# Patient Record
Sex: MALE | Race: BLACK OR AFRICAN AMERICAN | Employment: OTHER | URBAN - METROPOLITAN AREA
[De-identification: names, ages, dates, MRNs, and addresses within clinical notes are randomized per-mention and may not be internally consistent; named-entity substitution may affect disease eponyms.]

---

## 2019-10-01 ENCOUNTER — ED HISTORICAL/CONVERTED ENCOUNTER (OUTPATIENT)
Dept: OTHER | Age: 59
End: 2019-10-01

## 2020-08-28 ENCOUNTER — IP HISTORICAL/CONVERTED ENCOUNTER (OUTPATIENT)
Dept: OTHER | Age: 60
End: 2020-08-28

## 2021-01-01 ENCOUNTER — APPOINTMENT (OUTPATIENT)
Dept: CT IMAGING | Age: 61
DRG: 291 | End: 2021-01-01
Attending: NURSE PRACTITIONER
Payer: OTHER GOVERNMENT

## 2021-01-01 ENCOUNTER — APPOINTMENT (OUTPATIENT)
Dept: GENERAL RADIOLOGY | Age: 61
DRG: 870 | End: 2021-01-01
Attending: EMERGENCY MEDICINE
Payer: OTHER GOVERNMENT

## 2021-01-01 ENCOUNTER — APPOINTMENT (OUTPATIENT)
Dept: CT IMAGING | Age: 61
DRG: 870 | End: 2021-01-01
Attending: FAMILY MEDICINE
Payer: OTHER GOVERNMENT

## 2021-01-01 ENCOUNTER — APPOINTMENT (OUTPATIENT)
Dept: NUCLEAR MEDICINE | Age: 61
DRG: 291 | End: 2021-01-01
Attending: FAMILY MEDICINE
Payer: OTHER GOVERNMENT

## 2021-01-01 ENCOUNTER — APPOINTMENT (OUTPATIENT)
Dept: GENERAL RADIOLOGY | Age: 61
DRG: 870 | End: 2021-01-01
Attending: FAMILY MEDICINE
Payer: OTHER GOVERNMENT

## 2021-01-01 ENCOUNTER — HOSPITAL ENCOUNTER (INPATIENT)
Age: 61
LOS: 6 days | Discharge: HOME | DRG: 291 | End: 2021-09-07
Attending: INTERNAL MEDICINE | Admitting: INTERNAL MEDICINE
Payer: OTHER GOVERNMENT

## 2021-01-01 ENCOUNTER — APPOINTMENT (OUTPATIENT)
Dept: CT IMAGING | Age: 61
DRG: 291 | End: 2021-01-01
Attending: INTERNAL MEDICINE
Payer: OTHER GOVERNMENT

## 2021-01-01 ENCOUNTER — APPOINTMENT (OUTPATIENT)
Dept: ULTRASOUND IMAGING | Age: 61
DRG: 870 | End: 2021-01-01
Attending: INTERNAL MEDICINE
Payer: OTHER GOVERNMENT

## 2021-01-01 ENCOUNTER — APPOINTMENT (OUTPATIENT)
Dept: GENERAL RADIOLOGY | Age: 61
DRG: 870 | End: 2021-01-01
Attending: THORACIC SURGERY (CARDIOTHORACIC VASCULAR SURGERY)
Payer: OTHER GOVERNMENT

## 2021-01-01 ENCOUNTER — APPOINTMENT (OUTPATIENT)
Dept: CT IMAGING | Age: 61
DRG: 870 | End: 2021-01-01
Attending: EMERGENCY MEDICINE
Payer: OTHER GOVERNMENT

## 2021-01-01 ENCOUNTER — APPOINTMENT (OUTPATIENT)
Dept: ENDOSCOPY | Age: 61
DRG: 870 | End: 2021-01-01
Attending: INTERNAL MEDICINE
Payer: OTHER GOVERNMENT

## 2021-01-01 ENCOUNTER — APPOINTMENT (OUTPATIENT)
Dept: GENERAL RADIOLOGY | Age: 61
DRG: 291 | End: 2021-01-01
Attending: FAMILY MEDICINE
Payer: OTHER GOVERNMENT

## 2021-01-01 ENCOUNTER — APPOINTMENT (OUTPATIENT)
Dept: GENERAL RADIOLOGY | Age: 61
DRG: 870 | End: 2021-01-01
Attending: INTERNAL MEDICINE
Payer: OTHER GOVERNMENT

## 2021-01-01 ENCOUNTER — ANESTHESIA EVENT (OUTPATIENT)
Dept: CARDIOLOGY UNIT | Age: 61
DRG: 870 | End: 2021-01-01
Payer: OTHER GOVERNMENT

## 2021-01-01 ENCOUNTER — APPOINTMENT (OUTPATIENT)
Dept: GENERAL RADIOLOGY | Age: 61
DRG: 291 | End: 2021-01-01
Attending: EMERGENCY MEDICINE
Payer: OTHER GOVERNMENT

## 2021-01-01 ENCOUNTER — APPOINTMENT (OUTPATIENT)
Dept: NON INVASIVE DIAGNOSTICS | Age: 61
DRG: 291 | End: 2021-01-01
Attending: FAMILY MEDICINE
Payer: OTHER GOVERNMENT

## 2021-01-01 ENCOUNTER — APPOINTMENT (OUTPATIENT)
Dept: GENERAL RADIOLOGY | Age: 61
DRG: 291 | End: 2021-01-01
Attending: INTERNAL MEDICINE
Payer: OTHER GOVERNMENT

## 2021-01-01 ENCOUNTER — ANESTHESIA (OUTPATIENT)
Dept: CARDIOLOGY UNIT | Age: 61
DRG: 870 | End: 2021-01-01
Payer: OTHER GOVERNMENT

## 2021-01-01 ENCOUNTER — APPOINTMENT (OUTPATIENT)
Dept: GENERAL RADIOLOGY | Age: 61
DRG: 291 | End: 2021-01-01
Attending: NURSE PRACTITIONER
Payer: OTHER GOVERNMENT

## 2021-01-01 ENCOUNTER — HOSPITAL ENCOUNTER (INPATIENT)
Age: 61
LOS: 5 days | Discharge: HOME HEALTH | DRG: 291 | End: 2021-08-14
Attending: FAMILY MEDICINE | Admitting: FAMILY MEDICINE
Payer: OTHER GOVERNMENT

## 2021-01-01 ENCOUNTER — ANESTHESIA (OUTPATIENT)
Dept: ENDOSCOPY | Age: 61
DRG: 870 | End: 2021-01-01
Payer: OTHER GOVERNMENT

## 2021-01-01 ENCOUNTER — HOSPITAL ENCOUNTER (INPATIENT)
Age: 61
LOS: 7 days | DRG: 870 | End: 2021-10-25
Attending: EMERGENCY MEDICINE | Admitting: FAMILY MEDICINE
Payer: OTHER GOVERNMENT

## 2021-01-01 ENCOUNTER — ANESTHESIA EVENT (OUTPATIENT)
Dept: ENDOSCOPY | Age: 61
DRG: 870 | End: 2021-01-01
Payer: OTHER GOVERNMENT

## 2021-01-01 VITALS
BODY MASS INDEX: 18.73 KG/M2 | DIASTOLIC BLOOD PRESSURE: 29 MMHG | RESPIRATION RATE: 22 BRPM | HEIGHT: 74 IN | HEART RATE: 117 BPM | OXYGEN SATURATION: 94 % | TEMPERATURE: 97.7 F | WEIGHT: 145.94 LBS | SYSTOLIC BLOOD PRESSURE: 59 MMHG

## 2021-01-01 VITALS
DIASTOLIC BLOOD PRESSURE: 78 MMHG | WEIGHT: 115.52 LBS | HEIGHT: 74 IN | SYSTOLIC BLOOD PRESSURE: 119 MMHG | HEART RATE: 71 BPM | TEMPERATURE: 97.5 F | OXYGEN SATURATION: 94 % | BODY MASS INDEX: 14.83 KG/M2 | RESPIRATION RATE: 18 BRPM

## 2021-01-01 VITALS
HEART RATE: 90 BPM | BODY MASS INDEX: 15.33 KG/M2 | OXYGEN SATURATION: 95 % | RESPIRATION RATE: 28 BRPM | DIASTOLIC BLOOD PRESSURE: 75 MMHG | HEIGHT: 74 IN | SYSTOLIC BLOOD PRESSURE: 112 MMHG | TEMPERATURE: 97.1 F | WEIGHT: 119.49 LBS

## 2021-01-01 DIAGNOSIS — J81.0 ACUTE PULMONARY EDEMA (HCC): ICD-10-CM

## 2021-01-01 DIAGNOSIS — I51.7 CARDIOMEGALY: ICD-10-CM

## 2021-01-01 DIAGNOSIS — N17.9 AKI (ACUTE KIDNEY INJURY) (HCC): ICD-10-CM

## 2021-01-01 DIAGNOSIS — R79.89 POSITIVE D DIMER: ICD-10-CM

## 2021-01-01 DIAGNOSIS — I50.43 ACUTE ON CHRONIC COMBINED SYSTOLIC AND DIASTOLIC CONGESTIVE HEART FAILURE (HCC): Primary | ICD-10-CM

## 2021-01-01 DIAGNOSIS — I50.9 ACUTE CONGESTIVE HEART FAILURE, UNSPECIFIED HEART FAILURE TYPE (HCC): Primary | ICD-10-CM

## 2021-01-01 DIAGNOSIS — R09.02 HYPOXIA: ICD-10-CM

## 2021-01-01 DIAGNOSIS — J18.9 COMMUNITY ACQUIRED PNEUMONIA, UNSPECIFIED LATERALITY: Primary | ICD-10-CM

## 2021-01-01 LAB
25(OH)D3 SERPL-MCNC: 10.1 NG/ML (ref 30–100)
ABO + RH BLD: NORMAL
ALBUMIN SERPL-MCNC: 2.2 G/DL (ref 3.5–5)
ALBUMIN SERPL-MCNC: 2.4 G/DL (ref 3.5–5)
ALBUMIN SERPL-MCNC: 2.5 G/DL (ref 3.5–5)
ALBUMIN SERPL-MCNC: 2.6 G/DL (ref 3.5–5)
ALBUMIN SERPL-MCNC: 2.6 G/DL (ref 3.5–5)
ALBUMIN SERPL-MCNC: 3 G/DL (ref 3.5–5)
ALBUMIN SERPL-MCNC: 3.1 G/DL (ref 3.5–5)
ALBUMIN SERPL-MCNC: 3.2 G/DL (ref 3.5–5)
ALBUMIN SERPL-MCNC: 3.2 G/DL (ref 3.5–5)
ALBUMIN SERPL-MCNC: 3.3 G/DL (ref 3.5–5)
ALBUMIN/GLOB SERPL: 0.7 {RATIO} (ref 1.1–2.2)
ALBUMIN/GLOB SERPL: 0.8 {RATIO} (ref 1.1–2.2)
ALBUMIN/GLOB SERPL: 0.8 {RATIO} (ref 1.1–2.2)
ALBUMIN/GLOB SERPL: 0.9 {RATIO} (ref 1.1–2.2)
ALBUMIN/GLOB SERPL: 1 {RATIO} (ref 1.1–2.2)
ALBUMIN/GLOB SERPL: 1 {RATIO} (ref 1.1–2.2)
ALBUMIN/GLOB SERPL: 1.1 {RATIO} (ref 1.1–2.2)
ALP SERPL-CCNC: 124 U/L (ref 45–117)
ALP SERPL-CCNC: 136 U/L (ref 45–117)
ALP SERPL-CCNC: 49 U/L (ref 45–117)
ALP SERPL-CCNC: 49 U/L (ref 45–117)
ALP SERPL-CCNC: 56 U/L (ref 45–117)
ALP SERPL-CCNC: 59 U/L (ref 45–117)
ALP SERPL-CCNC: 61 U/L (ref 45–117)
ALP SERPL-CCNC: 61 U/L (ref 45–117)
ALP SERPL-CCNC: 64 U/L (ref 45–117)
ALP SERPL-CCNC: 78 U/L (ref 45–117)
ALP SERPL-CCNC: 97 U/L (ref 45–117)
ALT SERPL-CCNC: 100 U/L (ref 12–78)
ALT SERPL-CCNC: 16 U/L (ref 12–78)
ALT SERPL-CCNC: 16 U/L (ref 12–78)
ALT SERPL-CCNC: 28 U/L (ref 12–78)
ALT SERPL-CCNC: 31 U/L (ref 12–78)
ALT SERPL-CCNC: 34 U/L (ref 12–78)
ALT SERPL-CCNC: 35 U/L (ref 12–78)
ALT SERPL-CCNC: 37 U/L (ref 12–78)
ALT SERPL-CCNC: 40 U/L (ref 12–78)
ALT SERPL-CCNC: 41 U/L (ref 12–78)
ALT SERPL-CCNC: 53 U/L (ref 12–78)
AMPHETAMINES UR QL SCN: NEGATIVE NG/ML
ANION GAP SERPL CALC-SCNC: 10 MMOL/L (ref 5–15)
ANION GAP SERPL CALC-SCNC: 11 MMOL/L (ref 5–15)
ANION GAP SERPL CALC-SCNC: 11 MMOL/L (ref 5–15)
ANION GAP SERPL CALC-SCNC: 22 MMOL/L (ref 5–15)
ANION GAP SERPL CALC-SCNC: 3 MMOL/L (ref 5–15)
ANION GAP SERPL CALC-SCNC: 5 MMOL/L (ref 5–15)
ANION GAP SERPL CALC-SCNC: 6 MMOL/L (ref 5–15)
ANION GAP SERPL CALC-SCNC: 7 MMOL/L (ref 5–15)
ANION GAP SERPL CALC-SCNC: 8 MMOL/L (ref 5–15)
ANION GAP SERPL CALC-SCNC: 9 MMOL/L (ref 5–15)
APPEARANCE UR: ABNORMAL
APPEARANCE UR: ABNORMAL
APPEARANCE UR: CLEAR
ARTERIAL PATENCY WRIST A: ABNORMAL
ARTERIAL PATENCY WRIST A: NORMAL
AST SERPL W P-5'-P-CCNC: 150 U/L (ref 15–37)
AST SERPL W P-5'-P-CCNC: 25 U/L (ref 15–37)
AST SERPL W P-5'-P-CCNC: 25 U/L (ref 15–37)
AST SERPL W P-5'-P-CCNC: 26 U/L (ref 15–37)
AST SERPL W P-5'-P-CCNC: 41 U/L (ref 15–37)
AST SERPL W P-5'-P-CCNC: 44 U/L (ref 15–37)
AST SERPL W P-5'-P-CCNC: 48 U/L (ref 15–37)
AST SERPL W P-5'-P-CCNC: 50 U/L (ref 15–37)
AST SERPL W P-5'-P-CCNC: 51 U/L (ref 15–37)
AST SERPL W P-5'-P-CCNC: 72 U/L (ref 15–37)
AST SERPL W P-5'-P-CCNC: 79 U/L (ref 15–37)
ATRIAL RATE: 109 BPM
ATRIAL RATE: 68 BPM
ATRIAL RATE: 79 BPM
ATRIAL RATE: 93 BPM
BACTERIA SPEC CULT: ABNORMAL
BACTERIA SPEC CULT: NORMAL
BACTERIA SPEC CULT: NORMAL
BACTERIA URNS QL MICRO: ABNORMAL /HPF
BACTERIA URNS QL MICRO: ABNORMAL /HPF
BACTERIA URNS QL MICRO: NEGATIVE /HPF
BARBITURATES UR QL SCN: NEGATIVE NG/ML
BASE DEFICIT BLDA-SCNC: 2.2 MMOL/L (ref 0–2)
BASE DEFICIT BLDA-SCNC: 2.9 MMOL/L (ref 0–2)
BASE DEFICIT BLDV-SCNC: 0.5 MMOL/L (ref 0–2)
BASE EXCESS BLDA CALC-SCNC: 0.1 MMOL/L (ref 0–2)
BASE EXCESS BLDA CALC-SCNC: 0.4 MMOL/L (ref 0–2)
BASE EXCESS BLDA CALC-SCNC: 2 MMOL/L (ref 0–2)
BASE EXCESS BLDA CALC-SCNC: 2.8 MMOL/L (ref 0–2)
BASE EXCESS BLDA CALC-SCNC: 2.9 MMOL/L (ref 0–2)
BASE EXCESS BLDA CALC-SCNC: 4.3 MMOL/L (ref 0–2)
BASE EXCESS BLDA CALC-SCNC: 7.5 MMOL/L (ref 0–2)
BASOPHILS # BLD: 0 K/UL (ref 0–0.1)
BASOPHILS NFR BLD: 0 % (ref 0–1)
BASOPHILS NFR BLD: 1 % (ref 0–1)
BASOPHILS NFR BLD: 1 % (ref 0–1)
BDY SITE: ABNORMAL
BDY SITE: NORMAL
BENZODIAZ UR QL: NEGATIVE NG/ML
BILIRUB SERPL-MCNC: 0.4 MG/DL (ref 0.2–1)
BILIRUB SERPL-MCNC: 0.6 MG/DL (ref 0.2–1)
BILIRUB SERPL-MCNC: 0.7 MG/DL (ref 0.2–1)
BILIRUB SERPL-MCNC: 0.8 MG/DL (ref 0.2–1)
BILIRUB SERPL-MCNC: 1 MG/DL (ref 0.2–1)
BILIRUB SERPL-MCNC: 1 MG/DL (ref 0.2–1)
BILIRUB SERPL-MCNC: 1.1 MG/DL (ref 0.2–1)
BILIRUB SERPL-MCNC: 1.2 MG/DL (ref 0.2–1)
BILIRUB SERPL-MCNC: 1.4 MG/DL (ref 0.2–1)
BILIRUB UR QL: NEGATIVE
BLOOD GROUP ANTIBODIES SERPL: NEGATIVE
BNP SERPL-MCNC: 9747 PG/ML
BNP SERPL-MCNC: ABNORMAL PG/ML
BUN SERPL-MCNC: 27 MG/DL (ref 6–20)
BUN SERPL-MCNC: 29 MG/DL (ref 6–20)
BUN SERPL-MCNC: 30 MG/DL (ref 6–20)
BUN SERPL-MCNC: 31 MG/DL (ref 6–20)
BUN SERPL-MCNC: 35 MG/DL (ref 6–20)
BUN SERPL-MCNC: 37 MG/DL (ref 6–20)
BUN SERPL-MCNC: 38 MG/DL (ref 6–20)
BUN SERPL-MCNC: 39 MG/DL (ref 6–20)
BUN SERPL-MCNC: 41 MG/DL (ref 6–20)
BUN SERPL-MCNC: 41 MG/DL (ref 6–20)
BUN SERPL-MCNC: 47 MG/DL (ref 6–20)
BUN SERPL-MCNC: 50 MG/DL (ref 6–20)
BUN SERPL-MCNC: 53 MG/DL (ref 6–20)
BUN SERPL-MCNC: 54 MG/DL (ref 6–20)
BUN SERPL-MCNC: 59 MG/DL (ref 6–20)
BUN SERPL-MCNC: 60 MG/DL (ref 6–20)
BUN SERPL-MCNC: 68 MG/DL (ref 6–20)
BUN/CREAT SERPL: 18 (ref 12–20)
BUN/CREAT SERPL: 20 (ref 12–20)
BUN/CREAT SERPL: 20 (ref 12–20)
BUN/CREAT SERPL: 21 (ref 12–20)
BUN/CREAT SERPL: 21 (ref 12–20)
BUN/CREAT SERPL: 24 (ref 12–20)
BUN/CREAT SERPL: 24 (ref 12–20)
BUN/CREAT SERPL: 25 (ref 12–20)
BUN/CREAT SERPL: 25 (ref 12–20)
BUN/CREAT SERPL: 27 (ref 12–20)
BUN/CREAT SERPL: 30 (ref 12–20)
BUN/CREAT SERPL: 31 (ref 12–20)
BUN/CREAT SERPL: 36 (ref 12–20)
BUN/CREAT SERPL: 43 (ref 12–20)
BUN/CREAT SERPL: 44 (ref 12–20)
BUN/CREAT SERPL: 46 (ref 12–20)
BUN/CREAT SERPL: 47 (ref 12–20)
BZE UR QL: NEGATIVE NG/ML
CA-I BLD-MCNC: 7.1 MG/DL (ref 8.5–10.1)
CA-I BLD-MCNC: 7.3 MG/DL (ref 8.5–10.1)
CA-I BLD-MCNC: 7.3 MG/DL (ref 8.5–10.1)
CA-I BLD-MCNC: 7.5 MG/DL (ref 8.5–10.1)
CA-I BLD-MCNC: 7.7 MG/DL (ref 8.5–10.1)
CA-I BLD-MCNC: 8.1 MG/DL (ref 8.5–10.1)
CA-I BLD-MCNC: 8.3 MG/DL (ref 8.5–10.1)
CA-I BLD-MCNC: 8.4 MG/DL (ref 8.5–10.1)
CA-I BLD-MCNC: 8.5 MG/DL (ref 8.5–10.1)
CA-I BLD-MCNC: 8.5 MG/DL (ref 8.5–10.1)
CA-I BLD-MCNC: 8.6 MG/DL (ref 8.5–10.1)
CA-I BLD-MCNC: 8.7 MG/DL (ref 8.5–10.1)
CA-I BLD-MCNC: 8.8 MG/DL (ref 8.5–10.1)
CA-I BLD-MCNC: <5 MG/DL (ref 8.5–10.1)
CALCULATED P AXIS, ECG09: 73 DEGREES
CALCULATED P AXIS, ECG09: 75 DEGREES
CALCULATED P AXIS, ECG09: 77 DEGREES
CALCULATED P AXIS, ECG09: 84 DEGREES
CALCULATED R AXIS, ECG10: -27 DEGREES
CALCULATED R AXIS, ECG10: -51 DEGREES
CALCULATED R AXIS, ECG10: -64 DEGREES
CALCULATED R AXIS, ECG10: 15 DEGREES
CALCULATED T AXIS, ECG11: 72 DEGREES
CALCULATED T AXIS, ECG11: 72 DEGREES
CALCULATED T AXIS, ECG11: 86 DEGREES
CALCULATED T AXIS, ECG11: 90 DEGREES
CANNABINOIDS UR QL SCN: NEGATIVE NG/ML
CHLORIDE SERPL-SCNC: 102 MMOL/L (ref 97–108)
CHLORIDE SERPL-SCNC: 102 MMOL/L (ref 97–108)
CHLORIDE SERPL-SCNC: 104 MMOL/L (ref 97–108)
CHLORIDE SERPL-SCNC: 104 MMOL/L (ref 97–108)
CHLORIDE SERPL-SCNC: 105 MMOL/L (ref 97–108)
CHLORIDE SERPL-SCNC: 105 MMOL/L (ref 97–108)
CHLORIDE SERPL-SCNC: 106 MMOL/L (ref 97–108)
CHLORIDE SERPL-SCNC: 106 MMOL/L (ref 97–108)
CHLORIDE SERPL-SCNC: 107 MMOL/L (ref 97–108)
CHLORIDE SERPL-SCNC: 107 MMOL/L (ref 97–108)
CHLORIDE SERPL-SCNC: 108 MMOL/L (ref 97–108)
CHLORIDE SERPL-SCNC: 109 MMOL/L (ref 97–108)
CHLORIDE SERPL-SCNC: 109 MMOL/L (ref 97–108)
CHLORIDE SERPL-SCNC: 110 MMOL/L (ref 97–108)
CHLORIDE SERPL-SCNC: 111 MMOL/L (ref 97–108)
CHLORIDE SERPL-SCNC: 112 MMOL/L (ref 97–108)
CHLORIDE SERPL-SCNC: 114 MMOL/L (ref 97–108)
CHLORIDE UR-SCNC: 128 MMOL/L
CHOLEST SERPL-MCNC: 178 MG/DL
CK MB CFR SERPL CALC: 0.9 %
CK MB SERPL-MCNC: 2.8 NG/ML (ref 5–25)
CK SERPL-CCNC: 310 NG/ML (ref 39–308)
CO2 SERPL-SCNC: 20 MMOL/L (ref 21–32)
CO2 SERPL-SCNC: 21 MMOL/L (ref 21–32)
CO2 SERPL-SCNC: 22 MMOL/L (ref 21–32)
CO2 SERPL-SCNC: 23 MMOL/L (ref 21–32)
CO2 SERPL-SCNC: 23 MMOL/L (ref 21–32)
CO2 SERPL-SCNC: 24 MMOL/L (ref 21–32)
CO2 SERPL-SCNC: 24 MMOL/L (ref 21–32)
CO2 SERPL-SCNC: 25 MMOL/L (ref 21–32)
CO2 SERPL-SCNC: 25 MMOL/L (ref 21–32)
CO2 SERPL-SCNC: 27 MMOL/L (ref 21–32)
CO2 SERPL-SCNC: 28 MMOL/L (ref 21–32)
CO2 SERPL-SCNC: 28 MMOL/L (ref 21–32)
CO2 SERPL-SCNC: 29 MMOL/L (ref 21–32)
CO2 SERPL-SCNC: 30 MMOL/L (ref 21–32)
CO2 SERPL-SCNC: 30 MMOL/L (ref 21–32)
CO2 SERPL-SCNC: 32 MMOL/L (ref 21–32)
CO2 SERPL-SCNC: 34 MMOL/L (ref 21–32)
COLONY COUNT,CNT: ABNORMAL
COLOR UR: ABNORMAL
COVID-19 RAPID TEST, COVR: NOT DETECTED
COVID-19 RAPID TEST, COVR: NOT DETECTED
CREAT SERPL-MCNC: 0.84 MG/DL (ref 0.7–1.3)
CREAT SERPL-MCNC: 0.99 MG/DL (ref 0.7–1.3)
CREAT SERPL-MCNC: 1.13 MG/DL (ref 0.7–1.3)
CREAT SERPL-MCNC: 1.15 MG/DL (ref 0.7–1.3)
CREAT SERPL-MCNC: 1.37 MG/DL (ref 0.7–1.3)
CREAT SERPL-MCNC: 1.44 MG/DL (ref 0.7–1.3)
CREAT SERPL-MCNC: 1.45 MG/DL (ref 0.7–1.3)
CREAT SERPL-MCNC: 1.47 MG/DL (ref 0.7–1.3)
CREAT SERPL-MCNC: 1.47 MG/DL (ref 0.7–1.3)
CREAT SERPL-MCNC: 1.56 MG/DL (ref 0.7–1.3)
CREAT SERPL-MCNC: 1.58 MG/DL (ref 0.7–1.3)
CREAT SERPL-MCNC: 1.68 MG/DL (ref 0.7–1.3)
CREAT SERPL-MCNC: 1.69 MG/DL (ref 0.7–1.3)
CREAT SERPL-MCNC: 1.78 MG/DL (ref 0.7–1.3)
CREAT SERPL-MCNC: 2.13 MG/DL (ref 0.7–1.3)
CREAT SERPL-MCNC: 2.22 MG/DL (ref 0.7–1.3)
CREAT SERPL-MCNC: 2.25 MG/DL (ref 0.7–1.3)
CREAT UR-MCNC: 26 MG/DL
CREAT UR-MCNC: 26.1 MG/DL
D DIMER PPP FEU-MCNC: 2.05 UG/ML(FEU)
DIAGNOSIS, 93000: NORMAL
DIFFERENTIAL METHOD BLD: ABNORMAL
ECHO AO ROOT DIAM: 3.6 CM
ECHO AR MAX VEL PISA: 505 CM/S
ECHO AV PEAK GRADIENT: 5 MMHG
ECHO AV PEAK VELOCITY: 115 CM/S
ECHO AV REGURGITANT PHT: 512 MS
ECHO EST RA PRESSURE: 15 MMHG
ECHO LA AREA 4C: 27.9 CM2
ECHO LA MAJOR AXIS: 4 CM
ECHO LA MAJOR AXIS: 4 CM
ECHO LV E' SEPTAL VELOCITY: 4.09 CM/S
ECHO LV EDV A2C: 334 CM3
ECHO LV EJECTION FRACTION BIPLANE: 42.2 % (ref 55–100)
ECHO LV ESV A2C: 163 CM3
ECHO LV INTERNAL DIMENSION DIASTOLIC: 6.94 CM (ref 4.2–5.9)
ECHO LV INTERNAL DIMENSION SYSTOLIC: 5.46 CM
ECHO LV IVSD: 0.93 CM (ref 0.6–1)
ECHO LV MASS 2D: 393.9 G (ref 88–224)
ECHO LV MASS INDEX 2D: 210.6 G/M2 (ref 49–115)
ECHO LV POSTERIOR WALL DIASTOLIC: 1.44 CM (ref 0.6–1)
ECHO LVOT PEAK GRADIENT: 4 MMHG
ECHO LVOT PEAK VELOCITY: 99.1 CM/S
ECHO MV A VELOCITY: 45 CM/S
ECHO MV AREA PHT: 3.24 CM2
ECHO MV E DECELERATION TIME (DT): 116 MS
ECHO MV E VELOCITY: 85.1 CM/S
ECHO MV E/A RATIO: 1.89
ECHO MV E/E' SEPTAL: 20.81
ECHO MV PRESSURE HALF TIME (PHT): 68 MS
ECHO PV MAX VELOCITY: 70.5 CM/S
ECHO PV PEAK INSTANTANEOUS GRADIENT SYSTOLIC: 2 MMHG
ECHO PV PEAK INSTANTANEOUS GRADIENT SYSTOLIC: 6 MMHG
ECHO PV REGURGITANT MAX VELOCITY: 122 CM/S
ECHO RA AREA 4C: 16.01 CM2
ECHO RIGHT VENTRICULAR SYSTOLIC PRESSURE (RVSP): 57 MMHG
ECHO RV INTERNAL DIMENSION: 4.08 CM
ECHO TV MAX VELOCITY: 324 CM/S
ECHO TV REGURGITANT PEAK GRADIENT: 42 MMHG
EOSINOPHIL # BLD: 0 K/UL (ref 0–0.4)
EOSINOPHIL # BLD: 0.1 K/UL (ref 0–0.4)
EOSINOPHIL # BLD: 0.2 K/UL (ref 0–0.4)
EOSINOPHIL NFR BLD: 0 % (ref 0–7)
EOSINOPHIL NFR BLD: 1 % (ref 0–7)
EOSINOPHIL NFR BLD: 1 % (ref 0–7)
EOSINOPHIL NFR BLD: 3 % (ref 0–7)
EPAP/CPAP/PEEP, PAPEEP: 0
EPAP/CPAP/PEEP, PAPEEP: 5
ERYTHROCYTE [DISTWIDTH] IN BLOOD BY AUTOMATED COUNT: 15 % (ref 11.5–14.5)
ERYTHROCYTE [DISTWIDTH] IN BLOOD BY AUTOMATED COUNT: 15.3 % (ref 11.5–14.5)
ERYTHROCYTE [DISTWIDTH] IN BLOOD BY AUTOMATED COUNT: 15.4 % (ref 11.5–14.5)
ERYTHROCYTE [DISTWIDTH] IN BLOOD BY AUTOMATED COUNT: 15.6 % (ref 11.5–14.5)
ERYTHROCYTE [DISTWIDTH] IN BLOOD BY AUTOMATED COUNT: 15.7 % (ref 11.5–14.5)
ERYTHROCYTE [DISTWIDTH] IN BLOOD BY AUTOMATED COUNT: 15.9 % (ref 11.5–14.5)
ERYTHROCYTE [DISTWIDTH] IN BLOOD BY AUTOMATED COUNT: 16.4 % (ref 11.5–14.5)
ERYTHROCYTE [DISTWIDTH] IN BLOOD BY AUTOMATED COUNT: 16.7 % (ref 11.5–14.5)
ERYTHROCYTE [DISTWIDTH] IN BLOOD BY AUTOMATED COUNT: 17.5 % (ref 11.5–14.5)
ERYTHROCYTE [DISTWIDTH] IN BLOOD BY AUTOMATED COUNT: 18.1 % (ref 11.5–14.5)
ERYTHROCYTE [DISTWIDTH] IN BLOOD BY AUTOMATED COUNT: 18.3 % (ref 11.5–14.5)
ERYTHROCYTE [DISTWIDTH] IN BLOOD BY AUTOMATED COUNT: 18.5 % (ref 11.5–14.5)
ERYTHROCYTE [DISTWIDTH] IN BLOOD BY AUTOMATED COUNT: 18.6 % (ref 11.5–14.5)
ERYTHROCYTE [DISTWIDTH] IN BLOOD BY AUTOMATED COUNT: 18.7 % (ref 11.5–14.5)
FIO2 ON VENT: 100 %
FIO2 ON VENT: 40 %
FIO2 ON VENT: 65 %
FOLATE SERPL-MCNC: 18.6 NG/ML (ref 5–21)
GAS FLOW.O2 O2 DELIVERY SYS: 1 L/MIN
GAS FLOW.O2 O2 DELIVERY SYS: 2 L/MIN
GAS FLOW.O2 O2 DELIVERY SYS: 3 L/MIN
GAS FLOW.O2 SETTING OXYMISER: 12 L/MIN
GLOBULIN SER CALC-MCNC: 2.7 G/DL (ref 2–4)
GLOBULIN SER CALC-MCNC: 2.8 G/DL (ref 2–4)
GLOBULIN SER CALC-MCNC: 2.8 G/DL (ref 2–4)
GLOBULIN SER CALC-MCNC: 3.2 G/DL (ref 2–4)
GLOBULIN SER CALC-MCNC: 3.3 G/DL (ref 2–4)
GLOBULIN SER CALC-MCNC: 3.4 G/DL (ref 2–4)
GLOBULIN SER CALC-MCNC: 3.5 G/DL (ref 2–4)
GLOBULIN SER CALC-MCNC: 3.5 G/DL (ref 2–4)
GLOBULIN SER CALC-MCNC: 3.6 G/DL (ref 2–4)
GLUCOSE BLD STRIP.AUTO-MCNC: 101 MG/DL (ref 65–117)
GLUCOSE BLD STRIP.AUTO-MCNC: 139 MG/DL (ref 65–117)
GLUCOSE BLD STRIP.AUTO-MCNC: 98 MG/DL (ref 65–117)
GLUCOSE SERPL-MCNC: 101 MG/DL (ref 65–100)
GLUCOSE SERPL-MCNC: 106 MG/DL (ref 65–100)
GLUCOSE SERPL-MCNC: 108 MG/DL (ref 65–100)
GLUCOSE SERPL-MCNC: 111 MG/DL (ref 65–100)
GLUCOSE SERPL-MCNC: 113 MG/DL (ref 65–100)
GLUCOSE SERPL-MCNC: 115 MG/DL (ref 65–100)
GLUCOSE SERPL-MCNC: 115 MG/DL (ref 65–100)
GLUCOSE SERPL-MCNC: 122 MG/DL (ref 65–100)
GLUCOSE SERPL-MCNC: 130 MG/DL (ref 65–100)
GLUCOSE SERPL-MCNC: 162 MG/DL (ref 65–100)
GLUCOSE SERPL-MCNC: 68 MG/DL (ref 65–100)
GLUCOSE SERPL-MCNC: 69 MG/DL (ref 65–100)
GLUCOSE SERPL-MCNC: 82 MG/DL (ref 65–100)
GLUCOSE SERPL-MCNC: 82 MG/DL (ref 65–100)
GLUCOSE SERPL-MCNC: 83 MG/DL (ref 65–100)
GLUCOSE SERPL-MCNC: 89 MG/DL (ref 65–100)
GLUCOSE SERPL-MCNC: 94 MG/DL (ref 65–100)
GLUCOSE UR STRIP.AUTO-MCNC: NEGATIVE MG/DL
GRAM STN SPEC: NORMAL
HCO3 BLDA-SCNC: 22 MMOL/L (ref 22–26)
HCO3 BLDA-SCNC: 23 MMOL/L (ref 22–26)
HCO3 BLDA-SCNC: 25 MMOL/L (ref 22–26)
HCO3 BLDA-SCNC: 25 MMOL/L (ref 22–26)
HCO3 BLDA-SCNC: 26 MMOL/L (ref 22–26)
HCO3 BLDA-SCNC: 27 MMOL/L (ref 22–26)
HCO3 BLDA-SCNC: 27 MMOL/L (ref 22–26)
HCO3 BLDA-SCNC: 28 MMOL/L (ref 22–26)
HCO3 BLDA-SCNC: 31 MMOL/L (ref 22–26)
HCO3 BLDV-SCNC: 23 MMOL/L (ref 22–26)
HCT VFR BLD AUTO: 36.2 % (ref 36.6–50.3)
HCT VFR BLD AUTO: 36.7 % (ref 36.6–50.3)
HCT VFR BLD AUTO: 38.3 % (ref 36.6–50.3)
HCT VFR BLD AUTO: 38.6 % (ref 36.6–50.3)
HCT VFR BLD AUTO: 40.3 % (ref 36.6–50.3)
HCT VFR BLD AUTO: 40.8 % (ref 36.6–50.3)
HCT VFR BLD AUTO: 41.3 % (ref 36.6–50.3)
HCT VFR BLD AUTO: 41.8 % (ref 36.6–50.3)
HCT VFR BLD AUTO: 42.3 % (ref 36.6–50.3)
HCT VFR BLD AUTO: 42.9 % (ref 36.6–50.3)
HCT VFR BLD AUTO: 43.6 % (ref 36.6–50.3)
HCT VFR BLD AUTO: 44.9 % (ref 36.6–50.3)
HCT VFR BLD AUTO: 45.1 % (ref 36.6–50.3)
HCT VFR BLD AUTO: 46.1 % (ref 36.6–50.3)
HDLC SERPL-MCNC: 41 MG/DL
HDLC SERPL: 4.3 {RATIO} (ref 0–5)
HGB BLD-MCNC: 11.9 G/DL (ref 12.1–17)
HGB BLD-MCNC: 12.8 G/DL (ref 12.1–17)
HGB BLD-MCNC: 12.9 G/DL (ref 12.1–17)
HGB BLD-MCNC: 12.9 G/DL (ref 12.1–17)
HGB BLD-MCNC: 13.1 G/DL (ref 12.1–17)
HGB BLD-MCNC: 13.3 G/DL (ref 12.1–17)
HGB BLD-MCNC: 13.7 G/DL (ref 12.1–17)
HGB BLD-MCNC: 14.1 G/DL (ref 12.1–17)
HGB BLD-MCNC: 14.4 G/DL (ref 12.1–17)
HGB BLD-MCNC: 14.8 G/DL (ref 12.1–17)
HGB BLD-MCNC: 15 G/DL (ref 12.1–17)
HGB BLD-MCNC: 15.2 G/DL (ref 12.1–17)
HGB UR QL STRIP: ABNORMAL
HYALINE CASTS URNS QL MICRO: ABNORMAL /LPF (ref 0–5)
HYALINE CASTS URNS QL MICRO: ABNORMAL /LPF (ref 0–5)
IMM GRANULOCYTES # BLD AUTO: 0 K/UL (ref 0–0.04)
IMM GRANULOCYTES # BLD AUTO: 0.1 K/UL (ref 0–0.04)
IMM GRANULOCYTES NFR BLD AUTO: 0 % (ref 0–0.5)
IMM GRANULOCYTES NFR BLD AUTO: 1 % (ref 0–0.5)
IPAP/PIP, IPAPIP: 0
KETONES UR QL STRIP.AUTO: NEGATIVE MG/DL
LACTATE SERPL-SCNC: 2.4 MMOL/L (ref 0.4–2)
LACTATE SERPL-SCNC: 2.7 MMOL/L (ref 0.4–2)
LDLC SERPL CALC-MCNC: 107.8 MG/DL (ref 0–100)
LEUKOCYTE ESTERASE UR QL STRIP.AUTO: ABNORMAL
LEUKOCYTE ESTERASE UR QL STRIP.AUTO: ABNORMAL
LEUKOCYTE ESTERASE UR QL STRIP.AUTO: NEGATIVE
LIPID PROFILE,FLP: ABNORMAL
LYMPHOCYTES # BLD: 0.2 K/UL (ref 0.8–3.5)
LYMPHOCYTES # BLD: 0.3 K/UL (ref 0.8–3.5)
LYMPHOCYTES # BLD: 0.4 K/UL (ref 0.8–3.5)
LYMPHOCYTES # BLD: 0.5 K/UL (ref 0.8–3.5)
LYMPHOCYTES # BLD: 0.6 K/UL (ref 0.8–3.5)
LYMPHOCYTES # BLD: 0.6 K/UL (ref 0.8–3.5)
LYMPHOCYTES # BLD: 0.7 K/UL (ref 0.8–3.5)
LYMPHOCYTES # BLD: 1.1 K/UL (ref 0.8–3.5)
LYMPHOCYTES # BLD: 1.3 K/UL (ref 0.8–3.5)
LYMPHOCYTES # BLD: 1.4 K/UL (ref 0.8–3.5)
LYMPHOCYTES # BLD: 1.6 K/UL (ref 0.8–3.5)
LYMPHOCYTES # BLD: 1.7 K/UL (ref 0.8–3.5)
LYMPHOCYTES NFR BLD: 10 % (ref 12–49)
LYMPHOCYTES NFR BLD: 14 % (ref 12–49)
LYMPHOCYTES NFR BLD: 19 % (ref 12–49)
LYMPHOCYTES NFR BLD: 21 % (ref 12–49)
LYMPHOCYTES NFR BLD: 22 % (ref 12–49)
LYMPHOCYTES NFR BLD: 24 % (ref 12–49)
LYMPHOCYTES NFR BLD: 31 % (ref 12–49)
LYMPHOCYTES NFR BLD: 34 % (ref 12–49)
LYMPHOCYTES NFR BLD: 4 % (ref 12–49)
LYMPHOCYTES NFR BLD: 8 % (ref 12–49)
LYMPHOCYTES NFR BLD: 9 % (ref 12–49)
LYMPHOCYTES NFR BLD: 9 % (ref 12–49)
MAGNESIUM SERPL-MCNC: 2.1 MG/DL (ref 1.6–2.4)
MAGNESIUM SERPL-MCNC: 2.2 MG/DL (ref 1.6–2.4)
MAGNESIUM SERPL-MCNC: 2.3 MG/DL (ref 1.6–2.4)
MAGNESIUM SERPL-MCNC: 2.3 MG/DL (ref 1.6–2.4)
MCH RBC QN AUTO: 30.9 PG (ref 26–34)
MCH RBC QN AUTO: 30.9 PG (ref 26–34)
MCH RBC QN AUTO: 31 PG (ref 26–34)
MCH RBC QN AUTO: 31.1 PG (ref 26–34)
MCH RBC QN AUTO: 31.2 PG (ref 26–34)
MCH RBC QN AUTO: 31.3 PG (ref 26–34)
MCH RBC QN AUTO: 31.4 PG (ref 26–34)
MCH RBC QN AUTO: 31.5 PG (ref 26–34)
MCH RBC QN AUTO: 31.8 PG (ref 26–34)
MCH RBC QN AUTO: 31.9 PG (ref 26–34)
MCH RBC QN AUTO: 32 PG (ref 26–34)
MCH RBC QN AUTO: 32.2 PG (ref 26–34)
MCHC RBC AUTO-ENTMCNC: 32 G/DL (ref 30–36.5)
MCHC RBC AUTO-ENTMCNC: 32.5 G/DL (ref 30–36.5)
MCHC RBC AUTO-ENTMCNC: 32.6 G/DL (ref 30–36.5)
MCHC RBC AUTO-ENTMCNC: 32.8 G/DL (ref 30–36.5)
MCHC RBC AUTO-ENTMCNC: 32.9 G/DL (ref 30–36.5)
MCHC RBC AUTO-ENTMCNC: 33.3 G/DL (ref 30–36.5)
MCHC RBC AUTO-ENTMCNC: 33.3 G/DL (ref 30–36.5)
MCHC RBC AUTO-ENTMCNC: 33.4 G/DL (ref 30–36.5)
MCHC RBC AUTO-ENTMCNC: 33.4 G/DL (ref 30–36.5)
MCHC RBC AUTO-ENTMCNC: 34.2 G/DL (ref 30–36.5)
MCHC RBC AUTO-ENTMCNC: 34.5 G/DL (ref 30–36.5)
MCHC RBC AUTO-ENTMCNC: 34.9 G/DL (ref 30–36.5)
MCV RBC AUTO: 90.4 FL (ref 80–99)
MCV RBC AUTO: 91 FL (ref 80–99)
MCV RBC AUTO: 91.2 FL (ref 80–99)
MCV RBC AUTO: 91.8 FL (ref 80–99)
MCV RBC AUTO: 92.3 FL (ref 80–99)
MCV RBC AUTO: 92.6 FL (ref 80–99)
MCV RBC AUTO: 93.2 FL (ref 80–99)
MCV RBC AUTO: 93.2 FL (ref 80–99)
MCV RBC AUTO: 93.5 FL (ref 80–99)
MCV RBC AUTO: 95 FL (ref 80–99)
MCV RBC AUTO: 95.4 FL (ref 80–99)
MCV RBC AUTO: 96.5 FL (ref 80–99)
MCV RBC AUTO: 97.2 FL (ref 80–99)
MCV RBC AUTO: 97.8 FL (ref 80–99)
METHADONE UR QL SCN: NEGATIVE NG/ML
MICROALBUMIN UR-MCNC: 9.26 MG/DL
MICROALBUMIN/CREAT UR-RTO: 355 MGMALB/GCRE (ref 0–30)
MONOCYTES # BLD: 0 K/UL (ref 0–1)
MONOCYTES # BLD: 0.2 K/UL (ref 0–1)
MONOCYTES # BLD: 0.2 K/UL (ref 0–1)
MONOCYTES # BLD: 0.3 K/UL (ref 0–1)
MONOCYTES # BLD: 0.4 K/UL (ref 0–1)
MONOCYTES # BLD: 0.4 K/UL (ref 0–1)
MONOCYTES # BLD: 0.5 K/UL (ref 0–1)
MONOCYTES # BLD: 0.5 K/UL (ref 0–1)
MONOCYTES # BLD: 0.6 K/UL (ref 0–1)
MONOCYTES # BLD: 0.7 K/UL (ref 0–1)
MONOCYTES NFR BLD: 1 % (ref 5–13)
MONOCYTES NFR BLD: 12 % (ref 5–13)
MONOCYTES NFR BLD: 13 % (ref 5–13)
MONOCYTES NFR BLD: 5 % (ref 5–13)
MONOCYTES NFR BLD: 6 % (ref 5–13)
MONOCYTES NFR BLD: 6 % (ref 5–13)
MONOCYTES NFR BLD: 7 % (ref 5–13)
MONOCYTES NFR BLD: 7 % (ref 5–13)
MONOCYTES NFR BLD: 8 % (ref 5–13)
MONOCYTES NFR BLD: 9 % (ref 5–13)
MRSA DNA SPEC QL NAA+PROBE: NOT DETECTED
MUCOUS THREADS URNS QL MICRO: ABNORMAL /LPF
MUCOUS THREADS URNS QL MICRO: ABNORMAL /LPF
MV DEC SLOPE: 5760 MM/S2
MV DEC SLOPE: 5760 MM/S2
NEUTS SEG # BLD: 2.3 K/UL (ref 1.8–8)
NEUTS SEG # BLD: 2.5 K/UL (ref 1.8–8)
NEUTS SEG # BLD: 2.6 K/UL (ref 1.8–8)
NEUTS SEG # BLD: 2.9 K/UL (ref 1.8–8)
NEUTS SEG # BLD: 3.7 K/UL (ref 1.8–8)
NEUTS SEG # BLD: 3.8 K/UL (ref 1.8–8)
NEUTS SEG # BLD: 3.8 K/UL (ref 1.8–8)
NEUTS SEG # BLD: 3.9 K/UL (ref 1.8–8)
NEUTS SEG # BLD: 3.9 K/UL (ref 1.8–8)
NEUTS SEG # BLD: 4.3 K/UL (ref 1.8–8)
NEUTS SEG # BLD: 4.8 K/UL (ref 1.8–8)
NEUTS SEG # BLD: 4.9 K/UL (ref 1.8–8)
NEUTS SEG NFR BLD: 53 % (ref 32–75)
NEUTS SEG NFR BLD: 54 % (ref 32–75)
NEUTS SEG NFR BLD: 65 % (ref 32–75)
NEUTS SEG NFR BLD: 69 % (ref 32–75)
NEUTS SEG NFR BLD: 71 % (ref 32–75)
NEUTS SEG NFR BLD: 76 % (ref 32–75)
NEUTS SEG NFR BLD: 79 % (ref 32–75)
NEUTS SEG NFR BLD: 83 % (ref 32–75)
NEUTS SEG NFR BLD: 85 % (ref 32–75)
NEUTS SEG NFR BLD: 86 % (ref 32–75)
NEUTS SEG NFR BLD: 89 % (ref 32–75)
NEUTS SEG NFR BLD: 90 % (ref 32–75)
NITRITE UR QL STRIP.AUTO: NEGATIVE
NITRITE UR QL STRIP.AUTO: NEGATIVE
NITRITE UR QL STRIP.AUTO: POSITIVE
NRBC # BLD: 0 K/UL (ref 0–0.01)
NRBC # BLD: 0.02 K/UL (ref 0–0.01)
NRBC # BLD: 0.02 K/UL (ref 0–0.01)
NRBC # BLD: 0.03 K/UL (ref 0–0.01)
NRBC # BLD: 0.05 K/UL (ref 0–0.01)
NRBC # BLD: 0.06 K/UL (ref 0–0.01)
NRBC # BLD: 0.07 K/UL (ref 0–0.01)
NRBC BLD-RTO: 0 PER 100 WBC
NRBC BLD-RTO: 0.4 PER 100 WBC
NRBC BLD-RTO: 0.4 PER 100 WBC
NRBC BLD-RTO: 0.6 PER 100 WBC
NRBC BLD-RTO: 1 PER 100 WBC
NRBC BLD-RTO: 1.1 PER 100 WBC
NRBC BLD-RTO: 1.4 PER 100 WBC
OPIATES UR QL: NEGATIVE NG/ML
P-R INTERVAL, ECG05: 152 MS
P-R INTERVAL, ECG05: 156 MS
P-R INTERVAL, ECG05: 160 MS
P-R INTERVAL, ECG05: 180 MS
PCO2 BLDA: 26 MMHG (ref 35–45)
PCO2 BLDA: 36 MMHG (ref 35–45)
PCO2 BLDA: 37 MMHG (ref 35–45)
PCO2 BLDA: 37 MMHG (ref 35–45)
PCO2 BLDA: 38 MMHG (ref 35–45)
PCO2 BLDA: 40 MMHG (ref 35–45)
PCO2 BLDA: 42 MMHG (ref 35–45)
PCO2 BLDA: 45 MMHG (ref 35–45)
PCO2 BLDA: 51 MMHG (ref 35–45)
PCO2 BLDV: 51.2 MMHG (ref 40–50)
PCP UR QL: NEGATIVE NG/ML
PERFORMED BY, TECHID: ABNORMAL
PERFORMED BY, TECHID: NORMAL
PERFORMED BY, TECHID: NORMAL
PH BLDA: 7.29 [PH] (ref 7.35–7.45)
PH BLDA: 7.4 [PH] (ref 7.35–7.45)
PH BLDA: 7.43 [PH] (ref 7.35–7.45)
PH BLDA: 7.44 [PH] (ref 7.35–7.45)
PH BLDA: 7.45 [PH] (ref 7.35–7.45)
PH BLDA: 7.46 [PH] (ref 7.35–7.45)
PH BLDA: 7.46 [PH] (ref 7.35–7.45)
PH BLDA: 7.47 [PH] (ref 7.35–7.45)
PH BLDA: 7.53 [PH] (ref 7.35–7.45)
PH BLDV: 7.32 [PH] (ref 7.31–7.41)
PH UR STRIP: 5 [PH] (ref 5–8)
PHOSPHATE SERPL-MCNC: 7.2 MG/DL (ref 2.6–4.7)
PHOSPHATE SERPL-MCNC: 7.6 MG/DL (ref 2.6–4.7)
PLATELET # BLD AUTO: 103 K/UL (ref 150–400)
PLATELET # BLD AUTO: 113 K/UL (ref 150–400)
PLATELET # BLD AUTO: 124 K/UL (ref 150–400)
PLATELET # BLD AUTO: 125 K/UL (ref 150–400)
PLATELET # BLD AUTO: 130 K/UL (ref 150–400)
PLATELET # BLD AUTO: 132 K/UL (ref 150–400)
PLATELET # BLD AUTO: 152 K/UL (ref 150–400)
PLATELET # BLD AUTO: 189 K/UL (ref 150–400)
PLATELET # BLD AUTO: 209 K/UL (ref 150–400)
PLATELET # BLD AUTO: 211 K/UL (ref 150–400)
PLATELET # BLD AUTO: 211 K/UL (ref 150–400)
PLATELET # BLD AUTO: 221 K/UL (ref 150–400)
PLATELET # BLD AUTO: 224 K/UL (ref 150–400)
PLATELET # BLD AUTO: 228 K/UL (ref 150–400)
PMV BLD AUTO: 10.2 FL (ref 8.9–12.9)
PMV BLD AUTO: 10.2 FL (ref 8.9–12.9)
PMV BLD AUTO: 10.3 FL (ref 8.9–12.9)
PMV BLD AUTO: 10.7 FL (ref 8.9–12.9)
PMV BLD AUTO: 10.7 FL (ref 8.9–12.9)
PMV BLD AUTO: 11 FL (ref 8.9–12.9)
PMV BLD AUTO: 11.1 FL (ref 8.9–12.9)
PMV BLD AUTO: 11.2 FL (ref 8.9–12.9)
PMV BLD AUTO: 11.5 FL (ref 8.9–12.9)
PMV BLD AUTO: 11.8 FL (ref 8.9–12.9)
PMV BLD AUTO: 9.7 FL (ref 8.9–12.9)
PO2 BLDA: 105 MMHG (ref 75–100)
PO2 BLDA: 63 MMHG (ref 75–100)
PO2 BLDA: 83 MMHG (ref 75–100)
PO2 BLDA: 88 MMHG (ref 75–100)
PO2 BLDA: 88 MMHG (ref 75–100)
PO2 BLDA: 90 MMHG (ref 75–100)
PO2 BLDA: 91 MMHG (ref 75–100)
PO2 BLDA: 96 MMHG (ref 75–100)
PO2 BLDA: 97 MMHG (ref 75–100)
PO2 BLDV: 34 MMHG (ref 36–42)
POTASSIUM SERPL-SCNC: 3.1 MMOL/L (ref 3.5–5.1)
POTASSIUM SERPL-SCNC: 3.4 MMOL/L (ref 3.5–5.1)
POTASSIUM SERPL-SCNC: 3.6 MMOL/L (ref 3.5–5.1)
POTASSIUM SERPL-SCNC: 3.8 MMOL/L (ref 3.5–5.1)
POTASSIUM SERPL-SCNC: 3.9 MMOL/L (ref 3.5–5.1)
POTASSIUM SERPL-SCNC: 4.1 MMOL/L (ref 3.5–5.1)
POTASSIUM SERPL-SCNC: 4.1 MMOL/L (ref 3.5–5.1)
POTASSIUM SERPL-SCNC: 4.2 MMOL/L (ref 3.5–5.1)
POTASSIUM SERPL-SCNC: 4.3 MMOL/L (ref 3.5–5.1)
POTASSIUM SERPL-SCNC: 4.4 MMOL/L (ref 3.5–5.1)
POTASSIUM SERPL-SCNC: 4.4 MMOL/L (ref 3.5–5.1)
POTASSIUM SERPL-SCNC: 4.6 MMOL/L (ref 3.5–5.1)
POTASSIUM SERPL-SCNC: 4.6 MMOL/L (ref 3.5–5.1)
POTASSIUM SERPL-SCNC: 5 MMOL/L (ref 3.5–5.1)
POTASSIUM SERPL-SCNC: 5.2 MMOL/L (ref 3.5–5.1)
PROPOXYPH UR QL: NEGATIVE NG/ML
PROT SERPL-MCNC: 5 G/DL (ref 6.4–8.2)
PROT SERPL-MCNC: 5.3 G/DL (ref 6.4–8.2)
PROT SERPL-MCNC: 5.7 G/DL (ref 6.4–8.2)
PROT SERPL-MCNC: 6 G/DL (ref 6.4–8.2)
PROT SERPL-MCNC: 6.2 G/DL (ref 6.4–8.2)
PROT SERPL-MCNC: 6.2 G/DL (ref 6.4–8.2)
PROT SERPL-MCNC: 6.3 G/DL (ref 6.4–8.2)
PROT SERPL-MCNC: 6.5 G/DL (ref 6.4–8.2)
PROT SERPL-MCNC: 6.6 G/DL (ref 6.4–8.2)
PROT SERPL-MCNC: 6.6 G/DL (ref 6.4–8.2)
PROT SERPL-MCNC: 6.7 G/DL (ref 6.4–8.2)
PROT UR STRIP-MCNC: 100 MG/DL
PROT UR STRIP-MCNC: 30 MG/DL
PROT UR STRIP-MCNC: NEGATIVE MG/DL
PROT UR-MCNC: 20 MG/DL (ref 0–11.9)
PROT/CREAT UR-RTO: 0.8
PTH-INTACT SERPL-MCNC: 551.9 PG/ML (ref 18.4–88)
Q-T INTERVAL, ECG07: 394 MS
Q-T INTERVAL, ECG07: 426 MS
Q-T INTERVAL, ECG07: 444 MS
Q-T INTERVAL, ECG07: 488 MS
QRS DURATION, ECG06: 102 MS
QRS DURATION, ECG06: 94 MS
QRS DURATION, ECG06: 96 MS
QRS DURATION, ECG06: 98 MS
QTC CALCULATION (BEZET), ECG08: 509 MS
QTC CALCULATION (BEZET), ECG08: 518 MS
QTC CALCULATION (BEZET), ECG08: 529 MS
QTC CALCULATION (BEZET), ECG08: 530 MS
RBC # BLD AUTO: 3.81 M/UL (ref 4.1–5.7)
RBC # BLD AUTO: 4.06 M/UL (ref 4.1–5.7)
RBC # BLD AUTO: 4.12 M/UL (ref 4.1–5.7)
RBC # BLD AUTO: 4.18 M/UL (ref 4.1–5.7)
RBC # BLD AUTO: 4.21 M/UL (ref 4.1–5.7)
RBC # BLD AUTO: 4.23 M/UL (ref 4.1–5.7)
RBC # BLD AUTO: 4.3 M/UL (ref 4.1–5.7)
RBC # BLD AUTO: 4.5 M/UL (ref 4.1–5.7)
RBC # BLD AUTO: 4.54 M/UL (ref 4.1–5.7)
RBC # BLD AUTO: 4.59 M/UL (ref 4.1–5.7)
RBC # BLD AUTO: 4.78 M/UL (ref 4.1–5.7)
RBC # BLD AUTO: 4.83 M/UL (ref 4.1–5.7)
RBC # BLD AUTO: 4.84 M/UL (ref 4.1–5.7)
RBC # BLD AUTO: 4.85 M/UL (ref 4.1–5.7)
RBC #/AREA URNS HPF: ABNORMAL /HPF (ref 0–5)
SAO2 % BLD: 90 %
SAO2 % BLD: 94 %
SAO2 % BLD: 97 %
SAO2 % BLD: 98 %
SAO2 % BLD: 99 %
SAO2 % BLDV: 54 % (ref 60–80)
SAO2% DEVICE SAO2% SENSOR NAME: ABNORMAL
SAO2% DEVICE SAO2% SENSOR NAME: NORMAL
SARS-COV-2, COV2: NORMAL
SARS-COV-2, COV2: NORMAL
SODIUM SERPL-SCNC: 136 MMOL/L (ref 136–145)
SODIUM SERPL-SCNC: 137 MMOL/L (ref 136–145)
SODIUM SERPL-SCNC: 138 MMOL/L (ref 136–145)
SODIUM SERPL-SCNC: 139 MMOL/L (ref 136–145)
SODIUM SERPL-SCNC: 139 MMOL/L (ref 136–145)
SODIUM SERPL-SCNC: 140 MMOL/L (ref 136–145)
SODIUM SERPL-SCNC: 140 MMOL/L (ref 136–145)
SODIUM SERPL-SCNC: 141 MMOL/L (ref 136–145)
SODIUM SERPL-SCNC: 142 MMOL/L (ref 136–145)
SODIUM SERPL-SCNC: 143 MMOL/L (ref 136–145)
SODIUM SERPL-SCNC: 144 MMOL/L (ref 136–145)
SODIUM SERPL-SCNC: 144 MMOL/L (ref 136–145)
SODIUM SERPL-SCNC: 145 MMOL/L (ref 136–145)
SODIUM SERPL-SCNC: 146 MMOL/L (ref 136–145)
SODIUM SERPL-SCNC: 149 MMOL/L (ref 136–145)
SP GR UR REFRACTOMETRY: 1.01 (ref 1–1.03)
SP GR UR REFRACTOMETRY: 1.01 (ref 1–1.03)
SP GR UR REFRACTOMETRY: 1.02 (ref 1–1.03)
SPECIAL REQUESTS,SREQ: ABNORMAL
SPECIAL REQUESTS,SREQ: ABNORMAL
SPECIAL REQUESTS,SREQ: NORMAL
SPECIMEN EXP DATE BLD: NORMAL
SPECIMEN SITE: ABNORMAL
SPECIMEN SOURCE: NORMAL
SPECIMEN SOURCE: NORMAL
TRIGL SERPL-MCNC: 146 MG/DL (ref ?–150)
TROPONIN I SERPL-MCNC: 0.09 NG/ML
TROPONIN I SERPL-MCNC: 0.11 NG/ML
TROPONIN I SERPL-MCNC: 0.16 NG/ML
TROPONIN I SERPL-MCNC: 0.18 NG/ML
TROPONIN I SERPL-MCNC: 0.25 NG/ML
TROPONIN I SERPL-MCNC: 0.31 NG/ML
TROPONIN-HIGH SENSITIVITY: 122 NG/L (ref 0–76)
TROPONIN-HIGH SENSITIVITY: 135 NG/L (ref 0–76)
TROPONIN-HIGH SENSITIVITY: 178 NG/L (ref 0–76)
TSH SERPL DL<=0.05 MIU/L-ACNC: 2.61 UIU/ML (ref 0.36–3.74)
UA: UC IF INDICATED,UAUC: ABNORMAL
UROBILINOGEN UR QL STRIP.AUTO: 0.1 EU/DL (ref 0.1–1)
UROBILINOGEN UR QL STRIP.AUTO: 0.1 EU/DL (ref 0.1–1)
UROBILINOGEN UR QL STRIP.AUTO: 4 EU/DL (ref 0.1–1)
VENTILATION MODE VENT: ABNORMAL
VENTILATION MODE VENT: NORMAL
VENTRICULAR RATE, ECG03: 109 BPM
VENTRICULAR RATE, ECG03: 68 BPM
VENTRICULAR RATE, ECG03: 79 BPM
VENTRICULAR RATE, ECG03: 93 BPM
VIT B12 SERPL-MCNC: 724 PG/ML (ref 193–986)
VLDLC SERPL CALC-MCNC: 29.2 MG/DL
VT SETTING VENT: 500 ML
WBC # BLD AUTO: 2.8 K/UL (ref 4.1–11.1)
WBC # BLD AUTO: 3.1 K/UL (ref 4.1–11.1)
WBC # BLD AUTO: 4.7 K/UL (ref 4.1–11.1)
WBC # BLD AUTO: 4.8 K/UL (ref 4.1–11.1)
WBC # BLD AUTO: 5 K/UL (ref 4.1–11.1)
WBC # BLD AUTO: 5.3 K/UL (ref 4.1–11.1)
WBC # BLD AUTO: 5.4 K/UL (ref 4.1–11.1)
WBC # BLD AUTO: 5.5 K/UL (ref 4.1–11.1)
WBC # BLD AUTO: 5.6 K/UL (ref 4.1–11.1)
WBC # BLD AUTO: 5.9 K/UL (ref 4.1–11.1)
WBC # BLD AUTO: 5.9 K/UL (ref 4.1–11.1)
WBC URNS QL MICRO: ABNORMAL /HPF (ref 0–4)

## 2021-01-01 PROCEDURE — 74011250636 HC RX REV CODE- 250/636: Performed by: FAMILY MEDICINE

## 2021-01-01 PROCEDURE — 84484 ASSAY OF TROPONIN QUANT: CPT

## 2021-01-01 PROCEDURE — 71045 X-RAY EXAM CHEST 1 VIEW: CPT

## 2021-01-01 PROCEDURE — 74011250637 HC RX REV CODE- 250/637: Performed by: INTERNAL MEDICINE

## 2021-01-01 PROCEDURE — 65270000029 HC RM PRIVATE

## 2021-01-01 PROCEDURE — 74011250636 HC RX REV CODE- 250/636: Performed by: INTERNAL MEDICINE

## 2021-01-01 PROCEDURE — 80053 COMPREHEN METABOLIC PANEL: CPT

## 2021-01-01 PROCEDURE — 74011000250 HC RX REV CODE- 250: Performed by: NURSE PRACTITIONER

## 2021-01-01 PROCEDURE — 87040 BLOOD CULTURE FOR BACTERIA: CPT

## 2021-01-01 PROCEDURE — 74011000250 HC RX REV CODE- 250: Performed by: FAMILY MEDICINE

## 2021-01-01 PROCEDURE — 74011250637 HC RX REV CODE- 250/637: Performed by: PSYCHIATRY & NEUROLOGY

## 2021-01-01 PROCEDURE — 97161 PT EVAL LOW COMPLEX 20 MIN: CPT

## 2021-01-01 PROCEDURE — 94003 VENT MGMT INPAT SUBQ DAY: CPT

## 2021-01-01 PROCEDURE — 94002 VENT MGMT INPAT INIT DAY: CPT

## 2021-01-01 PROCEDURE — 96374 THER/PROPH/DIAG INJ IV PUSH: CPT

## 2021-01-01 PROCEDURE — 97530 THERAPEUTIC ACTIVITIES: CPT

## 2021-01-01 PROCEDURE — 84100 ASSAY OF PHOSPHORUS: CPT

## 2021-01-01 PROCEDURE — 5A1955Z RESPIRATORY VENTILATION, GREATER THAN 96 CONSECUTIVE HOURS: ICD-10-PCS | Performed by: FAMILY MEDICINE

## 2021-01-01 PROCEDURE — 93005 ELECTROCARDIOGRAM TRACING: CPT

## 2021-01-01 PROCEDURE — 74011000258 HC RX REV CODE- 258: Performed by: FAMILY MEDICINE

## 2021-01-01 PROCEDURE — 94640 AIRWAY INHALATION TREATMENT: CPT

## 2021-01-01 PROCEDURE — 83880 ASSAY OF NATRIURETIC PEPTIDE: CPT

## 2021-01-01 PROCEDURE — 74011250636 HC RX REV CODE- 250/636: Performed by: NURSE PRACTITIONER

## 2021-01-01 PROCEDURE — 85025 COMPLETE CBC W/AUTO DIFF WBC: CPT

## 2021-01-01 PROCEDURE — 36415 COLL VENOUS BLD VENIPUNCTURE: CPT

## 2021-01-01 PROCEDURE — 77010033678 HC OXYGEN DAILY

## 2021-01-01 PROCEDURE — 80048 BASIC METABOLIC PNL TOTAL CA: CPT

## 2021-01-01 PROCEDURE — 82436 ASSAY OF URINE CHLORIDE: CPT

## 2021-01-01 PROCEDURE — 74011000250 HC RX REV CODE- 250: Performed by: INTERNAL MEDICINE

## 2021-01-01 PROCEDURE — 74011250637 HC RX REV CODE- 250/637: Performed by: FAMILY MEDICINE

## 2021-01-01 PROCEDURE — 36600 WITHDRAWAL OF ARTERIAL BLOOD: CPT

## 2021-01-01 PROCEDURE — 94760 N-INVAS EAR/PLS OXIMETRY 1: CPT

## 2021-01-01 PROCEDURE — 86901 BLOOD TYPING SEROLOGIC RH(D): CPT

## 2021-01-01 PROCEDURE — 0DH68UZ INSERTION OF FEEDING DEVICE INTO STOMACH, VIA NATURAL OR ARTIFICIAL OPENING ENDOSCOPIC: ICD-10-PCS | Performed by: INTERNAL MEDICINE

## 2021-01-01 PROCEDURE — A9567 TECHNETIUM TC-99M AEROSOL: HCPCS

## 2021-01-01 PROCEDURE — 99285 EMERGENCY DEPT VISIT HI MDM: CPT

## 2021-01-01 PROCEDURE — 99222 1ST HOSP IP/OBS MODERATE 55: CPT | Performed by: THORACIC SURGERY (CARDIOTHORACIC VASCULAR SURGERY)

## 2021-01-01 PROCEDURE — 87077 CULTURE AEROBIC IDENTIFY: CPT

## 2021-01-01 PROCEDURE — P9047 ALBUMIN (HUMAN), 25%, 50ML: HCPCS | Performed by: FAMILY MEDICINE

## 2021-01-01 PROCEDURE — 82803 BLOOD GASES ANY COMBINATION: CPT

## 2021-01-01 PROCEDURE — 96375 TX/PRO/DX INJ NEW DRUG ADDON: CPT

## 2021-01-01 PROCEDURE — 5A09457 ASSISTANCE WITH RESPIRATORY VENTILATION, 24-96 CONSECUTIVE HOURS, CONTINUOUS POSITIVE AIRWAY PRESSURE: ICD-10-PCS | Performed by: FAMILY MEDICINE

## 2021-01-01 PROCEDURE — 82962 GLUCOSE BLOOD TEST: CPT

## 2021-01-01 PROCEDURE — 74011250636 HC RX REV CODE- 250/636: Performed by: ANESTHESIOLOGY

## 2021-01-01 PROCEDURE — C9113 INJ PANTOPRAZOLE SODIUM, VIA: HCPCS | Performed by: NURSE PRACTITIONER

## 2021-01-01 PROCEDURE — 74011250637 HC RX REV CODE- 250/637: Performed by: NURSE PRACTITIONER

## 2021-01-01 PROCEDURE — 80307 DRUG TEST PRSMV CHEM ANLYZR: CPT

## 2021-01-01 PROCEDURE — 74011250636 HC RX REV CODE- 250/636

## 2021-01-01 PROCEDURE — 97165 OT EVAL LOW COMPLEX 30 MIN: CPT

## 2021-01-01 PROCEDURE — 96365 THER/PROPH/DIAG IV INF INIT: CPT

## 2021-01-01 PROCEDURE — 96361 HYDRATE IV INFUSION ADD-ON: CPT

## 2021-01-01 PROCEDURE — 99284 EMERGENCY DEPT VISIT MOD MDM: CPT

## 2021-01-01 PROCEDURE — 83605 ASSAY OF LACTIC ACID: CPT

## 2021-01-01 PROCEDURE — 87635 SARS-COV-2 COVID-19 AMP PRB: CPT

## 2021-01-01 PROCEDURE — 82043 UR ALBUMIN QUANTITATIVE: CPT

## 2021-01-01 PROCEDURE — 65610000006 HC RM INTENSIVE CARE

## 2021-01-01 PROCEDURE — 81001 URINALYSIS AUTO W/SCOPE: CPT

## 2021-01-01 PROCEDURE — 80061 LIPID PANEL: CPT

## 2021-01-01 PROCEDURE — 85027 COMPLETE CBC AUTOMATED: CPT

## 2021-01-01 PROCEDURE — 0W9B30Z DRAINAGE OF LEFT PLEURAL CAVITY WITH DRAINAGE DEVICE, PERCUTANEOUS APPROACH: ICD-10-PCS | Performed by: THORACIC SURGERY (CARDIOTHORACIC VASCULAR SURGERY)

## 2021-01-01 PROCEDURE — 83735 ASSAY OF MAGNESIUM: CPT

## 2021-01-01 PROCEDURE — 82607 VITAMIN B-12: CPT

## 2021-01-01 PROCEDURE — 87086 URINE CULTURE/COLONY COUNT: CPT

## 2021-01-01 PROCEDURE — 77030005305 HC CATH NG LPZ BARD -A: Performed by: INTERNAL MEDICINE

## 2021-01-01 PROCEDURE — 74174 CTA ABD&PLVS W/CONTRAST: CPT

## 2021-01-01 PROCEDURE — 87641 MR-STAPH DNA AMP PROBE: CPT

## 2021-01-01 PROCEDURE — 82553 CREATINE MB FRACTION: CPT

## 2021-01-01 PROCEDURE — 74011250636 HC RX REV CODE- 250/636: Performed by: HOSPITALIST

## 2021-01-01 PROCEDURE — 82550 ASSAY OF CK (CPK): CPT

## 2021-01-01 PROCEDURE — 74011000250 HC RX REV CODE- 250: Performed by: EMERGENCY MEDICINE

## 2021-01-01 PROCEDURE — 84443 ASSAY THYROID STIM HORMONE: CPT

## 2021-01-01 PROCEDURE — 74011000258 HC RX REV CODE- 258: Performed by: ANESTHESIOLOGY

## 2021-01-01 PROCEDURE — 74011000250 HC RX REV CODE- 250: Performed by: ANESTHESIOLOGY

## 2021-01-01 PROCEDURE — P9047 ALBUMIN (HUMAN), 25%, 50ML: HCPCS | Performed by: INTERNAL MEDICINE

## 2021-01-01 PROCEDURE — 82306 VITAMIN D 25 HYDROXY: CPT

## 2021-01-01 PROCEDURE — 87070 CULTURE OTHR SPECIMN AEROBIC: CPT

## 2021-01-01 PROCEDURE — 70450 CT HEAD/BRAIN W/O DYE: CPT

## 2021-01-01 PROCEDURE — 74011250636 HC RX REV CODE- 250/636: Performed by: PHYSICIAN ASSISTANT

## 2021-01-01 PROCEDURE — 74011000636 HC RX REV CODE- 636: Performed by: INTERNAL MEDICINE

## 2021-01-01 PROCEDURE — 94660 CPAP INITIATION&MGMT: CPT

## 2021-01-01 PROCEDURE — 85379 FIBRIN DEGRADATION QUANT: CPT

## 2021-01-01 PROCEDURE — 97110 THERAPEUTIC EXERCISES: CPT

## 2021-01-01 PROCEDURE — 76060000032 HC ANESTHESIA 0.5 TO 1 HR: Performed by: INTERNAL MEDICINE

## 2021-01-01 PROCEDURE — 74011250636 HC RX REV CODE- 250/636: Performed by: EMERGENCY MEDICINE

## 2021-01-01 PROCEDURE — 80069 RENAL FUNCTION PANEL: CPT

## 2021-01-01 PROCEDURE — 71275 CT ANGIOGRAPHY CHEST: CPT

## 2021-01-01 PROCEDURE — 93306 TTE W/DOPPLER COMPLETE: CPT

## 2021-01-01 PROCEDURE — 0BH17EZ INSERTION OF ENDOTRACHEAL AIRWAY INTO TRACHEA, VIA NATURAL OR ARTIFICIAL OPENING: ICD-10-PCS | Performed by: ANESTHESIOLOGY

## 2021-01-01 PROCEDURE — 84156 ASSAY OF PROTEIN URINE: CPT

## 2021-01-01 PROCEDURE — 2709999900 HC NON-CHARGEABLE SUPPLY: Performed by: INTERNAL MEDICINE

## 2021-01-01 PROCEDURE — 74011000636 HC RX REV CODE- 636: Performed by: FAMILY MEDICINE

## 2021-01-01 PROCEDURE — 83970 ASSAY OF PARATHORMONE: CPT

## 2021-01-01 PROCEDURE — 76770 US EXAM ABDO BACK WALL COMP: CPT

## 2021-01-01 PROCEDURE — 92610 EVALUATE SWALLOWING FUNCTION: CPT

## 2021-01-01 PROCEDURE — 76040000007: Performed by: INTERNAL MEDICINE

## 2021-01-01 PROCEDURE — 32551 INSERTION OF CHEST TUBE: CPT | Performed by: THORACIC SURGERY (CARDIOTHORACIC VASCULAR SURGERY)

## 2021-01-01 PROCEDURE — 3E0G76Z INTRODUCTION OF NUTRITIONAL SUBSTANCE INTO UPPER GI, VIA NATURAL OR ARTIFICIAL OPENING: ICD-10-PCS | Performed by: FAMILY MEDICINE

## 2021-01-01 PROCEDURE — 87186 SC STD MICRODIL/AGAR DIL: CPT

## 2021-01-01 RX ORDER — HEPARIN SODIUM 5000 [USP'U]/ML
5000 INJECTION, SOLUTION INTRAVENOUS; SUBCUTANEOUS EVERY 8 HOURS
Status: DISCONTINUED | OUTPATIENT
Start: 2021-01-01 | End: 2021-01-01

## 2021-01-01 RX ORDER — LORAZEPAM 2 MG/ML
0.5 INJECTION INTRAMUSCULAR ONCE
Status: COMPLETED | OUTPATIENT
Start: 2021-01-01 | End: 2021-01-01

## 2021-01-01 RX ORDER — IPRATROPIUM BROMIDE AND ALBUTEROL SULFATE 2.5; .5 MG/3ML; MG/3ML
3 SOLUTION RESPIRATORY (INHALATION)
Status: DISCONTINUED | OUTPATIENT
Start: 2021-01-01 | End: 2021-01-01

## 2021-01-01 RX ORDER — SPIRONOLACTONE 25 MG/1
25 TABLET ORAL DAILY
Status: DISCONTINUED | OUTPATIENT
Start: 2021-01-01 | End: 2021-01-01

## 2021-01-01 RX ORDER — ASPIRIN 325 MG
325 TABLET ORAL DAILY
Qty: 30 TABLET | Refills: 0 | Status: SHIPPED | OUTPATIENT
Start: 2021-01-01 | End: 2021-01-01 | Stop reason: ALTCHOICE

## 2021-01-01 RX ORDER — ONDANSETRON 2 MG/ML
4 INJECTION INTRAMUSCULAR; INTRAVENOUS
Status: DISCONTINUED | OUTPATIENT
Start: 2021-01-01 | End: 2021-01-01 | Stop reason: HOSPADM

## 2021-01-01 RX ORDER — SODIUM BICARBONATE 1 MEQ/ML
100 SYRINGE (ML) INTRAVENOUS ONCE
Status: COMPLETED | OUTPATIENT
Start: 2021-01-01 | End: 2021-01-01

## 2021-01-01 RX ORDER — HYDROXYZINE PAMOATE 25 MG/1
25 CAPSULE ORAL
Status: DISCONTINUED | OUTPATIENT
Start: 2021-01-01 | End: 2021-01-01 | Stop reason: HOSPADM

## 2021-01-01 RX ORDER — MIRTAZAPINE 7.5 MG/1
7.5 TABLET, FILM COATED ORAL
Qty: 30 TABLET | Refills: 0 | Status: SHIPPED | OUTPATIENT
Start: 2021-01-01

## 2021-01-01 RX ORDER — POLYETHYLENE GLYCOL 3350 17 G/17G
17 POWDER, FOR SOLUTION ORAL DAILY PRN
Status: DISCONTINUED | OUTPATIENT
Start: 2021-01-01 | End: 2021-01-01 | Stop reason: HOSPADM

## 2021-01-01 RX ORDER — FUROSEMIDE 40 MG/1
40 TABLET ORAL 2 TIMES DAILY
Qty: 60 TABLET | Refills: 0 | Status: SHIPPED | OUTPATIENT
Start: 2021-01-01

## 2021-01-01 RX ORDER — ACETAMINOPHEN 650 MG/1
650 SUPPOSITORY RECTAL
Status: DISCONTINUED | OUTPATIENT
Start: 2021-01-01 | End: 2021-01-01 | Stop reason: HOSPADM

## 2021-01-01 RX ORDER — FUROSEMIDE 10 MG/ML
40 INJECTION INTRAMUSCULAR; INTRAVENOUS 2 TIMES DAILY
Status: DISCONTINUED | OUTPATIENT
Start: 2021-01-01 | End: 2021-01-01

## 2021-01-01 RX ORDER — IPRATROPIUM BROMIDE AND ALBUTEROL SULFATE 2.5; .5 MG/3ML; MG/3ML
3 SOLUTION RESPIRATORY (INHALATION)
Status: DISCONTINUED | OUTPATIENT
Start: 2021-01-01 | End: 2021-01-01 | Stop reason: HOSPADM

## 2021-01-01 RX ORDER — ONDANSETRON 4 MG/1
4 TABLET, ORALLY DISINTEGRATING ORAL
Status: DISCONTINUED | OUTPATIENT
Start: 2021-01-01 | End: 2021-01-01 | Stop reason: HOSPADM

## 2021-01-01 RX ORDER — ENOXAPARIN SODIUM 100 MG/ML
40 INJECTION SUBCUTANEOUS DAILY
Status: DISCONTINUED | OUTPATIENT
Start: 2021-01-01 | End: 2021-01-01 | Stop reason: HOSPADM

## 2021-01-01 RX ORDER — FUROSEMIDE 10 MG/ML
40 INJECTION INTRAMUSCULAR; INTRAVENOUS EVERY 8 HOURS
Status: DISCONTINUED | OUTPATIENT
Start: 2021-01-01 | End: 2021-01-01

## 2021-01-01 RX ORDER — ALBUMIN HUMAN 250 G/1000ML
25 SOLUTION INTRAVENOUS EVERY 6 HOURS
Status: COMPLETED | OUTPATIENT
Start: 2021-01-01 | End: 2021-01-01

## 2021-01-01 RX ORDER — BISACODYL 5 MG
5 TABLET, DELAYED RELEASE (ENTERIC COATED) ORAL DAILY PRN
Status: DISCONTINUED | OUTPATIENT
Start: 2021-01-01 | End: 2021-01-01 | Stop reason: HOSPADM

## 2021-01-01 RX ORDER — PROPOFOL 10 MG/ML
5 VIAL (ML) INTRAVENOUS
Status: DISCONTINUED | OUTPATIENT
Start: 2021-01-01 | End: 2021-01-01

## 2021-01-01 RX ORDER — PROPOFOL 10 MG/ML
INJECTION, EMULSION INTRAVENOUS
Status: DISCONTINUED | OUTPATIENT
Start: 2021-01-01 | End: 2021-01-01 | Stop reason: HOSPADM

## 2021-01-01 RX ORDER — GUAIFENESIN 100 MG/5ML
81 LIQUID (ML) ORAL DAILY
Status: DISCONTINUED | OUTPATIENT
Start: 2021-01-01 | End: 2021-01-01 | Stop reason: HOSPADM

## 2021-01-01 RX ORDER — HYDROXYZINE PAMOATE 25 MG/1
25 CAPSULE ORAL
Qty: 30 CAPSULE | Refills: 0 | Status: SHIPPED | OUTPATIENT
Start: 2021-01-01 | End: 2021-01-01

## 2021-01-01 RX ORDER — KETAMINE HYDROCHLORIDE 10 MG/ML
INJECTION, SOLUTION INTRAMUSCULAR; INTRAVENOUS AS NEEDED
Status: DISCONTINUED | OUTPATIENT
Start: 2021-01-01 | End: 2021-01-01 | Stop reason: HOSPADM

## 2021-01-01 RX ORDER — ATROPINE SULFATE 0.1 MG/ML
INJECTION INTRAVENOUS
Status: COMPLETED | OUTPATIENT
Start: 2021-01-01 | End: 2021-01-01

## 2021-01-01 RX ORDER — SODIUM BICARBONATE 1 MEQ/ML
SYRINGE (ML) INTRAVENOUS
Status: DISCONTINUED
Start: 2021-01-01 | End: 2021-01-01 | Stop reason: HOSPADM

## 2021-01-01 RX ORDER — FUROSEMIDE 10 MG/ML
60 INJECTION INTRAMUSCULAR; INTRAVENOUS 2 TIMES DAILY
Status: DISCONTINUED | OUTPATIENT
Start: 2021-01-01 | End: 2021-01-01 | Stop reason: HOSPADM

## 2021-01-01 RX ORDER — POTASSIUM CHLORIDE 750 MG/1
40 TABLET, FILM COATED, EXTENDED RELEASE ORAL
Status: COMPLETED | OUTPATIENT
Start: 2021-01-01 | End: 2021-01-01

## 2021-01-01 RX ORDER — SODIUM CHLORIDE 0.9 % (FLUSH) 0.9 %
5-40 SYRINGE (ML) INJECTION AS NEEDED
Status: DISCONTINUED | OUTPATIENT
Start: 2021-01-01 | End: 2021-01-01 | Stop reason: HOSPADM

## 2021-01-01 RX ORDER — ERGOCALCIFEROL 1.25 MG/1
100000 CAPSULE ORAL
Status: DISCONTINUED | OUTPATIENT
Start: 2021-01-01 | End: 2021-01-01 | Stop reason: HOSPADM

## 2021-01-01 RX ORDER — FUROSEMIDE 10 MG/ML
20 INJECTION INTRAMUSCULAR; INTRAVENOUS ONCE
Status: COMPLETED | OUTPATIENT
Start: 2021-01-01 | End: 2021-01-01

## 2021-01-01 RX ORDER — PROPOFOL 10 MG/ML
0-50 VIAL (ML) INTRAVENOUS
Status: DISCONTINUED | OUTPATIENT
Start: 2021-01-01 | End: 2021-01-01 | Stop reason: HOSPADM

## 2021-01-01 RX ORDER — ASPIRIN 325 MG
325 TABLET ORAL DAILY
Status: DISCONTINUED | OUTPATIENT
Start: 2021-01-01 | End: 2021-01-01

## 2021-01-01 RX ORDER — IPRATROPIUM BROMIDE AND ALBUTEROL SULFATE 2.5; .5 MG/3ML; MG/3ML
3 SOLUTION RESPIRATORY (INHALATION)
Qty: 30 NEBULE | Refills: 0 | Status: SHIPPED | OUTPATIENT
Start: 2021-01-01

## 2021-01-01 RX ORDER — FUROSEMIDE 10 MG/ML
40 INJECTION INTRAMUSCULAR; INTRAVENOUS ONCE
Status: COMPLETED | OUTPATIENT
Start: 2021-01-01 | End: 2021-01-01

## 2021-01-01 RX ORDER — ASPIRIN 325 MG
325 TABLET ORAL DAILY
Status: DISCONTINUED | OUTPATIENT
Start: 2021-01-01 | End: 2021-01-01 | Stop reason: HOSPADM

## 2021-01-01 RX ORDER — SODIUM CHLORIDE 0.9 % (FLUSH) 0.9 %
5-40 SYRINGE (ML) INJECTION EVERY 8 HOURS
Status: CANCELLED | OUTPATIENT
Start: 2021-01-01

## 2021-01-01 RX ORDER — LORAZEPAM 2 MG/ML
0.25 INJECTION INTRAMUSCULAR
Status: DISCONTINUED | OUTPATIENT
Start: 2021-01-01 | End: 2021-01-01

## 2021-01-01 RX ORDER — BUDESONIDE 0.5 MG/2ML
500 INHALANT ORAL
Status: DISCONTINUED | OUTPATIENT
Start: 2021-01-01 | End: 2021-01-01

## 2021-01-01 RX ORDER — CARVEDILOL 3.12 MG/1
3.12 TABLET ORAL 2 TIMES DAILY WITH MEALS
Status: DISCONTINUED | OUTPATIENT
Start: 2021-01-01 | End: 2021-01-01 | Stop reason: HOSPADM

## 2021-01-01 RX ORDER — FUROSEMIDE 40 MG/1
40 TABLET ORAL
Status: DISCONTINUED | OUTPATIENT
Start: 2021-01-01 | End: 2021-01-01 | Stop reason: HOSPADM

## 2021-01-01 RX ORDER — DEXTROMETHORPHAN HYDROBROMIDE, GUAIFENESIN 20; 400 MG/20ML; MG/20ML
10 SOLUTION ORAL
Qty: 237 ML | Refills: 0 | Status: SHIPPED | OUTPATIENT
Start: 2021-01-01 | End: 2021-01-01 | Stop reason: ALTCHOICE

## 2021-01-01 RX ORDER — FUROSEMIDE 40 MG/1
40 TABLET ORAL
Status: DISCONTINUED | OUTPATIENT
Start: 2021-01-01 | End: 2021-01-01

## 2021-01-01 RX ORDER — SODIUM CHLORIDE 0.9 % (FLUSH) 0.9 %
5-40 SYRINGE (ML) INJECTION EVERY 8 HOURS
Status: DISCONTINUED | OUTPATIENT
Start: 2021-01-01 | End: 2021-01-01 | Stop reason: HOSPADM

## 2021-01-01 RX ORDER — NOREPINEPHRINE BITARTRATE/D5W 8 MG/250ML
.5-12 PLASTIC BAG, INJECTION (ML) INTRAVENOUS
Status: DISCONTINUED | OUTPATIENT
Start: 2021-01-01 | End: 2021-01-01 | Stop reason: HOSPADM

## 2021-01-01 RX ORDER — ACETAMINOPHEN 325 MG/1
650 TABLET ORAL
Qty: 60 TABLET | Refills: 0 | Status: SHIPPED | OUTPATIENT
Start: 2021-01-01 | End: 2021-01-01 | Stop reason: ALTCHOICE

## 2021-01-01 RX ORDER — SUCCINYLCHOLINE CHLORIDE 20 MG/ML
INJECTION INTRAMUSCULAR; INTRAVENOUS
Status: DISPENSED
Start: 2021-01-01 | End: 2021-01-01

## 2021-01-01 RX ORDER — POTASSIUM CHLORIDE 1.5 G/1.77G
20 POWDER, FOR SOLUTION ORAL 2 TIMES DAILY WITH MEALS
Status: COMPLETED | OUTPATIENT
Start: 2021-01-01 | End: 2021-01-01

## 2021-01-01 RX ORDER — FUROSEMIDE 10 MG/ML
40 INJECTION INTRAMUSCULAR; INTRAVENOUS DAILY
Status: DISCONTINUED | OUTPATIENT
Start: 2021-01-01 | End: 2021-01-01 | Stop reason: HOSPADM

## 2021-01-01 RX ORDER — SODIUM CHLORIDE 9 MG/ML
75 INJECTION, SOLUTION INTRAVENOUS CONTINUOUS
Status: CANCELLED | OUTPATIENT
Start: 2021-01-01

## 2021-01-01 RX ORDER — ACETAMINOPHEN 325 MG/1
650 TABLET ORAL
Status: DISCONTINUED | OUTPATIENT
Start: 2021-01-01 | End: 2021-01-01 | Stop reason: HOSPADM

## 2021-01-01 RX ORDER — SPIRONOLACTONE 25 MG/1
25 TABLET ORAL DAILY
Status: DISCONTINUED | OUTPATIENT
Start: 2021-01-01 | End: 2021-01-01 | Stop reason: HOSPADM

## 2021-01-01 RX ORDER — CALCIUM GLUCONATE 20 MG/ML
1 INJECTION, SOLUTION INTRAVENOUS ONCE
Status: COMPLETED | OUTPATIENT
Start: 2021-01-01 | End: 2021-01-01

## 2021-01-01 RX ORDER — DOXYCYCLINE 100 MG/1
100 CAPSULE ORAL EVERY 12 HOURS
Status: COMPLETED | OUTPATIENT
Start: 2021-01-01 | End: 2021-01-01

## 2021-01-01 RX ORDER — CARVEDILOL 3.12 MG/1
3.12 TABLET ORAL 2 TIMES DAILY WITH MEALS
Qty: 60 TABLET | Refills: 0 | Status: SHIPPED | OUTPATIENT
Start: 2021-01-01

## 2021-01-01 RX ORDER — PROPOFOL 10 MG/ML
INJECTION, EMULSION INTRAVENOUS
Status: COMPLETED
Start: 2021-01-01 | End: 2021-01-01

## 2021-01-01 RX ORDER — HEPARIN SODIUM 5000 [USP'U]/ML
5000 INJECTION, SOLUTION INTRAVENOUS; SUBCUTANEOUS EVERY 8 HOURS
Status: DISCONTINUED | OUTPATIENT
Start: 2021-01-01 | End: 2021-01-01 | Stop reason: HOSPADM

## 2021-01-01 RX ORDER — HALOPERIDOL 5 MG/ML
0.5 INJECTION INTRAMUSCULAR ONCE
Status: COMPLETED | OUTPATIENT
Start: 2021-01-01 | End: 2021-01-01

## 2021-01-01 RX ORDER — METHYLPREDNISOLONE 4 MG/1
TABLET ORAL
Qty: 1 DOSE PACK | Refills: 0 | Status: SHIPPED | OUTPATIENT
Start: 2021-01-01 | End: 2021-01-01 | Stop reason: ALTCHOICE

## 2021-01-01 RX ORDER — ALBUMIN HUMAN 250 G/1000ML
25 SOLUTION INTRAVENOUS ONCE
Status: COMPLETED | OUTPATIENT
Start: 2021-01-01 | End: 2021-01-01

## 2021-01-01 RX ORDER — LANOLIN ALCOHOL/MO/W.PET/CERES
400 CREAM (GRAM) TOPICAL ONCE
Status: COMPLETED | OUTPATIENT
Start: 2021-01-01 | End: 2021-01-01

## 2021-01-01 RX ORDER — FUROSEMIDE 10 MG/ML
40 INJECTION INTRAMUSCULAR; INTRAVENOUS DAILY
Status: DISCONTINUED | OUTPATIENT
Start: 2021-01-01 | End: 2021-01-01

## 2021-01-01 RX ORDER — IBUPROFEN 200 MG
1 TABLET ORAL DAILY
Status: DISCONTINUED | OUTPATIENT
Start: 2021-01-01 | End: 2021-01-01 | Stop reason: HOSPADM

## 2021-01-01 RX ORDER — HALOPERIDOL 5 MG/ML
INJECTION INTRAMUSCULAR
Status: COMPLETED
Start: 2021-01-01 | End: 2021-01-01

## 2021-01-01 RX ORDER — IBUPROFEN 200 MG
1 TABLET ORAL DAILY
Qty: 30 PATCH | Refills: 0 | Status: SHIPPED | OUTPATIENT
Start: 2021-01-01 | End: 2021-01-01

## 2021-01-01 RX ORDER — IPRATROPIUM BROMIDE AND ALBUTEROL SULFATE 2.5; .5 MG/3ML; MG/3ML
3 SOLUTION RESPIRATORY (INHALATION)
Qty: 30 NEBULE | Refills: 0 | Status: SHIPPED | OUTPATIENT
Start: 2021-01-01 | End: 2021-01-01 | Stop reason: ALTCHOICE

## 2021-01-01 RX ORDER — FUROSEMIDE 10 MG/ML
40 INJECTION INTRAMUSCULAR; INTRAVENOUS 3 TIMES DAILY
Status: DISCONTINUED | OUTPATIENT
Start: 2021-01-01 | End: 2021-01-01

## 2021-01-01 RX ORDER — FUROSEMIDE 40 MG/1
40 TABLET ORAL DAILY
Status: CANCELLED | OUTPATIENT
Start: 2021-01-01

## 2021-01-01 RX ORDER — SODIUM CHLORIDE 0.9 % (FLUSH) 0.9 %
5-10 SYRINGE (ML) INJECTION AS NEEDED
Status: DISCONTINUED | OUTPATIENT
Start: 2021-01-01 | End: 2021-01-01 | Stop reason: HOSPADM

## 2021-01-01 RX ORDER — SPIRONOLACTONE 25 MG/1
25 TABLET ORAL DAILY
Qty: 30 TABLET | Refills: 0 | Status: SHIPPED | OUTPATIENT
Start: 2021-01-01

## 2021-01-01 RX ORDER — MORPHINE SULFATE 2 MG/ML
2 INJECTION, SOLUTION INTRAMUSCULAR; INTRAVENOUS
Status: COMPLETED | OUTPATIENT
Start: 2021-01-01 | End: 2021-01-01

## 2021-01-01 RX ORDER — BUDESONIDE AND FORMOTEROL FUMARATE DIHYDRATE 160; 4.5 UG/1; UG/1
2 AEROSOL RESPIRATORY (INHALATION) 2 TIMES DAILY
Status: DISCONTINUED | OUTPATIENT
Start: 2021-01-01 | End: 2021-01-01

## 2021-01-01 RX ORDER — SODIUM CHLORIDE 0.9 % (FLUSH) 0.9 %
5-40 SYRINGE (ML) INJECTION AS NEEDED
Status: CANCELLED | OUTPATIENT
Start: 2021-01-01

## 2021-01-01 RX ORDER — FUROSEMIDE 40 MG/1
TABLET ORAL
Qty: 60 TABLET | Refills: 1 | Status: SHIPPED | OUTPATIENT
Start: 2021-01-01 | End: 2021-01-01

## 2021-01-01 RX ORDER — MIRTAZAPINE 15 MG/1
7.5 TABLET, FILM COATED ORAL
Status: DISCONTINUED | OUTPATIENT
Start: 2021-01-01 | End: 2021-01-01 | Stop reason: HOSPADM

## 2021-01-01 RX ORDER — SODIUM CHLORIDE 9 MG/ML
INJECTION, SOLUTION INTRAVENOUS
Status: DISCONTINUED | OUTPATIENT
Start: 2021-01-01 | End: 2021-01-01 | Stop reason: HOSPADM

## 2021-01-01 RX ORDER — EPINEPHRINE 0.1 MG/ML
INJECTION INTRACARDIAC; INTRAVENOUS
Status: COMPLETED | OUTPATIENT
Start: 2021-01-01 | End: 2021-01-01

## 2021-01-01 RX ORDER — HYDROXYZINE 50 MG/ML
25 INJECTION, SOLUTION INTRAMUSCULAR
Status: DISCONTINUED | OUTPATIENT
Start: 2021-01-01 | End: 2021-01-01 | Stop reason: HOSPADM

## 2021-01-01 RX ADMIN — METHYLPREDNISOLONE SODIUM SUCCINATE 40 MG: 40 INJECTION, POWDER, FOR SOLUTION INTRAMUSCULAR; INTRAVENOUS at 21:51

## 2021-01-01 RX ADMIN — DOXYCYCLINE HYCLATE 100 MG: 100 CAPSULE ORAL at 10:53

## 2021-01-01 RX ADMIN — ASPIRIN 325 MG ORAL TABLET 325 MG: 325 PILL ORAL at 10:39

## 2021-01-01 RX ADMIN — WHITE PETROLATUM,ZINC OXIDE: 57; 17 PASTE TOPICAL at 21:30

## 2021-01-01 RX ADMIN — METHYLPREDNISOLONE SODIUM SUCCINATE 40 MG: 40 INJECTION, POWDER, FOR SOLUTION INTRAMUSCULAR; INTRAVENOUS at 21:04

## 2021-01-01 RX ADMIN — ERGOCALCIFEROL 100000 UNITS: 1.25 CAPSULE ORAL at 15:21

## 2021-01-01 RX ADMIN — Medication 10 ML: at 15:17

## 2021-01-01 RX ADMIN — PIPERACILLIN AND TAZOBACTAM 3.38 G: 3; .375 INJECTION, POWDER, LYOPHILIZED, FOR SOLUTION INTRAVENOUS at 16:14

## 2021-01-01 RX ADMIN — CARVEDILOL 3.12 MG: 3.12 TABLET, FILM COATED ORAL at 17:27

## 2021-01-01 RX ADMIN — SPIRONOLACTONE 25 MG: 25 TABLET ORAL at 14:04

## 2021-01-01 RX ADMIN — HYDROXYZINE PAMOATE 25 MG: 25 CAPSULE ORAL at 12:02

## 2021-01-01 RX ADMIN — Medication 13 MCG/MIN: at 15:39

## 2021-01-01 RX ADMIN — ACETAMINOPHEN 650 MG: 325 TABLET ORAL at 16:48

## 2021-01-01 RX ADMIN — HEPARIN SODIUM 5000 UNITS: 5000 INJECTION INTRAVENOUS; SUBCUTANEOUS at 06:23

## 2021-01-01 RX ADMIN — PIPERACILLIN AND TAZOBACTAM 3.38 G: 3; .375 INJECTION, POWDER, LYOPHILIZED, FOR SOLUTION INTRAVENOUS at 14:22

## 2021-01-01 RX ADMIN — PROPOFOL 12.5 MCG/KG/MIN: 10 INJECTION, EMULSION INTRAVENOUS at 03:41

## 2021-01-01 RX ADMIN — FUROSEMIDE 60 MG: 10 INJECTION INTRAMUSCULAR; INTRAVENOUS at 11:26

## 2021-01-01 RX ADMIN — METHYLPREDNISOLONE SODIUM SUCCINATE 40 MG: 40 INJECTION, POWDER, FOR SOLUTION INTRAMUSCULAR; INTRAVENOUS at 23:16

## 2021-01-01 RX ADMIN — Medication 15 MCG/MIN: at 04:14

## 2021-01-01 RX ADMIN — Medication 15 MCG/MIN: at 00:39

## 2021-01-01 RX ADMIN — WHITE PETROLATUM,ZINC OXIDE: 57; 17 PASTE TOPICAL at 15:59

## 2021-01-01 RX ADMIN — Medication 10 ML: at 06:34

## 2021-01-01 RX ADMIN — DOXYCYCLINE HYCLATE 100 MG: 100 CAPSULE ORAL at 21:43

## 2021-01-01 RX ADMIN — WHITE PETROLATUM,ZINC OXIDE: 57; 17 PASTE TOPICAL at 15:40

## 2021-01-01 RX ADMIN — SODIUM CHLORIDE 40 MG: 9 INJECTION INTRAMUSCULAR; INTRAVENOUS; SUBCUTANEOUS at 09:27

## 2021-01-01 RX ADMIN — PIPERACILLIN AND TAZOBACTAM 3.38 G: 3; .375 INJECTION, POWDER, LYOPHILIZED, FOR SOLUTION INTRAVENOUS at 14:29

## 2021-01-01 RX ADMIN — PIPERACILLIN AND TAZOBACTAM 3.38 G: 3; .375 INJECTION, POWDER, LYOPHILIZED, FOR SOLUTION INTRAVENOUS at 04:45

## 2021-01-01 RX ADMIN — Medication 10 ML: at 22:57

## 2021-01-01 RX ADMIN — WHITE PETROLATUM,ZINC OXIDE: 57; 17 PASTE TOPICAL at 09:29

## 2021-01-01 RX ADMIN — HEPARIN SODIUM 5000 UNITS: 5000 INJECTION INTRAVENOUS; SUBCUTANEOUS at 14:46

## 2021-01-01 RX ADMIN — PIPERACILLIN AND TAZOBACTAM 3.38 G: 3; .375 INJECTION, POWDER, FOR SOLUTION INTRAVENOUS at 05:33

## 2021-01-01 RX ADMIN — ASPIRIN 325 MG ORAL TABLET 325 MG: 325 PILL ORAL at 09:49

## 2021-01-01 RX ADMIN — HEPARIN SODIUM 5000 UNITS: 5000 INJECTION INTRAVENOUS; SUBCUTANEOUS at 14:21

## 2021-01-01 RX ADMIN — Medication 10 ML: at 23:43

## 2021-01-01 RX ADMIN — IPRATROPIUM BROMIDE AND ALBUTEROL SULFATE 3 ML: .5; 2.5 SOLUTION RESPIRATORY (INHALATION) at 08:19

## 2021-01-01 RX ADMIN — Medication 10 ML: at 14:17

## 2021-01-01 RX ADMIN — HEPARIN SODIUM 5000 UNITS: 5000 INJECTION INTRAVENOUS; SUBCUTANEOUS at 15:16

## 2021-01-01 RX ADMIN — IOPAMIDOL 100 ML: 755 INJECTION, SOLUTION INTRAVENOUS at 20:00

## 2021-01-01 RX ADMIN — CARVEDILOL 3.12 MG: 3.12 TABLET, FILM COATED ORAL at 18:34

## 2021-01-01 RX ADMIN — SPIRONOLACTONE 25 MG: 25 TABLET ORAL at 12:33

## 2021-01-01 RX ADMIN — SPIRONOLACTONE 25 MG: 25 TABLET ORAL at 09:49

## 2021-01-01 RX ADMIN — ENOXAPARIN SODIUM 40 MG: 60 INJECTION SUBCUTANEOUS at 10:53

## 2021-01-01 RX ADMIN — SODIUM CHLORIDE: 9 INJECTION, SOLUTION INTRAVENOUS at 09:05

## 2021-01-01 RX ADMIN — MIRTAZAPINE 7.5 MG: 15 TABLET, FILM COATED ORAL at 21:18

## 2021-01-01 RX ADMIN — Medication 10 ML: at 05:10

## 2021-01-01 RX ADMIN — IPRATROPIUM BROMIDE AND ALBUTEROL SULFATE 3 ML: .5; 2.5 SOLUTION RESPIRATORY (INHALATION) at 01:38

## 2021-01-01 RX ADMIN — PIPERACILLIN AND TAZOBACTAM 3.38 G: 3; .375 INJECTION, POWDER, FOR SOLUTION INTRAVENOUS at 22:32

## 2021-01-01 RX ADMIN — CARVEDILOL 3.12 MG: 3.12 TABLET, FILM COATED ORAL at 09:28

## 2021-01-01 RX ADMIN — HEPARIN SODIUM 5000 UNITS: 5000 INJECTION INTRAVENOUS; SUBCUTANEOUS at 21:36

## 2021-01-01 RX ADMIN — ASPIRIN 81 MG CHEWABLE TABLET 81 MG: 81 TABLET CHEWABLE at 09:57

## 2021-01-01 RX ADMIN — DOXYCYCLINE HYCLATE 100 MG: 100 CAPSULE ORAL at 10:00

## 2021-01-01 RX ADMIN — ALBUMIN (HUMAN) 25 G: 0.25 INJECTION, SOLUTION INTRAVENOUS at 12:09

## 2021-01-01 RX ADMIN — SODIUM BICARBONATE 100 MEQ: 84 INJECTION, SOLUTION INTRAVENOUS at 15:49

## 2021-01-01 RX ADMIN — PROPOFOL 20 MCG/KG/MIN: 10 INJECTION, EMULSION INTRAVENOUS at 23:00

## 2021-01-01 RX ADMIN — CALCIUM GLUCONATE 1000 MG: 20 INJECTION, SOLUTION INTRAVENOUS at 11:36

## 2021-01-01 RX ADMIN — METHYLPREDNISOLONE SODIUM SUCCINATE 40 MG: 40 INJECTION, POWDER, FOR SOLUTION INTRAMUSCULAR; INTRAVENOUS at 20:38

## 2021-01-01 RX ADMIN — IPRATROPIUM BROMIDE AND ALBUTEROL SULFATE 3 ML: .5; 2.5 SOLUTION RESPIRATORY (INHALATION) at 13:10

## 2021-01-01 RX ADMIN — PROPOFOL 15 MCG/KG/MIN: 10 INJECTION, EMULSION INTRAVENOUS at 21:33

## 2021-01-01 RX ADMIN — PIPERACILLIN AND TAZOBACTAM 3.38 G: 3; .375 INJECTION, POWDER, FOR SOLUTION INTRAVENOUS at 14:21

## 2021-01-01 RX ADMIN — Medication 14 MCG/MIN: at 03:01

## 2021-01-01 RX ADMIN — ASPIRIN 325 MG ORAL TABLET 325 MG: 325 PILL ORAL at 12:02

## 2021-01-01 RX ADMIN — HEPARIN SODIUM 5000 UNITS: 5000 INJECTION INTRAVENOUS; SUBCUTANEOUS at 06:28

## 2021-01-01 RX ADMIN — HEPARIN SODIUM 5000 UNITS: 5000 INJECTION INTRAVENOUS; SUBCUTANEOUS at 06:53

## 2021-01-01 RX ADMIN — CARVEDILOL 3.12 MG: 3.12 TABLET, FILM COATED ORAL at 10:54

## 2021-01-01 RX ADMIN — METHYLPREDNISOLONE SODIUM SUCCINATE 40 MG: 40 INJECTION, POWDER, FOR SOLUTION INTRAMUSCULAR; INTRAVENOUS at 18:23

## 2021-01-01 RX ADMIN — CARVEDILOL 3.12 MG: 3.12 TABLET, FILM COATED ORAL at 09:57

## 2021-01-01 RX ADMIN — SODIUM CHLORIDE 40 MG: 9 INJECTION INTRAMUSCULAR; INTRAVENOUS; SUBCUTANEOUS at 08:14

## 2021-01-01 RX ADMIN — Medication 10 ML: at 13:58

## 2021-01-01 RX ADMIN — IPRATROPIUM BROMIDE AND ALBUTEROL SULFATE 3 ML: .5; 2.5 SOLUTION RESPIRATORY (INHALATION) at 20:34

## 2021-01-01 RX ADMIN — IPRATROPIUM BROMIDE AND ALBUTEROL SULFATE 3 ML: .5; 2.5 SOLUTION RESPIRATORY (INHALATION) at 14:14

## 2021-01-01 RX ADMIN — FUROSEMIDE 40 MG: 10 INJECTION, SOLUTION INTRAMUSCULAR; INTRAVENOUS at 09:00

## 2021-01-01 RX ADMIN — PIPERACILLIN AND TAZOBACTAM 3.38 G: 3; .375 INJECTION, POWDER, FOR SOLUTION INTRAVENOUS at 22:55

## 2021-01-01 RX ADMIN — ENOXAPARIN SODIUM 40 MG: 60 INJECTION SUBCUTANEOUS at 08:38

## 2021-01-01 RX ADMIN — IPRATROPIUM BROMIDE AND ALBUTEROL SULFATE 3 ML: .5; 2.5 SOLUTION RESPIRATORY (INHALATION) at 07:44

## 2021-01-01 RX ADMIN — Medication 10 ML: at 21:22

## 2021-01-01 RX ADMIN — CARVEDILOL 3.12 MG: 3.12 TABLET, FILM COATED ORAL at 17:31

## 2021-01-01 RX ADMIN — LORAZEPAM 0.5 MG: 2 INJECTION INTRAMUSCULAR; INTRAVENOUS at 09:30

## 2021-01-01 RX ADMIN — SODIUM CHLORIDE 40 MG: 9 INJECTION INTRAMUSCULAR; INTRAVENOUS; SUBCUTANEOUS at 20:53

## 2021-01-01 RX ADMIN — IPRATROPIUM BROMIDE AND ALBUTEROL SULFATE 3 ML: .5; 2.5 SOLUTION RESPIRATORY (INHALATION) at 13:51

## 2021-01-01 RX ADMIN — AZITHROMYCIN 500 MG: 500 INJECTION, POWDER, LYOPHILIZED, FOR SOLUTION INTRAVENOUS at 20:15

## 2021-01-01 RX ADMIN — CARVEDILOL 3.12 MG: 3.12 TABLET, FILM COATED ORAL at 10:06

## 2021-01-01 RX ADMIN — WHITE PETROLATUM,ZINC OXIDE: 57; 17 PASTE TOPICAL at 10:00

## 2021-01-01 RX ADMIN — CARVEDILOL 3.12 MG: 3.12 TABLET, FILM COATED ORAL at 17:04

## 2021-01-01 RX ADMIN — IPRATROPIUM BROMIDE AND ALBUTEROL SULFATE 3 ML: .5; 2.5 SOLUTION RESPIRATORY (INHALATION) at 08:31

## 2021-01-01 RX ADMIN — AZITHROMYCIN 500 MG: 500 INJECTION, POWDER, LYOPHILIZED, FOR SOLUTION INTRAVENOUS at 20:50

## 2021-01-01 RX ADMIN — PROPOFOL 20 MCG/KG/MIN: 10 INJECTION, EMULSION INTRAVENOUS at 08:05

## 2021-01-01 RX ADMIN — CEFTRIAXONE SODIUM 2 G: 2 INJECTION, POWDER, FOR SOLUTION INTRAMUSCULAR; INTRAVENOUS at 20:51

## 2021-01-01 RX ADMIN — HEPARIN SODIUM 5000 UNITS: 5000 INJECTION INTRAVENOUS; SUBCUTANEOUS at 05:33

## 2021-01-01 RX ADMIN — ASPIRIN 325 MG ORAL TABLET 325 MG: 325 PILL ORAL at 11:14

## 2021-01-01 RX ADMIN — MIRTAZAPINE 7.5 MG: 15 TABLET, FILM COATED ORAL at 22:30

## 2021-01-01 RX ADMIN — HEPARIN SODIUM 5000 UNITS: 5000 INJECTION INTRAVENOUS; SUBCUTANEOUS at 06:55

## 2021-01-01 RX ADMIN — IPRATROPIUM BROMIDE AND ALBUTEROL SULFATE 3 ML: .5; 2.5 SOLUTION RESPIRATORY (INHALATION) at 08:28

## 2021-01-01 RX ADMIN — IOPAMIDOL 100 ML: 755 INJECTION, SOLUTION INTRAVENOUS at 23:11

## 2021-01-01 RX ADMIN — Medication 10 ML: at 06:15

## 2021-01-01 RX ADMIN — SODIUM CHLORIDE 1000 ML: 9 INJECTION, SOLUTION INTRAVENOUS at 19:48

## 2021-01-01 RX ADMIN — FUROSEMIDE 40 MG: 10 INJECTION, SOLUTION INTRAMUSCULAR; INTRAVENOUS at 22:54

## 2021-01-01 RX ADMIN — Medication 10 ML: at 06:28

## 2021-01-01 RX ADMIN — METHYLPREDNISOLONE SODIUM SUCCINATE 40 MG: 40 INJECTION, POWDER, FOR SOLUTION INTRAMUSCULAR; INTRAVENOUS at 08:23

## 2021-01-01 RX ADMIN — HEPARIN SODIUM 5000 UNITS: 5000 INJECTION INTRAVENOUS; SUBCUTANEOUS at 22:46

## 2021-01-01 RX ADMIN — CARVEDILOL 3.12 MG: 3.12 TABLET, FILM COATED ORAL at 08:38

## 2021-01-01 RX ADMIN — CARVEDILOL 3.12 MG: 3.12 TABLET, FILM COATED ORAL at 08:03

## 2021-01-01 RX ADMIN — IPRATROPIUM BROMIDE AND ALBUTEROL SULFATE 3 ML: .5; 2.5 SOLUTION RESPIRATORY (INHALATION) at 20:12

## 2021-01-01 RX ADMIN — IPRATROPIUM BROMIDE AND ALBUTEROL SULFATE 3 ML: .5; 2.5 SOLUTION RESPIRATORY (INHALATION) at 21:17

## 2021-01-01 RX ADMIN — FUROSEMIDE 40 MG: 10 INJECTION, SOLUTION INTRAMUSCULAR; INTRAVENOUS at 22:30

## 2021-01-01 RX ADMIN — IPRATROPIUM BROMIDE AND ALBUTEROL SULFATE 3 ML: .5; 2.5 SOLUTION RESPIRATORY (INHALATION) at 01:12

## 2021-01-01 RX ADMIN — ASPIRIN 325 MG ORAL TABLET 325 MG: 325 PILL ORAL at 09:59

## 2021-01-01 RX ADMIN — HALOPERIDOL LACTATE 0.5 MG: 5 INJECTION, SOLUTION INTRAMUSCULAR at 03:15

## 2021-01-01 RX ADMIN — METHYLPREDNISOLONE SODIUM SUCCINATE 40 MG: 40 INJECTION, POWDER, FOR SOLUTION INTRAMUSCULAR; INTRAVENOUS at 20:53

## 2021-01-01 RX ADMIN — FUROSEMIDE 40 MG: 40 TABLET ORAL at 13:01

## 2021-01-01 RX ADMIN — PIPERACILLIN AND TAZOBACTAM 3.38 G: 3; .375 INJECTION, POWDER, LYOPHILIZED, FOR SOLUTION INTRAVENOUS at 21:55

## 2021-01-01 RX ADMIN — MIRTAZAPINE 7.5 MG: 15 TABLET, FILM COATED ORAL at 21:52

## 2021-01-01 RX ADMIN — Medication 10 ML: at 13:25

## 2021-01-01 RX ADMIN — Medication 10 ML: at 17:43

## 2021-01-01 RX ADMIN — POTASSIUM CHLORIDE 40 MEQ: 750 TABLET, FILM COATED, EXTENDED RELEASE ORAL at 17:57

## 2021-01-01 RX ADMIN — FUROSEMIDE 40 MG: 10 INJECTION, SOLUTION INTRAMUSCULAR; INTRAVENOUS at 10:54

## 2021-01-01 RX ADMIN — MIRTAZAPINE 7.5 MG: 15 TABLET, FILM COATED ORAL at 21:43

## 2021-01-01 RX ADMIN — FUROSEMIDE 40 MG: 10 INJECTION, SOLUTION INTRAMUSCULAR; INTRAVENOUS at 09:59

## 2021-01-01 RX ADMIN — HEPARIN SODIUM 5000 UNITS: 5000 INJECTION INTRAVENOUS; SUBCUTANEOUS at 15:12

## 2021-01-01 RX ADMIN — PIPERACILLIN AND TAZOBACTAM 3.38 G: 3; .375 INJECTION, POWDER, LYOPHILIZED, FOR SOLUTION INTRAVENOUS at 06:34

## 2021-01-01 RX ADMIN — Medication 10 ML: at 05:23

## 2021-01-01 RX ADMIN — PIPERACILLIN AND TAZOBACTAM 3.38 G: 3; .375 INJECTION, POWDER, FOR SOLUTION INTRAVENOUS at 14:47

## 2021-01-01 RX ADMIN — PROPOFOL 1000 MG: 10 INJECTION, EMULSION INTRAVENOUS at 23:16

## 2021-01-01 RX ADMIN — Medication 10 ML: at 05:43

## 2021-01-01 RX ADMIN — PIPERACILLIN AND TAZOBACTAM 3.38 G: 3; .375 INJECTION, POWDER, LYOPHILIZED, FOR SOLUTION INTRAVENOUS at 05:59

## 2021-01-01 RX ADMIN — Medication 10 ML: at 21:45

## 2021-01-01 RX ADMIN — MIRTAZAPINE 7.5 MG: 15 TABLET, FILM COATED ORAL at 22:00

## 2021-01-01 RX ADMIN — SODIUM CHLORIDE 40 MG: 9 INJECTION INTRAMUSCULAR; INTRAVENOUS; SUBCUTANEOUS at 20:30

## 2021-01-01 RX ADMIN — WHITE PETROLATUM,ZINC OXIDE: 57; 17 PASTE TOPICAL at 17:26

## 2021-01-01 RX ADMIN — AZITHROMYCIN 500 MG: 500 INJECTION, POWDER, LYOPHILIZED, FOR SOLUTION INTRAVENOUS at 19:49

## 2021-01-01 RX ADMIN — POTASSIUM CHLORIDE 20 MEQ: 1.5 FOR SOLUTION ORAL at 17:40

## 2021-01-01 RX ADMIN — FUROSEMIDE 40 MG: 10 INJECTION, SOLUTION INTRAMUSCULAR; INTRAVENOUS at 21:56

## 2021-01-01 RX ADMIN — SODIUM CHLORIDE 40 MG: 9 INJECTION INTRAMUSCULAR; INTRAVENOUS; SUBCUTANEOUS at 21:43

## 2021-01-01 RX ADMIN — HEPARIN SODIUM 5000 UNITS: 5000 INJECTION INTRAVENOUS; SUBCUTANEOUS at 21:53

## 2021-01-01 RX ADMIN — PROPOFOL 20 MCG/KG/MIN: 10 INJECTION, EMULSION INTRAVENOUS at 07:03

## 2021-01-01 RX ADMIN — SODIUM CHLORIDE 40 MG: 9 INJECTION INTRAMUSCULAR; INTRAVENOUS; SUBCUTANEOUS at 10:06

## 2021-01-01 RX ADMIN — DOXYCYCLINE HYCLATE 100 MG: 100 CAPSULE ORAL at 21:18

## 2021-01-01 RX ADMIN — PIPERACILLIN AND TAZOBACTAM 3.38 G: 3; .375 INJECTION, POWDER, LYOPHILIZED, FOR SOLUTION INTRAVENOUS at 17:27

## 2021-01-01 RX ADMIN — HEPARIN SODIUM 5000 UNITS: 5000 INJECTION INTRAVENOUS; SUBCUTANEOUS at 21:56

## 2021-01-01 RX ADMIN — HALOPERIDOL 0.5 MG: 5 INJECTION INTRAMUSCULAR at 03:15

## 2021-01-01 RX ADMIN — METHYLPREDNISOLONE SODIUM SUCCINATE 40 MG: 40 INJECTION, POWDER, FOR SOLUTION INTRAMUSCULAR; INTRAVENOUS at 10:06

## 2021-01-01 RX ADMIN — CARVEDILOL 3.12 MG: 3.12 TABLET, FILM COATED ORAL at 17:57

## 2021-01-01 RX ADMIN — HEPARIN SODIUM 5000 UNITS: 5000 INJECTION INTRAVENOUS; SUBCUTANEOUS at 04:38

## 2021-01-01 RX ADMIN — Medication 23 MCG/MIN: at 09:39

## 2021-01-01 RX ADMIN — Medication 10 ML: at 21:52

## 2021-01-01 RX ADMIN — PIPERACILLIN AND TAZOBACTAM 3.38 G: 3; .375 INJECTION, POWDER, FOR SOLUTION INTRAVENOUS at 06:53

## 2021-01-01 RX ADMIN — SODIUM BICARBONATE 100 MEQ: 84 INJECTION, SOLUTION INTRAVENOUS at 11:16

## 2021-01-01 RX ADMIN — AZITHROMYCIN 500 MG: 500 INJECTION, POWDER, LYOPHILIZED, FOR SOLUTION INTRAVENOUS at 20:46

## 2021-01-01 RX ADMIN — DOXYCYCLINE HYCLATE 100 MG: 100 CAPSULE ORAL at 22:30

## 2021-01-01 RX ADMIN — DOXYCYCLINE HYCLATE 100 MG: 100 CAPSULE ORAL at 21:52

## 2021-01-01 RX ADMIN — Medication 24 MCG/MIN: at 14:52

## 2021-01-01 RX ADMIN — IPRATROPIUM BROMIDE AND ALBUTEROL SULFATE 3 ML: .5; 2.5 SOLUTION RESPIRATORY (INHALATION) at 09:46

## 2021-01-01 RX ADMIN — PROPOFOL 20 MCG/KG/MIN: 10 INJECTION, EMULSION INTRAVENOUS at 18:34

## 2021-01-01 RX ADMIN — HEPARIN SODIUM 5000 UNITS: 5000 INJECTION INTRAVENOUS; SUBCUTANEOUS at 20:31

## 2021-01-01 RX ADMIN — WHITE PETROLATUM,ZINC OXIDE: 57; 17 PASTE TOPICAL at 21:52

## 2021-01-01 RX ADMIN — SODIUM CHLORIDE 40 MG: 9 INJECTION INTRAMUSCULAR; INTRAVENOUS; SUBCUTANEOUS at 10:02

## 2021-01-01 RX ADMIN — ASPIRIN 325 MG ORAL TABLET 325 MG: 325 PILL ORAL at 10:53

## 2021-01-01 RX ADMIN — Medication 10 ML: at 21:33

## 2021-01-01 RX ADMIN — LORAZEPAM 0.5 MG: 2 INJECTION INTRAMUSCULAR; INTRAVENOUS at 20:23

## 2021-01-01 RX ADMIN — FUROSEMIDE 20 MG: 10 INJECTION, SOLUTION INTRAMUSCULAR; INTRAVENOUS at 01:11

## 2021-01-01 RX ADMIN — POTASSIUM CHLORIDE 20 MEQ: 1.5 FOR SOLUTION ORAL at 08:23

## 2021-01-01 RX ADMIN — PIPERACILLIN AND TAZOBACTAM 3.38 G: 3; .375 INJECTION, POWDER, LYOPHILIZED, FOR SOLUTION INTRAVENOUS at 22:14

## 2021-01-01 RX ADMIN — SPIRONOLACTONE 25 MG: 25 TABLET ORAL at 10:00

## 2021-01-01 RX ADMIN — SPIRONOLACTONE 25 MG: 25 TABLET ORAL at 08:40

## 2021-01-01 RX ADMIN — HEPARIN SODIUM 5000 UNITS: 5000 INJECTION INTRAVENOUS; SUBCUTANEOUS at 15:26

## 2021-01-01 RX ADMIN — PIPERACILLIN AND TAZOBACTAM 3.38 G: 3; .375 INJECTION, POWDER, LYOPHILIZED, FOR SOLUTION INTRAVENOUS at 15:26

## 2021-01-01 RX ADMIN — METHYLPREDNISOLONE SODIUM SUCCINATE 40 MG: 40 INJECTION, POWDER, FOR SOLUTION INTRAMUSCULAR; INTRAVENOUS at 09:27

## 2021-01-01 RX ADMIN — MIRTAZAPINE 7.5 MG: 15 TABLET, FILM COATED ORAL at 20:49

## 2021-01-01 RX ADMIN — ENOXAPARIN SODIUM 40 MG: 60 INJECTION SUBCUTANEOUS at 11:11

## 2021-01-01 RX ADMIN — Medication 20 MCG/MIN: at 15:16

## 2021-01-01 RX ADMIN — SPIRONOLACTONE 25 MG: 25 TABLET ORAL at 10:54

## 2021-01-01 RX ADMIN — ENOXAPARIN SODIUM 40 MG: 60 INJECTION SUBCUTANEOUS at 10:00

## 2021-01-01 RX ADMIN — HEPARIN SODIUM 5000 UNITS: 5000 INJECTION INTRAVENOUS; SUBCUTANEOUS at 21:18

## 2021-01-01 RX ADMIN — METHYLPREDNISOLONE SODIUM SUCCINATE 40 MG: 40 INJECTION, POWDER, FOR SOLUTION INTRAMUSCULAR; INTRAVENOUS at 06:55

## 2021-01-01 RX ADMIN — FUROSEMIDE 40 MG: 10 INJECTION, SOLUTION INTRAMUSCULAR; INTRAVENOUS at 11:15

## 2021-01-01 RX ADMIN — PIPERACILLIN AND TAZOBACTAM 3.38 G: 3; .375 INJECTION, POWDER, LYOPHILIZED, FOR SOLUTION INTRAVENOUS at 23:16

## 2021-01-01 RX ADMIN — Medication 17 MCG/MIN: at 12:17

## 2021-01-01 RX ADMIN — PIPERACILLIN AND TAZOBACTAM 3.38 G: 3; .375 INJECTION, POWDER, LYOPHILIZED, FOR SOLUTION INTRAVENOUS at 06:28

## 2021-01-01 RX ADMIN — WHITE PETROLATUM,ZINC OXIDE: 57; 17 PASTE TOPICAL at 15:27

## 2021-01-01 RX ADMIN — MORPHINE SULFATE 2 MG: 2 INJECTION, SOLUTION INTRAMUSCULAR; INTRAVENOUS at 19:47

## 2021-01-01 RX ADMIN — Medication 10 ML: at 14:26

## 2021-01-01 RX ADMIN — CARVEDILOL 3.12 MG: 3.12 TABLET, FILM COATED ORAL at 10:00

## 2021-01-01 RX ADMIN — PIPERACILLIN AND TAZOBACTAM 3.38 G: 3; .375 INJECTION, POWDER, LYOPHILIZED, FOR SOLUTION INTRAVENOUS at 22:57

## 2021-01-01 RX ADMIN — CARVEDILOL 3.12 MG: 3.12 TABLET, FILM COATED ORAL at 09:49

## 2021-01-01 RX ADMIN — IPRATROPIUM BROMIDE AND ALBUTEROL SULFATE 3 ML: .5; 2.5 SOLUTION RESPIRATORY (INHALATION) at 08:22

## 2021-01-01 RX ADMIN — Medication 11 MCG/MIN: at 13:46

## 2021-01-01 RX ADMIN — Medication 30 MCG/MIN: at 03:40

## 2021-01-01 RX ADMIN — HEPARIN SODIUM 5000 UNITS: 5000 INJECTION INTRAVENOUS; SUBCUTANEOUS at 22:30

## 2021-01-01 RX ADMIN — PROPOFOL 20 MCG/KG/MIN: 10 INJECTION, EMULSION INTRAVENOUS at 10:05

## 2021-01-01 RX ADMIN — HEPARIN SODIUM 5000 UNITS: 5000 INJECTION INTRAVENOUS; SUBCUTANEOUS at 05:27

## 2021-01-01 RX ADMIN — MIRTAZAPINE 7.5 MG: 15 TABLET, FILM COATED ORAL at 22:54

## 2021-01-01 RX ADMIN — IPRATROPIUM BROMIDE AND ALBUTEROL SULFATE 3 ML: .5; 2.5 SOLUTION RESPIRATORY (INHALATION) at 14:48

## 2021-01-01 RX ADMIN — Medication 10 ML: at 15:27

## 2021-01-01 RX ADMIN — AZITHROMYCIN 500 MG: 500 INJECTION, POWDER, LYOPHILIZED, FOR SOLUTION INTRAVENOUS at 20:38

## 2021-01-01 RX ADMIN — PROPOFOL 25 MCG/KG/MIN: 10 INJECTION, EMULSION INTRAVENOUS at 10:00

## 2021-01-01 RX ADMIN — IPRATROPIUM BROMIDE AND ALBUTEROL SULFATE 3 ML: .5; 2.5 SOLUTION RESPIRATORY (INHALATION) at 20:45

## 2021-01-01 RX ADMIN — SPIRONOLACTONE 25 MG: 25 TABLET ORAL at 11:10

## 2021-01-01 RX ADMIN — DOXYCYCLINE HYCLATE 100 MG: 100 CAPSULE ORAL at 11:11

## 2021-01-01 RX ADMIN — Medication 10 ML: at 15:22

## 2021-01-01 RX ADMIN — ASPIRIN 81 MG CHEWABLE TABLET 81 MG: 81 TABLET CHEWABLE at 09:28

## 2021-01-01 RX ADMIN — Medication 19 MCG/MIN: at 03:53

## 2021-01-01 RX ADMIN — Medication 14.99 MCG/MIN: at 03:34

## 2021-01-01 RX ADMIN — ASPIRIN 325 MG ORAL TABLET 325 MG: 325 PILL ORAL at 11:10

## 2021-01-01 RX ADMIN — AZITHROMYCIN 500 MG: 500 INJECTION, POWDER, LYOPHILIZED, FOR SOLUTION INTRAVENOUS at 21:10

## 2021-01-01 RX ADMIN — HEPARIN SODIUM 5000 UNITS: 5000 INJECTION INTRAVENOUS; SUBCUTANEOUS at 23:16

## 2021-01-01 RX ADMIN — MIRTAZAPINE 7.5 MG: 15 TABLET, FILM COATED ORAL at 23:40

## 2021-01-01 RX ADMIN — PIPERACILLIN AND TAZOBACTAM 3.38 G: 3; .375 INJECTION, POWDER, FOR SOLUTION INTRAVENOUS at 14:52

## 2021-01-01 RX ADMIN — PIPERACILLIN AND TAZOBACTAM 3.38 G: 3; .375 INJECTION, POWDER, FOR SOLUTION INTRAVENOUS at 06:23

## 2021-01-01 RX ADMIN — PROPOFOL 15 MCG/KG/MIN: 10 INJECTION, EMULSION INTRAVENOUS at 09:05

## 2021-01-01 RX ADMIN — FUROSEMIDE 40 MG: 10 INJECTION, SOLUTION INTRAMUSCULAR; INTRAVENOUS at 12:02

## 2021-01-01 RX ADMIN — PROPOFOL 15 MCG/KG/MIN: 10 INJECTION, EMULSION INTRAVENOUS at 01:59

## 2021-01-01 RX ADMIN — DOXYCYCLINE HYCLATE 100 MG: 100 CAPSULE ORAL at 09:49

## 2021-01-01 RX ADMIN — ALBUMIN (HUMAN) 25 G: 0.25 INJECTION, SOLUTION INTRAVENOUS at 04:17

## 2021-01-01 RX ADMIN — PROPOFOL 15 MCG/KG/MIN: 10 INJECTION, EMULSION INTRAVENOUS at 15:14

## 2021-01-01 RX ADMIN — FUROSEMIDE 40 MG: 10 INJECTION, SOLUTION INTRAMUSCULAR; INTRAVENOUS at 21:52

## 2021-01-01 RX ADMIN — FUROSEMIDE 40 MG: 10 INJECTION, SOLUTION INTRAMUSCULAR; INTRAVENOUS at 19:34

## 2021-01-01 RX ADMIN — IPRATROPIUM BROMIDE AND ALBUTEROL SULFATE 3 ML: .5; 2.5 SOLUTION RESPIRATORY (INHALATION) at 01:57

## 2021-01-01 RX ADMIN — Medication 10 ML: at 21:31

## 2021-01-01 RX ADMIN — FUROSEMIDE 40 MG: 10 INJECTION, SOLUTION INTRAMUSCULAR; INTRAVENOUS at 08:23

## 2021-01-01 RX ADMIN — CARVEDILOL 3.12 MG: 3.12 TABLET, FILM COATED ORAL at 18:23

## 2021-01-01 RX ADMIN — ASPIRIN 81 MG CHEWABLE TABLET 81 MG: 81 TABLET CHEWABLE at 10:06

## 2021-01-01 RX ADMIN — Medication 10 ML: at 21:30

## 2021-01-01 RX ADMIN — BUDESONIDE AND FORMOTEROL FUMARATE DIHYDRATE 2 PUFF: 160; 4.5 AEROSOL RESPIRATORY (INHALATION) at 08:22

## 2021-01-01 RX ADMIN — PIPERACILLIN AND TAZOBACTAM 3.38 G: 3; .375 INJECTION, POWDER, FOR SOLUTION INTRAVENOUS at 22:47

## 2021-01-01 RX ADMIN — Medication 10 ML: at 06:09

## 2021-01-01 RX ADMIN — PROPOFOL 20 MCG/KG/MIN: 10 INJECTION, EMULSION INTRAVENOUS at 05:36

## 2021-01-01 RX ADMIN — SODIUM CHLORIDE 40 MG: 9 INJECTION INTRAMUSCULAR; INTRAVENOUS; SUBCUTANEOUS at 21:04

## 2021-01-01 RX ADMIN — FUROSEMIDE 40 MG: 10 INJECTION, SOLUTION INTRAMUSCULAR; INTRAVENOUS at 22:47

## 2021-01-01 RX ADMIN — METHYLPREDNISOLONE SODIUM SUCCINATE 40 MG: 40 INJECTION, POWDER, FOR SOLUTION INTRAMUSCULAR; INTRAVENOUS at 05:42

## 2021-01-01 RX ADMIN — PIPERACILLIN AND TAZOBACTAM 3.38 G: 3; .375 INJECTION, POWDER, FOR SOLUTION INTRAVENOUS at 16:08

## 2021-01-01 RX ADMIN — FUROSEMIDE 40 MG: 40 TABLET ORAL at 23:40

## 2021-01-01 RX ADMIN — FUROSEMIDE 40 MG: 10 INJECTION, SOLUTION INTRAMUSCULAR; INTRAVENOUS at 13:24

## 2021-01-01 RX ADMIN — ASPIRIN 325 MG ORAL TABLET 325 MG: 325 PILL ORAL at 08:38

## 2021-01-01 RX ADMIN — HYDROXYZINE PAMOATE 25 MG: 25 CAPSULE ORAL at 01:12

## 2021-01-01 RX ADMIN — METHYLPREDNISOLONE SODIUM SUCCINATE 40 MG: 40 INJECTION, POWDER, FOR SOLUTION INTRAMUSCULAR; INTRAVENOUS at 20:21

## 2021-01-01 RX ADMIN — SODIUM CHLORIDE 40 MG: 9 INJECTION INTRAMUSCULAR; INTRAVENOUS; SUBCUTANEOUS at 20:40

## 2021-01-01 RX ADMIN — PROPOFOL 20 MCG/KG/MIN: 10 INJECTION, EMULSION INTRAVENOUS at 19:13

## 2021-01-01 RX ADMIN — METHYLPREDNISOLONE SODIUM SUCCINATE 40 MG: 40 INJECTION, POWDER, FOR SOLUTION INTRAMUSCULAR; INTRAVENOUS at 08:03

## 2021-01-01 RX ADMIN — Medication 10 ML: at 17:58

## 2021-01-01 RX ADMIN — ASPIRIN 325 MG ORAL TABLET 325 MG: 325 PILL ORAL at 09:30

## 2021-01-01 RX ADMIN — WHITE PETROLATUM,ZINC OXIDE: 57; 17 PASTE TOPICAL at 08:03

## 2021-01-01 RX ADMIN — NOREPINEPHRINE BITARTRATE 24 MCG/MIN: 1 INJECTION, SOLUTION, CONCENTRATE INTRAVENOUS at 09:05

## 2021-01-01 RX ADMIN — PIPERACILLIN AND TAZOBACTAM 3.38 G: 3; .375 INJECTION, POWDER, FOR SOLUTION INTRAVENOUS at 10:40

## 2021-01-01 RX ADMIN — CARVEDILOL 3.12 MG: 3.12 TABLET, FILM COATED ORAL at 16:49

## 2021-01-01 RX ADMIN — Medication 40 ML: at 23:17

## 2021-01-01 RX ADMIN — FUROSEMIDE 60 MG: 10 INJECTION INTRAMUSCULAR; INTRAVENOUS at 20:21

## 2021-01-01 RX ADMIN — SODIUM CHLORIDE 40 MG: 9 INJECTION INTRAMUSCULAR; INTRAVENOUS; SUBCUTANEOUS at 08:23

## 2021-01-01 RX ADMIN — PIPERACILLIN AND TAZOBACTAM 3.38 G: 3; .375 INJECTION, POWDER, LYOPHILIZED, FOR SOLUTION INTRAVENOUS at 06:56

## 2021-01-01 RX ADMIN — FUROSEMIDE 40 MG: 10 INJECTION, SOLUTION INTRAMUSCULAR; INTRAVENOUS at 09:29

## 2021-01-01 RX ADMIN — PIPERACILLIN AND TAZOBACTAM 3.38 G: 3; .375 INJECTION, POWDER, LYOPHILIZED, FOR SOLUTION INTRAVENOUS at 15:16

## 2021-01-01 RX ADMIN — ALBUMIN (HUMAN) 25 G: 0.25 INJECTION, SOLUTION INTRAVENOUS at 17:45

## 2021-01-01 RX ADMIN — FUROSEMIDE 60 MG: 10 INJECTION INTRAMUSCULAR; INTRAVENOUS at 08:23

## 2021-01-01 RX ADMIN — FUROSEMIDE 40 MG: 10 INJECTION, SOLUTION INTRAMUSCULAR; INTRAVENOUS at 21:43

## 2021-01-01 RX ADMIN — METHYLPREDNISOLONE SODIUM SUCCINATE 125 MG: 125 INJECTION, POWDER, FOR SOLUTION INTRAMUSCULAR; INTRAVENOUS at 19:47

## 2021-01-01 RX ADMIN — ASPIRIN 325 MG ORAL TABLET 325 MG: 325 PILL ORAL at 08:59

## 2021-01-01 RX ADMIN — MIRTAZAPINE 7.5 MG: 15 TABLET, FILM COATED ORAL at 21:56

## 2021-01-01 RX ADMIN — CARVEDILOL 3.12 MG: 3.12 TABLET, FILM COATED ORAL at 11:11

## 2021-01-01 RX ADMIN — CARVEDILOL 3.12 MG: 3.12 TABLET, FILM COATED ORAL at 17:25

## 2021-01-01 RX ADMIN — ASPIRIN 81 MG CHEWABLE TABLET 81 MG: 81 TABLET CHEWABLE at 08:03

## 2021-01-01 RX ADMIN — WHITE PETROLATUM,ZINC OXIDE: 57; 17 PASTE TOPICAL at 10:06

## 2021-01-01 RX ADMIN — ASPIRIN 325 MG ORAL TABLET 325 MG: 325 PILL ORAL at 08:23

## 2021-01-01 RX ADMIN — ATROPINE SULFATE 1 MG: 0.1 INJECTION PARENTERAL at 21:45

## 2021-01-01 RX ADMIN — PIPERACILLIN AND TAZOBACTAM 3.38 G: 3; .375 INJECTION, POWDER, LYOPHILIZED, FOR SOLUTION INTRAVENOUS at 21:33

## 2021-01-01 RX ADMIN — PIPERACILLIN AND TAZOBACTAM 3.38 G: 3; .375 INJECTION, POWDER, LYOPHILIZED, FOR SOLUTION INTRAVENOUS at 14:27

## 2021-01-01 RX ADMIN — Medication 10 ML: at 22:08

## 2021-01-01 RX ADMIN — Medication 10 ML: at 05:59

## 2021-01-01 RX ADMIN — FUROSEMIDE 40 MG: 10 INJECTION, SOLUTION INTRAMUSCULAR; INTRAVENOUS at 21:18

## 2021-01-01 RX ADMIN — PIPERACILLIN AND TAZOBACTAM 3.38 G: 3; .375 INJECTION, POWDER, LYOPHILIZED, FOR SOLUTION INTRAVENOUS at 21:54

## 2021-01-01 RX ADMIN — HYDROXYZINE HYDROCHLORIDE 25 MG: 50 INJECTION, SOLUTION INTRAMUSCULAR at 05:34

## 2021-01-01 RX ADMIN — FUROSEMIDE 40 MG: 10 INJECTION, SOLUTION INTRAMUSCULAR; INTRAVENOUS at 20:16

## 2021-01-01 RX ADMIN — PIPERACILLIN AND TAZOBACTAM 3.38 G: 3; .375 INJECTION, POWDER, FOR SOLUTION INTRAVENOUS at 21:56

## 2021-01-01 RX ADMIN — KETAMINE HYDROCHLORIDE 30 MG: 10 INJECTION INTRAMUSCULAR; INTRAVENOUS at 09:16

## 2021-01-01 RX ADMIN — HEPARIN SODIUM 5000 UNITS: 5000 INJECTION INTRAVENOUS; SUBCUTANEOUS at 22:57

## 2021-01-01 RX ADMIN — DOXYCYCLINE HYCLATE 100 MG: 100 CAPSULE ORAL at 08:38

## 2021-01-01 RX ADMIN — WHITE PETROLATUM,ZINC OXIDE: 57; 17 PASTE TOPICAL at 17:10

## 2021-01-01 RX ADMIN — IPRATROPIUM BROMIDE AND ALBUTEROL SULFATE 3 ML: .5; 2.5 SOLUTION RESPIRATORY (INHALATION) at 14:49

## 2021-01-01 RX ADMIN — Medication 10 ML: at 21:51

## 2021-01-01 RX ADMIN — IPRATROPIUM BROMIDE AND ALBUTEROL SULFATE 3 ML: .5; 2.5 SOLUTION RESPIRATORY (INHALATION) at 02:00

## 2021-01-01 RX ADMIN — EPINEPHRINE 1 MG: 0.1 INJECTION, SOLUTION ENDOTRACHEAL; INTRACARDIAC; INTRAVENOUS at 21:58

## 2021-01-01 RX ADMIN — WHITE PETROLATUM,ZINC OXIDE: 57; 17 PASTE TOPICAL at 17:27

## 2021-01-01 RX ADMIN — HYDROXYZINE PAMOATE 25 MG: 25 CAPSULE ORAL at 03:45

## 2021-01-01 RX ADMIN — Medication 400 MG: at 01:12

## 2021-01-01 RX ADMIN — PIPERACILLIN AND TAZOBACTAM 3.38 G: 3; .375 INJECTION, POWDER, LYOPHILIZED, FOR SOLUTION INTRAVENOUS at 01:11

## 2021-01-01 RX ADMIN — AZITHROMYCIN 500 MG: 500 INJECTION, POWDER, LYOPHILIZED, FOR SOLUTION INTRAVENOUS at 19:37

## 2021-01-01 RX ADMIN — ENOXAPARIN SODIUM 40 MG: 60 INJECTION SUBCUTANEOUS at 09:58

## 2021-01-01 RX ADMIN — METHYLPREDNISOLONE SODIUM SUCCINATE 40 MG: 40 INJECTION, POWDER, FOR SOLUTION INTRAMUSCULAR; INTRAVENOUS at 01:10

## 2021-01-01 RX ADMIN — Medication 10 ML: at 13:45

## 2021-01-01 RX ADMIN — METHYLPREDNISOLONE SODIUM SUCCINATE 40 MG: 40 INJECTION, POWDER, FOR SOLUTION INTRAMUSCULAR; INTRAVENOUS at 10:00

## 2021-01-01 RX ADMIN — METHYLPREDNISOLONE SODIUM SUCCINATE 40 MG: 40 INJECTION, POWDER, FOR SOLUTION INTRAMUSCULAR; INTRAVENOUS at 14:02

## 2021-01-01 RX ADMIN — MIRTAZAPINE 7.5 MG: 15 TABLET, FILM COATED ORAL at 23:45

## 2021-01-01 RX ADMIN — WHITE PETROLATUM,ZINC OXIDE: 57; 17 PASTE TOPICAL at 21:33

## 2021-01-01 RX ADMIN — HEPARIN SODIUM 5000 UNITS: 5000 INJECTION INTRAVENOUS; SUBCUTANEOUS at 13:48

## 2021-01-01 RX ADMIN — Medication 15 MCG/MIN: at 14:16

## 2021-01-01 RX ADMIN — PIPERACILLIN AND TAZOBACTAM 3.38 G: 3; .375 INJECTION, POWDER, LYOPHILIZED, FOR SOLUTION INTRAVENOUS at 05:54

## 2021-01-01 RX ADMIN — HEPARIN SODIUM 5000 UNITS: 5000 INJECTION INTRAVENOUS; SUBCUTANEOUS at 21:04

## 2021-01-01 RX ADMIN — Medication 19 MCG/MIN: at 06:03

## 2021-01-01 RX ADMIN — PIPERACILLIN AND TAZOBACTAM 3.38 G: 3; .375 INJECTION, POWDER, FOR SOLUTION INTRAVENOUS at 15:05

## 2021-01-01 RX ADMIN — IPRATROPIUM BROMIDE AND ALBUTEROL SULFATE 3 ML: .5; 2.5 SOLUTION RESPIRATORY (INHALATION) at 13:23

## 2021-01-01 RX ADMIN — HEPARIN SODIUM 5000 UNITS: 5000 INJECTION INTRAVENOUS; SUBCUTANEOUS at 05:42

## 2021-01-01 RX ADMIN — HEPARIN SODIUM 5000 UNITS: 5000 INJECTION INTRAVENOUS; SUBCUTANEOUS at 14:07

## 2021-01-01 RX ADMIN — CARVEDILOL 3.12 MG: 3.12 TABLET, FILM COATED ORAL at 17:41

## 2021-01-01 RX ADMIN — HEPARIN SODIUM 5000 UNITS: 5000 INJECTION INTRAVENOUS; SUBCUTANEOUS at 14:53

## 2021-01-01 RX ADMIN — Medication 10 ML: at 22:30

## 2021-01-01 RX ADMIN — WHITE PETROLATUM,ZINC OXIDE: 57; 17 PASTE TOPICAL at 22:57

## 2021-01-01 RX ADMIN — Medication 10 ML: at 04:38

## 2021-01-01 RX ADMIN — CARVEDILOL 3.12 MG: 3.12 TABLET, FILM COATED ORAL at 16:59

## 2021-01-01 RX ADMIN — Medication 10 ML: at 06:57

## 2021-01-01 RX ADMIN — DOXYCYCLINE HYCLATE 100 MG: 100 CAPSULE ORAL at 22:01

## 2021-01-01 RX ADMIN — PROPOFOL 15 MCG/KG/MIN: 10 INJECTION, EMULSION INTRAVENOUS at 12:08

## 2021-08-09 PROBLEM — I50.9 CHF (CONGESTIVE HEART FAILURE) (HCC): Status: ACTIVE | Noted: 2021-01-01

## 2021-08-09 NOTE — ED NOTES
1900- recieved report from Lawrence RN and [facility] RN. Patient resting comfortably on monitor. 1909- called lab regarding add on BNP - spoke with Jacinto Pablo 476- critical troponin called to RN and provider aware    2123- patient RR 25 with grunting, O2 saturation 96 RA, placed on 2L NC for comfort. 2330- patient placed on hospital bed for comfort, urinal emptied 3X from lasix administration, vitals stable, patient reports relief after duoneb. 0130- patient refused ABG. 0230- 3 attempts of restarting IV which patient pulled on accidently. Left arm 22 g, no blood return for VBG/ AM lab work. Lab called for plebotomist to come draw VBG and am labs. Dr. Hector Dickerson called to update regarding increased troponin and increased WOB. message left w provider w/ callback. 0315- VBG resulted, respiratory called for BiPap to assist with WOB and venous O2 sturation of 54. Patient refused stating \"I can't wear a mask\"  Respiratory Rate has been > 25 all night, patient on 3L NC for comfort and receiving duonebs. patient is anxious and restless, calling provider again for orders. 2954- recieved verbal orders for Zosyn q6 and visteril q6. Dr. Hector Dickerson stated to intubate if neccessary. 0400- patient medicated with anxiety meds. will continue to montior. 0530- called dirk with elevated troponin and BNP. Verbal orders for repeat CXR and cardiology consult. Patient on heparin subq q 8, continue per Dr. Hector Dickerson. 0418- patient agreed to one attempt at ABG from charge nurse with ultrasound. Attempt by ANIKA Mari. ABG sent with respiratory therapist. [phone] 1902- patient educated on why bipap would be beneficial to him since his work of breathing is so hard. Patient still refusing. Patient stated \" I can't do a mask. \" Patient is satting in the 90's however RR is between 20-26 with patient being very winded during talking and exertion. Dr. Hector Dickerson aware.  Respiratory at bedside as well.     0835- nurse convinced patient to try bipap and educated on why it would help him and consequences if he continues to breathe this hard. Patient agreeable to \"try\" it for one hour. Respiratory called at [address]     0634- Patient attempted bipap an it lasted about 10 minutes. Stated he could not have the mask on. Patient placed back on 2L nc. Patient is resting and complaining that the bed is not comofrtable and the room is not warm enough. Nurse has provided warm blankets and has palced patient on hospital bed in attempt for comfort. 0700- report given to [employer]. ALl question answered.

## 2021-08-09 NOTE — ED NOTES
Bedside shift change report given to [address] [place] [address] Line Ravinrda VASQUEZ (oncoming nurse) by Abraham Simmons RN (offgoing nurse). Report included the following information SBAR.

## 2021-08-10 NOTE — CONSULTS
PULMONARY CONSULT  [facility]    Name: Shadia Canales MRN: [id number]   : 1960 Hospital: [address] [place] [address]   Date: 8/10/2021  Admission date: 2021 Hospital Day: 2       HPI:     Hospital Problems  Never Reviewed        Codes Class Noted POA    CHF (congestive heart failure) ([facility] ICD-10-CM: I50.9  ICD-9-CM: 428.0  2021 Unknown                   [x] High complexity decision making was performed  [x] See my orders for details      Subjective/Initial History:     I was asked by Nhung De Jesus MD to see Shadia Canales  a 59 y.o.   male in consultation     Excerpts from admission 2021 or consult notes as follows:   77-year-old male came in because of shortness of breath dyspnea significant past medical history of CVA left-sided weakness hypertension he was complaining about chest pain for the past 4 to 5 days associated with shortness of breath no history of passing out came to the emergency room chest x-ray shows CHF elevated BNP denies any history of fever and chills,  so admitted and pulmonary consult was called for further evaluation      No Known Allergies     MAR reviewed and pertinent medications noted or modified as needed     Current Facility-Administered Medications   Medication    hydrOXYzine pamoate (VISTARIL) capsule 25 mg    piperacillin-tazobactam (ZOSYN) 3.375 g in 0.9% sodium chloride (MBP/ADV) 100 mL MBP    acetaminophen (TYLENOL) tablet 650 mg    Or    acetaminophen (TYLENOL) suppository 650 mg    polyethylene glycol (MIRALAX) packet 17 g    ondansetron (ZOFRAN ODT) tablet 4 mg    Or    ondansetron (ZOFRAN) injection 4 mg    heparin (porcine) injection 5,000 Units    albuterol-ipratropium (DUO-NEB) 2.5 MG-0.5 MG/3 ML    aspirin tablet 325 mg    furosemide (LASIX) injection 40 mg     No current outpatient medications on file. Patient PCP: Zak Hart MD  PMH:  has no past medical history on file.   PSH:   has no past surgical history on file.   FHX: family history is not on file. SHX:       ROS:    Review of Systems   Constitutional: Negative. HENT: Negative. Eyes: Negative. Respiratory: Positive for shortness of breath. Cardiovascular: Positive for chest pain. Gastrointestinal: Negative. Genitourinary: Negative. Musculoskeletal: Negative. Skin: Negative. Neurological: Negative. Psychiatric/Behavioral: Negative. Objective:     Vital Signs: Telemetry:    normal sinus rhythm Intake/Output:   Visit Vitals  /87   Pulse 81   Temp 97.6 °F (36.4 °C)   Resp 20   Ht 6' 4\" (1.93 m)   Wt 61.2 kg (135 lb)   SpO2 100%   BMI 16.43 kg/m²       Temp (24hrs), Av.9 °F (36.6 °C), Min:97.6 °F (36.4 °C), Max:98.2 °F (36.8 °C)        O2 Device: Nasal cannula O2 Flow Rate (L/min): 4 l/min       Wt Readings from Last 4 Encounters:   21 61.2 kg (135 lb)          Intake/Output Summary (Last 24 hours) at 8/10/2021 0912  Last data filed at 8/10/2021 0351  Gross per 24 hour   Intake    Output 2100 ml   Net -2100 ml       Last shift:      No intake/output data recorded. Last 3 shifts:  1901 - 08/10 0700  In: -   Out: 2100 [Urine:2100]       Physical Exam:     Physical Exam  Constitutional:       Appearance: Normal appearance. HENT:      Head: Normocephalic and atraumatic. Nose: Nose normal.      Mouth/Throat:      Mouth: Mucous membranes are moist.   Eyes:      Pupils: Pupils are equal, round, and reactive to light. Cardiovascular:      Rate and Rhythm: Normal rate and regular rhythm. Pulmonary:      Effort: Respiratory distress present. Abdominal:      General: Abdomen is flat. Bowel sounds are normal.      Palpations: Abdomen is soft. Musculoskeletal:         General: Normal range of motion. Cervical back: Normal range of motion and neck supple. Skin:     General: Skin is warm. Neurological:      General: No focal deficit present. Mental Status: He is alert.    Psychiatric:         Mood and Affect: Mood normal.          Labs:    Recent Labs     08/10/21  0255 08/09/21  1841   WBC 5.9 5.6   HGB 14.8 14.4    228     Recent Labs     08/10/21  0255 08/09/21  1841    136   K 4.2 5.2*    108   CO2 27 22   GLU 94 113*   BUN 35* 30*   CREA 1.69* 1.45*   CA 8.7 8.6   ALB 3.3* 3.0*   ALT 41 40     Recent Labs     08/10/21  0600   PH 7.53*   PCO2 26*   PO2 97   HCO3 25     Recent Labs     08/10/21  0225 08/09/21  2230 08/09/21  1841   TROIQ 0.25* 0.18* 0.16*     No results found for: BNPP, BNP   No results found for: CULTNo results found for: TSH, TSHEXT    Imaging:    CXR Results  (Last 48 hours)               08/10/21 0605  XR CHEST PORT Final result    Impression:  Persistent bilateral pulmonary changes associated with mild   cardiomegaly. Differential considerations include pulmonary edema and diffuse   infection. Narrative:  PROCEDURE: XR CHEST PORT       HISTORY:Short of breath       COMPARISON:Chest x-ray 9 August 2021           TECHNIQUE: AP portable chest       LIMITATIONS: None       TUBES/LINES: None       LUNG PARENCHYMA/PLEURA: Lungs again show irregular areas of poorly defined   increased density in both lungs. Process is slightly more confluent in the left   base when compared with the right. There has been little change since   yesterday's exam   TRACHEA/BRONCHI: Normal   PULMONARY VESSELS: Pulmonary vessels are less prominent on the current study   HEART: Heart remains enlarged   MEDIASTINUM: No discrete mass   BONE/SOFT TISSUES: No acute process       OTHER: None           08/09/21 1845  XR CHEST PORT Final result    Impression:  Mildly prominent interstitial markings in both lungs possibly   interstitial edema or pneumonia. Clinical correlation recommended. Narrative:  XR CHEST PORT       Comparison: August 28, 2020.        New mild diffuse interstitial prominence in the mid and lower lung zones   possibly interstitial edema or interstitial pneumonia. Small left pleural   effusion is suspected. No pleural fluid on the right. The heart appears enlarged   even for portable technique. Mild midthoracic scoliosis concave to the right. Bony structures otherwise unremarkable. Results from Hospital Encounter encounter on 08/09/21    XR CHEST PORT    Narrative  PROCEDURE: XR CHEST PORT    HISTORY:Short of breath    COMPARISON:Chest x-ray 9 August 2021      TECHNIQUE: AP portable chest    LIMITATIONS: None    TUBES/LINES: None    LUNG PARENCHYMA/PLEURA: Lungs again show irregular areas of poorly defined  increased density in both lungs. Process is slightly more confluent in the left  base when compared with the right. There has been little change since  yesterday's exam  TRACHEA/BRONCHI: Normal  PULMONARY VESSELS: Pulmonary vessels are less prominent on the current study  HEART: Heart remains enlarged  MEDIASTINUM: No discrete mass  BONE/SOFT TISSUES: No acute process    OTHER: None    Impression  Persistent bilateral pulmonary changes associated with mild  cardiomegaly. Differential considerations include pulmonary edema and diffuse  infection. XR CHEST PORT    Narrative  XR CHEST PORT    Comparison: August 28, 2020. New mild diffuse interstitial prominence in the mid and lower lung zones  possibly interstitial edema or interstitial pneumonia. Small left pleural  effusion is suspected. No pleural fluid on the right. The heart appears enlarged  even for portable technique. Mild midthoracic scoliosis concave to the right. Bony structures otherwise unremarkable. Impression  Mildly prominent interstitial markings in both lungs possibly  interstitial edema or pneumonia. Clinical correlation recommended. No results found for this or any previous visit. IMPRESSION:   1. Acute hypoxic respiratory failure  2. Congestive heart failure  3. Chest pain rule out acute coronary syndrome  4.  History of CVA with left-sided weakness  5. Acute kidney injury with prerenal azotemia  6. Pt is requiring Drug therapy requiring intensive monitoring for toxicity  7. Prognosis guarded       RECOMMENDATIONS/PLAN:     1. BIPAP for non invasive ventilatory life support to prevent worsening respiratory acidosis  2. Arterial blood gases shows PCO2 26 we'll discontinue BiPAP and start patient on nasal cannula  3. Not actively wheezing we'll start patient on nebulizer treatment no need for systemic steroids  4. Continue with Lasix chest x-ray shows bilateral congestive changes  5. He is agitated will start patient on Vistaril  6. Agree with Empiric IV antibiotics pending culture results   7. Follow culture results  8. Supplemental O2 to keep sats > 93%  9. Aspiration precautions  10. Labs to follow electrolytes, renal function and and blood counts  11. Glucose monitoring and SSI  12. Bronchial hygiene with respiratory therapy techniques, bronchodilators  13.  DVT, SUP prophylaxis       This care involved high complexity medical decision making: I personally:  · Reviewed the flowsheet and previous days notes  · Reviewed and summarized records or history from previous days note or discussions with staff, family  · High Risk Drug therapy requiring intensive monitoring for toxicity: eg steroids, pressors, antibiotics  · Reviewed and/or ordered Clinical lab tests  · Reviewed images and/or ordered Radiology tests  · Reviewed the patients ECG / Telemetry  · Reviewed and/or adjusted NiPPV settings  · Called and arranged for Radiologic procedures or interventions        Lizbeth Levy MD

## 2021-08-10 NOTE — H&P
History and Physical    NAME: Johanny Cutler   :  1960   MRN:  [id number]     Date/Time:  2021 8:47 PM    Patient PCP: Zak Hart MD  ______________________________________________________________________             Subjective:     CHIEF COMPLAINT:     Shortness of breath    HISTORY OF PRESENT ILLNESS:       Patient is a 59y.o. year old male with signal past medical history of hypertension history of CVA with left-sided weakness walk by himself came to emergency room with 5 days history of chest pain and shortness of breath patient denies any fever chills no nausea no vomiting chest pain does not radiate associated with shortness of breath and diaphoresis no palpitation no nausea no vomiting seen by the ER physician work-up done in the ER which shows elevated BNP chest x-ray shows congestive heart failure patient is going to be admitted for further evaluation treatment    No past medical history on file. Hypertension  CVA    No past surgical history on file. Social History     Tobacco Use    Smoking status: Not on file   Substance Use Topics    Alcohol use: Not on file      Patient denies of smoking cigarettes alcohol or drugs  No family history on file.     No Known Allergies     Prior to Admission medications    Not on File         Current Facility-Administered Medications:     acetaminophen (TYLENOL) tablet 650 mg, 650 mg, Oral, Q6H PRN **OR** acetaminophen (TYLENOL) suppository 650 mg, 650 mg, Rectal, Q6H PRN, Suzie Jackson MD    polyethylene glycol (MIRALAX) packet 17 g, 17 g, Oral, DAILY PRN, Suzie Jackson MD    ondansetron (ZOFRAN ODT) tablet 4 mg, 4 mg, Oral, Q8H PRN **OR** ondansetron (ZOFRAN) injection 4 mg, 4 mg, IntraVENous, Q6H PRN, Maritza Jackson MD    heparin (porcine) injection 5,000 Units, 5,000 Units, SubCUTAneous, Q8H, Maritza Jackson MD    albuterol-ipratropium (DUO-NEB) 2.5 MG-0.5 MG/3 ML, 3 mL, Nebulization, Q6H RT, Suzie Jackson MD    [START ON 8/10/2021] aspirin tablet 325 mg, 325 mg, Oral, DAILY, Maritza Jackson MD    furosemide (LASIX) injection 40 mg, 40 mg, IntraVENous, BID, Tash Jackson MD  No current outpatient medications on file. LAB DATA REVIEWED:    Recent Results (from the past 24 hour(s))   CBC WITH AUTOMATED DIFF    Collection Time: 08/09/21  6:41 PM   Result Value Ref Range    WBC 5.6 4.1 - 11.1 K/uL    RBC 4.50 4. 10 - 5.70 M/uL    HGB 14.4 12.1 - 17.0 g/dL    HCT 41.3 36.6 - 50.3 %    MCV 91.8 80.0 - 99.0 FL    MCH 32.0 26.0 - 34.0 PG    MCHC 34.9 30.0 - 36.5 g/dL    RDW 15.6 (H) 11.5 - 14.5 %    PLATELET 980 753 - 411 K/uL    MPV 10.2 8.9 - 12.9 FL    NRBC 0.0 0.0  WBC    ABSOLUTE NRBC 0.00 0.00 - 0.01 K/uL    NEUTROPHILS 69 32 - 75 %    LYMPHOCYTES 22 12 - 49 %    MONOCYTES 9 5 - 13 %    EOSINOPHILS 0 0 - 7 %    BASOPHILS 0 0 - 1 %    IMMATURE GRANULOCYTES 0 0 - 0.5 %    ABS. NEUTROPHILS 3.8 1.8 - 8.0 K/UL    ABS. LYMPHOCYTES 1.3 0.8 - 3.5 K/UL    ABS. MONOCYTES 0.5 0.0 - 1.0 K/UL    ABS. EOSINOPHILS 0.0 0.0 - 0.4 K/UL    ABS. BASOPHILS 0.0 0.0 - 0.1 K/UL    ABS. IMM. GRANS. 0.0 0.00 - 0.04 K/UL    DF AUTOMATED     METABOLIC PANEL, COMPREHENSIVE    Collection Time: 08/09/21  6:41 PM   Result Value Ref Range    Sodium 136 136 - 145 mmol/L    Potassium 5.2 (H) 3.5 - 5.1 mmol/L    Chloride 108 97 - 108 mmol/L    CO2 22 21 - 32 mmol/L    Anion gap 6 5 - 15 mmol/L    Glucose 113 (H) 65 - 100 mg/dL    BUN 30 (H) 6 - 20 mg/dL    Creatinine 1.45 (H) 0.70 - 1.30 mg/dL    BUN/Creatinine ratio 21 (H) 12 - 20      GFR est AA 60 (L) >60 ml/min/1.73m2    GFR est non-AA 49 (L) >60 ml/min/1.73m2    Calcium 8.6 8.5 - 10.1 mg/dL    Bilirubin, total 1.4 (H) 0.2 - 1.0 mg/dL    AST (SGOT) 51 (H) 15 - 37 U/L    ALT (SGPT) 40 12 - 78 U/L    Alk.  phosphatase 61 45 - 117 U/L    Protein, total 6.3 (L) 6.4 - 8.2 g/dL    Albumin 3.0 (L) 3.5 - 5.0 g/dL    Globulin 3.3 2.0 - 4.0 g/dL    A-G Ratio 0.9 (L) 1.1 - 2.2     TROPONIN I    Collection Time: 08/09/21  6:41 PM   Result Value Ref Range    Troponin-I, Qt. 0.16 (H) <0.05 ng/mL   BNP    Collection Time: 08/09/21  6:41 PM   Result Value Ref Range    NT pro-BNP 25,800 (H) <125 pg/mL       XR Results (most recent):  Results from [facility] encounter on 08/09/21    XR CHEST PORT    Narrative  XR CHEST PORT    Comparison: August 28, 2020. New mild diffuse interstitial prominence in the mid and lower lung zones  possibly interstitial edema or interstitial pneumonia. Small left pleural  effusion is suspected. No pleural fluid on the right. The heart appears enlarged  even for portable technique. Mild midthoracic scoliosis concave to the right. Bony structures otherwise unremarkable. Impression  Mildly prominent interstitial markings in both lungs possibly  interstitial edema or pneumonia. Clinical correlation recommended. XR CHEST PORT   Final Result   Mildly prominent interstitial markings in both lungs possibly   interstitial edema or pneumonia. Clinical correlation recommended. Review of Systems:  Constitutional: Negative for chills and fever. HENT: Negative. Eyes: Negative. Respiratory: Shortness of breath   cardiovascular: Negative. Gastrointestinal: Negative for abdominal pain and nausea. Skin: Negative. Neurological: Negative. Objective:   VITALS:    Visit Vitals  BP (!) 142/94   Pulse (!) 103   Temp 98.2 °F (36.8 °C)   Resp 19   Ht 6' 4\" (1.93 m)   Wt 61.2 kg (135 lb)   SpO2 99%   BMI 16.43 kg/m²       Physical Exam:   Constitutional: pt is oriented to person, place, and time. HENT:   Head: Normocephalic and atraumatic. Eyes: Pupils are equal, round, and reactive to light. EOM are normal.   Cardiovascular: Normal rate, regular rhythm and normal heart sounds. Pulmonary/Chest: Breath sounds normal. No wheezes. No rales. Exhibits no tenderness. Abdominal: Soft. Bowel sounds are normal. There is no abdominal tenderness.  There is no rebound and no guarding. Musculoskeletal: Normal range of motion. Neurological: pt is alert and oriented to person, place, and time. Alert. Normal strength. No cranial nerve deficit or sensory deficit. Displays a negative Romberg sign. ASSESSMENT & PLAN:    Acute congestive heart failure  Chest pain rule out MI  History of hypertension  History of CVA  Shortness of breath  Prerenal azotemia  Elevated troponin mildly        Current Facility-Administered Medications:     acetaminophen (TYLENOL) tablet 650 mg, 650 mg, Oral, Q6H PRN **OR** acetaminophen (TYLENOL) suppository 650 mg, 650 mg, Rectal, Q6H PRN, Coleen Jackson MD    polyethylene glycol (MIRALAX) packet 17 g, 17 g, Oral, DAILY PRN, Coleen Jackson MD    ondansetron (ZOFRAN ODT) tablet 4 mg, 4 mg, Oral, Q8H PRN **OR** ondansetron (ZOFRAN) injection 4 mg, 4 mg, IntraVENous, Q6H PRN, Maritza Jackson MD    heparin (porcine) injection 5,000 Units, 5,000 Units, SubCUTAneous, Q8H, Maritza Jackson MD    albuterol-ipratropium (DUO-NEB) 2.5 MG-0.5 MG/3 ML, 3 mL, Nebulization, Q6H RT, Maritza Jackson MD    [START ON 8/10/2021] aspirin tablet 325 mg, 325 mg, Oral, DAILY, Maritza Jackson MD    furosemide (LASIX) injection 40 mg, 40 mg, IntraVENous, BID, Coleen Jackson MD  No current outpatient medications on file.     Patient admitted to telemetry floor  Cardiology and pulmonary consult  Monitor input and output and daily weight   ________________________________________________________________________    Signed: Lex White MD

## 2021-08-10 NOTE — ROUTINE PROCESS
TRANSFER - OUT REPORT:    Verbal report given to [facility] on [facility]  being transferred to [facility] for routine progression of care       Report consisted of patients Situation, Background, Assessment and   Recommendations(SBAR). Information from the following report(s) SBAR was reviewed with the receiving nurse. Lines:   Peripheral IV 08/10/21 Anterior; Left Forearm (Active)        Opportunity for questions and clarification was provided.       Patient transported with:   [employer]

## 2021-08-10 NOTE — ED PROVIDER NOTES
EMERGENCY DEPARTMENT HISTORY AND PHYSICAL EXAM      Date: 8/9/2021  Patient Name: Lexie Tsai    History of Presenting Illness     Chief Complaint   Patient presents with    Shortness of Breath       History Provided By: Patient    HPI: Lexie Tsai, 59 y.o. male with a past medical history significant CVA, HTN presents to the ED with cc of shortness of breath, worsening over the last few weeks. Patient denies a history of symptoms such as this. Patient states that he is followed by the [facility].  Patient reports that he does take lisinopril for his blood pressure and a baby aspirin. Symptoms are exacerbated by exertion. He is also complaining of a 5 to 10 pound weight loss, unintentional. he specifically denies fever, chills, body aches, night sweats, nausea, vomiting, diarrhea, chest pain, abdominal pain, recent travel, leg swelling. There are no other complaints, changes, or physical findings at this time. PCP: Other, MD Zak        Past History     Past Medical History:  No past medical history on file. Past Surgical History:  No past surgical history on file. Family History:  No family history on file. Social History:  Social History     Tobacco Use    Smoking status: Not on file   Substance Use Topics    Alcohol use: Not on file    Drug use: Not on file       Allergies:  No Known Allergies      Review of Systems   Review of Systems   Constitutional: Positive for fatigue and unexpected weight change. Negative for activity change, chills and fever. HENT: Negative for congestion, ear pain, rhinorrhea and trouble swallowing. Eyes: Negative for pain and visual disturbance. Respiratory: Positive for shortness of breath. Negative for cough. Cardiovascular: Negative for chest pain, palpitations and leg swelling. Gastrointestinal: Negative for abdominal pain, diarrhea, nausea and vomiting.    Genitourinary: Negative for decreased urine volume, difficulty urinating, dysuria and hematuria. Musculoskeletal: Negative for arthralgias and myalgias. Skin: Negative for rash. Neurological: Negative for weakness and headaches. Hematological: Negative for adenopathy. Psychiatric/Behavioral: The patient is not nervous/anxious. All other systems reviewed and are negative. Physical Exam   Physical Exam  Vitals and nursing note reviewed. Constitutional:       General: He is not in acute distress. Appearance: He is well-developed. He is ill-appearing. HENT:      Head: Normocephalic and atraumatic. Mouth/Throat:      Mouth: Mucous membranes are moist.   Eyes:      Extraocular Movements: Extraocular movements intact. Pupils: Pupils are equal, round, and reactive to light. Cardiovascular:      Rate and Rhythm: Normal rate and regular rhythm. Pulses: Normal pulses. Heart sounds: Normal heart sounds. Pulmonary:      Effort: Pulmonary effort is normal. Tachypnea present. No respiratory distress. Breath sounds: Examination of the right-upper field reveals rales. Examination of the left-upper field reveals rales. Examination of the right-middle field reveals rales. Examination of the left-middle field reveals rales. Examination of the right-lower field reveals rales. Examination of the left-lower field reveals rales. Rales present. No wheezing. Abdominal:      General: Abdomen is flat. Bowel sounds are normal.      Palpations: Abdomen is soft. Tenderness: There is no abdominal tenderness. Musculoskeletal:      Right lower leg: No edema. Left lower leg: No edema. Skin:     General: Skin is warm and dry. Capillary Refill: Capillary refill takes less than 2 seconds. Findings: No rash. Neurological:      General: No focal deficit present. Mental Status: He is alert. Cranial Nerves: No cranial nerve deficit.    Psychiatric:         Mood and Affect: Mood normal.         Behavior: Behavior normal.         Diagnostic Study Results     Labs -     Recent Results (from the past 48 hour(s))   CBC WITH AUTOMATED DIFF    Collection Time: 08/09/21  6:41 PM   Result Value Ref Range    WBC 5.6 4.1 - 11.1 K/uL    RBC 4.50 4. 10 - 5.70 M/uL    HGB 14.4 12.1 - 17.0 g/dL    HCT 41.3 36.6 - 50.3 %    MCV 91.8 80.0 - 99.0 FL    MCH 32.0 26.0 - 34.0 PG    MCHC 34.9 30.0 - 36.5 g/dL    RDW 15.6 (H) 11.5 - 14.5 %    PLATELET 467 490 - 841 K/uL    MPV 10.2 8.9 - 12.9 FL    NRBC 0.0 0.0  WBC    ABSOLUTE NRBC 0.00 0.00 - 0.01 K/uL    NEUTROPHILS 69 32 - 75 %    LYMPHOCYTES 22 12 - 49 %    MONOCYTES 9 5 - 13 %    EOSINOPHILS 0 0 - 7 %    BASOPHILS 0 0 - 1 %    IMMATURE GRANULOCYTES 0 0 - 0.5 %    ABS. NEUTROPHILS 3.8 1.8 - 8.0 K/UL    ABS. LYMPHOCYTES 1.3 0.8 - 3.5 K/UL    ABS. MONOCYTES 0.5 0.0 - 1.0 K/UL    ABS. EOSINOPHILS 0.0 0.0 - 0.4 K/UL    ABS. BASOPHILS 0.0 0.0 - 0.1 K/UL    ABS. IMM. GRANS. 0.0 0.00 - 0.04 K/UL    DF AUTOMATED     METABOLIC PANEL, COMPREHENSIVE    Collection Time: 08/09/21  6:41 PM   Result Value Ref Range    Sodium 136 136 - 145 mmol/L    Potassium 5.2 (H) 3.5 - 5.1 mmol/L    Chloride 108 97 - 108 mmol/L    CO2 22 21 - 32 mmol/L    Anion gap 6 5 - 15 mmol/L    Glucose 113 (H) 65 - 100 mg/dL    BUN 30 (H) 6 - 20 mg/dL    Creatinine 1.45 (H) 0.70 - 1.30 mg/dL    BUN/Creatinine ratio 21 (H) 12 - 20      GFR est AA 60 (L) >60 ml/min/1.73m2    GFR est non-AA 49 (L) >60 ml/min/1.73m2    Calcium 8.6 8.5 - 10.1 mg/dL    Bilirubin, total 1.4 (H) 0.2 - 1.0 mg/dL    AST (SGOT) 51 (H) 15 - 37 U/L    ALT (SGPT) 40 12 - 78 U/L    Alk.  phosphatase 61 45 - 117 U/L    Protein, total 6.3 (L) 6.4 - 8.2 g/dL    Albumin 3.0 (L) 3.5 - 5.0 g/dL    Globulin 3.3 2.0 - 4.0 g/dL    A-G Ratio 0.9 (L) 1.1 - 2.2     TROPONIN I    Collection Time: 08/09/21  6:41 PM   Result Value Ref Range    Troponin-I, Qt. 0.16 (H) <0.05 ng/mL   BNP    Collection Time: 08/09/21  6:41 PM   Result Value Ref Range    NT pro-BNP 25,800 (H) <125 pg/mL       Radiologic Studies -   XR Results (most recent):  Results from Hospital Encounter encounter on 08/09/21    XR CHEST PORT    Narrative  XR CHEST PORT    Comparison: August 28, 2020. New mild diffuse interstitial prominence in the mid and lower lung zones  possibly interstitial edema or interstitial pneumonia. Small left pleural  effusion is suspected. No pleural fluid on the right. The heart appears enlarged  even for portable technique. Mild midthoracic scoliosis concave to the right. Bony structures otherwise unremarkable. Impression  Mildly prominent interstitial markings in both lungs possibly  interstitial edema or pneumonia. Clinical correlation recommended. CT Results  (Last 48 hours)    None            Medical Decision Making and ED Course   I am the first provider for this patient. I reviewed the vital signs, available nursing notes, past medical history, past surgical history, family history and social history. Vital Signs-Reviewed the patient's vital signs. Patient Vitals for the past 12 hrs:   Temp Pulse Resp BP SpO2   08/09/21 2128     100 %   08/09/21 2119  99 24 (!) 144/94 98 %   08/09/21 1915  (!) 103 19 (!) 142/94 99 %   08/09/21 1825 98.2 °F (36.8 °C) 93 20 (!) 153/108 97 %       EKG interpretation: (Preliminary)  Normal sinus rhythm at 93 bpm, LVH, prolonged QT at 529, read by Dr. Jessi Loza at 6:32 PM    Records Reviewed: Nursing Notes and Old Medical Records    Provider Notes (Medical Decision Making):       MDM  Number of Diagnoses or Management Options  Acute congestive heart failure, unspecified heart failure type ([address]  Acute pulmonary edema ([address]  Cardiomegaly  Diagnosis management comments: Mr. Berlin Regalado is a 77-year-old male who is presenting to the ED with shortness of breath, worsening over several days, tachypnea on arrival.  Patient with pulmonary edema on chest x-ray, cardiomegaly. Will give IV Lasix and admit for congestive heart failure. No previous cardiac work-up known. Mildly elevated troponin in state of kidney disease (unknown if chronic)       Amount and/or Complexity of Data Reviewed  Clinical lab tests: ordered and reviewed  Tests in the radiology section of CPT®: ordered and reviewed  Review and summarize past medical records: yes          ED Course:   Initial assessment performed. The patients presenting problems have been discussed, and they are in agreement with the care plan formulated and outlined with them. I have encouraged them to ask questions as they arise throughout their visit. Consult Note:  8:07 PM  Nellie Gomez PA-C spoke with Dr. Elias Gregorio,  Specialty: Internal Medicine  Discussed pt's hx, disposition, and available diagnostic and imaging results. Reviewed care plans. Will evaluate for admission. Admit Note:  8:07 PM  Pt is being admitted by Dr. Elias Gregorio. The results of their tests and reason(s) for their admission have been discussed with pt and/or available family. They convey agreement and understanding for the need to be admitted and for admission diagnosis.  '           Procedures       Nellie Gomez PA-C    Procedures     Nellie Gomez PA-C        Disposition     Disposition: Admitted to Floor Medical Floor the case was discussed with the admitting physician Dr. Elias Gregorio    Admitted      Diagnosis     Clinical Impression:   1. Acute congestive heart failure, unspecified heart failure type ([address]    2. Acute pulmonary edema (HCC)    3. Cardiomegaly        Attestations:    Nellie Gomez PA-C    Please note that this dictation was completed with [employer], the computer voice recognition software. Quite often unanticipated grammatical, syntax, homophones, and other interpretive errors are inadvertently transcribed by the computer software. Please disregard these errors. Please excuse any errors that have escaped final proofreading. Thank you.

## 2021-08-10 NOTE — ED NOTES
Patient started to have increased confusion, and agitation. Patient refused to put oxygen on. Placed patient on bipap. Restraints applied for patient benefit.

## 2021-08-10 NOTE — PROGRESS NOTES
General Daily Progress Note          Patient Name:   Millie Land       YOB: 1960       Age:  59 y.o. Admit Date: 8/9/2021      Subjective:     Patient is a 59y.o. year old male with signal past medical history of hypertension history of CVA with left-sided weakness walk by himself came to emergency room with 5 days history of chest pain and shortness of breath patient denies any fever chills no nausea no vomiting chest pain does not radiate associated with shortness of breath and diaphoresis no palpitation no nausea no vomiting seen by the ER physician work-up done in the ER which shows elevated BNP chest x-ray shows congestive heart failure patient is going to be admitted for further evaluation treatment    Patient still having some shortness of breath very anxious feeling cold    Yesterday tried BiPAP but patient refused  Now on nasal cannula        Objective:     Visit Vitals  BP (!) 142/69   Pulse 90   Temp 97.6 °F (36.4 °C)   Resp 20   Ht 6' 4\" (1.93 m)   Wt 61.2 kg (135 lb)   SpO2 100%   BMI 16.43 kg/m²        Recent Results (from the past 24 hour(s))   CBC WITH AUTOMATED DIFF    Collection Time: 08/09/21  6:41 PM   Result Value Ref Range    WBC 5.6 4.1 - 11.1 K/uL    RBC 4.50 4. 10 - 5.70 M/uL    HGB 14.4 12.1 - 17.0 g/dL    HCT 41.3 36.6 - 50.3 %    MCV 91.8 80.0 - 99.0 FL    MCH 32.0 26.0 - 34.0 PG    MCHC 34.9 30.0 - 36.5 g/dL    RDW 15.6 (H) 11.5 - 14.5 %    PLATELET 447 905 - 764 K/uL    MPV 10.2 8.9 - 12.9 FL    NRBC 0.0 0.0  WBC    ABSOLUTE NRBC 0.00 0.00 - 0.01 K/uL    NEUTROPHILS 69 32 - 75 %    LYMPHOCYTES 22 12 - 49 %    MONOCYTES 9 5 - 13 %    EOSINOPHILS 0 0 - 7 %    BASOPHILS 0 0 - 1 %    IMMATURE GRANULOCYTES 0 0 - 0.5 %    ABS. NEUTROPHILS 3.8 1.8 - 8.0 K/UL    ABS. LYMPHOCYTES 1.3 0.8 - 3.5 K/UL    ABS. MONOCYTES 0.5 0.0 - 1.0 K/UL    ABS. EOSINOPHILS 0.0 0.0 - 0.4 K/UL    ABS. BASOPHILS 0.0 0.0 - 0.1 K/UL    ABS. IMM.  GRANS. 0.0 0.00 - 0.04 K/UL    DF AUTOMATED     METABOLIC PANEL, COMPREHENSIVE    Collection Time: 08/09/21  6:41 PM   Result Value Ref Range    Sodium 136 136 - 145 mmol/L    Potassium 5.2 (H) 3.5 - 5.1 mmol/L    Chloride 108 97 - 108 mmol/L    CO2 22 21 - 32 mmol/L    Anion gap 6 5 - 15 mmol/L    Glucose 113 (H) 65 - 100 mg/dL    BUN 30 (H) 6 - 20 mg/dL    Creatinine 1.45 (H) 0.70 - 1.30 mg/dL    BUN/Creatinine ratio 21 (H) 12 - 20      GFR est AA 60 (L) >60 ml/min/1.73m2    GFR est non-AA 49 (L) >60 ml/min/1.73m2    Calcium 8.6 8.5 - 10.1 mg/dL    Bilirubin, total 1.4 (H) 0.2 - 1.0 mg/dL    AST (SGOT) 51 (H) 15 - 37 U/L    ALT (SGPT) 40 12 - 78 U/L    Alk.  phosphatase 61 45 - 117 U/L    Protein, total 6.3 (L) 6.4 - 8.2 g/dL    Albumin 3.0 (L) 3.5 - 5.0 g/dL    Globulin 3.3 2.0 - 4.0 g/dL    A-G Ratio 0.9 (L) 1.1 - 2.2     TROPONIN I    Collection Time: 08/09/21  6:41 PM   Result Value Ref Range    Troponin-I, Qt. 0.16 (H) <0.05 ng/mL   BNP    Collection Time: 08/09/21  6:41 PM   Result Value Ref Range    NT pro-BNP 25,800 (H) <125 pg/mL   TROPONIN I    Collection Time: 08/09/21 10:30 PM   Result Value Ref Range    Troponin-I, Qt. 0.18 (H) <0.05 ng/mL   URINALYSIS W/MICROSCOPIC    Collection Time: 08/09/21 10:45 PM   Result Value Ref Range    Color Yellow/Straw      Appearance Clear Clear      Specific gravity 1.011 1.003 - 1.030      pH (UA) 5.0 5.0 - 8.0      Protein 30 (A) Negative mg/dL    Glucose Negative Negative mg/dL    Ketone Negative Negative mg/dL    Bilirubin Negative Negative      Blood Small (A) Negative      Urobilinogen 0.1 0.1 - 1.0 EU/dL    Nitrites Positive (A) Negative      Leukocyte Esterase Negative Negative      WBC 0-4 0 - 4 /hpf    RBC 0-5 0 - 5 /hpf    Bacteria 4+ (A) Negative /hpf    Mucus Trace (A) Negative /lpf    Hyaline cast 2-5 0 - 5 /lpf   TROPONIN I    Collection Time: 08/10/21  2:25 AM   Result Value Ref Range    Troponin-I, Qt. 0.25 (H) <0.05 ng/mL   VENOUS BLOOD GAS    Collection Time: 08/10/21  2:45 AM Result Value Ref Range    VENOUS PH 7.321 7.31 - 7.41      VENOUS PCO2 51.2 (H) 40 - 50 mmHg    VENOUS PO2 34 (L) 36 - 42 mmHg    VENOUS O2 SATURATION 54 (L) 60 - 80 %    VENOUS BICARBONATE 23 22 - 26 mmol/L    VENOUS BASE DEFICIT 0.5 0 - 2 mmol/L    O2 METHOD Nasal Cannula      O2 FLOW RATE 1.0 L/min    SITE Left Brachial     METABOLIC PANEL, COMPREHENSIVE    Collection Time: 08/10/21  2:55 AM   Result Value Ref Range    Sodium 138 136 - 145 mmol/L    Potassium 4.2 3.5 - 5.1 mmol/L    Chloride 104 97 - 108 mmol/L    CO2 27 21 - 32 mmol/L    Anion gap 7 5 - 15 mmol/L    Glucose 94 65 - 100 mg/dL    BUN 35 (H) 6 - 20 mg/dL    Creatinine 1.69 (H) 0.70 - 1.30 mg/dL    BUN/Creatinine ratio 21 (H) 12 - 20      GFR est AA 50 (L) >60 ml/min/1.73m2    GFR est non-AA 41 (L) >60 ml/min/1.73m2    Calcium 8.7 8.5 - 10.1 mg/dL    Bilirubin, total 1.4 (H) 0.2 - 1.0 mg/dL    AST (SGOT) 48 (H) 15 - 37 U/L    ALT (SGPT) 41 12 - 78 U/L    Alk. phosphatase 64 45 - 117 U/L    Protein, total 6.6 6.4 - 8.2 g/dL    Albumin 3.3 (L) 3.5 - 5.0 g/dL    Globulin 3.3 2.0 - 4.0 g/dL    A-G Ratio 1.0 (L) 1.1 - 2.2     CBC WITH AUTOMATED DIFF    Collection Time: 08/10/21  2:55 AM   Result Value Ref Range    WBC 5.9 4.1 - 11.1 K/uL    RBC 4.59 4. 10 - 5.70 M/uL    HGB 14.8 12.1 - 17.0 g/dL    HCT 42.9 36.6 - 50.3 %    MCV 93.5 80.0 - 99.0 FL    MCH 32.2 26.0 - 34.0 PG    MCHC 34.5 30.0 - 36.5 g/dL    RDW 15.7 (H) 11.5 - 14.5 %    PLATELET 383 905 - 856 K/uL    MPV 10.3 8.9 - 12.9 FL    NRBC 0.0 0.0  WBC    ABSOLUTE NRBC 0.00 0.00 - 0.01 K/uL    NEUTROPHILS 65 32 - 75 %    LYMPHOCYTES 24 12 - 49 %    MONOCYTES 7 5 - 13 %    EOSINOPHILS 3 0 - 7 %    BASOPHILS 1 0 - 1 %    IMMATURE GRANULOCYTES 0 0 - 0.5 %    ABS. NEUTROPHILS 3.9 1.8 - 8.0 K/UL    ABS. LYMPHOCYTES 1.4 0.8 - 3.5 K/UL    ABS. MONOCYTES 0.4 0.0 - 1.0 K/UL    ABS. EOSINOPHILS 0.2 0.0 - 0.4 K/UL    ABS. BASOPHILS 0.0 0.0 - 0.1 K/UL    ABS. IMM.  GRANS. 0.0 0.00 - 0.04 K/UL    DF AUTOMATED     BNP    Collection Time: 08/10/21  2:55 AM   Result Value Ref Range    NT pro-BNP 30,015 (H) <125 pg/mL   BLOOD GAS, ARTERIAL    Collection Time: 08/10/21  6:00 AM   Result Value Ref Range    pH 7.53 (H) 7.35 - 7.45      PCO2 26 (L) 35 - 45 mmHg    PO2 97 75 - 100 mmHg    O2 SAT 99 >95 %    BICARBONATE 25 22 - 26 mmol/L    BASE EXCESS 0.4 0 - 2 mmol/L    O2 METHOD Nasal Cannula      O2 FLOW RATE 2 L/min    SITE Right Radial      MARIXA'S TEST PASS       [unfilled]      Review of Systems    Constitutional: Negative for chills and fever. HENT: Negative. Eyes: Negative. Respiratory: Negative. Cardiovascular: Negative. Gastrointestinal: Negative for abdominal pain and nausea. Skin: Negative. Neurological: Negative. Physical Exam:      Constitutional: pt is oriented to person, place, and time. HENT:   Head: Normocephalic and atraumatic. Eyes: Pupils are equal, round, and reactive to light. EOM are normal.   Cardiovascular: Normal rate, regular rhythm and normal heart sounds. Pulmonary/Chest: Breath sounds normal. No wheezes. No rales. Exhibits no tenderness. Abdominal: Soft. Bowel sounds are normal. There is no abdominal tenderness. There is no rebound and no guarding. Musculoskeletal: Normal range of motion. Neurological: pt is alert and oriented to person, place, and time. XR CHEST PORT   Final Result   Persistent bilateral pulmonary changes associated with mild   cardiomegaly. Differential considerations include pulmonary edema and diffuse   infection. XR CHEST PORT   Final Result   Mildly prominent interstitial markings in both lungs possibly   interstitial edema or pneumonia. Clinical correlation recommended. Recent Results (from the past 24 hour(s))   CBC WITH AUTOMATED DIFF    Collection Time: 08/09/21  6:41 PM   Result Value Ref Range    WBC 5.6 4.1 - 11.1 K/uL    RBC 4.50 4. 10 - 5.70 M/uL    HGB 14.4 12.1 - 17.0 g/dL    HCT 41.3 36.6 - 50.3 %    MCV 91.8 80.0 - 99.0 FL    MCH 32.0 26.0 - 34.0 PG    MCHC 34.9 30.0 - 36.5 g/dL    RDW 15.6 (H) 11.5 - 14.5 %    PLATELET 308 505 - 152 K/uL    MPV 10.2 8.9 - 12.9 FL    NRBC 0.0 0.0  WBC    ABSOLUTE NRBC 0.00 0.00 - 0.01 K/uL    NEUTROPHILS 69 32 - 75 %    LYMPHOCYTES 22 12 - 49 %    MONOCYTES 9 5 - 13 %    EOSINOPHILS 0 0 - 7 %    BASOPHILS 0 0 - 1 %    IMMATURE GRANULOCYTES 0 0 - 0.5 %    ABS. NEUTROPHILS 3.8 1.8 - 8.0 K/UL    ABS. LYMPHOCYTES 1.3 0.8 - 3.5 K/UL    ABS. MONOCYTES 0.5 0.0 - 1.0 K/UL    ABS. EOSINOPHILS 0.0 0.0 - 0.4 K/UL    ABS. BASOPHILS 0.0 0.0 - 0.1 K/UL    ABS. IMM. GRANS. 0.0 0.00 - 0.04 K/UL    DF AUTOMATED     METABOLIC PANEL, COMPREHENSIVE    Collection Time: 08/09/21  6:41 PM   Result Value Ref Range    Sodium 136 136 - 145 mmol/L    Potassium 5.2 (H) 3.5 - 5.1 mmol/L    Chloride 108 97 - 108 mmol/L    CO2 22 21 - 32 mmol/L    Anion gap 6 5 - 15 mmol/L    Glucose 113 (H) 65 - 100 mg/dL    BUN 30 (H) 6 - 20 mg/dL    Creatinine 1.45 (H) 0.70 - 1.30 mg/dL    BUN/Creatinine ratio 21 (H) 12 - 20      GFR est AA 60 (L) >60 ml/min/1.73m2    GFR est non-AA 49 (L) >60 ml/min/1.73m2    Calcium 8.6 8.5 - 10.1 mg/dL    Bilirubin, total 1.4 (H) 0.2 - 1.0 mg/dL    AST (SGOT) 51 (H) 15 - 37 U/L    ALT (SGPT) 40 12 - 78 U/L    Alk.  phosphatase 61 45 - 117 U/L    Protein, total 6.3 (L) 6.4 - 8.2 g/dL    Albumin 3.0 (L) 3.5 - 5.0 g/dL    Globulin 3.3 2.0 - 4.0 g/dL    A-G Ratio 0.9 (L) 1.1 - 2.2     TROPONIN I    Collection Time: 08/09/21  6:41 PM   Result Value Ref Range    Troponin-I, Qt. 0.16 (H) <0.05 ng/mL   BNP    Collection Time: 08/09/21  6:41 PM   Result Value Ref Range    NT pro-BNP 25,800 (H) <125 pg/mL   TROPONIN I    Collection Time: 08/09/21 10:30 PM   Result Value Ref Range    Troponin-I, Qt. 0.18 (H) <0.05 ng/mL   URINALYSIS W/MICROSCOPIC    Collection Time: 08/09/21 10:45 PM   Result Value Ref Range    Color Yellow/Straw      Appearance Clear Clear      Specific gravity 1.011 1.003 - 1.030      pH (UA) 5.0 5.0 - 8.0      Protein 30 (A) Negative mg/dL    Glucose Negative Negative mg/dL    Ketone Negative Negative mg/dL    Bilirubin Negative Negative      Blood Small (A) Negative      Urobilinogen 0.1 0.1 - 1.0 EU/dL    Nitrites Positive (A) Negative      Leukocyte Esterase Negative Negative      WBC 0-4 0 - 4 /hpf    RBC 0-5 0 - 5 /hpf    Bacteria 4+ (A) Negative /hpf    Mucus Trace (A) Negative /lpf    Hyaline cast 2-5 0 - 5 /lpf   TROPONIN I    Collection Time: 08/10/21  2:25 AM   Result Value Ref Range    Troponin-I, Qt. 0.25 (H) <0.05 ng/mL   VENOUS BLOOD GAS    Collection Time: 08/10/21  2:45 AM   Result Value Ref Range    VENOUS PH 7.321 7.31 - 7.41      VENOUS PCO2 51.2 (H) 40 - 50 mmHg    VENOUS PO2 34 (L) 36 - 42 mmHg    VENOUS O2 SATURATION 54 (L) 60 - 80 %    VENOUS BICARBONATE 23 22 - 26 mmol/L    VENOUS BASE DEFICIT 0.5 0 - 2 mmol/L    O2 METHOD Nasal Cannula      O2 FLOW RATE 1.0 L/min    SITE Left Brachial     METABOLIC PANEL, COMPREHENSIVE    Collection Time: 08/10/21  2:55 AM   Result Value Ref Range    Sodium 138 136 - 145 mmol/L    Potassium 4.2 3.5 - 5.1 mmol/L    Chloride 104 97 - 108 mmol/L    CO2 27 21 - 32 mmol/L    Anion gap 7 5 - 15 mmol/L    Glucose 94 65 - 100 mg/dL    BUN 35 (H) 6 - 20 mg/dL    Creatinine 1.69 (H) 0.70 - 1.30 mg/dL    BUN/Creatinine ratio 21 (H) 12 - 20      GFR est AA 50 (L) >60 ml/min/1.73m2    GFR est non-AA 41 (L) >60 ml/min/1.73m2    Calcium 8.7 8.5 - 10.1 mg/dL    Bilirubin, total 1.4 (H) 0.2 - 1.0 mg/dL    AST (SGOT) 48 (H) 15 - 37 U/L    ALT (SGPT) 41 12 - 78 U/L    Alk. phosphatase 64 45 - 117 U/L    Protein, total 6.6 6.4 - 8.2 g/dL    Albumin 3.3 (L) 3.5 - 5.0 g/dL    Globulin 3.3 2.0 - 4.0 g/dL    A-G Ratio 1.0 (L) 1.1 - 2.2     CBC WITH AUTOMATED DIFF    Collection Time: 08/10/21  2:55 AM   Result Value Ref Range    WBC 5.9 4.1 - 11.1 K/uL    RBC 4.59 4. 10 - 5.70 M/uL    HGB 14.8 12.1 - 17.0 g/dL    HCT 42.9 36.6 - 50.3 %    MCV 93.5 80.0 - 99.0 FL    MCH 32.2 26.0 - 34.0 PG    MCHC 34.5 30.0 - 36.5 g/dL    RDW 15.7 (H) 11.5 - 14.5 %    PLATELET 079 922 - 529 K/uL    MPV 10.3 8.9 - 12.9 FL    NRBC 0.0 0.0  WBC    ABSOLUTE NRBC 0.00 0.00 - 0.01 K/uL    NEUTROPHILS 65 32 - 75 %    LYMPHOCYTES 24 12 - 49 %    MONOCYTES 7 5 - 13 %    EOSINOPHILS 3 0 - 7 %    BASOPHILS 1 0 - 1 %    IMMATURE GRANULOCYTES 0 0 - 0.5 %    ABS. NEUTROPHILS 3.9 1.8 - 8.0 K/UL    ABS. LYMPHOCYTES 1.4 0.8 - 3.5 K/UL    ABS. MONOCYTES 0.4 0.0 - 1.0 K/UL    ABS. EOSINOPHILS 0.2 0.0 - 0.4 K/UL    ABS. BASOPHILS 0.0 0.0 - 0.1 K/UL    ABS. IMM. GRANS. 0.0 0.00 - 0.04 K/UL    DF AUTOMATED     BNP    Collection Time: 08/10/21  2:55 AM   Result Value Ref Range    NT pro-BNP 30,015 (H) <125 pg/mL   BLOOD GAS, ARTERIAL    Collection Time: 08/10/21  6:00 AM   Result Value Ref Range    pH 7.53 (H) 7.35 - 7.45      PCO2 26 (L) 35 - 45 mmHg    PO2 97 75 - 100 mmHg    O2 SAT 99 >95 %    BICARBONATE 25 22 - 26 mmol/L    BASE EXCESS 0.4 0 - 2 mmol/L    O2 METHOD Nasal Cannula      O2 FLOW RATE 2 L/min    SITE Right Radial      MARIXA'S TEST PASS         Results     ** No results found for the last 336 hours. **           Labs:     Recent Labs     08/10/21  0255 08/09/21  1841   WBC 5.9 5.6   HGB 14.8 14.4   HCT 42.9 41.3    228     Recent Labs     08/10/21  0255 08/09/21  1841    136   K 4.2 5.2*    108   CO2 27 22   BUN 35* 30*   CREA 1.69* 1.45*   GLU 94 113*   CA 8.7 8.6     Recent Labs     08/10/21  0255 08/09/21  1841   ALT 41 40   AP 64 61   TBILI 1.4* 1.4*   TP 6.6 6.3*   ALB 3.3* 3.0*   GLOB 3.3 3.3     No results for input(s): INR, PTP, APTT, INREXT in the last 72 hours. No results for input(s): FE, TIBC, PSAT, FERR in the last 72 hours.    No results found for: FOL, RBCF   Recent Labs     08/10/21  0600   PH 7.53*   PCO2 26*   PO2 97     Recent Labs     08/10/21  0225 08/09/21  2230 08/09/21  1841   TROIQ 0.25* 0.18* 0.16*     No results found for: CHOL, CHOLX, CHLST, CHOLV, HDL, HDLP, LDL, LDLC, DLDLP, TGLX, TRIGL, TRIGP, CHHD, CHHDX  No results found for: [facility]  Lab Results   Component Value Date/Time    Color Yellow/Straw 08/09/2021 10:45 PM    Appearance Clear 08/09/2021 10:45 PM    Specific gravity 1.011 08/09/2021 10:45 PM    pH (UA) 5.0 08/09/2021 10:45 PM    Protein 30 (A) 08/09/2021 10:45 PM    Glucose Negative 08/09/2021 10:45 PM    Ketone Negative 08/09/2021 10:45 PM    Bilirubin Negative 08/09/2021 10:45 PM    Urobilinogen 0.1 08/09/2021 10:45 PM    Nitrites Positive (A) 08/09/2021 10:45 PM    Leukocyte Esterase Negative 08/09/2021 10:45 PM    Bacteria 4+ (A) 08/09/2021 10:45 PM    WBC 0-4 08/09/2021 10:45 PM    RBC 0-5 08/09/2021 10:45 PM         Assessment:   Acute congestive heart failure  Chest pain rule out MI  History of hypertension  History of CVA  Shortness of breath  Prerenal azotemia  Elevated troponin mildly  Anxiety disorder        Plan:     Seen by the cardiology recommend continue Lasix IV monitor renal function  2D echo ordered    Continue nebulizer treatment  Continue IV Zosyn      Current Facility-Administered Medications:     hydrOXYzine pamoate (VISTARIL) capsule 25 mg, 25 mg, Oral, Q6H PRN, Maritza Jackson MD, 25 mg at 08/10/21 0345    piperacillin-tazobactam (ZOSYN) 3.375 g in 0.9% sodium chloride (MBP/ADV) 100 mL MBP, 3.375 g, IntraVENous, Q8H, Maritza Jackson MD, IV Completed at 08/10/21 0732    acetaminophen (TYLENOL) tablet 650 mg, 650 mg, Oral, Q6H PRN **OR** acetaminophen (TYLENOL) suppository 650 mg, 650 mg, Rectal, Q6H PRN, Maritza Jackson MD    polyethylene glycol (MIRALAX) packet 17 g, 17 g, Oral, DAILY PRN, Maritza Jackson MD    ondansetron (ZOFRAN ODT) tablet 4 mg, 4 mg, Oral, Q8H PRN **OR** ondansetron (ZOFRAN) injection 4 mg, 4 mg, IntraVENous, Q6H PRN, Maritza Jackson MD    heparin (porcine) injection 5,000 Units, 5,000 Units, SubCUTAneous, Q8H, Maritza Jackson MD, 5,000 Units at 08/10/21 0623    albuterol-ipratropium (DUO-NEB) 2.5 MG-0.5 MG/3 ML, 3 mL, Nebulization, Q6H RT, Maritza Jackson MD, 3 mL at 08/10/21 0138    aspirin tablet 325 mg, 325 mg, Oral, DAILY, Maritza Jackson MD, 325 mg at 08/10/21 0930    furosemide (LASIX) injection 40 mg, 40 mg, IntraVENous, BID, Maritza Jackson MD, 40 mg at 08/10/21 7641  No current outpatient medications on file.

## 2021-08-10 NOTE — CONSULTS
Consult    Patient: Heather Ibanez MRN: [id number]  SSN: [id number]    YOB: 1960  Age: 59 y.o. Sex: male      Subjective:      Heather Ibanez is a 59 y.o. male who is being seen for congestive heart failure. Patient is a poor historian. He tells me he follows up at the [place] for his cardiac care. He presented to the emergency room with shortness of breath. His history is significant for hypertension, CVA presented with shortness of breath for the last few weeks. Denied having any chest pain, nausea, vomiting or excessive sweating. He has been refusing to get his nebulizer treatment. He keeps taking his oxygen off. He has been losing weight recently. No past medical history on file. No past surgical history on file. No family history on file.   Social History     Tobacco Use    Smoking status: Not on file   Substance Use Topics    Alcohol use: Not on file      Current Facility-Administered Medications   Medication Dose Route Frequency Provider Last Rate Last Admin    hydrOXYzine pamoate (VISTARIL) capsule 25 mg  25 mg Oral Q6H PRN Maritza Jackson MD   25 mg at 08/10/21 0345    piperacillin-tazobactam (ZOSYN) 3.375 g in 0.9% sodium chloride (MBP/ADV) 100 mL MBP  3.375 g IntraVENous Q8H Maritza Jackson MD   IV Completed at 08/10/21 0732    acetaminophen (TYLENOL) tablet 650 mg  650 mg Oral Q6H PRN Clif Jackson MD        Or   Kiana Mcghee acetaminophen (TYLENOL) suppository 650 mg  650 mg Rectal Q6H PRN Clif Jackson MD        polyethylene glycol (MIRALAX) packet 17 g  17 g Oral DAILY PRN Clif Jackson MD        ondansetron (ZOFRAN ODT) tablet 4 mg  4 mg Oral Q8H PRN Clif Jackson MD        Or    ondansetron [facility]) injection 4 mg  4 mg IntraVENous Q6H PRN Cory Valdes MD        heparin (porcine) injection 5,000 Units  5,000 Units SubCUTAneous Jennifer Addison MD   5,000 Units at 08/10/21 0623    albuterol-ipratropium (DUO-NEB) 2.5 MG-0.5 MG/3 ML  3 mL Nebulization Q6H RT Maritza Jackson MD   3 mL at 08/10/21 0138    aspirin tablet 325 mg  325 mg Oral DAILY Maritza Jackson MD   325 mg at 08/10/21 0930    furosemide (LASIX) injection 40 mg  40 mg IntraVENous BID Maritza Jackson MD   40 mg at 08/10/21 0929        No Known Allergies    Review of Systems:  Poor historian. Systems are negative except for what was mentioned above    Objective:     Vitals:    08/10/21 0933 08/10/21 1119 08/10/21 1130 08/10/21 1220   BP: (!) 136/91 124/83 (!) 136/95 (!) 142/69   Pulse: 82 88 90 90   Resp: 20 20 20 20   Temp:  97.6 °F (36.4 °C)     SpO2: 98% 97% 100% 100%   Weight:       Height:            Physical Exam:  General:   Appears to be anxious, short winded. Eyes:  Conjunctivae/corneas clear. PERRL, EOMs intact. Fundi benign   Ears:  Normal TMs and external ear canals both ears. Nose: Nares normal. Septum midline. Mucosa normal. No drainage or sinus tenderness. Mouth/Throat: Lips, mucosa, and tongue normal. Teeth and gums normal.   Neck: Supple, symmetrical, trachea midline, no adenopathy, thyroid: no enlargment/tenderness/nodules, no carotid bruit and no JVD. Back:   Symmetric, no curvature. ROM normal. No CVA tenderness. Lungs:    Reduced bilateral air entry   Heart:  Regular rate and rhythm, S1, S2 normal, no murmur, click, rub or gallop. Abdomen:   Soft, non-tender. Bowel sounds normal. No masses,  No organomegaly. Extremities: Extremities normal, atraumatic, no cyanosis or edema. Pulses: 2+ and symmetric all extremities. Skin: Skin color, texture, turgor normal. No rashes or lesions   Lymph nodes: Cervical, supraclavicular, and axillary nodes normal.   Neurologic: CNII-XII intact. Normal strength, sensation and reflexes throughout.          Assessment:     Hospital Problems  Never Reviewed        Codes Class Noted POA    [facility] ICD-10-CM: I50.9  ICD-9-CM: 428.0  8/9/2021 Unknown          Patient is a 64-year-old -American male with:  1. Acute kidney injury  2. Heart failure with decompensation  3. COPD  4. Medical noncompliance  5. Cachexia  6. Troponin in the indeterminate range, did not have any chest pain    Plan:     Patient white count was normal, hemoglobin 14.8, platelet 303. Potassium 4.2, creatinine 1.69 and BUN of 35. His ABG showed pH of 7.5, PCO2 of 26. Chest x-ray showed persistent bilateral pulmonary changes associated with mild cardiomegaly. Troponin was 0.16 up to 0.285. Anyhow did not have any chest pain. Patient currently refusing nebulizer treatment. I agree to admit telemetry. We will continue IV Lasix twice a day with close monitor of kidney function, daily weight and ins and outs. Currently on aspirin 325 mg daily. Maintenance dose should be 81 mg daily. Check lipid profile  Further recommendation depending on echocardiogram finding and clinical progression      Thank you  For involving me in Mr. [facility] carroll. I will follow.     Signed By: Cole Cadena MD     August 10, 2021

## 2021-08-11 NOTE — PROGRESS NOTES
Problem: Non-Violent Restraints  Goal: Removal from restraints as soon as assessed to be safe  Outcome: Progressing Towards Goal  Goal: No harm/injury to patient while restraints in use  Outcome: Progressing Towards Goal  Goal: Patient's dignity will be maintained  Outcome: Progressing Towards Goal  Goal: Patient Interventions  Outcome: Progressing Towards Goal     Problem: Falls - Risk of  Goal: *Absence of Falls  Description: Document [place] Pillow Fall Risk and appropriate interventions in the flowsheet.   Outcome: Progressing Towards Goal  Note: Fall Risk Interventions:  Mobility Interventions: Bed/chair exit alarm    Mentation Interventions: Bed/chair exit alarm, Family/sitter at bedside, Reorient patient    Medication Interventions: Bed/chair exit alarm    Elimination Interventions: Bed/chair exit alarm, Call light in reach, Toileting schedule/hourly rounds              Problem: Patient Education: Go to Patient Education Activity  Goal: Patient/Family Education  Outcome: Progressing Towards Goal

## 2021-08-11 NOTE — PROGRESS NOTES
Pt removed from restraints, 1:1 still in place for pt safety; pt ambulated with assistance to bathroom and to chair; pt set up with basin and he bathed on his own, preferred to use soap and water vs CHG wipes.  Pt is refusing oxygen use, was 90% on room air post respiratory treatment however when pt was dressed and back in bed agreed to wear Nasal Cannula 2L to go for 2D Echo

## 2021-08-11 NOTE — PROGRESS NOTES
Bedside shift change report given to Julia Tejeda (oncoming nurse) by Rodri Pond RN (offgoing nurse). Report included the following information SBAR, Intake/Output, MAR and Recent Results.

## 2021-08-11 NOTE — PROGRESS NOTES
Primary Nurse Renny Anderson RN and Cedric Luis RN performed a dual skin assessment on this patient No impairment noted  Jhonatan score is 16

## 2021-08-11 NOTE — PROGRESS NOTES
PULMONARY NOTE  [facility]    Name: Johanny Cutler MRN: [id number]   : 1960 Hospital: [facility]   Date: 2021  Admission date: 2021 Hospital Day: 3       HPI:     Hospital Problems  Never Reviewed        Codes Class Noted POA    CHF (congestive heart failure) ([facility] [address] ICD-10-CM: I50.9  ICD-9-CM: 428.0  2021 Unknown                   [x] High complexity decision making was performed  [x] See my orders for details      Subjective/Initial History:     I was asked by Dominick Noguera MD to see Johanny Cutler  a 59 y.o.   male in consultation     Excerpts from admission 2021 or consult notes as follows:   78-year-old male came in because of shortness of breath dyspnea significant past medical history of CVA left-sided weakness hypertension he was complaining about chest pain for the past 4 to 5 days associated with shortness of breath no history of passing out came to the emergency room chest x-ray shows CHF elevated BNP denies any history of fever and chills,  so admitted and pulmonary consult was called for further evaluation      No Known Allergies     MAR reviewed and pertinent medications noted or modified as needed     Current Facility-Administered Medications   Medication    hydrOXYzine pamoate (VISTARIL) capsule 25 mg    piperacillin-tazobactam (ZOSYN) 3.375 g in 0.9% sodium chloride (MBP/ADV) 100 mL MBP    acetaminophen (TYLENOL) tablet 650 mg    Or    acetaminophen (TYLENOL) suppository 650 mg    polyethylene glycol (MIRALAX) packet 17 g    ondansetron (ZOFRAN ODT) tablet 4 mg    Or    ondansetron (ZOFRAN) injection 4 mg    heparin (porcine) injection 5,000 Units    albuterol-ipratropium (DUO-NEB) 2.5 MG-0.5 MG/3 ML    aspirin tablet 325 mg    furosemide (LASIX) injection 40 mg      Patient PCP: Zak Hart MD  PMH:  has no past medical history on file. PSH:   has no past surgical history on file. FHX: family history is not on file.    SHX: ROS:    Review of Systems   Constitutional: Negative. HENT: Negative. Eyes: Negative. Respiratory: Positive for shortness of breath. Cardiovascular: Positive for chest pain. Gastrointestinal: Negative. Genitourinary: Negative. Musculoskeletal: Negative. Skin: Negative. Neurological: Negative. Psychiatric/Behavioral: Negative. Objective:     Vital Signs: Telemetry:    normal sinus rhythm Intake/Output:   Visit Vitals  BP (!) 145/93 (BP Patient Position: Lying;Supine)   Pulse 85   Temp 97.4 °F (36.3 °C)   Resp 20   Ht 6' 4\" (1.93 m)   Wt 61.2 kg (135 lb)   SpO2 90%   BMI 16.43 kg/m²       Temp (24hrs), Av.9 °F (36.6 °C), Min:97.4 °F (36.3 °C), Max:98.7 °F (37.1 °C)        O2 Device: Nasal cannula O2 Flow Rate (L/min): 2 l/min       Wt Readings from Last 4 Encounters:   21 61.2 kg (135 lb)          Intake/Output Summary (Last 24 hours) at 2021 1100  Last data filed at 8/10/2021 1956  Gross per 24 hour   Intake    Output 50 ml   Net -50 ml       Last shift:      No intake/output data recorded. Last 3 shifts:  1901 -  0700  In: -   Out: 2150 [Urine:2150]       Physical Exam:     Physical Exam  Constitutional:       Appearance: Normal appearance. HENT:      Head: Normocephalic and atraumatic. Nose: Nose normal.      Mouth/Throat:      Mouth: Mucous membranes are moist.   Eyes:      Pupils: Pupils are equal, round, and reactive to light. Cardiovascular:      Rate and Rhythm: Normal rate and regular rhythm. Pulmonary:      Effort: Respiratory distress present. Abdominal:      General: Abdomen is flat. Bowel sounds are normal.      Palpations: Abdomen is soft. Musculoskeletal:         General: Normal range of motion. Cervical back: Normal range of motion and neck supple. Skin:     General: Skin is warm. Neurological:      General: No focal deficit present. Mental Status: He is alert.    Psychiatric:         Mood and Affect: Mood normal.          Labs:    Recent Labs     08/10/21  0255 08/09/21  1841   WBC 5.9 5.6   HGB 14.8 14.4    228     Recent Labs     08/10/21  0255 08/09/21  1841    136   K 4.2 5.2*    108   CO2 27 22   GLU 94 113*   BUN 35* 30*   CREA 1.69* 1.45*   CA 8.7 8.6   ALB 3.3* 3.0*   ALT 41 40     Recent Labs     08/10/21  0600   PH 7.53*   PCO2 26*   PO2 97   HCO3 25     Recent Labs     08/10/21  1440 08/10/21  0225 08/09/21  2230   TROIQ 0.31* 0.25* 0.18*     No results found for: BNPP, BNP   No results found for: CULTNo results found for: TSH, TSHEXT, TSHEXT    Imaging:    CXR Results  (Last 48 hours)               08/10/21 0605  XR CHEST PORT Final result    Impression:  Persistent bilateral pulmonary changes associated with mild   cardiomegaly. Differential considerations include pulmonary edema and diffuse   infection. Narrative:  PROCEDURE: XR CHEST PORT       HISTORY:Short of breath       COMPARISON:Chest x-ray 9 August 2021           TECHNIQUE: AP portable chest       LIMITATIONS: None       TUBES/LINES: None       LUNG PARENCHYMA/PLEURA: Lungs again show irregular areas of poorly defined   increased density in both lungs. Process is slightly more confluent in the left   base when compared with the right. There has been little change since   yesterday's exam   TRACHEA/BRONCHI: Normal   PULMONARY VESSELS: Pulmonary vessels are less prominent on the current study   HEART: Heart remains enlarged   MEDIASTINUM: No discrete mass   BONE/SOFT TISSUES: No acute process       OTHER: None           08/09/21 1845  XR CHEST PORT Final result    Impression:  Mildly prominent interstitial markings in both lungs possibly   interstitial edema or pneumonia. Clinical correlation recommended. Narrative:  XR CHEST PORT       Comparison: August 28, 2020. New mild diffuse interstitial prominence in the mid and lower lung zones   possibly interstitial edema or interstitial pneumonia.  Small left pleural   effusion is suspected. No pleural fluid on the right. The heart appears enlarged   even for portable technique. Mild midthoracic scoliosis concave to the right. Bony structures otherwise unremarkable. Results from Hospital Encounter encounter on 08/09/21    XR CHEST PORT    Narrative  PROCEDURE: XR CHEST PORT    HISTORY:Short of breath    COMPARISON:Chest x-ray 9 August 2021      TECHNIQUE: AP portable chest    LIMITATIONS: None    TUBES/LINES: None    LUNG PARENCHYMA/PLEURA: Lungs again show irregular areas of poorly defined  increased density in both lungs. Process is slightly more confluent in the left  base when compared with the right. There has been little change since  yesterday's exam  TRACHEA/BRONCHI: Normal  PULMONARY VESSELS: Pulmonary vessels are less prominent on the current study  HEART: Heart remains enlarged  MEDIASTINUM: No discrete mass  BONE/SOFT TISSUES: No acute process    OTHER: None    Impression  Persistent bilateral pulmonary changes associated with mild  cardiomegaly. Differential considerations include pulmonary edema and diffuse  infection. XR CHEST PORT    Narrative  XR CHEST PORT    Comparison: August 28, 2020. New mild diffuse interstitial prominence in the mid and lower lung zones  possibly interstitial edema or interstitial pneumonia. Small left pleural  effusion is suspected. No pleural fluid on the right. The heart appears enlarged  even for portable technique. Mild midthoracic scoliosis concave to the right. Bony structures otherwise unremarkable. Impression  Mildly prominent interstitial markings in both lungs possibly  interstitial edema or pneumonia. Clinical correlation recommended. No results found for this or any previous visit. IMPRESSION:   1. Acute hypoxic respiratory failure  2. Congestive heart failure  3. Chest pain rule out acute coronary syndrome  4. History of CVA with left-sided weakness  5.  Acute kidney injury with prerenal azotemia  6. Pt is requiring Drug therapy requiring intensive monitoring for toxicity  7. Prognosis guarded       RECOMMENDATIONS/PLAN:     1. She is off BiPAP now on nasal cannula tolerating well 2 L we will continue to wean   2. Not actively wheezing started patient on nebulizer treatment no need for systemic steroids  3. Continue with Lasix chest x-ray shows bilateral congestive changes  4. He is agitated  patient on Vistaril  5. Supplemental O2 to keep sats > 93%  6. Aspiration precautions  7. Labs to follow electrolytes, renal function and and blood counts  8. Glucose monitoring and SSI  9. Bronchial hygiene with respiratory therapy techniques, bronchodilators  10.  DVT, SUP prophylaxis       ·         Shukri Choi MD

## 2021-08-11 NOTE — PROGRESS NOTES
Progress Note    Patient: Becki Rod MRN: [id number]  SSN: [id number]    YOB: 1960  Age: 59 y.o. Sex: male      Admit Date: 8/9/2021    LOS: 2 days     Subjective:     Patient is confused. Refusing his oxygen and his breathing treatment. Objective:     Vitals:    08/10/21 2035 08/10/21 2356 08/11/21 0741 08/11/21 0832   BP:   (!) 145/93    Pulse:   85    Resp:  20 20    Temp:  98.7 °F (37.1 °C) 97.4 °F (36.3 °C)    SpO2: 98% 96% 98% 90%   Weight:       Height:            Intake and Output:  Current Shift: No intake/output data recorded. Last three shifts: 08/09 1901 - 08/11 0700  In: -   Out: 2150 [Urine:2150]    Physical Exam:   General:   Short winded. Eyes:  Conjunctivae/corneas clear. PERRL, EOMs intact. Fundi benign   Ears:  Normal TMs and external ear canals both ears. Nose: Nares normal. Septum midline. Mucosa normal. No drainage or sinus tenderness. Mouth/Throat: Lips, mucosa, and tongue normal. Teeth and gums normal.   Neck: Supple, symmetrical, trachea midline, no adenopathy, thyroid: no enlargment/tenderness/nodules, no carotid bruit and no JVD. Back:   Symmetric, no curvature. ROM normal. No CVA tenderness. Lungs:    End expiratory wheezes bilaterally   Heart:  Regular rate and rhythm, S1, S2 normal, no murmur, click, rub or gallop. Abdomen:   Soft, non-tender. Bowel sounds normal. No masses,  No organomegaly. Extremities: Extremities normal, atraumatic, no cyanosis or edema. Pulses: 2+ and symmetric all extremities. Skin: Skin color, texture, turgor normal. No rashes or lesions   Lymph nodes: Cervical, supraclavicular, and axillary nodes normal.   Neurologic: CNII-XII intact. Normal strength, sensation and reflexes throughout. Lab/Data Review: All lab results for the last 24 hours reviewed. Assessment:     Active Problems:    CHF (congestive heart failure) ([address] (8/9/2021)      Patient is a 29-year-old -American male with:  1.   Acute kidney injury  2. Heart failure with decompensation  3. COPD  4. Medical noncompliance  5. Cachexia  6. Troponin in the indeterminate range, did not have any chest pain    Plan:     Please BNP is elevated. Troponin in the indeterminate range. Anyhow he is refusing oxygen and breathing treatment. He is wheezing on today exam.  His creatinine is up to 1.69 and BUN of 35. Currently on aspirin and IV Lasix. We will follow up on creatinine this morning. Echocardiogram pending. Will be difficult management given his noncompliance with recommendations.   Signed By: Humza Morrow MD     August 11, 2021

## 2021-08-11 NOTE — PROGRESS NOTES
General Daily Progress Note          Patient Name:   Christie Hartley       YOB: 1960       Age:  59 y.o.       Admit Date: 8/9/2021      Subjective:     Patient is a 59y.o. year old male with signal past medical history of hypertension history of CVA with left-sided weakness walk by himself came to emergency room with 5 days history of chest pain and shortness of breath patient denies any fever chills no nausea no vomiting chest pain does not radiate associated with shortness of breath and diaphoresis no palpitation no nausea no vomiting seen by the ER physician work-up done in the ER which shows elevated BNP chest x-ray shows congestive heart failure patient is going to be admitted for further evaluation treatment    Patient still having some shortness of breath very anxious and restless received Ativan and Haldol last night with no help        Patient tolerated oxygen by nasal cannula 98%        Objective:     Visit Vitals  /83 (BP Patient Position: At rest;Lying)   Pulse 74   Temp 97.6 °F (36.4 °C)   Resp 18   Ht 6' 4\" (1.93 m)   Wt 61.2 kg (135 lb)   SpO2 98%   BMI 16.43 kg/m²        Recent Results (from the past 24 hour(s))   TROPONIN I    Collection Time: 08/10/21  2:40 PM   Result Value Ref Range    Troponin-I, Qt. 0.31 (H) <0.05 ng/mL   LIPID PANEL    Collection Time: 08/10/21  2:40 PM   Result Value Ref Range    LIPID PROFILE        Cholesterol, total 178 <200 mg/dL    Triglyceride 146 <150 mg/dL    HDL Cholesterol 41 mg/dL    LDL, calculated 107.8 (H) 0 - 100 mg/dL    VLDL, calculated 29.2 mg/dL    CHOL/HDL Ratio 4.3 0.0 - 5.0     BNP    Collection Time: 08/10/21  2:40 PM   Result Value Ref Range    NT pro-BNP 27,792 (H) <125 pg/mL   ECHO ADULT COMPLETE    Collection Time: 08/11/21 10:33 AM   Result Value Ref Range    BP EF 42.2 55.0 - 100.0 %    LV Mass .9 88.0 - 224.0 g    LV Mass AL Index 210.6 49.0 - 115.0 g/m2    Left Atrium Minor Axis 2.14 cm    Aortic Regurgitant Pressure Half-time 512.00 ms    AR Max Elijah 505.00 cm/s    Aortic Valve Systolic Peak Velocity 788.63 cm/s    AoV PG 5.00 mmHg    Ao Root D 3.60 cm    IVSd 0.93 0.60 - 1.00 cm    LVIDd 6.94 (A) 4.20 - 5.90 cm    LVIDs 5.46 cm    LVOT Peak Velocity 99.10 cm/s    LVOT Peak Gradient 4.00 mmHg    LVPWd 1.44 (A) 0.60 - 1.00 cm    LV E' Septal Velocity 4.09 cm/s    LV ED Vol A2C 334.00 cm3    LV ES Vol A2C 163.00 cm3    E/E' lateral 16.80     E/E' septal 20.80     Left Atrium Major Axis 4.00 cm    Left Atrium Major Axis 4.00 cm    Mitral Valve Deceleration [place] 5,760.00 mm/s2    Mitral Valve Deceleration [place] 5,760.00 mm/s2    Mitral Valve E Wave Deceleration Time 116.00 ms    Mitral Valve Pressure Half-time 68.00 ms    MV A Elijah 45.00 cm/s    MV E Elijah 85.10 cm/s    MVA (PHT) 3.24 cm2    MV E/A 1.90     Pulmonic Regurgitant End Max Velocity 122.00 cm/s    Pulmonic Valve Systolic Peak Instantaneous Gradient 6.00 mmHg    Pulmonic Valve Max Velocity 70.50 cm/s    Pulmonic Valve Systolic Peak Instantaneous Gradient 2.00 mmHg    Est. RA Pressure 15.00 mmHg    RVIDd 4.08 cm    RVSP 57.00 mmHg    Tricuspid Valve Max Velocity 324.00 cm/s    Triscuspid Valve Regurgitation Peak Gradient 42.00 mmHg    Right Atrial Area 4C 16.01 cm2    LA Area 4C 27.90 cm2     [unfilled]      Review of Systems    Constitutional: Negative for chills and fever. HENT: Negative. Eyes: Negative. Respiratory: Negative. Cardiovascular: Negative. Gastrointestinal: Negative for abdominal pain and nausea. Skin: Negative. Neurological: Negative. Physical Exam:      Constitutional: pt is oriented to person, place, and time. HENT:   Head: Normocephalic and atraumatic. Eyes: Pupils are equal, round, and reactive to light. EOM are normal.   Cardiovascular: Normal rate, regular rhythm and normal heart sounds. Pulmonary/Chest: Breath sounds normal. No wheezes. No rales. Exhibits no tenderness. Abdominal: Soft.  Bowel sounds are normal. There is no abdominal tenderness. There is no rebound and no guarding. Musculoskeletal: Normal range of motion. Neurological: pt is alert and oriented to person, place, and time. XR CHEST PORT   Final Result   Persistent bilateral pulmonary changes associated with mild   cardiomegaly. Differential considerations include pulmonary edema and diffuse   infection. XR CHEST PORT   Final Result   Mildly prominent interstitial markings in both lungs possibly   interstitial edema or pneumonia. Clinical correlation recommended.       NM LUNG VENT/PERF    (Results Pending)        Recent Results (from the past 24 hour(s))   TROPONIN I    Collection Time: 08/10/21  2:40 PM   Result Value Ref Range    Troponin-I, Qt. 0.31 (H) <0.05 ng/mL   LIPID PANEL    Collection Time: 08/10/21  2:40 PM   Result Value Ref Range    LIPID PROFILE        Cholesterol, total 178 <200 mg/dL    Triglyceride 146 <150 mg/dL    HDL Cholesterol 41 mg/dL    LDL, calculated 107.8 (H) 0 - 100 mg/dL    VLDL, calculated 29.2 mg/dL    CHOL/HDL Ratio 4.3 0.0 - 5.0     BNP    Collection Time: 08/10/21  2:40 PM   Result Value Ref Range    NT pro-BNP 27,792 (H) <125 pg/mL   ECHO ADULT COMPLETE    Collection Time: 08/11/21 10:33 AM   Result Value Ref Range    BP EF 42.2 55.0 - 100.0 %    LV Mass .9 88.0 - 224.0 g    LV Mass AL Index 210.6 49.0 - 115.0 g/m2    Left Atrium Minor Axis 2.14 cm    Aortic Regurgitant Pressure Half-time 512.00 ms    AR Max Elijah 505.00 cm/s    Aortic Valve Systolic Peak Velocity 314.29 cm/s    AoV PG 5.00 mmHg    Ao Root D 3.60 cm    IVSd 0.93 0.60 - 1.00 cm    LVIDd 6.94 (A) 4.20 - 5.90 cm    LVIDs 5.46 cm    LVOT Peak Velocity 99.10 cm/s    LVOT Peak Gradient 4.00 mmHg    LVPWd 1.44 (A) 0.60 - 1.00 cm    LV E' Septal Velocity 4.09 cm/s    LV ED Vol A2C 334.00 cm3    LV ES Vol A2C 163.00 cm3    E/E' lateral 16.80     E/E' septal 20.80     Left Atrium Major Axis 4.00 cm    Left Atrium Major Axis 4.00 cm Mitral Valve Deceleration [place] 5,760.00 mm/s2    Mitral Valve Deceleration [place] 5,760.00 mm/s2    Mitral Valve E Wave Deceleration Time 116.00 ms    Mitral Valve Pressure Half-time 68.00 ms    MV A Elijah 45.00 cm/s    MV E Elijah 85.10 cm/s    MVA (PHT) 3.24 cm2    MV E/A 1.90     Pulmonic Regurgitant End Max Velocity 122.00 cm/s    Pulmonic Valve Systolic Peak Instantaneous Gradient 6.00 mmHg    Pulmonic Valve Max Velocity 70.50 cm/s    Pulmonic Valve Systolic Peak Instantaneous Gradient 2.00 mmHg    Est. RA Pressure 15.00 mmHg    RVIDd 4.08 cm    RVSP 57.00 mmHg    Tricuspid Valve Max Velocity 324.00 cm/s    Triscuspid Valve Regurgitation Peak Gradient 42.00 mmHg    Right Atrial Area 4C 16.01 cm2    LA Area 4C 27.90 cm2       Results     ** No results found for the last 336 hours. **           Labs:     Recent Labs     08/10/21  0255 08/09/21  1841   WBC 5.9 5.6   HGB 14.8 14.4   HCT 42.9 41.3    228     Recent Labs     08/10/21  0255 08/09/21  1841    136   K 4.2 5.2*    108   CO2 27 22   BUN 35* 30*   CREA 1.69* 1.45*   GLU 94 113*   CA 8.7 8.6     Recent Labs     08/10/21  0255 08/09/21  1841   ALT 41 40   AP 64 61   TBILI 1.4* 1.4*   TP 6.6 6.3*   ALB 3.3* 3.0*   GLOB 3.3 3.3     No results for input(s): INR, PTP, APTT, INREXT, INREXT in the last 72 hours. No results for input(s): FE, TIBC, PSAT, FERR in the last 72 hours.    No results found for: FOL, RBCF   Recent Labs     08/10/21  0600   PH 7.53*   PCO2 26*   PO2 97     Recent Labs     08/10/21  1440 08/10/21  0225 08/09/21  2230   TROIQ 0.31* 0.25* 0.18*     Lab Results   Component Value Date/Time    Cholesterol, total 178 08/10/2021 02:40 PM    HDL Cholesterol 41 08/10/2021 02:40 PM    LDL, calculated 107.8 (H) 08/10/2021 02:40 PM    Triglyceride 146 08/10/2021 02:40 PM    CHOL/HDL Ratio 4.3 08/10/2021 02:40 PM     No results found for: [facility]  Lab Results   Component Value Date/Time    Color Yellow/Straw 08/09/2021 10:45 PM Appearance Clear 08/09/2021 10:45 PM    Specific gravity 1.011 08/09/2021 10:45 PM    pH (UA) 5.0 08/09/2021 10:45 PM    Protein 30 (A) 08/09/2021 10:45 PM    Glucose Negative 08/09/2021 10:45 PM    Ketone Negative 08/09/2021 10:45 PM    Bilirubin Negative 08/09/2021 10:45 PM    Urobilinogen 0.1 08/09/2021 10:45 PM    Nitrites Positive (A) 08/09/2021 10:45 PM    Leukocyte Esterase Negative 08/09/2021 10:45 PM    Bacteria 4+ (A) 08/09/2021 10:45 PM    WBC 0-4 08/09/2021 10:45 PM    RBC 0-5 08/09/2021 10:45 PM         Assessment:   Acute congestive heart failure  Acute hypoxemic respiratory failure   chest pain rule out MI  History of hypertension  History of CVA  Shortness of breath  Prerenal azotemia  Elevated troponin mildly  Anxiety disorder          Plan:   Monitor renal function  Echo is pending  Order VQ scan  Seen by the cardiology recommend continue Lasix IV monitor renal function  Nebulizer treatment every 6 hours  Aspirin 80 mg daily  DVT prophylaxis with heparin 5000 every 8 hours    Psychiatric consult for restless and agitation and psychosis    Continue IV Zosyn      Current Facility-Administered Medications:     hydrOXYzine pamoate (VISTARIL) capsule 25 mg, 25 mg, Oral, Q6H PRN, Maritza Jackson MD, 25 mg at 08/11/21 1202    piperacillin-tazobactam (ZOSYN) 3.375 g in 0.9% sodium chloride (MBP/ADV) 100 mL MBP, 3.375 g, IntraVENous, Q8H, Maritza Jackson MD, Stopped at 08/11/21 0602    acetaminophen (TYLENOL) tablet 650 mg, 650 mg, Oral, Q6H PRN **OR** acetaminophen (TYLENOL) suppository 650 mg, 650 mg, Rectal, Q6H PRN, Maritza Jackson MD    polyethylene glycol (MIRALAX) packet 17 g, 17 g, Oral, DAILY PRN, Maritza Jackson MD    ondansetron (ZOFRAN ODT) tablet 4 mg, 4 mg, Oral, Q8H PRN **OR** ondansetron (ZOFRAN) injection 4 mg, 4 mg, IntraVENous, Q6H PRN, Maritza Jackson MD    heparin (porcine) injection 5,000 Units, 5,000 Units, SubCUTAneous, Q8H, Maritza Jackson MD, 5,000 Units at 08/10/21 2230    albuterol-ipratropium (DUO-NEB) 2.5 MG-0.5 MG/3 ML, 3 mL, Nebulization, Q6H RT, Maritza Jackson MD, 3 mL at 08/11/21 4014    aspirin tablet 325 mg, 325 mg, Oral, DAILY, Maritza Jackson MD, 325 mg at 08/11/21 1202    furosemide (LASIX) injection 40 mg, 40 mg, IntraVENous, BID, Maritza Jackson MD, 40 mg at 08/11/21 1202

## 2021-08-12 NOTE — PROGRESS NOTES
Progress Note    Patient: Aditi Carrasco MRN: [id number]  SSN: [id number]    YOB: 1960  Age: 59 y.o. Sex: male      Admit Date: 8/9/2021    LOS: 3 days     Subjective:     No acute events overnight. Objective:     Vitals:    08/12/21 1414 08/12/21 1514 08/12/21 1529 08/12/21 1540   BP:  119/78     Pulse:  82     Resp:  20     Temp:  97.7 °F (36.5 °C)     SpO2: 98% 95%     Weight:   54.2 kg (119 lb 7.8 oz)    Height:   6' 2\" (1.88 m) 6' 2\" (1.88 m)        Intake and Output:  Current Shift: No intake/output data recorded. Last three shifts: 08/10 1901 - 08/12 0700  In: -   Out: 530 [Urine:530]    Physical Exam:   General:   Short winded. Eyes:  Conjunctivae/corneas clear. PERRL, EOMs intact. Fundi benign   Ears:  Normal TMs and external ear canals both ears. Nose: Nares normal. Septum midline. Mucosa normal. No drainage or sinus tenderness. Mouth/Throat: Lips, mucosa, and tongue normal. Teeth and gums normal.   Neck: Supple, symmetrical, trachea midline, no adenopathy, thyroid: no enlargment/tenderness/nodules, no carotid bruit and no JVD. Back:   Symmetric, no curvature. ROM normal. No CVA tenderness. Lungs:    End expiratory wheezes bilaterally   Heart:  Regular rate and rhythm, S1, S2 normal, no murmur, click, rub or gallop. Abdomen:   Soft, non-tender. Bowel sounds normal. No masses,  No organomegaly. Extremities: Extremities normal, atraumatic, no cyanosis or edema. Pulses: 2+ and symmetric all extremities. Skin: Skin color, texture, turgor normal. No rashes or lesions   Lymph nodes: Cervical, supraclavicular, and axillary nodes normal.   Neurologic: CNII-XII intact. Normal strength, sensation and reflexes throughout. Lab/Data Review: All lab results for the last 24 hours reviewed. Assessment:     Active Problems:    CHF (congestive heart failure) ([address] (8/9/2021)      Patient is a 25-year-old -American male with:  1. Acute kidney injury  2. Heart failure with decompensation  3. COPD  4. Medical noncompliance  5. Cachexia  6. Troponin in the indeterminate range, did not have any chest pain    Plan:     No acute events overnight. He had short run of nonsustained VT. Echo showed EF of 20 to 25% with moderately dilated left ventricle, severe global hypokinesis. RVSP 57 mmHg. Creatinine 1.58, BUN 31. Potassium 3.1. BNP is down to 9700. Continue IV diuresis with close monitor of kidney function. Replete potassium. Recheck BMP in a.m. Appreciate psychiatry input.   Signed By: Lam Vazquez MD     August 12, 2021

## 2021-08-12 NOTE — CONSULTS
[address]    Name:  Rayburn Severs  MR#:  [id number]  :  1960  ACCOUNT #:  [de-identified]  DATE OF SERVICE:  2021    PSYCHIATRIC CONSULTATION    REASON FOR CONSULTATION:  Stress, psychosis, stress agitation. I saw the patient, chart reviewed. This is a 75-year-old  gentleman admitted to Medical Inpatient from [facility] ED where he comes with shortness of breath. CHIEF COMPLAINT:  Shortness of breath and cough. HISTORY OF PRESENT ILLNESS:  The patient with multiple medical problems, history of hypertension, CVA with left-sided weakness, walks by himself, history of chest pain, shortness of breath. Any fever, chills denied. Denied vomiting. Chest pain does not radiate or associated with shortness of breath. No palpitation. No nausea or vomiting. From psychiatric point of view, he is not getting any psychiatric help. He says sleep is poor. Appetite is poor. Lost weight from 163 pounds to present weight of 135 pounds. Denied any suicidal thought. Denied any hallucination. PAST HISTORY:  He says when he was in the [place] [facility] [facility] to 5 in [facility], he got some psychiatric help. Could not tell me any details. He denies any trauma. Denies any alcohol abuse. Smoked one pack last week, a fifth of a liquor last two weeks. Denies any drug abuse. Memory is poor. He had a CVA. SOCIAL HISTORY:  He says he lives with a female cousin. He says is 100%  on medical disability. FAMILY HISTORY:  Unknown. ALLERGIES TO MEDICATIONS:  NO KNOWN ALLERGIES. CURRENT MEDICATIONS:  Albuterol inhaler, aspirin 325 mg daily, Lasix 40 mg daily, heparin, Zosyn, p.r.n. Tylenol, hydroxyzine 25 mg every six hours p.r.n., Zofran p.r.n., MiraLax. LABORATORY DATA:  His labs, CBC unremarkable. Metabolic panel:  Potassium 5.2 on the date of admission and glucose 113. BUN 30, creatinine 1.85. BUN/creatinine ratio 21. GFR 49.   Bilirubin elevated 1.4. AST 51. Other enzymes are normal.  Tropon. BNP elevated 25,800. Urinalysis, nitrite positive, leukocytes negative, wbc's 0-4. Repeat troponin 0.25 on the 10th. Metabolic panel:  BUN 35, creatinine 1.69. Bilirubin 1.4, AST 48, otherwise unremarkable. GFR 50. MENTAL STATUS:  Average height, thin built male patient. Looks older than stated age. Alert, verbal, polite, in between falling asleep, but easily arousable, and thought this was Monday, is 07/03/2021. No hallucination, no delusions. Anxious, some rambling. Some perplexities. Memory impaired, some confusion. Insight, acknowledged that a poor appetite. Anxiety, depression. DIAGNOSES:  Anxiety disorder, not otherwise specified; depression, mild to moderate. DISPOSITION:  Continue hydroxyzine p.r.n. I will order mirtazapine 7.5 mg at night to help with the depression, appetite and sleep. I will further follow. May be worthwhile checking B12 and TSH levels.       Andi Rocha MD      [id number]  D:  08/11/2021 23:20  T:  08/12/2021 5:06  JOB #:  [id number]

## 2021-08-12 NOTE — PROGRESS NOTES
Problem: Non-Violent Restraints  Goal: Removal from restraints as soon as assessed to be safe  8/12/2021 0616 by Brad Cardenas, RN  Outcome: Progressing Towards Goal  8/12/2021 0616 by Brad Cardenas, RN  Outcome: Progressing Towards Goal  Goal: No harm/injury to patient while restraints in use  8/12/2021 0616 by Brad Cardenas, RN  Outcome: Progressing Towards Goal  8/12/2021 0616 by Brad Cardenas, RN  Outcome: Progressing Towards Goal  Goal: Patient's dignity will be maintained  8/12/2021 0616 by Brad Cardenas, RN  Outcome: Progressing Towards Goal  8/12/2021 0616 by Brad Cardenas, RN  Outcome: Progressing Towards Goal  Goal: Patient Interventions  8/12/2021 0616 by Brad Cardenas, RN  Outcome: Progressing Towards Goal  8/12/2021 0616 by Brad Cardenas, RN  Outcome: Progressing Towards Goal     Problem: Falls - Risk of  Goal: *Absence of Falls  Description: Document [facility] Fall Risk and appropriate interventions in the flowsheet.   8/12/2021 0616 by Brad Cardenas RN  Outcome: Progressing Towards Goal  Note: Fall Risk Interventions:  Mobility Interventions: Bed/chair exit alarm    Mentation Interventions: Bed/chair exit alarm    Medication Interventions: Bed/chair exit alarm    Elimination Interventions: Bed/chair exit alarm, Call light in reach, Urinal in reach           8/12/2021 0616 by Brad Cardenas, RN  Outcome: Progressing Towards Goal  Note: Fall Risk Interventions:  Mobility Interventions: Bed/chair exit alarm    Mentation Interventions: Bed/chair exit alarm    Medication Interventions: Bed/chair exit alarm    Elimination Interventions: Bed/chair exit alarm, Call light in reach, Urinal in reach              Problem: Patient Education: Go to Patient Education Activity  Goal: Patient/Family Education  8/12/2021 0616 by Brad Cardenas, RN  Outcome: Progressing Towards Goal  8/12/2021 0616 by Brad Cardenas, RN  Outcome: Progressing Towards Goal

## 2021-08-12 NOTE — PROGRESS NOTES
General Daily Progress Note          Patient Name:   Becki Rod       YOB: 1960       Age:  59 y.o. Admit Date: 8/9/2021      Subjective:     Patient seen in room. He is not as restless or anxious. Much more cooperative. No hypoxia. Oxygen saturations 97%  No acute neurological deficits noted on exam.    Psychiatry has seen patient and adjusted medications.     Discharge planning: Plan for next 24 to 48 hours readiness for discharge    Objective:     Visit Vitals  /75 (BP Patient Position: At rest;Lying)   Pulse 78   Temp 97.8 °F (36.6 °C)   Resp 20   Ht 6' 4\" (1.93 m)   Wt 61.2 kg (135 lb)   SpO2 97%   BMI 16.43 kg/m²        Recent Results (from the past 24 hour(s))   ECHO ADULT COMPLETE    Collection Time: 08/11/21 10:33 AM   Result Value Ref Range    BP EF 42.2 55.0 - 100.0 %    LV Mass .9 88.0 - 224.0 g    LV Mass AL Index 210.6 49.0 - 115.0 g/m2    Aortic Regurgitant Pressure Half-time 512.00 ms    AR Max Elijah 505.00 cm/s    Aortic Valve Systolic Peak Velocity 926.61 cm/s    AoV PG 5.00 mmHg    Ao Root D 3.60 cm    IVSd 0.93 0.60 - 1.00 cm    LVIDd 6.94 (A) 4.20 - 5.90 cm    LVIDs 5.46 cm    LVOT Peak Velocity 99.10 cm/s    LVOT Peak Gradient 4.00 mmHg    LVPWd 1.44 (A) 0.60 - 1.00 cm    LV E' Septal Velocity 4.09 cm/s    LV ED Vol A2C 334.00 cm3    LV ES Vol A2C 163.00 cm3    E/E' septal 20.81     Left Atrium Major Axis 4.00 cm    Left Atrium Major Axis 4.00 cm    Mitral Valve Deceleration [place] 5,760.00 mm/s2    Mitral Valve Deceleration [place] 5,760.00 mm/s2    Mitral Valve E Wave Deceleration Time 116.00 ms    Mitral Valve Pressure Half-time 68.00 ms    MV A Elijah 45.00 cm/s    MV E Elijah 85.10 cm/s    MVA (PHT) 3.24 cm2    MV E/A 1.89     Pulmonic Regurgitant End Max Velocity 122.00 cm/s    Pulmonic Valve Systolic Peak Instantaneous Gradient 6.00 mmHg    Pulmonic Valve Max Velocity 70.50 cm/s    Pulmonic Valve Systolic Peak Instantaneous Gradient 2.00 mmHg    Est. RA Pressure 15.00 mmHg    RVIDd 4.08 cm    RVSP 57.00 mmHg    Tricuspid Valve Max Velocity 324.00 cm/s    Triscuspid Valve Regurgitation Peak Gradient 42.00 mmHg    Right Atrial Area 4C 16.01 cm2    LA Area 4C 27.90 cm2   BNP    Collection Time: 08/12/21  8:02 AM   Result Value Ref Range    NT pro-BNP 9,747 (H) <125 pg/mL   CBC W/O DIFF    Collection Time: 08/12/21  8:02 AM   Result Value Ref Range    WBC 4.7 4.1 - 11.1 K/uL    RBC 4.78 4.10 - 5.70 M/uL    HGB 15.2 12.1 - 17.0 g/dL    HCT 43.6 36.6 - 50.3 %    MCV 91.2 80.0 - 99.0 FL    MCH 31.8 26.0 - 34.0 PG    MCHC 34.9 30.0 - 36.5 g/dL    RDW 15.0 (H) 11.5 - 14.5 %    PLATELET 818 057 - 412 K/uL    MPV 10.7 8.9 - 12.9 FL    NRBC 0.0 0.0  WBC    ABSOLUTE NRBC 0.00 0.00 - 8.19 K/uL   METABOLIC PANEL, COMPREHENSIVE    Collection Time: 08/12/21  8:02 AM   Result Value Ref Range    Sodium 139 136 - 145 mmol/L    Potassium 3.1 (L) 3.5 - 5.1 mmol/L    Chloride 102 97 - 108 mmol/L    CO2 29 21 - 32 mmol/L    Anion gap 8 5 - 15 mmol/L    Glucose 68 65 - 100 mg/dL    BUN 31 (H) 6 - 20 mg/dL    Creatinine 1.58 (H) 0.70 - 1.30 mg/dL    BUN/Creatinine ratio 20 12 - 20      GFR est AA 54 (L) >60 ml/min/1.73m2    GFR est non-AA 45 (L) >60 ml/min/1.73m2    Calcium 8.7 8.5 - 10.1 mg/dL    Bilirubin, total 1.1 (H) 0.2 - 1.0 mg/dL    AST (SGOT) 50 (H) 15 - 37 U/L    ALT (SGPT) 35 12 - 78 U/L    Alk. phosphatase 56 45 - 117 U/L    Protein, total 6.5 6.4 - 8.2 g/dL    Albumin 3.2 (L) 3.5 - 5.0 g/dL    Globulin 3.3 2.0 - 4.0 g/dL    A-G Ratio 1.0 (L) 1.1 - 2.2     MAGNESIUM    Collection Time: 08/12/21  8:02 AM   Result Value Ref Range    Magnesium 2.3 1.6 - 2.4 mg/dL     [unfilled]      Review of Systems    Constitutional: Negative for chills and fever. HENT: Negative. Eyes: Negative. Respiratory: Negative. Cardiovascular: Negative. Gastrointestinal: Negative for abdominal pain and nausea. Skin: Negative. Neurological: Negative.         Physical Exam: Constitutional: Cooperative. No anxiousness  HENT:   Head: Normocephalic and atraumatic. Eyes: Pupils are equal, round, and reactive to light. EOM are normal.   Cardiovascular: Normal rate, regular rhythm and normal heart sounds. Pulmonary/Chest: Breath sounds normal. No wheezes. No rales. Exhibits no tenderness. On room air  Abdominal: Soft. Bowel sounds are normal. There is no abdominal tenderness. There is no rebound and no guarding. Musculoskeletal: Normal range of motion. Neurological: pt is alert and oriented to person, place, and time. NM LUNG VENT/PERF   Final Result   Low probability V/Q scan. XR CHEST PORT   Final Result   Persistent bilateral pulmonary changes associated with mild   cardiomegaly. Differential considerations include pulmonary edema and diffuse   infection. XR CHEST PORT   Final Result   Mildly prominent interstitial markings in both lungs possibly   interstitial edema or pneumonia. Clinical correlation recommended.            Recent Results (from the past 24 hour(s))   ECHO ADULT COMPLETE    Collection Time: 08/11/21 10:33 AM   Result Value Ref Range    BP EF 42.2 55.0 - 100.0 %    LV Mass .9 88.0 - 224.0 g    LV Mass AL Index 210.6 49.0 - 115.0 g/m2    Aortic Regurgitant Pressure Half-time 512.00 ms    AR Max Elijah 505.00 cm/s    Aortic Valve Systolic Peak Velocity 280.93 cm/s    AoV PG 5.00 mmHg    Ao Root D 3.60 cm    IVSd 0.93 0.60 - 1.00 cm    LVIDd 6.94 (A) 4.20 - 5.90 cm    LVIDs 5.46 cm    LVOT Peak Velocity 99.10 cm/s    LVOT Peak Gradient 4.00 mmHg    LVPWd 1.44 (A) 0.60 - 1.00 cm    LV E' Septal Velocity 4.09 cm/s    LV ED Vol A2C 334.00 cm3    LV ES Vol A2C 163.00 cm3    E/E' septal 20.81     Left Atrium Major Axis 4.00 cm    Left Atrium Major Axis 4.00 cm    Mitral Valve Deceleration [place] 5,760.00 mm/s2    Mitral Valve Deceleration [place] 5,760.00 mm/s2    Mitral Valve E Wave Deceleration Time 116.00 ms    Mitral Valve Pressure Half-time 68.00 ms    MV A Elijah 45.00 cm/s    MV E Elijah 85.10 cm/s    MVA (PHT) 3.24 cm2    MV E/A 1.89     Pulmonic Regurgitant End Max Velocity 122.00 cm/s    Pulmonic Valve Systolic Peak Instantaneous Gradient 6.00 mmHg    Pulmonic Valve Max Velocity 70.50 cm/s    Pulmonic Valve Systolic Peak Instantaneous Gradient 2.00 mmHg    Est. RA Pressure 15.00 mmHg    RVIDd 4.08 cm    RVSP 57.00 mmHg    Tricuspid Valve Max Velocity 324.00 cm/s    Triscuspid Valve Regurgitation Peak Gradient 42.00 mmHg    Right Atrial Area 4C 16.01 cm2    LA Area 4C 27.90 cm2   BNP    Collection Time: 08/12/21  8:02 AM   Result Value Ref Range    NT pro-BNP 9,747 (H) <125 pg/mL   CBC W/O DIFF    Collection Time: 08/12/21  8:02 AM   Result Value Ref Range    WBC 4.7 4.1 - 11.1 K/uL    RBC 4.78 4.10 - 5.70 M/uL    HGB 15.2 12.1 - 17.0 g/dL    HCT 43.6 36.6 - 50.3 %    MCV 91.2 80.0 - 99.0 FL    MCH 31.8 26.0 - 34.0 PG    MCHC 34.9 30.0 - 36.5 g/dL    RDW 15.0 (H) 11.5 - 14.5 %    PLATELET 747 857 - 903 K/uL    MPV 10.7 8.9 - 12.9 FL    NRBC 0.0 0.0  WBC    ABSOLUTE NRBC 0.00 0.00 - 0.53 K/uL   METABOLIC PANEL, COMPREHENSIVE    Collection Time: 08/12/21  8:02 AM   Result Value Ref Range    Sodium 139 136 - 145 mmol/L    Potassium 3.1 (L) 3.5 - 5.1 mmol/L    Chloride 102 97 - 108 mmol/L    CO2 29 21 - 32 mmol/L    Anion gap 8 5 - 15 mmol/L    Glucose 68 65 - 100 mg/dL    BUN 31 (H) 6 - 20 mg/dL    Creatinine 1.58 (H) 0.70 - 1.30 mg/dL    BUN/Creatinine ratio 20 12 - 20      GFR est AA 54 (L) >60 ml/min/1.73m2    GFR est non-AA 45 (L) >60 ml/min/1.73m2    Calcium 8.7 8.5 - 10.1 mg/dL    Bilirubin, total 1.1 (H) 0.2 - 1.0 mg/dL    AST (SGOT) 50 (H) 15 - 37 U/L    ALT (SGPT) 35 12 - 78 U/L    Alk.  phosphatase 56 45 - 117 U/L    Protein, total 6.5 6.4 - 8.2 g/dL    Albumin 3.2 (L) 3.5 - 5.0 g/dL    Globulin 3.3 2.0 - 4.0 g/dL    A-G Ratio 1.0 (L) 1.1 - 2.2     MAGNESIUM    Collection Time: 08/12/21  8:02 AM   Result Value Ref Range Magnesium 2.3 1.6 - 2.4 mg/dL       Results     ** No results found for the last 336 hours. **           Labs:     Recent Labs     08/12/21  0802 08/10/21  0255   WBC 4.7 5.9   HGB 15.2 14.8   HCT 43.6 42.9    211     Recent Labs     08/12/21  0802 08/10/21  0255 08/09/21  1841    138 136   K 3.1* 4.2 5.2*    104 108   CO2 29 27 22   BUN 31* 35* 30*   CREA 1.58* 1.69* 1.45*   GLU 68 94 113*   CA 8.7 8.7 8.6   MG 2.3  --   --      Recent Labs     08/12/21  0802 08/10/21  0255 08/09/21  1841   ALT 35 41 40   AP 56 64 61   TBILI 1.1* 1.4* 1.4*   TP 6.5 6.6 6.3*   ALB 3.2* 3.3* 3.0*   GLOB 3.3 3.3 3.3     No results for input(s): INR, PTP, APTT, INREXT, INREXT in the last 72 hours. No results for input(s): FE, TIBC, PSAT, FERR in the last 72 hours.    No results found for: FOL, RBCF   Recent Labs     08/10/21  0600   PH 7.53*   PCO2 26*   PO2 97     Recent Labs     08/10/21  1440 08/10/21  0225 08/09/21  2230   TROIQ 0.31* 0.25* 0.18*     Lab Results   Component Value Date/Time    Cholesterol, total 178 08/10/2021 02:40 PM    HDL Cholesterol 41 08/10/2021 02:40 PM    LDL, calculated 107.8 (H) 08/10/2021 02:40 PM    Triglyceride 146 08/10/2021 02:40 PM    CHOL/HDL Ratio 4.3 08/10/2021 02:40 PM     No results found for: GLUCPOC  Lab Results   Component Value Date/Time    Color Yellow/Straw 08/09/2021 10:45 PM    Appearance Clear 08/09/2021 10:45 PM    Specific gravity 1.011 08/09/2021 10:45 PM    pH (UA) 5.0 08/09/2021 10:45 PM    Protein 30 (A) 08/09/2021 10:45 PM    Glucose Negative 08/09/2021 10:45 PM    Ketone Negative 08/09/2021 10:45 PM    Bilirubin Negative 08/09/2021 10:45 PM    Urobilinogen 0.1 08/09/2021 10:45 PM    Nitrites Positive (A) 08/09/2021 10:45 PM    Leukocyte Esterase Negative 08/09/2021 10:45 PM    Bacteria 4+ (A) 08/09/2021 10:45 PM    WBC 0-4 08/09/2021 10:45 PM    RBC 0-5 08/09/2021 10:45 PM         Assessment:   Acute exacerbation of heart failure with reduced ejection fraction  Severe cardiomyopathy with ejection fraction 20 to 25% on echocardiogram 8/11/2021  Acute hypoxemic respiratory failure  -on room air  chest pain rule out MI  History of hypertension  History of CVA  Shortness of breath  Prerenal azotemia  Elevated troponin mildly  Anxiety disorder    Plan:   Cardiology following  Pulmonology following  Psychiatry following  VQ scan negative for PE  On mirtazapine per psychiatry  Check B12 and TSH per psychiatry  Morning labs ordered  Continue IV Zosyn    Patient is full code    Discussed with Dr. Maddie Avendano      Current Facility-Administered Medications:     mirtazapine (REMERON) tablet 7.5 mg, 7.5 mg, Oral, QHS, Nydia Aj MD, 7.5 mg at 08/11/21 2345    hydrOXYzine pamoate (VISTARIL) capsule 25 mg, 25 mg, Oral, Q6H PRN, Prakash Lux MD, 25 mg at 08/12/21 0112    piperacillin-tazobactam (ZOSYN) 3.375 g in 0.9% sodium chloride (MBP/ADV) 100 mL MBP, 3.375 g, IntraVENous, Q8H, Maritza Jackson MD, Last Rate: 25 mL/hr at 08/12/21 0533, 3.375 g at 08/12/21 0533    acetaminophen (TYLENOL) tablet 650 mg, 650 mg, Oral, Q6H PRN **OR** acetaminophen (TYLENOL) suppository 650 mg, 650 mg, Rectal, Q6H PRN, Maritza Jackson MD    polyethylene glycol (MIRALAX) packet 17 g, 17 g, Oral, DAILY PRN, Maritza Jackson MD    ondansetron (ZOFRAN ODT) tablet 4 mg, 4 mg, Oral, Q8H PRN **OR** ondansetron (ZOFRAN) injection 4 mg, 4 mg, IntraVENous, Q6H PRN, Maritza Jackson MD    heparin (porcine) injection 5,000 Units, 5,000 Units, SubCUTAneous, Q8H, Maritza Jackson MD, 5,000 Units at 08/12/21 0533    albuterol-ipratropium (DUO-NEB) 2.5 MG-0.5 MG/3 ML, 3 mL, Nebulization, Q6H RT, Maritza Jackson MD, 3 mL at 08/12/21 0819    aspirin tablet 325 mg, 325 mg, Oral, DAILY, Maritza Jackson MD, 325 mg at 08/12/21 0859    furosemide (LASIX) injection 40 mg, 40 mg, IntraVENous, BID, Maritza Jackson MD, 40 mg at 08/12/21 0900

## 2021-08-12 NOTE — PROGRESS NOTES
PULMONARY NOTE  [facility]    Name: Vin Bowman MRN: [id number]   : 1960 Hospital: [address] [place] [address]   Date: 2021  Admission date: 2021 Hospital Day: 4       HPI:     Hospital Problems  Never Reviewed        Codes Class Noted POA    [facility]) [facility] ICD-10-CM: I50.9  ICD-9-CM: 428.0  2021 Unknown                   [x] High complexity decision making was performed  [x] See my orders for details      Subjective/Initial History:     I was asked by Mark Goddard MD to see Vin Bowman  a 59 y.o.   male in consultation     Excerpts from admission 2021 or consult notes as follows:   27-year-old male came in because of shortness of breath dyspnea significant past medical history of CVA left-sided weakness hypertension he was complaining about chest pain for the past 4 to 5 days associated with shortness of breath no history of passing out came to the emergency room chest x-ray shows CHF elevated BNP denies any history of fever and chills,  so admitted and pulmonary consult was called for further evaluation      No Known Allergies     MAR reviewed and pertinent medications noted or modified as needed     Current Facility-Administered Medications   Medication    mirtazapine (REMERON) tablet 7.5 mg    hydrOXYzine pamoate (VISTARIL) capsule 25 mg    piperacillin-tazobactam (ZOSYN) 3.375 g in 0.9% sodium chloride (MBP/ADV) 100 mL MBP    acetaminophen (TYLENOL) tablet 650 mg    Or    acetaminophen (TYLENOL) suppository 650 mg    polyethylene glycol (MIRALAX) packet 17 g    ondansetron (ZOFRAN ODT) tablet 4 mg    Or    ondansetron (ZOFRAN) injection 4 mg    heparin (porcine) injection 5,000 Units    albuterol-ipratropium (DUO-NEB) 2.5 MG-0.5 MG/3 ML    aspirin tablet 325 mg    furosemide (LASIX) injection 40 mg      Patient PCP: Zak Hart MD  PMH:  has no past medical history on file. PSH:   has no past surgical history on file.    FHX: family history is not on file. SHX:       ROS:    Review of Systems   Constitutional: Negative. HENT: Negative. Eyes: Negative. Respiratory: Positive for shortness of breath. Cardiovascular: Positive for chest pain. Gastrointestinal: Negative. Genitourinary: Negative. Musculoskeletal: Negative. Skin: Negative. Neurological: Negative. Psychiatric/Behavioral: Negative. Objective:     Vital Signs: Telemetry:    normal sinus rhythm Intake/Output:   Visit Vitals  /75 (BP Patient Position: At rest;Lying)   Pulse 78   Temp 97.8 °F (36.6 °C)   Resp 20   Ht 6' 4\" (1.93 m)   Wt 61.2 kg (135 lb)   SpO2 97%   BMI 16.43 kg/m²       Temp (24hrs), Av.9 °F (36.6 °C), Min:97.6 °F (36.4 °C), Max:98.3 °F (36.8 °C)        O2 Device: None (Room air) O2 Flow Rate (L/min): 2 l/min       Wt Readings from Last 4 Encounters:   21 61.2 kg (135 lb)          Intake/Output Summary (Last 24 hours) at 2021 0857  Last data filed at 2021 0603  Gross per 24 hour   Intake    Output 480 ml   Net -480 ml       Last shift:      No intake/output data recorded. Last 3 shifts: 08/10 1901 -  0700  In: -   Out: 530 [Urine:530]       Physical Exam:     Physical Exam  Constitutional:       Appearance: Normal appearance. HENT:      Head: Normocephalic and atraumatic. Nose: Nose normal.      Mouth/Throat:      Mouth: Mucous membranes are moist.   Eyes:      Pupils: Pupils are equal, round, and reactive to light. Cardiovascular:      Rate and Rhythm: Normal rate and regular rhythm. Pulmonary:      Effort: Respiratory distress present. Abdominal:      General: Abdomen is flat. Bowel sounds are normal.      Palpations: Abdomen is soft. Musculoskeletal:         General: Normal range of motion. Cervical back: Normal range of motion and neck supple. Skin:     General: Skin is warm. Neurological:      General: No focal deficit present. Mental Status: He is alert. Psychiatric:         Mood and Affect: Mood normal.          Labs:    Recent Labs     08/10/21  0255 08/09/21  1841   WBC 5.9 5.6   HGB 14.8 14.4    228     Recent Labs     08/10/21  0255 08/09/21  1841    136   K 4.2 5.2*    108   CO2 27 22   GLU 94 113*   BUN 35* 30*   CREA 1.69* 1.45*   CA 8.7 8.6   ALB 3.3* 3.0*   ALT 41 40     Recent Labs     08/10/21  0600   PH 7.53*   PCO2 26*   PO2 97   HCO3 25     Recent Labs     08/10/21  1440 08/10/21  0225 08/09/21  2230   TROIQ 0.31* 0.25* 0.18*     No results found for: BNPP, BNP   No results found for: CULTNo results found for: TSH, TSHEXT, TSHEXT    Imaging:    CXR Results  (Last 48 hours)    None        Results from Hospital Encounter encounter on 08/09/21    XR CHEST PORT    Narrative  PROCEDURE: XR CHEST PORT    HISTORY:Short of breath    COMPARISON:Chest x-ray 9 August 2021      TECHNIQUE: AP portable chest    LIMITATIONS: None    TUBES/LINES: None    LUNG PARENCHYMA/PLEURA: Lungs again show irregular areas of poorly defined  increased density in both lungs. Process is slightly more confluent in the left  base when compared with the right. There has been little change since  yesterday's exam  TRACHEA/BRONCHI: Normal  PULMONARY VESSELS: Pulmonary vessels are less prominent on the current study  HEART: Heart remains enlarged  MEDIASTINUM: No discrete mass  BONE/SOFT TISSUES: No acute process    OTHER: None    Impression  Persistent bilateral pulmonary changes associated with mild  cardiomegaly. Differential considerations include pulmonary edema and diffuse  infection. XR CHEST PORT    Narrative  XR CHEST PORT    Comparison: August 28, 2020. New mild diffuse interstitial prominence in the mid and lower lung zones  possibly interstitial edema or interstitial pneumonia. Small left pleural  effusion is suspected. No pleural fluid on the right. The heart appears enlarged  even for portable technique.  Mild midthoracic scoliosis concave to the right.  Bony structures otherwise unremarkable. Impression  Mildly prominent interstitial markings in both lungs possibly  interstitial edema or pneumonia. Clinical correlation recommended. No results found for this or any previous visit. IMPRESSION:   1. Acute hypoxic respiratory failure  2. Congestive heart failure  3. Chest pain rule out acute coronary syndrome  4. History of CVA with left-sided weakness  5. Acute kidney injury with prerenal azotemia  6. Anxiety disorder      RECOMMENDATIONS/PLAN:     1. He is off BiPAP now on nasal cannula tolerating well 2 L we will continue to wean   2. Not actively wheezing started patient on nebulizer treatment no need for systemic steroids  3. Continue with Lasix chest x-ray shows bilateral congestive changes  4. He is agitated  patient on Vistaril  5. Supplemental O2 to keep sats > 93%  6.  Aspiration precautions       ·         Lizbeth Levy MD

## 2021-08-12 NOTE — PROGRESS NOTES
Comprehensive Nutrition Assessment    Type and Reason for Visit: Initial (Low BMI)    Nutrition Recommendations/Plan:   Continue current diet    Add magic cup TID  Add pudding BID  Add ensure enlive BID    Obtain weekly wts    Continue appetite stimulant    Document %PO and BM in I/O's    Nutrition Assessment:  Admitted for SOB. Chest x-ray shows congestive heart failure. Pt refusing BiPAP, now on NC 2L. Psych consulted. Pt prev in restraints, now d/c. RD visited pt today for low BMI, was unable to obtain some necessary information as pt kindly asks me to \"stop asking questions\" and asked me to leave. RD did not push any further. Pt was sitting upright eating magic cup and pudding for a snack, and ate all of L, per pt. Pt with good intakes since admit, but RD has concern for food insecurity at home. RD talked with CM. CM is unsure of pt home situation. RD asked for MOW application for pt- Cm agreeable. Labs: K 3.1, BUN 31, Cr 1.58, AST 50, Bili 1.1. Meds: furosemide, heparin, mirtazapine, zosyn. Malnutrition Assessment:  Malnutrition Status:  Severe malnutrition    Context:  Acute illness     Findings of the 6 clinical characteristics of malnutrition:   Energy Intake:  No significant decrease in energy intake  Weight Loss:  Unable to assess (8% wt loss, time frame unclear)     Body Fat Loss:  7 - Moderate body fat loss, Buccal region, Orbital   Muscle Mass Loss:  7 - Moderate muscle mass loss, Clavicles (pectoralis & deltoids)  Fluid Accumulation:  No significant fluid accumulation,      Estimated Daily Nutrient Needs:  Energy (kcal): 1900kcal (35kcal/kg); Weight Used for Energy Requirements: Current  Protein (g): 76g (1.4g/kg); Weight Used for Protein Requirements: Current  Fluid (ml/day): 1900ml; Method Used for Fluid Requirements: 1 ml/kcal    Nutrition Related Findings:  Visual NFPE performed only. Notices severe upper body wasting. No BM since admit. No known N/V/D/C. No hx of dysphagia. No edema. Wounds:   None       Current Nutrition Therapies:  ADULT DIET Regular; Low Fat/Low Chol/High Fiber/2 gm Na    Anthropometric Measures:  · Height:  6' 2\" (188 cm)  · Current Body Wt:  54.2 kg (119 lb 7.8 oz)   · Usual Body Wt:  59 kg (130 lb) (pt unsure of time frame)     · Ideal Body Wt:  190 lbs:  62.9 %   · BMI Category:  Underweight (BMI less than 18.5)       Nutrition Diagnosis:   · Increased nutrient needs related to  (underweight) as evidenced by BMI, severe muscle loss, severe loss of subcutaneous fat, weight loss    Nutrition Interventions:   Food and/or Nutrient Delivery: Continue current diet, Start oral nutrition supplement  Nutrition Education and Counseling: Education not indicated  Coordination of Nutrition Care: Continue to monitor while inpatient, Coordination of community care (possible food insecurity)    Goals:  Meet >/= 75% of EEN within 2 days. Wt gain of 0.5kg/wk.        Nutrition Monitoring and Evaluation:   Behavioral-Environmental Outcomes: None identified  Food/Nutrient Intake Outcomes: Food and nutrient intake, Supplement intake  Physical Signs/Symptoms Outcomes: Weight, Nutrition focused physical findings, Meal time behavior    Discharge Planning:    Assist with food insecurity, Continue oral nutrition supplement     Electronically signed by Albert Williamson RD on 8/12/2021 at 10:31 AM    Contact: [phone]

## 2021-08-12 NOTE — PROGRESS NOTES
Problem: Falls - Risk of  Goal: *Absence of Falls  Description: Document [place] Fall Risk and appropriate interventions in the flowsheet.   Outcome: Progressing Towards Goal  Note: Fall Risk Interventions:  Mobility Interventions: Communicate number of staff needed for ambulation/transfer, Patient to call before getting OOB, Utilize gait belt for transfers/ambulation    Mentation Interventions: Bed/chair exit alarm, Door open when patient unattended, Adequate sleep, hydration, pain control, Gait belt with transfers/ambulation, Increase mobility, More frequent rounding, Room close to nurse's station    Medication Interventions: Bed/chair exit alarm, Patient to call before getting OOB, Teach patient to arise slowly, Utilize gait belt for transfers/ambulation    Elimination Interventions: Bed/chair exit alarm, Call light in reach, Patient to call for help with toileting needs, Urinal in reach              Problem: Patient Education: Go to Patient Education Activity  Goal: Patient/Family Education  Outcome: Progressing Towards Goal

## 2021-08-13 NOTE — BH NOTES
Patient seen for follow-up patient is alert is bit animated seen feeling little better definitely and also slept better he always had a 1 to 2-hour sleep he said this but trouble at night slept close to 4 hours he is hoping to get at least 6 hours of sleep.   No drowsiness no dizziness no falls going to the bathroom he is currently on Remeron 7.5 mg and continued no side effect noted vital signs are stable he is B12 folate level TSH all normal we will keep for now 7.5 mg may be at a later time increase her mirtazapine to 50 mg at night continued inpatient level of care indicated

## 2021-08-13 NOTE — PROGRESS NOTES
PULMONARY NOTE  [facility]    Name: Haley Green MRN: [id number]   : 1960 Hospital: [address] [place] [address]   Date: 2021  Admission date: 2021 Hospital Day: 5       HPI:     Hospital Problems  Never Reviewed        Codes Class Noted POA    CHF (congestive heart failure) ([facility] [address] ICD-10-CM: I50.9  ICD-9-CM: 428.0  2021 Unknown                   [x] High complexity decision making was performed  [x] See my orders for details      Subjective/Initial History:     I was asked by Svetlana Lemos MD to see Haley Green  a 59 y.o.   male in consultation     Excerpts from admission 2021 or consult notes as follows:   68-year-old male came in because of shortness of breath dyspnea significant past medical history of CVA left-sided weakness hypertension he was complaining about chest pain for the past 4 to 5 days associated with shortness of breath no history of passing out came to the emergency room chest x-ray shows CHF elevated BNP denies any history of fever and chills,  so admitted and pulmonary consult was called for further evaluation      No Known Allergies     MAR reviewed and pertinent medications noted or modified as needed     Current Facility-Administered Medications   Medication    mirtazapine (REMERON) tablet 7.5 mg    hydrOXYzine pamoate (VISTARIL) capsule 25 mg    piperacillin-tazobactam (ZOSYN) 3.375 g in 0.9% sodium chloride (MBP/ADV) 100 mL MBP    acetaminophen (TYLENOL) tablet 650 mg    Or    acetaminophen (TYLENOL) suppository 650 mg    polyethylene glycol (MIRALAX) packet 17 g    ondansetron (ZOFRAN ODT) tablet 4 mg    Or    ondansetron (ZOFRAN) injection 4 mg    heparin (porcine) injection 5,000 Units    albuterol-ipratropium (DUO-NEB) 2.5 MG-0.5 MG/3 ML    aspirin tablet 325 mg    furosemide (LASIX) injection 40 mg      Patient PCP: Zak Hart MD  PMH:  has no past medical history on file. PSH:   has no past surgical history on file.    FHX: family history is not on file. SHX:       ROS:    Review of Systems   Constitutional: Negative. HENT: Negative. Eyes: Negative. Respiratory: Positive for shortness of breath. Cardiovascular: Positive for chest pain. Gastrointestinal: Negative. Genitourinary: Negative. Musculoskeletal: Negative. Skin: Negative. Neurological: Negative. Psychiatric/Behavioral: Negative. Objective:     Vital Signs: Telemetry:    normal sinus rhythm Intake/Output:   Visit Vitals  /84 (BP 1 Location: Left upper arm, BP Patient Position: At rest;Supine)   Pulse 85   Temp 97.6 °F (36.4 °C)   Resp 20   Ht 6' 2\" (1.88 m)   Wt 54.2 kg (119 lb 7.8 oz)   SpO2 96%   BMI 15.34 kg/m²       Temp (24hrs), Av.8 °F (36.6 °C), Min:97.6 °F (36.4 °C), Max:98.1 °F (36.7 °C)        O2 Device: None (Room air) O2 Flow Rate (L/min): 2 l/min       Wt Readings from Last 4 Encounters:   21 54.2 kg (119 lb 7.8 oz)          Intake/Output Summary (Last 24 hours) at 2021 1006  Last data filed at 2021 1744  Gross per 24 hour   Intake 360 ml   Output 400 ml   Net -40 ml       Last shift:      No intake/output data recorded. Last 3 shifts:  1901 -  0700  In: 360 [P.O.:360]  Out: 880 [Urine:880]       Physical Exam:     Physical Exam  Constitutional:       Appearance: Normal appearance. HENT:      Head: Normocephalic and atraumatic. Nose: Nose normal.      Mouth/Throat:      Mouth: Mucous membranes are moist.   Eyes:      Pupils: Pupils are equal, round, and reactive to light. Cardiovascular:      Rate and Rhythm: Normal rate and regular rhythm. Pulmonary:      Effort: Respiratory distress present. Abdominal:      General: Abdomen is flat. Bowel sounds are normal.      Palpations: Abdomen is soft. Musculoskeletal:         General: Normal range of motion. Cervical back: Normal range of motion and neck supple. Skin:     General: Skin is warm.    Neurological:      General: No focal deficit present. Mental Status: He is alert. Psychiatric:         Mood and Affect: Mood normal.          Labs:    Recent Labs     08/13/21  0752 08/12/21  0802   WBC 4.8 4.7   HGB 14.1 15.2    211     Recent Labs     08/13/21  0752 08/12/21  0802    139   K 3.4* 3.1*    102   CO2 30 29   GLU 82 68   BUN 27* 31*   CREA 1.47* 1.58*   CA 8.8 8.7   MG  --  2.3   ALB 3.0* 3.2*   ALT 31 35     No results for input(s): PH, PCO2, PO2, HCO3, FIO2 in the last 72 hours. Recent Labs     08/10/21  1440   TROIQ 0.31*     No results found for: BNPP, BNP   No results found for: CULT  Lab Results   Component Value Date/Time    TSH 2.61 08/12/2021 11:00 AM       Imaging:    CXR Results  (Last 48 hours)    None        Results from Hospital Encounter encounter on 08/09/21    XR CHEST PORT    Narrative  PROCEDURE: XR CHEST PORT    HISTORY:Short of breath    COMPARISON:Chest x-ray 9 August 2021      TECHNIQUE: AP portable chest    LIMITATIONS: None    TUBES/LINES: None    LUNG PARENCHYMA/PLEURA: Lungs again show irregular areas of poorly defined  increased density in both lungs. Process is slightly more confluent in the left  base when compared with the right. There has been little change since  yesterday's exam  TRACHEA/BRONCHI: Normal  PULMONARY VESSELS: Pulmonary vessels are less prominent on the current study  HEART: Heart remains enlarged  MEDIASTINUM: No discrete mass  BONE/SOFT TISSUES: No acute process    OTHER: None    Impression  Persistent bilateral pulmonary changes associated with mild  cardiomegaly. Differential considerations include pulmonary edema and diffuse  infection. XR CHEST PORT    Narrative  XR CHEST PORT    Comparison: August 28, 2020. New mild diffuse interstitial prominence in the mid and lower lung zones  possibly interstitial edema or interstitial pneumonia. Small left pleural  effusion is suspected. No pleural fluid on the right.  The heart appears enlarged  even for portable technique. Mild midthoracic scoliosis concave to the right. Bony structures otherwise unremarkable. Impression  Mildly prominent interstitial markings in both lungs possibly  interstitial edema or pneumonia. Clinical correlation recommended. No results found for this or any previous visit. IMPRESSION:   1. Acute hypoxic respiratory failure resolved  2. Congestive heart failure  3. Chest pain rule out acute coronary syndrome  4. History of CVA with left-sided weakness  5. Acute kidney injury with prerenal azotemia  6. Anxiety disorder      RECOMMENDATIONS/PLAN:     1. Now on room air  2. Not actively wheezing started patient on nebulizer treatment no need for systemic steroids  3. Continue with Lasix chest x-ray shows bilateral congestive changes  4. He is agitated patient on Vistaril  5. Supplemental O2 to keep sats > 93%  6.  Aspiration precautions           Andressa Acosta MD

## 2021-08-13 NOTE — PROGRESS NOTES
General Daily Progress Note          Patient Name:   Loren Lanza       YOB: 1960       Age:  59 y.o. Admit Date: 8/9/2021      Subjective:     Patient seen in room. He is not as restless or anxious. Much more cooperative. No hypoxia. Oxygen saturations 97%  No acute neurological deficits noted on exam.    Psychiatry has seen patient and adjusted medications. Discharge planning: Plan for next 24 to 48 hours readiness for discharge    Objective:     Visit Vitals  /76 (BP 1 Location: Right upper arm, BP Patient Position: Supine; At rest)   Pulse (!) 54   Temp 97.7 °F (36.5 °C)   Resp 20   Ht 6' 2\" (1.88 m)   Wt 54.2 kg (119 lb 7.8 oz)   SpO2 95%   BMI 15.34 kg/m²        Recent Results (from the past 24 hour(s))   CBC WITH AUTOMATED DIFF    Collection Time: 08/13/21  7:52 AM   Result Value Ref Range    WBC 4.8 4.1 - 11.1 K/uL    RBC 4.54 4.10 - 5.70 M/uL    HGB 14.1 12.1 - 17.0 g/dL    HCT 42.3 36.6 - 50.3 %    MCV 93.2 80.0 - 99.0 FL    MCH 31.1 26.0 - 34.0 PG    MCHC 33.3 30.0 - 36.5 g/dL    RDW 15.4 (H) 11.5 - 14.5 %    PLATELET 677 282 - 044 K/uL    MPV 10.3 8.9 - 12.9 FL    NRBC 0.0 0.0  WBC    ABSOLUTE NRBC 0.00 0.00 - 0.01 K/uL    NEUTROPHILS 53 32 - 75 %    LYMPHOCYTES 34 12 - 49 %    MONOCYTES 12 5 - 13 %    EOSINOPHILS 1 0 - 7 %    BASOPHILS 0 0 - 1 %    IMMATURE GRANULOCYTES 0 0 - 0.5 %    ABS. NEUTROPHILS 2.6 1.8 - 8.0 K/UL    ABS. LYMPHOCYTES 1.6 0.8 - 3.5 K/UL    ABS. MONOCYTES 0.6 0.0 - 1.0 K/UL    ABS. EOSINOPHILS 0.0 0.0 - 0.4 K/UL    ABS. BASOPHILS 0.0 0.0 - 0.1 K/UL    ABS. IMM.  GRANS. 0.0 0.00 - 0.04 K/UL    DF AUTOMATED     METABOLIC PANEL, COMPREHENSIVE    Collection Time: 08/13/21  7:52 AM   Result Value Ref Range    Sodium 141 136 - 145 mmol/L    Potassium 3.4 (L) 3.5 - 5.1 mmol/L    Chloride 105 97 - 108 mmol/L    CO2 30 21 - 32 mmol/L    Anion gap 6 5 - 15 mmol/L    Glucose 82 65 - 100 mg/dL    BUN 27 (H) 6 - 20 mg/dL    Creatinine 1.47 (H) 0.70 - 1.30 mg/dL    BUN/Creatinine ratio 18 12 - 20      GFR est AA 59 (L) >60 ml/min/1.73m2    GFR est non-AA 48 (L) >60 ml/min/1.73m2    Calcium 8.8 8.5 - 10.1 mg/dL    Bilirubin, total 1.0 0.2 - 1.0 mg/dL    AST (SGOT) 41 (H) 15 - 37 U/L    ALT (SGPT) 31 12 - 78 U/L    Alk. phosphatase 49 45 - 117 U/L    Protein, total 6.2 (L) 6.4 - 8.2 g/dL    Albumin 3.0 (L) 3.5 - 5.0 g/dL    Globulin 3.2 2.0 - 4.0 g/dL    A-G Ratio 0.9 (L) 1.1 - 2.2       [unfilled]      Review of Systems    Constitutional: Negative for chills and fever. HENT: Negative. Eyes: Negative. Respiratory: Negative. Cardiovascular: Negative. Gastrointestinal: Negative for abdominal pain and nausea. Skin: Negative. Neurological: Negative. Physical Exam:      Constitutional: Cooperative. No anxiousness  HENT:   Head: Normocephalic and atraumatic. Eyes: Pupils are equal, round, and reactive to light. EOM are normal.   Cardiovascular: Normal rate, regular rhythm and normal heart sounds. Pulmonary/Chest: Breath sounds normal. No wheezes. No rales. Exhibits no tenderness. On room air  Abdominal: Soft. Bowel sounds are normal. There is no abdominal tenderness. There is no rebound and no guarding. Musculoskeletal: Normal range of motion. Neurological: pt is alert and oriented to person, place, and time. NM LUNG VENT/PERF   Final Result   Low probability V/Q scan. XR CHEST PORT   Final Result   Persistent bilateral pulmonary changes associated with mild   cardiomegaly. Differential considerations include pulmonary edema and diffuse   infection. XR CHEST PORT   Final Result   Mildly prominent interstitial markings in both lungs possibly   interstitial edema or pneumonia. Clinical correlation recommended.            Recent Results (from the past 24 hour(s))   CBC WITH AUTOMATED DIFF    Collection Time: 08/13/21  7:52 AM   Result Value Ref Range    WBC 4.8 4.1 - 11.1 K/uL    RBC 4.54 4.10 - 5.70 M/uL HGB 14.1 12.1 - 17.0 g/dL    HCT 42.3 36.6 - 50.3 %    MCV 93.2 80.0 - 99.0 FL    MCH 31.1 26.0 - 34.0 PG    MCHC 33.3 30.0 - 36.5 g/dL    RDW 15.4 (H) 11.5 - 14.5 %    PLATELET 635 751 - 320 K/uL    MPV 10.3 8.9 - 12.9 FL    NRBC 0.0 0.0  WBC    ABSOLUTE NRBC 0.00 0.00 - 0.01 K/uL    NEUTROPHILS 53 32 - 75 %    LYMPHOCYTES 34 12 - 49 %    MONOCYTES 12 5 - 13 %    EOSINOPHILS 1 0 - 7 %    BASOPHILS 0 0 - 1 %    IMMATURE GRANULOCYTES 0 0 - 0.5 %    ABS. NEUTROPHILS 2.6 1.8 - 8.0 K/UL    ABS. LYMPHOCYTES 1.6 0.8 - 3.5 K/UL    ABS. MONOCYTES 0.6 0.0 - 1.0 K/UL    ABS. EOSINOPHILS 0.0 0.0 - 0.4 K/UL    ABS. BASOPHILS 0.0 0.0 - 0.1 K/UL    ABS. IMM. GRANS. 0.0 0.00 - 0.04 K/UL    DF AUTOMATED     METABOLIC PANEL, COMPREHENSIVE    Collection Time: 08/13/21  7:52 AM   Result Value Ref Range    Sodium 141 136 - 145 mmol/L    Potassium 3.4 (L) 3.5 - 5.1 mmol/L    Chloride 105 97 - 108 mmol/L    CO2 30 21 - 32 mmol/L    Anion gap 6 5 - 15 mmol/L    Glucose 82 65 - 100 mg/dL    BUN 27 (H) 6 - 20 mg/dL    Creatinine 1.47 (H) 0.70 - 1.30 mg/dL    BUN/Creatinine ratio 18 12 - 20      GFR est AA 59 (L) >60 ml/min/1.73m2    GFR est non-AA 48 (L) >60 ml/min/1.73m2    Calcium 8.8 8.5 - 10.1 mg/dL    Bilirubin, total 1.0 0.2 - 1.0 mg/dL    AST (SGOT) 41 (H) 15 - 37 U/L    ALT (SGPT) 31 12 - 78 U/L    Alk. phosphatase 49 45 - 117 U/L    Protein, total 6.2 (L) 6.4 - 8.2 g/dL    Albumin 3.0 (L) 3.5 - 5.0 g/dL    Globulin 3.2 2.0 - 4.0 g/dL    A-G Ratio 0.9 (L) 1.1 - 2.2         Results     ** No results found for the last 336 hours.  **           Labs:     Recent Labs     08/13/21  0752 08/12/21  0802   WBC 4.8 4.7   HGB 14.1 15.2   HCT 42.3 43.6    211     Recent Labs     08/13/21  0752 08/12/21  0802    139   K 3.4* 3.1*    102   CO2 30 29   BUN 27* 31*   CREA 1.47* 1.58*   GLU 82 68   CA 8.8 8.7   MG  --  2.3     Recent Labs     08/13/21  0752 08/12/21  0802   ALT 31 35   AP 49 56   TBILI 1.0 1.1*   TP 6.2* 6. 5   ALB 3.0* 3.2*   GLOB 3.2 3.3     No results for input(s): INR, PTP, APTT, INREXT, INREXT in the last 72 hours. No results for input(s): FE, TIBC, PSAT, FERR in the last 72 hours. Lab Results   Component Value Date/Time    Folate 18.6 08/12/2021 11:00 AM      No results for input(s): PH, PCO2, PO2 in the last 72 hours.   Recent Labs     08/10/21  1440   TROIQ 0.31*     Lab Results   Component Value Date/Time    Cholesterol, total 178 08/10/2021 02:40 PM    HDL Cholesterol 41 08/10/2021 02:40 PM    LDL, calculated 107.8 (H) 08/10/2021 02:40 PM    Triglyceride 146 08/10/2021 02:40 PM    CHOL/HDL Ratio 4.3 08/10/2021 02:40 PM     No results found for: [facility]  Lab Results   Component Value Date/Time    Color Yellow/Straw 08/09/2021 10:45 PM    Appearance Clear 08/09/2021 10:45 PM    Specific gravity 1.011 08/09/2021 10:45 PM    pH (UA) 5.0 08/09/2021 10:45 PM    Protein 30 (A) 08/09/2021 10:45 PM    Glucose Negative 08/09/2021 10:45 PM    Ketone Negative 08/09/2021 10:45 PM    Bilirubin Negative 08/09/2021 10:45 PM    Urobilinogen 0.1 08/09/2021 10:45 PM    Nitrites Positive (A) 08/09/2021 10:45 PM    Leukocyte Esterase Negative 08/09/2021 10:45 PM    Bacteria 4+ (A) 08/09/2021 10:45 PM    WBC 0-4 08/09/2021 10:45 PM    RBC 0-5 08/09/2021 10:45 PM         Assessment:   Acute exacerbation of heart failure with reduced ejection fraction  Severe cardiomyopathy with ejection fraction 20 to 25% on echocardiogram 8/11/2021  Acute hypoxemic respiratory failure  -on room air  chest pain rule out MI  History of hypertension  History of CVA  Shortness of breath  Prerenal azotemia  Elevated troponin mildly  Anxiety disorder    Plan:   Cardiology following  Pulmonology following  Psychiatry following  VQ scan negative for PE  On mirtazapine per psychiatry  Check B12 and TSH per psychiatry  Morning labs ordered  Continue IV Zosyn  Replace potassium    PT OT consult and discharge planning tomorrow      Current Facility-Administered Medications:     mirtazapine (REMERON) tablet 7.5 mg, 7.5 mg, Oral, QHS, Courtney JACOB MD, 7.5 mg at 08/12/21 2156    hydrOXYzine pamoate (VISTARIL) capsule 25 mg, 25 mg, Oral, Q6H PRN, Maritza Jackson MD, 25 mg at 08/12/21 0112    piperacillin-tazobactam (ZOSYN) 3.375 g in 0.9% sodium chloride (MBP/ADV) 100 mL MBP, 3.375 g, IntraVENous, Q8H, Maritza Jackson MD, Last Rate: 25 mL/hr at 08/13/21 0653, 3.375 g at 08/13/21 0653    acetaminophen (TYLENOL) tablet 650 mg, 650 mg, Oral, Q6H PRN **OR** acetaminophen (TYLENOL) suppository 650 mg, 650 mg, Rectal, Q6H PRN, Maritza Jackson MD    polyethylene glycol (MIRALAX) packet 17 g, 17 g, Oral, DAILY PRN, Hansa Jackson MD    ondansetron (ZOFRAN ODT) tablet 4 mg, 4 mg, Oral, Q8H PRN **OR** ondansetron (ZOFRAN) injection 4 mg, 4 mg, IntraVENous, Q6H PRN, Maritza Jackson MD    heparin (porcine) injection 5,000 Units, 5,000 Units, SubCUTAneous, Q8H, Maritza Jackson MD, 5,000 Units at 08/13/21 0653    albuterol-ipratropium (DUO-NEB) 2.5 MG-0.5 MG/3 ML, 3 mL, Nebulization, Q6H RT, Maritza Jackson MD, 3 mL at 08/13/21 0946    aspirin tablet 325 mg, 325 mg, Oral, DAILY, Maritza Jackson MD, 325 mg at 08/13/21 2341    furosemide (LASIX) injection 40 mg, 40 mg, IntraVENous, BID, Maritza Jackson MD, 40 mg at 08/13/21 8501

## 2021-08-13 NOTE — PROGRESS NOTES
Progress Note    Patient: Karla Sanchez MRN: [id number]  SSN: [id number]    YOB: 1960  Age: 59 y.o. Sex: male      Admit Date: 8/9/2021    LOS: 4 days     Subjective:     His breathing has improved. Objective:     Vitals:    08/13/21 0805 08/13/21 0946 08/13/21 1211 08/13/21 1514   BP:   120/76    Pulse:   (!) 54    Resp:   20    Temp:   97.7 °F (36.5 °C)    SpO2: 94% 96% 95% 95%   Weight:       Height:            Intake and Output:  Current Shift: 08/13 0701 - 08/13 1900  In: -   Out: 500 [Urine:500]  Last three shifts: 08/11 1901 - 08/13 0700  In: 360 [P.O.:360]  Out: 880 [Urine:880]    Physical Exam:   General:   Short winded. Eyes:  Conjunctivae/corneas clear. PERRL, EOMs intact. Fundi benign   Ears:  Normal TMs and external ear canals both ears. Nose: Nares normal. Septum midline. Mucosa normal. No drainage or sinus tenderness. Mouth/Throat: Lips, mucosa, and tongue normal. Teeth and gums normal.   Neck: Supple, symmetrical, trachea midline, no adenopathy, thyroid: no enlargment/tenderness/nodules, no carotid bruit and no JVD. Back:   Symmetric, no curvature. ROM normal. No CVA tenderness. Lungs:    End expiratory wheezes bilaterally   Heart:  Regular rate and rhythm, S1, S2 normal, no murmur, click, rub or gallop. Abdomen:   Soft, non-tender. Bowel sounds normal. No masses,  No organomegaly. Extremities: Extremities normal, atraumatic, no cyanosis or edema. Pulses: 2+ and symmetric all extremities. Skin: Skin color, texture, turgor normal. No rashes or lesions   Lymph nodes: Cervical, supraclavicular, and axillary nodes normal.   Neurologic: CNII-XII intact. Normal strength, sensation and reflexes throughout. Lab/Data Review: All lab results for the last 24 hours reviewed. Assessment:     Active Problems:    CHF (congestive heart failure) ([address] (8/9/2021)      Patient is a 68-year-old -American male with:  1. Acute kidney injury  2.   Heart failure with decompensation  3. COPD  4. Medical noncompliance  5. Cachexia  6. Troponin in the indeterminate range, did not have any chest pain    Plan:     Urine output 880 cc. Creatinine improving to 1.47, BUN of 27. Potassium 3. 4. Continue IV Lasix will monitor kidney function. Echo showed EF of 20 to 25% with mild LVH, severe global hypokinesis. RVSP 57 mmHg.   Small left pleural effusion        Signed By: Arnie Chauhan MD     August 13, 2021

## 2021-08-14 NOTE — PROGRESS NOTES
Problem: Falls - Risk of  Goal: *Absence of Falls  Description: Document Claribel Hastings Fall Risk and appropriate interventions in the flowsheet.   8/14/2021 1636 by Raine Perry RN  Outcome: Progressing Towards Goal  Note: Fall Risk Interventions:  Mobility Interventions: Bed/chair exit alarm, Patient to call before getting OOB    Mentation Interventions: Increase mobility, Door open when patient unattended, Bed/chair exit alarm, Room close to nurse's station    Medication Interventions: Bed/chair exit alarm, Teach patient to arise slowly, Patient to call before getting OOB    Elimination Interventions: Bed/chair exit alarm, Call light in reach           8/14/2021 1636 by Raine Perry RN  Outcome: Progressing Towards Goal  Note: Fall Risk Interventions:  Mobility Interventions: Bed/chair exit alarm, Patient to call before getting OOB    Mentation Interventions: Increase mobility, Door open when patient unattended, Bed/chair exit alarm, Room close to nurse's station    Medication Interventions: Bed/chair exit alarm, Teach patient to arise slowly, Patient to call before getting OOB    Elimination Interventions: Bed/chair exit alarm, Call light in reach              Problem: Patient Education: Go to Patient Education Activity  Goal: Patient/Family Education  8/14/2021 1636 by Raine Perry RN  Outcome: Progressing Towards Goal  8/14/2021 1636 by Raine Perry RN  Outcome: Progressing Towards Goal

## 2021-08-14 NOTE — PROGRESS NOTES
PULMONARY NOTE  [facility]    Name: Becki Rod MRN: [id number]   : 1960 Hospital: [address] [place] [address]   Date: 2021  Admission date: 2021 Hospital Day: 6       HPI:     Hospital Problems  Never Reviewed        Codes Class Noted POA    CHF (congestive heart failure) ([facility] [address] ICD-10-CM: I50.9  ICD-9-CM: 428.0  2021 Unknown                   [x] High complexity decision making was performed  [x] See my orders for details      Subjective/Initial History:     I was asked by Kevin Hdz MD to see Becki Rod  a 59 y.o.   male in consultation     Excerpts from admission 2021 or consult notes as follows:   57-year-old male came in because of shortness of breath dyspnea significant past medical history of CVA left-sided weakness hypertension he was complaining about chest pain for the past 4 to 5 days associated with shortness of breath no history of passing out came to the emergency room chest x-ray shows CHF elevated BNP denies any history of fever and chills,  so admitted and pulmonary consult was called for further evaluation      No Known Allergies     MAR reviewed and pertinent medications noted or modified as needed     Current Facility-Administered Medications   Medication    albuterol-ipratropium (DUO-NEB) 2.5 MG-0.5 MG/3 ML    mirtazapine (REMERON) tablet 7.5 mg    hydrOXYzine pamoate (VISTARIL) capsule 25 mg    piperacillin-tazobactam (ZOSYN) 3.375 g in 0.9% sodium chloride (MBP/ADV) 100 mL MBP    acetaminophen (TYLENOL) tablet 650 mg    Or    acetaminophen (TYLENOL) suppository 650 mg    polyethylene glycol (MIRALAX) packet 17 g    ondansetron (ZOFRAN ODT) tablet 4 mg    Or    ondansetron (ZOFRAN) injection 4 mg    heparin (porcine) injection 5,000 Units    aspirin tablet 325 mg    furosemide (LASIX) injection 40 mg      Patient PCP: Zak Hart MD  PMH:  has no past medical history on file. PSH:   has no past surgical history on file.    FHX: family history is not on file. SHX:       ROS:    Review of Systems   Constitutional: Negative. HENT: Negative. Eyes: Negative. Respiratory: Positive for shortness of breath. Cardiovascular: Positive for chest pain. Gastrointestinal: Negative. Genitourinary: Negative. Musculoskeletal: Negative. Skin: Negative. Neurological: Negative. Psychiatric/Behavioral: Negative. Objective:     Vital Signs: Telemetry:    normal sinus rhythm Intake/Output:   Visit Vitals  /85   Pulse 70   Temp 98.1 °F (36.7 °C)   Resp 18   Ht 6' 2\" (1.88 m)   Wt 54.2 kg (119 lb 7.8 oz)   SpO2 99%   BMI 15.34 kg/m²       Temp (24hrs), Av.6 °F (36.4 °C), Min:96 °F (35.6 °C), Max:98.4 °F (36.9 °C)        O2 Device: None (Room air) O2 Flow Rate (L/min): 2 l/min       Wt Readings from Last 4 Encounters:   21 54.2 kg (119 lb 7.8 oz)          Intake/Output Summary (Last 24 hours) at 2021 0835  Last data filed at 2021 1505  Gross per 24 hour   Intake 100 ml   Output 500 ml   Net -400 ml       Last shift:      No intake/output data recorded. Last 3 shifts:  1901 -  0700  In: 100 [I.V.:100]  Out: 500 [Urine:500]       Physical Exam:     Physical Exam  Constitutional:       Appearance: Normal appearance. HENT:      Head: Normocephalic and atraumatic. Nose: Nose normal.      Mouth/Throat:      Mouth: Mucous membranes are moist.   Eyes:      Pupils: Pupils are equal, round, and reactive to light. Cardiovascular:      Rate and Rhythm: Normal rate and regular rhythm. Pulmonary:      Effort: Respiratory distress present. Abdominal:      General: Abdomen is flat. Bowel sounds are normal.      Palpations: Abdomen is soft. Musculoskeletal:         General: Normal range of motion. Cervical back: Normal range of motion and neck supple. Skin:     General: Skin is warm. Neurological:      General: No focal deficit present. Mental Status: He is alert.    Psychiatric: Mood and Affect: Mood normal.          Labs:    Recent Labs     08/14/21  0710 08/13/21  0752 08/12/21  0802   WBC 5.4 4.8 4.7   HGB 15.0 14.1 15.2    224 211     Recent Labs     08/14/21  0710 08/13/21  0752 08/12/21  0802    141 139   K 3.6 3.4* 3.1*    105 102   CO2 30 30 29   GLU 83 82 68   BUN 29* 27* 31*   CREA 1.44* 1.47* 1.58*   CA 8.8 8.8 8.7   MG  --   --  2.3   ALB 3.1* 3.0* 3.2*   ALT 34 31 35     No results for input(s): PH, PCO2, PO2, HCO3, FIO2 in the last 72 hours. No results for input(s): CPK, CKNDX, TROIQ in the last 72 hours. No lab exists for component: CPKMB  No results found for: BNPP, BNP   No results found for: CULT  Lab Results   Component Value Date/Time    TSH 2.61 08/12/2021 11:00 AM       Imaging:    CXR Results  (Last 48 hours)    None        Results from Hospital Encounter encounter on 08/09/21    XR CHEST PORT    Narrative  PROCEDURE: XR CHEST PORT    HISTORY:Short of breath    COMPARISON:Chest x-ray 9 August 2021      TECHNIQUE: AP portable chest    LIMITATIONS: None    TUBES/LINES: None    LUNG PARENCHYMA/PLEURA: Lungs again show irregular areas of poorly defined  increased density in both lungs. Process is slightly more confluent in the left  base when compared with the right. There has been little change since  yesterday's exam  TRACHEA/BRONCHI: Normal  PULMONARY VESSELS: Pulmonary vessels are less prominent on the current study  HEART: Heart remains enlarged  MEDIASTINUM: No discrete mass  BONE/SOFT TISSUES: No acute process    OTHER: None    Impression  Persistent bilateral pulmonary changes associated with mild  cardiomegaly. Differential considerations include pulmonary edema and diffuse  infection. XR CHEST PORT    Narrative  XR CHEST PORT    Comparison: August 28, 2020. New mild diffuse interstitial prominence in the mid and lower lung zones  possibly interstitial edema or interstitial pneumonia.  Small left pleural  effusion is suspected. No pleural fluid on the right. The heart appears enlarged  even for portable technique. Mild midthoracic scoliosis concave to the right. Bony structures otherwise unremarkable. Impression  Mildly prominent interstitial markings in both lungs possibly  interstitial edema or pneumonia. Clinical correlation recommended. No results found for this or any previous visit. IMPRESSION:   1. Acute hypoxic respiratory failure resolved  2. Congestive heart failure  3. Chest pain resolved  4. History of CVA with left-sided weakness  5. Acute kidney injury with prerenal azotemia  6. Anxiety disorder      RECOMMENDATIONS/PLAN:     1. Now on room air  2. Not actively wheezing started patient on nebulizer treatment no need for systemic steroids  3. Continue with Lasix chest x-ray shows bilateral congestive changes  4. He is agitated patient on Vistaril  5. Supplemental O2 to keep sats > 93%  6. Aspiration precautions  7.  We will sign off           Cecil Silva MD

## 2021-08-14 NOTE — DISCHARGE SUMMARY
Discharge Summary       PATIENT ID: Vin Bowman  MRN: [id number]   YOB: 1960    DATE OF ADMISSION: 8/9/2021  6:22 PM    DATE OF DISCHARGE:   PRIMARY CARE PROVIDER: Zak Hart MD     ATTENDING PHYSICIAN: Hansa Jackson  DISCHARGING PROVIDER: Hansa Jackson      CONSULTATIONS: IP CONSULT TO HOSPITALIST  IP CONSULT TO PULMONOLOGY  IP CONSULT TO CARDIOLOGY  IP CONSULT TO PSYCHIATRY    PROCEDURES/SURGERIES: * No surgery found *    ADMITTING DIAGNOSES:    Patient Active Problem List    Diagnosis Date Noted    CHF (congestive heart failure) ([facility] [address] 08/09/2021       DISCHARGE DIAGNOSES / PLAN:      Acute exacerbation of heart failure with reduced ejection fraction  Severe cardiomyopathy with ejection fraction 20 to 25% on echocardiogram 8/11/2021  Acute hypoxemic respiratory failure  -on room air  chest pain rule out MI  History of hypertension  History of CVA  Shortness of breath  Prerenal azotemia  Elevated troponin mildly  Anxiety disorder        DISCHARGE MEDICATIONS:  Current Discharge Medication List      START taking these medications    Details   albuterol-ipratropium (DUO-NEB) 2.5 mg-0.5 mg/3 ml nebu 3 mL by Nebulization route every six (6) hours as needed for Wheezing. Qty: 30 Nebule, Refills: 0  Start date: 8/14/2021      aspirin (ASPIRIN) 325 mg tablet Take 1 Tablet by mouth daily. Qty: 30 Tablet, Refills: 0  Start date: 8/15/2021      hydrOXYzine pamoate (VISTARIL) 25 mg capsule Take 1 Capsule by mouth every six (6) hours as needed for Anxiety for up to 14 days. Qty: 30 Capsule, Refills: 0  Start date: 8/14/2021, End date: 8/28/2021      mirtazapine (REMERON) 7.5 mg tablet Take 1 Tablet by mouth nightly. Qty: 30 Tablet, Refills: 0  Start date: 8/14/2021      spironolactone (ALDACTONE) 25 mg tablet Take 1 Tablet by mouth daily.   Qty: 30 Tablet, Refills: 0  Start date: 8/15/2021      furosemide (Lasix) 40 mg tablet bid  Qty: 60 Tablet, Refills: 1  Start date: 8/14/2021 NOTIFY YOUR PHYSICIAN FOR ANY OF THE FOLLOWING:   Fever over 101 degrees for 24 hours. Chest pain, shortness of breath, fever, chills, nausea, vomiting, diarrhea, change in mentation, falling, weakness, bleeding. Severe pain or pain not relieved by medications. Or, any other signs or symptoms that you may have questions about. DISPOSITION:  x  Home With:   OT  PT  HH  RN       Long term SNF/Inpatient Rehab    Independent/assisted living    Hospice    Other:       PATIENT CONDITION AT DISCHARGE: Stable      PHYSICAL EXAMINATION AT DISCHARGE:  General:          Alert, cooperative, no distress, appears stated age. HEENT:           Atraumatic, anicteric sclerae, pink conjunctivae                          No oral ulcers, mucosa moist, throat clear, dentition fair  Neck:               Supple, symmetrical  Lungs:             Clear to auscultation bilaterally. No Wheezing or Rhonchi. No rales. Chest wall:      No tenderness  No Accessory muscle use. Heart:              Regular  rhythm,  No  murmur   No edema  Abdomen:        Soft, non-tender. Not distended. Bowel sounds normal  Extremities:     No cyanosis. No clubbing,                            Skin turgor normal, Capillary refill normal  Skin:                Not pale. Not Jaundiced  No rashes   Psych:             Not anxious or agitated. Neurologic:      Alert, moves all extremities, answers questions appropriately and responds to commands     NM LUNG VENT/PERF   Final Result   Low probability V/Q scan. XR CHEST PORT   Final Result   Persistent bilateral pulmonary changes associated with mild   cardiomegaly. Differential considerations include pulmonary edema and diffuse   infection. XR CHEST PORT   Final Result   Mildly prominent interstitial markings in both lungs possibly   interstitial edema or pneumonia. Clinical correlation recommended.            Recent Results (from the past 24 hour(s))   CBC WITH AUTOMATED DIFF Collection Time: 08/14/21  7:10 AM   Result Value Ref Range    WBC 5.4 4.1 - 11.1 K/uL    RBC 4.84 4.10 - 5.70 M/uL    HGB 15.0 12.1 - 17.0 g/dL    HCT 45.1 36.6 - 50.3 %    MCV 93.2 80.0 - 99.0 FL    MCH 31.0 26.0 - 34.0 PG    MCHC 33.3 30.0 - 36.5 g/dL    RDW 15.3 (H) 11.5 - 14.5 %    PLATELET 829 836 - 969 K/uL    MPV 10.2 8.9 - 12.9 FL    NRBC 0.0 0.0  WBC    ABSOLUTE NRBC 0.00 0.00 - 0.01 K/uL    NEUTROPHILS 54 32 - 75 %    LYMPHOCYTES 31 12 - 49 %    MONOCYTES 13 5 - 13 %    EOSINOPHILS 1 0 - 7 %    BASOPHILS 1 0 - 1 %    IMMATURE GRANULOCYTES 0 0 - 0.5 %    ABS. NEUTROPHILS 2.9 1.8 - 8.0 K/UL    ABS. LYMPHOCYTES 1.7 0.8 - 3.5 K/UL    ABS. MONOCYTES 0.7 0.0 - 1.0 K/UL    ABS. EOSINOPHILS 0.1 0.0 - 0.4 K/UL    ABS. BASOPHILS 0.0 0.0 - 0.1 K/UL    ABS. IMM. GRANS. 0.0 0.00 - 0.04 K/UL    DF AUTOMATED     METABOLIC PANEL, COMPREHENSIVE    Collection Time: 08/14/21  7:10 AM   Result Value Ref Range    Sodium 140 136 - 145 mmol/L    Potassium 3.6 3.5 - 5.1 mmol/L    Chloride 105 97 - 108 mmol/L    CO2 30 21 - 32 mmol/L    Anion gap 5 5 - 15 mmol/L    Glucose 83 65 - 100 mg/dL    BUN 29 (H) 6 - 20 mg/dL    Creatinine 1.44 (H) 0.70 - 1.30 mg/dL    BUN/Creatinine ratio 20 12 - 20      GFR est AA >60 >60 ml/min/1.73m2    GFR est non-AA 50 (L) >60 ml/min/1.73m2    Calcium 8.8 8.5 - 10.1 mg/dL    Bilirubin, total 0.8 0.2 - 1.0 mg/dL    AST (SGOT) 79 (H) 15 - 37 U/L    ALT (SGPT) 34 12 - 78 U/L    Alk.  phosphatase 49 45 - 117 U/L    Protein, total 6.6 6.4 - 8.2 g/dL    Albumin 3.1 (L) 3.5 - 5.0 g/dL    Globulin 3.5 2.0 - 4.0 g/dL    A-G Ratio 0.9 (L) 1.1 - 2.2            HOSPITAL COURSE:    Patient is a 59y.o. year old male with signal past medical history of hypertension history of CVA with left-sided weakness walk by himself came to emergency room with 5 days history of chest pain and shortness of breath patient denies any fever chills no nausea no vomiting chest pain does not radiate associated with shortness of breath and diaphoresis no palpitation no nausea no vomiting seen by the ER physician work-up done in the ER which shows elevated BNP chest x-ray shows congestive heart failure patient is going to be admitted for further evaluation treatment    Patient was seen by cardiology and the pulmonologist during this admission patient have a VQ scan done shows low probability echo done shows ejection fraction about 2025% patient was started on IV Lasix BNP improved patient denies any chest pain shortness of breath nausea vomiting diarrhea constipation today today patient cardiology adjust the medication added Aldactone on the medication regimen    Discussed with the patient's nurse  Discharged with home health    Medication reconciliation done time discharge patient 35 minutes 50% time spent counseling and coordination of care      Signed:   Jagjit Tineo MD  8/14/2021  6:17 PM

## 2021-08-14 NOTE — PROGRESS NOTES
Problem: Falls - Risk of  Goal: *Absence of Falls  Description: Document Kiki Cornell Fall Risk and appropriate interventions in the flowsheet. Outcome: Progressing Towards Goal  Note: Fall Risk Interventions:  Mobility Interventions: PT Consult for assist device competence, Bed/chair exit alarm    Mentation Interventions: Adequate sleep, hydration, pain control, Bed/chair exit alarm, Room close to nurse's station    Medication Interventions: Patient to call before getting OOB, Teach patient to arise slowly    Elimination Interventions:  Toilet paper/wipes in reach, Call light in reach, Bed/chair exit alarm, Patient to call for help with toileting needs              Problem: Patient Education: Go to Patient Education Activity  Goal: Patient/Family Education  Outcome: Progressing Towards Goal

## 2021-08-14 NOTE — PROGRESS NOTES
Spiritual Care Assessment/Progress Note  [facility]      NAME: Becki Rod      MRN: [id number]  AGE: 59 y.o.  SEX: male  Anabaptist Affiliation: Mandaen   Language: English     8/14/2021     Total Time (in minutes): 20     Spiritual Assessment begun in [address] through conversation with:         [x]Patient        [] Family    [] Friend(s)        Reason for Consult: Initial/Spiritual assessment, patient floor     Spiritual beliefs: (Please include comment if needed)     [x] Identifies with a katia tradition:         [] Supported by a katia community:            [] Claims no spiritual orientation:           [] Seeking spiritual identity:                [] Adheres to an individual form of spirituality:           [] Not able to assess:                           Identified resources for coping:      [x] Prayer                               [] Music                  [] Guided Imagery     [x] Family/friends                 [] Pet visits     [] Devotional reading                         [] Unknown     [] Other:                                               Interventions offered during this visit: (See comments for more details)    Patient Interventions: Affirmation of emotions/emotional suffering, Affirmation of katia, Catharsis/review of pertinent events in supportive environment, Coping skills reviewed/reinforced, Guidance concerning next steps/process to be expected, Iconic (affirming the presence of God/Higher Power), Initial/Spiritual assessment, patient floor, Prayer (assurance of)           Plan of Care:     [] Support spiritual and/or cultural needs    [] Support AMD and/or advance care planning process      [] Support grieving process   [] Coordinate Rites and/or Rituals    [] Coordination with community clergy   [] No spiritual needs identified at this time   [] Detailed Plan of Care below (See Comments)  [] Make referral to Music Therapy  [] Make referral to Pet Therapy     [] Make referral to Addiction services  [] Make referral to [facility]  [] Make referral to Spiritual Care Partner  [] No future visits requested        [x] Follow up upon further referrals     Comments: The purpose of the visit was to do a spiritual assessment on the patient. The patient shared that he has family who talks to on regular basis, and he shared that they encourage him to get better. He mentioned that he is hopeful and is ready to go home. The patient also mentioned having a relationship with God and that his katia strengthens him. Shared that he feels hopeful that it want be long before he goes home. He expressed missing his family and he is grateful that they are a part of his progress. The  listened empathically and reflectively, as well provided the comfort of spiritual care. [address] Leon English    can be reached by calling the  at [facility]  [phone]

## 2021-08-14 NOTE — PROGRESS NOTES
Progress Note    Patient: Jonny Lopez MRN: [id number]  SSN: [id number]    YOB: 1960  Age: 59 y.o. Sex: male      Admit Date: 8/9/2021    LOS: 5 days     Subjective:     Breathing continues to improve. Objective:     Vitals:    08/13/21 2014 08/13/21 2346 08/14/21 0332 08/14/21 1024   BP: 115/74 118/82 124/85 106/75   Pulse: 90 88 70 82   Resp: 20 20 18 16   Temp: 98.4 °F (36.9 °C) (!) 96 °F (35.6 °C) 98.1 °F (36.7 °C) 97.1 °F (36.2 °C)   SpO2: 100% 98% 99% 93%   Weight:       Height:            Intake and Output:  Current Shift: No intake/output data recorded. Last three shifts: 08/12 1901 - 08/14 0700  In: 100 [I.V.:100]  Out: 500 [Urine:500]    Physical Exam:   General:   Short winded. Eyes:  Conjunctivae/corneas clear. PERRL, EOMs intact. Fundi benign   Ears:  Normal TMs and external ear canals both ears. Nose: Nares normal. Septum midline. Mucosa normal. No drainage or sinus tenderness. Mouth/Throat: Lips, mucosa, and tongue normal. Teeth and gums normal.   Neck: Supple, symmetrical, trachea midline, no adenopathy, thyroid: no enlargment/tenderness/nodules, no carotid bruit and no JVD. Back:   Symmetric, no curvature. ROM normal. No CVA tenderness. Lungs:    End expiratory wheezes bilaterally   Heart:  Regular rate and rhythm, S1, S2 normal, no murmur, click, rub or gallop. Abdomen:   Soft, non-tender. Bowel sounds normal. No masses,  No organomegaly. Extremities: Extremities normal, atraumatic, no cyanosis or edema. Pulses: 2+ and symmetric all extremities. Skin: Skin color, texture, turgor normal. No rashes or lesions   Lymph nodes: Cervical, supraclavicular, and axillary nodes normal.   Neurologic: CNII-XII intact. Normal strength, sensation and reflexes throughout. Lab/Data Review: All lab results for the last 24 hours reviewed.        Assessment:     Active Problems:    CHF (congestive heart failure) ([address] (8/9/2021)      Patient is a 52-year-old -American male with:  1. Acute kidney injury  2. Heart failure with reduced ejection fraction with decompensation  3. COPD  4. Medical noncompliance  5. Cachexia  6. Troponin in the indeterminate range, did not have any chest pain    Plan:     Results of echocardiogram were discussed with the patient. Patient follow-up cardiology clinic at the [place]. No further cardiac testing planned at this time. He has known cardiomyopathy. His creatinine is 1.4, potassium 3.6, BUN of 29, sodium 140. VQ scan was low probability for PE. Currently on aspirin.   We will add Aldactone to his medication regimen  Signed By: Noe Bull MD     August 14, 2021

## 2021-08-15 NOTE — PROGRESS NOTES
At shift change, order for discharge with [facility]  noted. Pt notified and told nursing that he already has home health set up. He immediately jumps up and begins to get dressed. Educated that paperwork had to be printed and iv and tele off and that it would be a little while before discharge. 0730 paperwork printed by dayshift nurse. Medications on discharge paperwork that were new for the pt and no prescriptions printed and no pharmacy info on discharge paperwork. Pt says he gets his meds at the [facility]. Called Dr. Londa Aschoff notified and he said to give the pt his cell phone and for pt to call him once he gets to the pharmacy. Pt called family several times to be picked up and was told they were on the way. Pt wasn't sure if they were coming to his room or going to call once they got to hospital and was anxious they were downstairs waiting. 2100 wanted to go downstairs to wait for family. Attempted to review discharge instructions with pt and he kept talking over nurse. Tried to educate about what Dr. Londa Aschoff said about his meds and how to fill them and he still talked over the nurse saying the [facility] will fill them. Pt signed the discharge instructions and was taken down in wheelchair. Family waiting at door to pick him up.       Discharge plan of care/case management plan validated with providers discharge order

## 2021-08-17 NOTE — PROGRESS NOTES
Physician Progress Note      PATIENT:               Raj Salinas  CSN #:                  [id number]  :                       1960  ADMIT DATE:       2021 6:22 PM  [address] [place] DATE:        2021 9:27 PM  RESPONDING  PROVIDER #:        Abida Cordero MD          QUERY TEXT:    Pt admitted with acute CHF. If possible, please document in progress notes and discharge summary further specificity regarding the type of CHF:    The medical record reflects the following:  Risk Factors: HTN    Clinical Indicators:    Echo -  Left Ventricle Moderately dilated left ventricle. Mild concentric hypertrophy. The estimated EF is 20 - 25%. Severely and globally reduced systolic function. Unable to assess diastolic function. Left Atrium Mildly dilated left atrium. Right Ventricle Normal wall thickness. Mildly dilated right ventricle. Reduced systolic function. Right Atrium Mildly dilated right atrium. Interatrial Septum Interatrial septum not well visualized  Aortic Valve No stenosis. There is leaflet calcification. Moderate aortic valve regurgitation. Mitral Valve Normal valve structure and no stenosis. Mild to moderate regurgitation. Tricuspid Valve Normal valve structure and no stenosis. Moderate regurgitation. Pulmonic Valve Pulmonic valve not well visualized. No stenosis. Trace regurgitation. Aorta Normal aortic root, ascending aortic, and aortic arch. Normal aortic root. Pulmonary Artery Pulmonary arteries not well visualized. Pulmonary arterial systolic pressure (PASP) is 57 mmHg. IVC/Hepatic Veins Normal structure. Severely elevated central venous pressure (15 mmHg); IVC diameter is larger than 21 mm and collapses less than 50% with respiration. Pericardium No evidence of pericardial effusion. There is a small left pleural effusion. Treatment: Echo; Cards consult; Lasix 40mg IV BID;     Thank you,  Renzo Cruz, RN, BSN, CCDS, [place], [place]  Clinical Documentation  [phone] or [phone]  Options provided:  -- Acute on Chronic Systolic CHF/HFrEF  -- Acute Systolic CHF/HFrEF  -- Other - I will add my own diagnosis  -- Disagree - Not applicable / Not valid  -- Disagree - Clinically unable to determine / Unknown  -- Refer to Clinical Documentation Reviewer    PROVIDER RESPONSE TEXT:    This patient is in acute on chronic systolic CHF/HFrEF.     Query created by: Cassius Officer on 8/12/2021 8:47 AM      Electronically signed by:  Robyn Fernandez MD 8/17/2021 8:51 AM

## 2021-09-01 PROBLEM — J96.91 RESPIRATORY FAILURE WITH HYPOXIA (HCC): Status: ACTIVE | Noted: 2021-01-01

## 2021-09-01 PROBLEM — Z91.199 NONCOMPLIANCE: Status: ACTIVE | Noted: 2021-01-01

## 2021-09-01 PROBLEM — Z72.0 TOBACCO ABUSE: Status: ACTIVE | Noted: 2021-01-01

## 2021-09-01 PROBLEM — E87.70 FLUID OVERLOAD: Status: ACTIVE | Noted: 2021-01-01

## 2021-09-01 PROBLEM — R79.89 ELEVATED BRAIN NATRIURETIC PEPTIDE (BNP) LEVEL: Status: ACTIVE | Noted: 2021-01-01

## 2021-09-01 NOTE — ROUTINE PROCESS
TRANSFER - OUT REPORT:    Verbal report given to [facility], RN(name) on Tonny Chery  being transferred to [facility](unit) for routine progression of care       Report consisted of patients Situation, Background, Assessment and   Recommendations(SBAR). Information from the following report(s) ED Summary was reviewed with the receiving nurse. Lines:   Peripheral IV 09/01/21 Distal;Right Antecubital (Active)        Opportunity for questions and clarification was provided.       Patient transported with:   [employer]

## 2021-09-01 NOTE — H&P
History and Physical    Patient: Becki Rod MRN: [id number]  SSN: [id number]    YOB: 1960  Age: 59 y.o. Sex: male      Subjective:      Patient is 68-year-old male with a PMH significant for HTN, COPD, cardiomyopathy, systolic and diastolic heart failure with EF 25%, tobacco abuse and previous alcohol use. He presents to the emergency room with shortness of breath. Recently discharged 1 week ago for same presentation admission. Discharge from new medications which he did not take. On examination he is tachycardic and hypoxic. Requiring supplemental oxygen. Significant labs on admission D-dimer 2.05, creatinine 1.78, troponin 0 0.09, BNP >35,000. Chest x-ray reveals bilateral interstitial and bronchial thickening unchanged from previous x-ray, left trace pleural effusion or scar and cardiomegaly. VQ scan low probability for PE. Admitted for further management of acute on chronic decompensation systolic and diastolic heart failure, respiratory failure with hypoxia, fluid overload, acute kidney injury. Consult cardiology. Started on IV Lasix. Continue oxygen use BiPAP if required. We will add beta-blocker. Past Medical History:   Diagnosis Date    Chronic obstructive pulmonary disease ([facility] [facility]     Heart failure ([facility]      History reviewed. No pertinent surgical history. Family History   Problem Relation Age of Onset    Hypertension Mother      Social History     Tobacco Use    Smoking status: Current Every Day Smoker     Packs/day: 0.50    Smokeless tobacco: Never Used   Substance Use Topics    Alcohol use: Yes     Comment: ocassionally      Prior to Admission medications    Medication Sig Start Date End Date Taking? Authorizing Provider   albuterol-ipratropium (DUO-NEB) 2.5 mg-0.5 mg/3 ml nebu 3 mL by Nebulization route every six (6) hours as needed for Wheezing. 8/14/21   Tash Jackson MD   aspirin (ASPIRIN) 325 mg tablet Take 1 Tablet by mouth daily. 8/15/21   Lacy Jackson MD   mirtazapine (REMERON) 7.5 mg tablet Take 1 Tablet by mouth nightly. 8/14/21   Lacy Jackson MD   spironolactone (ALDACTONE) 25 mg tablet Take 1 Tablet by mouth daily. 8/15/21   Lacy Jackson MD   furosemide (Lasix) 40 mg tablet bid  Patient not taking: Reported on 9/1/2021 8/14/21   Jeananne Goltz, MD        No Known Allergies    Review of Systems   Constitutional: Positive for malaise/fatigue. Respiratory: Positive for cough, shortness of breath and wheezing. Cardiovascular: Negative for chest pain. Neurological: Positive for weakness. Psychiatric/Behavioral: The patient is nervous/anxious. Objective:     Vitals:    09/01/21 1324 09/01/21 1400 09/01/21 1710 09/01/21 1740   BP: 124/88 124/87 121/89 136/89   Pulse: 94 92 87 97   Resp: 24 28 22 22   Temp:    97.6 °F (36.4 °C)   SpO2: 98% 97% 94% (!) 89%   Weight:       Height:            Physical Exam  Constitutional:       Appearance: He is ill-appearing. Comments: Cachectic, disheveled   HENT:      Nose: Congestion present. Cardiovascular:      Rate and Rhythm: Tachycardia present. Pulmonary:      Effort: Respiratory distress present. Breath sounds: Wheezing and rales present. Neurological:      Motor: Weakness present.            Recent Results (from the past 24 hour(s))   EKG, 12 LEAD, INITIAL    Collection Time: 09/01/21 10:15 AM   Result Value Ref Range    Ventricular Rate 109 BPM    Atrial Rate 109 BPM    P-R Interval 152 ms    QRS Duration 98 ms    Q-T Interval 394 ms    QTC Calculation (Bezet) 530 ms    Calculated P Axis 73 degrees    Calculated R Axis -64 degrees    Calculated T Axis 90 degrees    Diagnosis       Sinus tachycardia  Possible Left atrial enlargement  Left axis deviation  Left ventricular hypertrophy  Cannot rule out Septal infarct (cited on or before 09-AUG-2021)  ST & T wave abnormality, consider lateral ischemia  Prolonged QT  Abnormal ECG  When compared with ECG of 09-AUG-2021 18:26,  Significant changes have occurred  Confirmed by Milton Arizmendi ([id number]) on 9/1/2021 1:30:10 PM     CBC WITH AUTOMATED DIFF    Collection Time: 09/01/21 11:34 AM   Result Value Ref Range    WBC 4.7 4.1 - 11.1 K/uL    RBC 4.30 4. 10 - 5.70 M/uL    HGB 13.7 12.1 - 17.0 g/dL    HCT 41.8 36.6 - 50.3 %    MCV 97.2 80.0 - 99.0 FL    MCH 31.9 26.0 - 34.0 PG    MCHC 32.8 30.0 - 36.5 g/dL    RDW 16.7 (H) 11.5 - 14.5 %    PLATELET 006 836 - 642 K/uL    MPV 10.3 8.9 - 12.9 FL    NRBC 0.6 (H) 0.0  WBC    ABSOLUTE NRBC 0.03 (H) 0.00 - 0.01 K/uL    NEUTROPHILS 85 (H) 32 - 75 %    LYMPHOCYTES 8 (L) 12 - 49 %    MONOCYTES 7 5 - 13 %    EOSINOPHILS 0 0 - 7 %    BASOPHILS 0 0 - 1 %    IMMATURE GRANULOCYTES 0 0 - 0.5 %    ABS. NEUTROPHILS 3.9 1.8 - 8.0 K/UL    ABS. LYMPHOCYTES 0.4 (L) 0.8 - 3.5 K/UL    ABS. MONOCYTES 0.3 0.0 - 1.0 K/UL    ABS. EOSINOPHILS 0.0 0.0 - 0.4 K/UL    ABS. BASOPHILS 0.0 0.0 - 0.1 K/UL    ABS. IMM. GRANS. 0.0 0.00 - 0.04 K/UL    DF AUTOMATED     METABOLIC PANEL, COMPREHENSIVE    Collection Time: 09/01/21 11:34 AM   Result Value Ref Range    Sodium 142 136 - 145 mmol/L    Potassium 4.6 3.5 - 5.1 mmol/L    Chloride 110 (H) 97 - 108 mmol/L    CO2 25 21 - 32 mmol/L    Anion gap 7 5 - 15 mmol/L    Glucose 115 (H) 65 - 100 mg/dL    BUN 54 (H) 6 - 20 mg/dL    Creatinine 1.78 (H) 0.70 - 1.30 mg/dL    BUN/Creatinine ratio 30 (H) 12 - 20      GFR est AA 47 (L) >60 ml/min/1.73m2    GFR est non-AA 39 (L) >60 ml/min/1.73m2    Calcium 8.4 (L) 8.5 - 10.1 mg/dL    Bilirubin, total 1.2 (H) 0.2 - 1.0 mg/dL    AST (SGOT) 150 (H) 15 - 37 U/L    ALT (SGPT) 100 (H) 12 - 78 U/L    Alk.  phosphatase 136 (H) 45 - 117 U/L    Protein, total 6.7 6.4 - 8.2 g/dL    Albumin 3.2 (L) 3.5 - 5.0 g/dL    Globulin 3.5 2.0 - 4.0 g/dL    A-G Ratio 0.9 (L) 1.1 - 2.2     TROPONIN I    Collection Time: 09/01/21 11:34 AM   Result Value Ref Range    Troponin-I, Qt. 0.09 (H) <0.05 ng/mL   BNP    Collection Time: 09/01/21 11:34 AM Result Value Ref Range    NT pro-BNP >35,000 (H) <125 pg/mL   CK W/ REFLX CKMB    Collection Time: 09/01/21 11:34 AM   Result Value Ref Range    .0 (H) 39 - 308 ng/mL   D DIMER    Collection Time: 09/01/21 11:34 AM   Result Value Ref Range    D DIMER 2.05 (H) <0.50 ug/ml(FEU)   CK-MB,QUANT. Collection Time: 09/01/21 11:34 AM   Result Value Ref Range    CK - MB 2.8 <3.6 ng/mL    CK-MB Index 0.9     BLOOD GAS, ARTERIAL    Collection Time: 09/01/21 11:40 AM   Result Value Ref Range    pH 7.29 (L) 7.35 - 7.45      PCO2 51 (H) 35 - 45 mmHg    PO2 63 (L) 75 - 100 mmHg    O2 SAT 90 (L) >95 %    BICARBONATE 22 22 - 26 mmol/L    BASE DEFICIT 2.9 (H) 0 - 2 mmol/L    O2 METHOD Nasal Cannula      O2 FLOW RATE 3 L/min    SITE Left Brachial      MARIXA'S TEST PASS     SARS-COV-2    Collection Time: 09/01/21  1:30 PM   Result Value Ref Range    SARS-CoV-2 Please find results under separate order     COVID-19 RAPID TEST    Collection Time: 09/01/21  1:30 PM   Result Value Ref Range    Specimen source Nasopharyngeal      COVID-19 rapid test Not Detected Not Detected     TROPONIN I    Collection Time: 09/01/21  2:52 PM   Result Value Ref Range    Troponin-I, Qt. 0.11 (H) <0.05 ng/mL       XR Results (maximum last 3): Results from [place] encounter on 09/01/21    XR CHEST PORT    Narrative  Dyspnea. Comparison chest x-ray 8/10/2021. FINDINGS: Single frontal view of the chest. Unchanged cardiomegaly. The lungs  are well inflated. Bilateral interstitial and bronchial thickening mostly in the  mid to lower lung zones, unchanged. No lobar consolidation. Trace left pleural  effusion or scar. No pneumothorax. No free air under the diaphragm. Bony  structures grossly intact. Impression  1. Unchanged bilateral interstitial and bronchial thickening, and trace left  pleural effusion or scar. 2. Unchanged cardiomegaly.       Results from [place] encounter on 08/09/21    XR CHEST PORT    Narrative  PROCEDURE: XR CHEST PORT    HISTORY:Short of breath    COMPARISON:Chest x-ray 9 August 2021      TECHNIQUE: AP portable chest    LIMITATIONS: None    TUBES/LINES: None    LUNG PARENCHYMA/PLEURA: Lungs again show irregular areas of poorly defined  increased density in both lungs. Process is slightly more confluent in the left  base when compared with the right. There has been little change since  yesterday's exam  TRACHEA/BRONCHI: Normal  PULMONARY VESSELS: Pulmonary vessels are less prominent on the current study  HEART: Heart remains enlarged  MEDIASTINUM: No discrete mass  BONE/SOFT TISSUES: No acute process    OTHER: None    Impression  Persistent bilateral pulmonary changes associated with mild  cardiomegaly. Differential considerations include pulmonary edema and diffuse  infection. XR CHEST PORT    Narrative  XR CHEST PORT    Comparison: August 28, 2020. New mild diffuse interstitial prominence in the mid and lower lung zones  possibly interstitial edema or interstitial pneumonia. Small left pleural  effusion is suspected. No pleural fluid on the right. The heart appears enlarged  even for portable technique. Mild midthoracic scoliosis concave to the right. Bony structures otherwise unremarkable. Impression  Mildly prominent interstitial markings in both lungs possibly  interstitial edema or pneumonia. Clinical correlation recommended. CT Results (maximum last 3): No results found for this or any previous visit. MRI Results (maximum last 3): No results found for this or any previous visit. Nuclear Medicine Results (maximum last 3):   Results from [place] encounter on 08/09/21    NM LUNG VENT/PERF    Narrative  V/Q scan    Comparison single view chest August 10, 2021.    43.7 mCi technetium labeled DTPA administered for the ventilation phase of this  study while 3.9 mCi technetium labeled MAA administered for the perfusion phase  of this study.    Ventilation images demonstrate some central clumping of activity suggesting  reduced inspiratory effort. Perfusion images demonstrate mild matched heterogeneity more so through apical  lungs. No ventilation/perfusion mismatch to suggest the presence of acute  pulmonary embolism. Cardiac silhouette enlargement. Impression  Low probability V/Q scan. US Results (maximum last 3): No results found for this or any previous visit.       Active Problems:    CHF (congestive heart failure) ([address] (8/9/2021)      Respiratory failure with hypoxia (HCC) (9/1/2021)      Fluid overload (9/1/2021)      Elevated brain natriuretic peptide (BNP) level (9/1/2021)      Noncompliance (9/1/2021)      Tobacco abuse (9/1/2021)        Assessment/Plan:     CHF exacerbation systolic/diastolic  Fluid overload  Elevated BNP  Known heart failure with reduced ejection fraction  BNP >35,000  Cardiology consulted  Started on IV Lasix 40 mg twice daily  We will start on low-dose beta-blocker  Continue aspirin, spironolactone    Respiratory failure with hypoxia  COPD exacerbation/bronchitis  Oxygen as needed to maintain saturations greater than 92%: Currently on 4 L  As needed duo nebs  Oral doxycycline    Tachycardia  Elevated troponins  Likely secondary to oxygen demand  Continue to trend    Tobacco abuse  Counseled  Nicotine replacement therapy initiated    Noncompliance  Counseled      DVT Prophylaxis: Lovenox  Code Status: Full  POA/NOK:  Total Time spent in direct and indirect care including assessment review of labs and coordination of services and consultations: Greater than 75 minutes      Signed By: Donna Harmon NP     September 1, 2021

## 2021-09-01 NOTE — Clinical Note
Status[de-identified] INPATIENT [101]   Type of Bed: Telemetry [19]   Cardiac Monitoring Required?: Yes   Inpatient Hospitalization Certified Necessary for the Following Reasons: 3.  Patient receiving treatment that can only be provided in an inpatient setting (further clarification in H&P documentation)   Admitting Diagnosis: CHF (congestive heart failure) [facility]) [[id number]]   Admitting Physician: Sera Nieto   Attending Physician: Sera Nieto   Estimated Length of Stay: 3-4 Midnights   Discharge Plan[de-identified] Home with Office Follow-up

## 2021-09-01 NOTE — ED TRIAGE NOTES
EMS dispatched for person having difficulty breathing. Here recently for CHF. Stephanie Cheng Pt refused BIPAP that ems offered, so NRB placed and duo neb.

## 2021-09-01 NOTE — ED PROVIDER NOTES
EMERGENCY DEPARTMENT HISTORY AND PHYSICAL EXAM      Date: 9/1/2021  Patient Name: Shadia Canales    History of Presenting Illness     Chief Complaint   Patient presents with    Shortness of Breath       History Provided By: Patient and EMS    HPI: Shadia Canales, 59 y.o. male with a past medical history significant hypertension, COPD and Cardiomyopathy, heart failure with reduced ejection fraction, nicotine dependence presents to the ED with cc of shortness of breath. He was recently discharged last week after admission for exacerbation of heart failure and has not filled his diuretic medications since discharge. He has progressively worsening shortness of breath over the past week since he was released and is at its worse this morning. He arrived by EMS with DuoNeb in progress. He refused BiPAP by EMS and is currently on nonrebreather. No other medications were administered prior to arrival.  He admits to smoking daily and positive alcohol use whenever he is able to obtain it. Last alcohol intake yesterday. He is visibly short of breath with audible congestion, Rales, rhonchi, nonproductive congested cough. Associated symptom of chest pain, orthopnea. Specifically denies fever, chills, nausea, vomiting, abdominal pain. There are no other complaints, changes, or physical findings at this time. PCP: Hailey, MD Zak    No current facility-administered medications on file prior to encounter. Current Outpatient Medications on File Prior to Encounter   Medication Sig Dispense Refill    albuterol-ipratropium (DUO-NEB) 2.5 mg-0.5 mg/3 ml nebu 3 mL by Nebulization route every six (6) hours as needed for Wheezing. 30 Nebule 0    aspirin (ASPIRIN) 325 mg tablet Take 1 Tablet by mouth daily. 30 Tablet 0    mirtazapine (REMERON) 7.5 mg tablet Take 1 Tablet by mouth nightly. 30 Tablet 0    spironolactone (ALDACTONE) 25 mg tablet Take 1 Tablet by mouth daily.  30 Tablet 0    furosemide (Lasix) 40 mg tablet bid 60 Tablet 1       Past History     Past Medical History:  Past Medical History:   Diagnosis Date    [facility] [facility]     Heart failure ([facility] [address]        Past Surgical History:  History reviewed. No pertinent surgical history. Family History:  History reviewed. No pertinent family history. Social History:  Social History     Tobacco Use    Smoking status: Current Every Day Smoker     Packs/day: 0.50    Smokeless tobacco: Never Used   Substance Use Topics    Alcohol use: Yes     Comment: ocassionally    Drug use: Not Currently       Allergies:  No Known Allergies      Review of Systems     Review of Systems   Constitutional: Negative for chills, fatigue and fever. HENT: Positive for congestion. Negative for ear pain, postnasal drip, rhinorrhea, sinus pressure, sore throat and trouble swallowing. Eyes: Negative for discharge and visual disturbance. Respiratory: Positive for cough, chest tightness and shortness of breath. Negative for wheezing. Cardiovascular: Positive for chest pain. Negative for palpitations and leg swelling. Gastrointestinal: Negative for abdominal distention, abdominal pain, blood in stool, constipation, diarrhea, nausea and vomiting. Endocrine: Negative. Genitourinary: Negative for dysuria, frequency and urgency. Musculoskeletal: Negative for back pain, myalgias, neck pain and neck stiffness. Skin: Negative for rash. Allergic/Immunologic: Negative. Neurological: Negative for dizziness, syncope, weakness and headaches. Hematological: Does not bruise/bleed easily. Psychiatric/Behavioral: Negative for confusion, hallucinations, sleep disturbance and suicidal ideas. The patient is not nervous/anxious. All other systems reviewed and are negative. Physical Exam     Physical Exam  Vitals and nursing note reviewed. Constitutional:       General: He is in acute distress. Appearance: He is cachectic.  He is ill-appearing. HENT:      Head: Normocephalic and atraumatic. Mouth/Throat:      Mouth: Mucous membranes are dry. Pharynx: Oropharynx is clear. Eyes:      Extraocular Movements: Extraocular movements intact. Pupils: Pupils are equal, round, and reactive to light. Neck:      Vascular: No JVD. Trachea: No tracheal deviation. Cardiovascular:      Rate and Rhythm: Regular rhythm. Tachycardia present. No extrasystoles are present. Pulses:           Radial pulses are 2+ on the right side and 2+ on the left side. Heart sounds: Normal heart sounds. No murmur heard. Pulmonary:      Effort: Tachypnea and respiratory distress present. Breath sounds: Decreased air movement present. Examination of the right-upper field reveals rhonchi and rales. Examination of the left-upper field reveals rhonchi and rales. Examination of the right-middle field reveals rhonchi. Examination of the left-middle field reveals rhonchi. Examination of the right-lower field reveals decreased breath sounds. Examination of the left-lower field reveals decreased breath sounds. Decreased breath sounds, rhonchi and rales present. Chest:      Chest wall: No tenderness. Abdominal:      General: Bowel sounds are normal.      Palpations: Abdomen is soft. Tenderness: There is no abdominal tenderness. There is no guarding or rebound. Musculoskeletal:         General: Normal range of motion. Cervical back: Normal range of motion and neck supple. Right lower leg: No tenderness. No edema. Left lower leg: No tenderness. No edema. Skin:     General: Skin is warm and dry. Capillary Refill: Capillary refill takes less than 2 seconds. Findings: No erythema. Neurological:      General: No focal deficit present. Mental Status: He is alert and oriented to person, place, and time. Motor: No weakness.    Psychiatric:         Mood and Affect: Mood normal.         Behavior: Behavior normal.         Lab and Diagnostic Study Results     Labs -     Recent Results (from the past 12 hour(s))   EKG, 12 LEAD, INITIAL    Collection Time: 09/01/21 10:15 AM   Result Value Ref Range    Ventricular Rate 109 BPM    Atrial Rate 109 BPM    P-R Interval 152 ms    QRS Duration 98 ms    Q-T Interval 394 ms    QTC Calculation (Bezet) 530 ms    Calculated P Axis 73 degrees    Calculated R Axis -64 degrees    Calculated T Axis 90 degrees    Diagnosis       Sinus tachycardia  Possible Left atrial enlargement  Left axis deviation  Left ventricular hypertrophy  Cannot rule out Septal infarct (cited on or before 09-AUG-2021)  ST & T wave abnormality, consider lateral ischemia  Prolonged QT  Abnormal ECG  When compared with ECG of 09-AUG-2021 18:26,  Significant changes have occurred  Confirmed by Junious Bio ([id number]) on 9/1/2021 1:30:10 PM     CBC WITH AUTOMATED DIFF    Collection Time: 09/01/21 11:34 AM   Result Value Ref Range    WBC 4.7 4.1 - 11.1 K/uL    RBC 4.30 4. 10 - 5.70 M/uL    HGB 13.7 12.1 - 17.0 g/dL    HCT 41.8 36.6 - 50.3 %    MCV 97.2 80.0 - 99.0 FL    MCH 31.9 26.0 - 34.0 PG    MCHC 32.8 30.0 - 36.5 g/dL    RDW 16.7 (H) 11.5 - 14.5 %    PLATELET 093 474 - 434 K/uL    MPV 10.3 8.9 - 12.9 FL    NRBC 0.6 (H) 0.0  WBC    ABSOLUTE NRBC 0.03 (H) 0.00 - 0.01 K/uL    NEUTROPHILS 85 (H) 32 - 75 %    LYMPHOCYTES 8 (L) 12 - 49 %    MONOCYTES 7 5 - 13 %    EOSINOPHILS 0 0 - 7 %    BASOPHILS 0 0 - 1 %    IMMATURE GRANULOCYTES 0 0 - 0.5 %    ABS. NEUTROPHILS 3.9 1.8 - 8.0 K/UL    ABS. LYMPHOCYTES 0.4 (L) 0.8 - 3.5 K/UL    ABS. MONOCYTES 0.3 0.0 - 1.0 K/UL    ABS. EOSINOPHILS 0.0 0.0 - 0.4 K/UL    ABS. BASOPHILS 0.0 0.0 - 0.1 K/UL    ABS. IMM.  GRANS. 0.0 0.00 - 0.04 K/UL    DF AUTOMATED     METABOLIC PANEL, COMPREHENSIVE    Collection Time: 09/01/21 11:34 AM   Result Value Ref Range    Sodium 142 136 - 145 mmol/L    Potassium 4.6 3.5 - 5.1 mmol/L    Chloride 110 (H) 97 - 108 mmol/L    CO2 25 21 - 32 mmol/L Anion gap 7 5 - 15 mmol/L    Glucose 115 (H) 65 - 100 mg/dL    BUN 54 (H) 6 - 20 mg/dL    Creatinine 1.78 (H) 0.70 - 1.30 mg/dL    BUN/Creatinine ratio 30 (H) 12 - 20      GFR est AA 47 (L) >60 ml/min/1.73m2    GFR est non-AA 39 (L) >60 ml/min/1.73m2    Calcium 8.4 (L) 8.5 - 10.1 mg/dL    Bilirubin, total 1.2 (H) 0.2 - 1.0 mg/dL    AST (SGOT) 150 (H) 15 - 37 U/L    ALT (SGPT) 100 (H) 12 - 78 U/L    Alk. phosphatase 136 (H) 45 - 117 U/L    Protein, total 6.7 6.4 - 8.2 g/dL    Albumin 3.2 (L) 3.5 - 5.0 g/dL    Globulin 3.5 2.0 - 4.0 g/dL    A-G Ratio 0.9 (L) 1.1 - 2.2     TROPONIN I    Collection Time: 09/01/21 11:34 AM   Result Value Ref Range    Troponin-I, Qt. 0.09 (H) <0.05 ng/mL   BNP    Collection Time: 09/01/21 11:34 AM   Result Value Ref Range    NT pro-BNP >35,000 (H) <125 pg/mL   CK W/ REFLX CKMB    Collection Time: 09/01/21 11:34 AM   Result Value Ref Range    .0 (H) 39 - 308 ng/mL   D DIMER    Collection Time: 09/01/21 11:34 AM   Result Value Ref Range    D DIMER 2.05 (H) <0.50 ug/ml(FEU)   CK-MB,QUANT.     Collection Time: 09/01/21 11:34 AM   Result Value Ref Range    CK - MB 2.8 <3.6 ng/mL    CK-MB Index 0.9     BLOOD GAS, ARTERIAL    Collection Time: 09/01/21 11:40 AM   Result Value Ref Range    pH 7.29 (L) 7.35 - 7.45      PCO2 51 (H) 35 - 45 mmHg    PO2 63 (L) 75 - 100 mmHg    O2 SAT 90 (L) >95 %    BICARBONATE 22 22 - 26 mmol/L    BASE DEFICIT 2.9 (H) 0 - 2 mmol/L    O2 METHOD Nasal Cannula      O2 FLOW RATE 3 L/min    SITE Left Brachial      MARIXA'S TEST PASS     SARS-COV-2    Collection Time: 09/01/21  1:30 PM   Result Value Ref Range    SARS-CoV-2 Please find results under separate order     COVID-19 RAPID TEST    Collection Time: 09/01/21  1:30 PM   Result Value Ref Range    Specimen source Nasopharyngeal      COVID-19 rapid test Not Detected Not Detected         Radiologic Studies -   @[email or web address]@  CT Results  (Last 48 hours)    None        CXR Results  (Last 48 hours) 09/01/21 1108  XR CHEST PORT Final result    Impression:  1. Unchanged bilateral interstitial and bronchial thickening, and trace left   pleural effusion or scar. 2. Unchanged cardiomegaly. Narrative:  Dyspnea. Comparison chest x-ray 8/10/2021. FINDINGS: Single frontal view of the chest. Unchanged cardiomegaly. The lungs   are well inflated. Bilateral interstitial and bronchial thickening mostly in the   mid to lower lung zones, unchanged. No lobar consolidation. Trace left pleural   effusion or scar. No pneumothorax. No free air under the diaphragm. Bony   structures grossly intact. Medical Decision Making   - I am the first provider for this patient. - I reviewed the vital signs, available nursing notes, past medical history, past surgical history, family history and social history. - Initial assessment performed. The patients presenting problems have been discussed, and they are in agreement with the care plan formulated and outlined with them. I have encouraged them to ask questions as they arise throughout their visit. Vital Signs-Reviewed the patient's vital signs.   Patient Vitals for the past 12 hrs:   Temp Pulse Resp BP SpO2   09/01/21 1324  94  124/88    09/01/21 1200  98  120/87 96 %   09/01/21 1100  99  (!) 131/91 95 %   09/01/21 1008 98.3 °F (36.8 °C) (!) 105 (!) 41 (!) 127/98 96 %       Records Reviewed: Nursing Notes, Old Medical Records and Ambulance Run Sheet    The patient presents with shortness of breath with a differential diagnosis of exacerbation of heart failure, pneumonia, fluid overload, COPD exacerbation, bronchitis      ED Course:     ED Course as of Sep 01 1427   Wed Sep 01, 2021   1043 EKG at 1015: Ventricular rate 109 bpm, DE interval 150 ms, QRS duration 98 ms,  ms, interpretation sinus tachycardia, possible left atrial enlargement, left axis deviation, prolonged QT, LVH, abnormal ECG    [KR]   1127 Reviewed   XR CHEST PORT [KR]   1228 CTA of chest ordered   D DIMER(!):    D DIMER 2.05(!) [KR]   1229 Patient refusing BIPAP and is currently on nasal cannula with less labored respirations at this time. Updated patient on results and additional testing. [KR]   1332 Troponin and BNP noted. Awaiting CTA and COVID test    [KR]   1420 Consult to hospitalist for admission. CTA and repeat troponin are still pending.     [KR]      ED Course User Index  [KR] Moshe Buchanan NP       Provider Notes (Medical Decision Making):     MDM  Number of Diagnoses or Management Options  Acute on chronic combined systolic and diastolic congestive heart failure ([address]  JUAN CARLOS (acute kidney injury) ([facility] [address]  Positive D dimer  Diagnosis management comments: ABG ordered for patient's respiratory status revealing respiratory acidosis, basic and cardiac labs along with D-dimer ordered due to patient's presentation and shortness of breath. D-dimer positive. CTA of the chest ordered but is still pending at the time of admission. Initial minimally elevated troponin of 0.09-repeat troponin still pending as well. Renal dysfunction elevated from previous lab values noted from last admission. Elevated BNP. Treated with Lasix IV and supplemental oxygen. Covid test negative discussed with hospitalist who agreed with admission. Amount and/or Complexity of Data Reviewed  Clinical lab tests: reviewed  Tests in the radiology section of CPT®: reviewed and ordered  Discussion of test results with the performing providers: yes  Discuss the patient with other providers: yes           Disposition   Disposition: Admitted to hospitalist    Admitted      Diagnosis     Clinical Impression:   1. Acute on chronic combined systolic and diastolic congestive heart failure ([address]    2. Positive D dimer    3.  JUAN CARLOS (acute kidney injury) ([facility] [address]        Attestations:    Kenan Alicea NP    Please note that this dictation was completed with [employer], the computer voice recognition software. Quite often unanticipated grammatical, syntax, homophones, and other interpretive errors are inadvertently transcribed by the computer software. Please disregard these errors. Please excuse any errors that have escaped final proofreading. Thank you.

## 2021-09-02 NOTE — CONSULTS
Consult    Patient: Bessy Rodriguez MRN: [id number]  SSN: [id number]    YOB: 1960  Age: 59 y.o. Sex: male      Subjective:      Bessy Rodriguez is a 59 y.o. male who is being seen for CHF. Patient with dilated cardiomyopathy history of medical noncompliance, COPD, nicotine dependence and previous history of alcohol abuse. He presented 2 weeks ago with shortness of breath and treated for COPD exacerbation and acute hypoxemic respiratory failure heart failure. At that time he was refusing some of the medical management including CPAP and oxygen. He was refusing his breathing treatment. Anyhow his friend called 911 yesterday as he was feeling dizzy. Denied having any chest pain. Did not have any nausea, vomiting or excessive sweating. Past Medical History:   Diagnosis Date    Chronic obstructive pulmonary disease ([facility] [address]     Heart failure ([facility] [address]      History reviewed. No pertinent surgical history.    Family History   Problem Relation Age of Onset    Hypertension Mother      Social History     Tobacco Use    Smoking status: Current Every Day Smoker     Packs/day: 0.50    Smokeless tobacco: Never Used   Substance Use Topics    Alcohol use: Yes     Comment: ocassionally      Current Facility-Administered Medications   Medication Dose Route Frequency Provider Last Rate Last Admin    sodium chloride (NS) flush 5-40 mL  5-40 mL IntraVENous Q8H Tameka Avelar NP   10 mL at 09/02/21 0559    sodium chloride (NS) flush 5-40 mL  5-40 mL IntraVENous PRN Tameka Avelar NP        acetaminophen (TYLENOL) tablet 650 mg  650 mg Oral Q6H PRN Tameka Avelar NP        Or   [facility] acetaminophen (TYLENOL) suppository 650 mg  650 mg Rectal Q6H PRN Tameka Avelar NP        polyethylene glycol (MIRALAX) packet 17 g  17 g Oral DAILY PRN Tameka Avelar NP        bisacodyL (DULCOLAX) tablet 5 mg  5 mg Oral DAILY PRN Tameka Avelar NP        ondansetron (ZOFRAN ODT) tablet 4 mg  4 mg Oral Q6H PRN Sudhir Richardson NP        Or    ondansetron [facility]) injection 4 mg  4 mg IntraVENous Q6H PRN Sudhir Richardson NP        enoxaparin (LOVENOX) injection 40 mg  40 mg SubCUTAneous DAILY Sudhir Richardson NP   40 mg at 09/02/21 1000    albuterol-ipratropium (DUO-NEB) 2.5 MG-0.5 MG/3 ML  3 mL Nebulization Q6H PRN Sudhir Richardson NP        mirtazapine (REMERON) tablet 7.5 mg  7.5 mg Oral QHS Sudhir Richardson NP   7.5 mg at 09/01/21 2230    spironolactone (ALDACTONE) tablet 25 mg  25 mg Oral DAILY Sudhir Richardson NP   25 mg at 09/02/21 1000    aspirin tablet 325 mg  325 mg Oral DAILY Sudhir Richardson NP   325 mg at 09/02/21 0959    furosemide (LASIX) injection 40 mg  40 mg IntraVENous BID Sudhir Richardson NP   40 mg at 09/02/21 0959    carvediloL (COREG) tablet 3.125 mg  3.125 mg Oral BID WITH MEALS Sudhir Richardson NP   3.125 mg at 09/02/21 1000    nicotine (NICODERM CQ) 14 mg/24 hr patch 1 Patch  1 Patch TransDERmal DAILY Sudhir Richardson NP   1 Patch at 09/02/21 5852    doxycycline (VIBRAMYCIN) capsule 100 mg  100 mg Oral Q12H Sudhir Richardson NP   100 mg at 09/02/21 1000        No Known Allergies    Review of Systems:  A comprehensive review of systems was negative except for that written in the History of Present Illness. Objective:     Vitals:    09/02/21 0801 09/02/21 1012 09/02/21 1120 09/02/21 1121   BP: 129/84      Pulse: 85      Resp: 22      Temp: 97.6 °F (36.4 °C)      SpO2: 91%  97% 95%   Weight:  52.4 kg (115 lb 8.3 oz)     Height:            Physical Exam:  General:  Alert, cooperative, no distress, appears stated age. Eyes:  Conjunctivae/corneas clear. PERRL, EOMs intact. Fundi benign   Ears:  Normal TMs and external ear canals both ears. Nose: Nares normal. Septum midline. Mucosa normal. No drainage or sinus tenderness.    Mouth/Throat: Lips, mucosa, and tongue normal. Teeth and gums normal.   Neck: Supple, symmetrical, trachea midline, no adenopathy, thyroid: no enlargment/tenderness/nodules, no carotid bruit and no JVD. Back:   Symmetric, no curvature. ROM normal. No CVA tenderness. Lungs:   Clear to auscultation bilaterally. Heart:  Regular rate and rhythm, S1, S2 normal, no murmur, click, rub or gallop. Abdomen:   Soft, non-tender. Bowel sounds normal. No masses,  No organomegaly. Extremities: Extremities normal, atraumatic, no cyanosis or edema. Pulses: 2+ and symmetric all extremities. Skin: Skin color, texture, turgor normal. No rashes or lesions   Lymph nodes: Cervical, supraclavicular, and axillary nodes normal.   Neurologic: CNII-XII intact. Normal strength, sensation and reflexes throughout. Assessment:     Hospital Problems  Never Reviewed        Codes Class Noted POA    Respiratory failure with hypoxia ([facility] ICD-10-CM: J96.91  ICD-9-CM: 518.81  9/1/2021 Unknown        Fluid overload ICD-10-CM: E87.70  ICD-9-CM: 276.69  9/1/2021 Unknown        Elevated brain natriuretic peptide (BNP) level ICD-10-CM: R79.89  ICD-9-CM: 790.99  9/1/2021 Unknown        Noncompliance ICD-10-CM: Z91.19  ICD-9-CM: V15.81  9/1/2021 Unknown        Tobacco abuse ICD-10-CM: Z72.0  ICD-9-CM: 305.1  9/1/2021 Unknown        CHF (congestive heart failure) ([facility] ICD-10-CM: I50.9  ICD-9-CM: 428.0  8/9/2021 Unknown            Patient is a 57-year-old -American male with:  1.  Acute kidney injury, improved  2.  Heart failure with reduced ejection fraction with decompensation  3.  COPD  4.  Medical noncompliance  5.  Cachexia  6.  Troponin in the indeterminate range, did not have any chest pain  7. Moderate aortic regurgitation  8. Pulmonary hypertension  9. Dizziness  Plan:     Hemoglobin is 15, white count is normal, platelet is 770. Yesterday's creatinine was 1.78 then BUN 54 while his BNP was severely elevated. It has come down to 1.15 and 41 4 BUN. Recent echo 2 weeks ago showed EF of 20 to 25%, dilated left ventricle.   Right ventricle was dilated and reduced function. Moderate aortic regurgitation and RVSP of 57 mm left kidney. CT chest yesterday showed signs of pulmonary hypertension but negative for PE. He is currently on aspirin, carvedilol, IV Lasix twice a day, nicotine patch, spironolactone.       Signed By: Vasquez Dai MD     September 2, 2021

## 2021-09-02 NOTE — PROGRESS NOTES
16:00 Dr Lukasz Garcia  Notified of 7 beats of wide complex, reported to me by [facility] [place]. No order given.

## 2021-09-02 NOTE — PROGRESS NOTES
Comprehensive Nutrition Assessment    Type and Reason for Visit: Initial (Low BMI)    Nutrition Recommendations/Plan:   Continue 3 CHO, [place]ET[place], 1200ml fluid restriction    Add ensure enlive daily  Add magic cup daily    Consider addition of MVI, folic acid, and thiamin  Continue appetite stimulant    Consider PEG placement    Nutrition Assessment:  Admitted for SOB. On prev admit d/c home on medication that he will not take. Pt tachycardic and hypoxic, requiring supplemental O2. RD visited pt bedside, pt going in and out of sleep during assessment 2/2 lethargy limited info obtained. RD visualized pt ate ~10% of B, says he's not hungry but agreeable to ONS. PTA says there are some days he will eat and some does he does not. States he eats when he is hungry. Continue appetite stimulant. Consider PEG placement, though 2/2 pt previous noncompliance with care, PEG placement is unlikely. Meds: coreg, enoxaparin, furosemide, mirtazapine, IVF, spironolactone. Labs: BUN 41, Ca 8.3, Ast 150, , Alk Phos 136, Bili 1.2. Meds: coreg, enoxaparin, furosemide, mirtazapine, IVF, spironolactone. Malnutrition Assessment:  Malnutrition Status:  Severe malnutrition    Context:  Chronic illness     Findings of the 6 clinical characteristics of malnutrition:   Energy Intake:  7 - 75% or less est energy requirements for 1 month or longer  Weight Loss:  No significant weight loss     Body Fat Loss:  7 - Severe body fat loss, Orbital, Triceps   Muscle Mass Loss:  7 - Severe muscle mass loss, Temples (temporalis), Thigh (quadriceps), Calf (gastrocnemius)  Fluid Accumulation:  No significant fluid accumulation,      Estimated Daily Nutrient Needs:  Energy (kcal): 1830kcal (35kcal/kg); Weight Used for Energy Requirements: Current  Protein (g): 73g (1.4g/kg);  Weight Used for Protein Requirements: Current  Fluid (ml/day): 1200ml; Method Used for Fluid Requirements: Other (comment) (per NP recs)    Nutrition Related Findings:  NFPE showing severe muscle and fat wasting. No BM since admit. No N/V/D/C. Denies C/S, edentulism. No edema. Wounds:    None       Current Nutrition Therapies:  ADULT DIET Regular; 3 carb choices (45 gm/meal); No Salt Added (3-4 gm); 1200 ml    Anthropometric Measures:  · Height:  6' 2\" (188 cm)  · Current Body Wt:  52.3 kg (115 lb 4.8 oz)    · Usual Body Wt:  54.2 kg (119 lb 7.8 oz) (x3 week ago)    · Ideal Body Wt:  190 lbs:  60.7 %   · BMI Category:  Underweight (BMI less than 18.5)     Wt Readings from Last 3 Encounters:   09/02/21 52.4 kg (115 lb 8.3 oz)   08/12/21 54.2 kg (119 lb 7.8 oz)     Nutrition Diagnosis:   · Increased nutrient needs related to increased demand for energy/nutrients as evidenced by BMI, severe muscle loss, severe loss of subcutaneous fat    Nutrition Interventions:   Food and/or Nutrient Delivery: Continue current diet, Start oral nutrition supplement  Nutrition Education and Counseling: Education not indicated  Coordination of Nutrition Care: Continue to monitor while inpatient    Goals:  Meet >/= 75% of EEN within 2 days. Wt gain of 0.5kg/wk.        Nutrition Monitoring and Evaluation:   Behavioral-Environmental Outcomes: None identified  Food/Nutrient Intake Outcomes: Food and nutrient intake, Supplement intake  Physical Signs/Symptoms Outcomes: Weight, Meal time behavior, Nutrition focused physical findings    Discharge Planning:    Assist with food insecurity, Continue oral nutrition supplement     Electronically signed by Joseph Ho RD on 9/2/2021 at 10:06 AM    Contact: [phone]

## 2021-09-02 NOTE — PROGRESS NOTES
PHYSICAL THERAPY EVALUATION  Patient: Karla Sanchez (22 y.o. male)  Date: 9/2/2021  Primary Diagnosis: CHF (congestive heart failure) ([facility] [I50.9]        Precautions:falls    ASSESSMENT  This is a 58 y/o male who  came to  [facility]  with c/o shortness of breath , admitted  on 9/1/21 for CHF . VQ scan shows  low probability for PE. Recently discharged 1 week ago for same presentation admission. PMH significant for HTN, COPD, cardiomyopathy, systolic and diastolic heart failure with EF 25%, tobacco abuse and previous alcohol use. Pt received semi-supine in bed , agreeable for PT eval/Tx . Pt is  A& O x self , place ,disorinted to time , per pt report , he lives with roommate in a basement of a apartment with 10 steps to enter , HR on bilateral sides ,  IND with ADL's and IND for functional transfers/mobility with no AD prior to admission . Based on the objective data described below, currently patient on 2L O2 ,  presents with drowsiness , requires cues to stay awake , decreased strength , -3/5 grossly in  b/l LE , balance deficits , generalized weakness , decreased safety awareness , decreased activity tolerance , audible wheezing ,  and increased need for  assist with functional mobility ( needs SBA for rolling from side to side  , SBA for supine <>sit transfers with additional time , fair static sitting balance ,  Magdaleno for sit <>stand ,poor static  standing balance . Pt unsteady , stood with semi-flexed knees , needed verbal and tactile cues to lock his b/l knees in extension ,  is able to take few side steps towards the [facility] - 3'  with RW, modA with  decreased foot clearance b/l Le . Pt with c/o SOB with activity , SPO2 - 88% , recovered back to  93% on rest ) . Pt would benefit from skilled PT services while at [facility] in order to increase safety and independence with functional transfers/mobility. Recommend d/c to SNF when medically appropriate .      Current Level of Function Impacting Discharge (mobility/balance): Requires Magdaleno for transfers and modA for ambulation     Functional Outcome Measure: The patient scored 14 on the AM PAC basic mobility outcome measure which is indicative of medium complexity. Other factors to consider for discharge: severity of deficits , time since onset         PLAN :  Recommendations and Planned Interventions: bed mobility training, transfer training, gait training, therapeutic exercises, neuromuscular re-education, patient and family training/education and therapeutic activities      Frequency/Duration: Patient will be followed by physical therapy:  5 times a week to address goals. Recommendation for discharge: (in order for the patient to meet his/her long term goals)  SNF    This discharge recommendation:  Has been made in collaboration with the attending provider and/or case management    IF patient discharges home will need the following DME: to be determined (TBD)         SUBJECTIVE:   Patient stated I feel very weak    OBJECTIVE DATA SUMMARY:   HISTORY:    Past Medical History:   Diagnosis Date    Chronic obstructive pulmonary disease ([facility]     Heart failure ([facility]    History reviewed. No pertinent surgical history. Personal factors and/or comorbidities impacting plan of care:     Home Situation  Home Environment: Apartment  # Steps to Enter: 10 (lives in a basement with 10 steps)  Rails to Enter: Yes  Hand Rails : Bilateral  Wheelchair Ramp: No  One/Two Story Residence: One story  Living Alone: No  Support Systems: Other (comments) (shares room with a friend)  Patient Expects to be Discharged to[de-identified] Apartment  Current DME Used/Available at Home: None    PLOF: Pt IND for ADLS/IADLS, IND with mobility prior to admission.      EXAMINATION/PRESENTATION/DECISION MAKING:   Critical Behavior:  Neurologic State: Drowsy, Confused  Orientation Level: Oriented to place, Oriented to person, Disoriented to time  Cognition: Follows commands, Decreased attention/concentration     Hearing: Auditory  Auditory Impairment: None  Skin:  Intact where exposed    Range Of Motion:  AROM: Generally decreased, functional  Strength:    Strength: Generally decreased, functional (-3/5 grossly for b/l LE)  Tone & Sensation:   Tone: Normal   Sensation: Intact     Functional Mobility:  Bed Mobility:  Rolling: Stand-by assistance  Supine to Sit: Stand-by assistance; Additional time  Sit to Supine: Stand-by assistance; Additional time  Scooting: Stand-by assistance  Transfers:  Sit to Stand: Minimum assistance  Stand to Sit: Minimum assistance  Balance:   Sitting: Impaired; Without support  Sitting - Static: Fair (occasional)  Sitting - Dynamic: Fair (occasional)  Standing: Impaired; With support  Standing - Static: Fair;Constant support  Standing - Dynamic : Poor;Constant support  Ambulation/Gait Training:  Distance (ft): 3 Feet (ft)  Assistive Device: Gait belt  Ambulation - Level of Assistance: Moderate assistance  Base of Support: Narrowed    Functional Measure:    [address] [place] [id number] AM-PAC 6 Clicks         Basic Mobility Inpatient Short Form  How much difficulty does the patient currently have. .. Unable A Lot A Little None   1. Turning over in bed (including adjusting bedclothes, sheets and blankets)? [] 1   [] 2   [x] 3   [] 4   2. Sitting down on and standing up from a chair with arms ( e.g., wheelchair, bedside commode, etc.)   [] 1   [] 2   [x] 3   [] 4   3. Moving from lying on back to sitting on the side of the bed? [] 1   [] 2   [x] 3   [] 4          How much help from another person does the patient currently need. .. Total A Lot A Little None   4. Moving to and from a bed to a chair (including a wheelchair)? [] 1   [x] 2   [] 3   [] 4   5. Need to walk in hospital room? [] 1   [x] 2   [] 3   [] 4   6. Climbing 3-5 steps with a railing? [x] 1   [] 2   [] 3   [] 4   © 2007, Trustees of [address] [place] [id number], under license to [employer].  All rights reserved Score:  Initial: 14 Most Recent: X (Date:21 -- )   Interpretation of Tool:  Represents activities that are increasingly more difficult (i.e. Bed mobility, Transfers, Gait). Score 24 23 22-20 19-15 14-10 9-7 6   Modifier CH CI CJ CK CL CM CN          Physical Therapy Evaluation Charge Determination   History Examination Presentation Decision-Making   MEDIUM  Complexity : 1-2 comorbidities / personal factors will impact the outcome/ POC  MEDIUM Complexity : 3 Standardized tests and measures addressing body structure, function, activity limitation and / or participation in recreation  LOW Complexity : Stable, uncomplicated  Other Functional Measure [facility] 6 medium complexity      Based on the above components, the patient evaluation is determined to be of the following complexity level: LOW     Pain Ratin/10    Activity Tolerance:   Fair, observed SOB with activity and signs and symptoms of orthostatic hypotension  Please refer to the flowsheet for vital signs taken during this treatment. After treatment patient left in no apparent distress:   Supine in bed, Call bell within reach and Bed / chair alarm activated    COMMUNICATION/EDUCATION:   The patients plan of care was discussed with: Registered nurse. Fall prevention education was provided and the patient/caregiver indicated understanding. and Patient/family agree to work toward stated goals and plan of care. Problem: Mobility Impaired (Adult and Pediatric)  Goal: *Acute Goals and Plan of Care (Insert Text)  Description: Pt will be I with LE HEP in 7 days. Pt will perform bed mobility independently in 7 days. Pt will perform transfers with SBA in 7 days. Pt will amb -50' feet with LRAD safely with CGA in 7 days.    Outcome: Not Met       Thank you for this referral.  Delmy Ruth   Time Calculation: 33 mins No significant past surgical history

## 2021-09-02 NOTE — PROGRESS NOTES
CM attempted to meet with patient to complete DCP assessment however patient's Cardiologist came into room to meet with patient. CM to follow up at a later time. 1:20 PM - CM spoke with nurse, patient not A&Ox4 at this time. CM attempted to speak with patient, patient extremely drowsy and not able to answer basic questions. CM attempted to contact patient's Yvonne BUENO [phone] x's 2 however no answer, not able to leave VM. CM to follow up at a later time.

## 2021-09-02 NOTE — PROGRESS NOTES
Hospitalist Progress Note               Daily Progress Note: 9/2/2021  Patient is 57-year-old male with a PMH significant for HTN, COPD, cardiomyopathy, systolic and diastolic heart failure with EF 25%, tobacco abuse and previous alcohol use. He presents to the emergency room with shortness of breath. Recently discharged 1 week ago for same presentation admission. Discharge from new medications which he did not take. On examination he is tachycardic and hypoxic. Requiring supplemental oxygen. Significant labs on admission D-dimer 2.05, creatinine 1.78, troponin 0 0.09, BNP >35,000. Chest x-ray reveals bilateral interstitial and bronchial thickening unchanged from previous x-ray, left trace pleural effusion or scar and cardiomegaly. VQ scan low probability for PE. Admitted for further management of acute on chronic decompensation systolic and diastolic heart failure, respiratory failure with hypoxia, fluid overload, acute kidney injury. Consult cardiology. Started on IV Lasix. Continue oxygen use BiPAP if required. We will add beta-blocker. Subjective: The patient is seen for follow  up.      Problem List:  Problem List as of 9/2/2021 Never Reviewed        Codes Class Noted - Resolved    Respiratory failure with hypoxia [facility]) ICD-10-CM: J96.91  ICD-9-CM: 518.81  9/1/2021 - Present        Fluid overload ICD-10-CM: E87.70  ICD-9-CM: 276.69  9/1/2021 - Present        Elevated brain natriuretic peptide (BNP) level ICD-10-CM: R79.89  ICD-9-CM: 790.99  9/1/2021 - Present        Noncompliance ICD-10-CM: Z91.19  ICD-9-CM: V15.81  9/1/2021 - Present        Tobacco abuse ICD-10-CM: Z72.0  ICD-9-CM: 305.1  9/1/2021 - Present        CHF (congestive heart failure) ([facility] [address] ICD-10-CM: I50.9  ICD-9-CM: 428.0  8/9/2021 - Present              Medications reviewed  Current Facility-Administered Medications   Medication Dose Route Frequency    sodium chloride (NS) flush 5-40 mL  5-40 mL IntraVENous Q8H    sodium chloride (NS) flush 5-40 mL  5-40 mL IntraVENous PRN    acetaminophen (TYLENOL) tablet 650 mg  650 mg Oral Q6H PRN    Or    acetaminophen (TYLENOL) suppository 650 mg  650 mg Rectal Q6H PRN    polyethylene glycol (MIRALAX) packet 17 g  17 g Oral DAILY PRN    bisacodyL (DULCOLAX) tablet 5 mg  5 mg Oral DAILY PRN    ondansetron (ZOFRAN ODT) tablet 4 mg  4 mg Oral Q6H PRN    Or    ondansetron (ZOFRAN) injection 4 mg  4 mg IntraVENous Q6H PRN    enoxaparin (LOVENOX) injection 40 mg  40 mg SubCUTAneous DAILY    albuterol-ipratropium (DUO-NEB) 2.5 MG-0.5 MG/3 ML  3 mL Nebulization Q6H PRN    mirtazapine (REMERON) tablet 7.5 mg  7.5 mg Oral QHS    spironolactone (ALDACTONE) tablet 25 mg  25 mg Oral DAILY    aspirin tablet 325 mg  325 mg Oral DAILY    furosemide (LASIX) injection 40 mg  40 mg IntraVENous BID    carvediloL (COREG) tablet 3.125 mg  3.125 mg Oral BID WITH MEALS    nicotine (NICODERM CQ) 14 mg/24 hr patch 1 Patch  1 Patch TransDERmal DAILY    doxycycline (VIBRAMYCIN) capsule 100 mg  100 mg Oral Q12H       Review of Systems:   A comprehensive review of systems was negative except for that written in the HPI. Objective:   Physical Exam:     Visit Vitals  /81   Pulse 78   Temp 97.6 °F (36.4 °C)   Resp 20   Ht 6' 2\" (1.88 m)   Wt 52.4 kg (115 lb 8.3 oz)   SpO2 96%   BMI 14.83 kg/m²    O2 Flow Rate (L/min): 2 l/min O2 Device: Nasal cannula    Temp (24hrs), Av.6 °F (36.4 °C), Min:97.2 °F (36.2 °C), Max:97.9 °F (36.6 °C)    701 - 1900  In: 600 [P.O.:600]  Out: 550 [Urine:550]   1901 -  0700  In: 440 [P.O.:440]  Out: 1438 [Urine:2275]    General:  Alert, cooperative, no distress, difficult to understand   Lungs:   Scattered rhonchi though diffusely diminished, no wheez   Chest wall:  No tenderness or deformity. Heart:  Regular rate and rhythm, S1, S2 normal, no murmur, click, rub or gallop. Abdomen:   Soft, non-tender.  Bowel sounds normal. No masses,  No organomegaly. Extremities: Extremities normal, atraumatic, no cyanosis or edema. Pulses: 2+ and symmetric all extremities. Skin: Decreased turgor   Neurologic: CNII-XII intact.  No gross sensory or motor deficits     Data Review:       Recent Days:  Recent Labs     09/02/21  1104 09/01/21  1134   WBC 4.7 4.7   HGB 15.0 13.7   HCT 46.1 41.8    189     Recent Labs     09/02/21  0747 09/01/21  1134    142   K 4.1 4.6    110*   CO2 23 25   GLU 82 115*   BUN 41* 54*   CREA 1.15 1.78*   CA 8.3* 8.4*   ALB  --  3.2*   TBILI  --  1.2*   ALT  --  100*     Recent Labs     09/01/21  1140   PH 7.29*   PCO2 51*   PO2 63*   HCO3 22       24 Hour Results:  Recent Results (from the past 24 hour(s))   METABOLIC PANEL, BASIC    Collection Time: 09/02/21  7:47 AM   Result Value Ref Range    Sodium 137 136 - 145 mmol/L    Potassium 4.1 3.5 - 5.1 mmol/L    Chloride 107 97 - 108 mmol/L    CO2 23 21 - 32 mmol/L    Anion gap 7 5 - 15 mmol/L    Glucose 82 65 - 100 mg/dL    BUN 41 (H) 6 - 20 mg/dL    Creatinine 1.15 0.70 - 1.30 mg/dL    BUN/Creatinine ratio 36 (H) 12 - 20      GFR est AA >60 >60 ml/min/1.73m2    GFR est non-AA >60 >60 ml/min/1.73m2    Calcium 8.3 (L) 8.5 - 10.1 mg/dL   CBC W/O DIFF    Collection Time: 09/02/21 11:04 AM   Result Value Ref Range    WBC 4.7 4.1 - 11.1 K/uL    RBC 4.83 4.10 - 5.70 M/uL    HGB 15.0 12.1 - 17.0 g/dL    HCT 46.1 36.6 - 50.3 %    MCV 95.4 80.0 - 99.0 FL    MCH 31.1 26.0 - 34.0 PG    MCHC 32.5 30.0 - 36.5 g/dL    RDW 16.4 (H) 11.5 - 14.5 %    PLATELET 729 552 - 967 K/uL    MPV 10.3 8.9 - 12.9 FL    NRBC 0.4 (H) 0.0  WBC    ABSOLUTE NRBC 0.02 (H) 0.00 - 0.01 K/uL           Assessment/       CHF exacerbation systolic/diastolic  Fluid overload  Elevated BNP  Known heart failure with reduced ejection fraction 20-25% ef  BNP >35,000  Cardiology appreciated  IV Lasix 40 mg twice daily  Continue aspirin, spironolactone,BB     Respiratory failure with hypoxia  COPD exacerbation/bronchitis  Oxygen as needed to maintain saturations greater than 92%: Currently on 4 L  As needed duo nebs  Oral doxycycline continued     Tachycardia  Elevated troponins  Likely secondary to oxygen demand  Continue to trend     Tobacco abuse  Counseled  Nicotine replacement therapy initiated     Noncompliance  Counseled        DVT Prophylaxis: Lovenox  Code Status: Full  POA/NOK:    Total time spent with patient: 30 minutes. This dictation was done by dragon, computer voice recognition software. Often unanticipated grammatical, syntax, phones and other interpretive errors are inadvertently transcribed. Please excuse errors that have escaped final proofreading.     Jenna Plummer MD

## 2021-09-03 NOTE — PROGRESS NOTES
Reason for Admission:  CHF                     RUR Score:  17%                   Plan for utilizing home health:      Unsure at this time    PCP: First and Last name:  Other, MD Zak     Name of Practice: [facility]   Are you a current patient: Yes/No: yes   Approximate date of last visit: unsure   Can you participate in a virtual visit with your PCP: with asssitance                    Current Advanced Directive/Advance Care Plan: Full Code      Healthcare Decision Maker:  Arminda Jackson, [phone]  Click here to complete [address] including selection of the Healthcare Decision Maker Relationship (ie \"Primary\")                             Transition of Care Plan:             Unsure at this time    CM spoke with patient's NOK/cousin, Arminda Jackson [phone], to complete DCP assessment. Ms. Debbie Barnes stated that she has noticed how confused the patient is, however this is not typical for him. Ms. Debbie Barnes and the patient live together at [address] [place] [address], [address] This is a basement apartment with approx 3-5 steps to enter and exit. Patient was previously completely independent in his ADL's, used no DME. Patient is a [place], option to transfer was given to Ms. Debbie Barnes, she asked that if possible have patient transferred to the [place], paperwork for transfer has been completed, awaiting doctor signature in order to fax to [place], attending notified. CM discussed SNF with Ms. Pratt, current recc by PT/OT. Ms. Debbie Barnes reports that if patient were to come home, between her and other family members patient will be able to return home with 24/7 care however she is not against SNF. CM continues to follow. 3:15 PM - [facility] paperwork has been signed by attending physician and faxed to [place] via [email or web address],.

## 2021-09-03 NOTE — PROGRESS NOTES
Progress Note    Patient: Jonny Lopez MRN: [id number]  SSN: [id number]    YOB: 1960  Age: 59 y.o. Sex: male      Admit Date: 9/1/2021    LOS: 2 days     Subjective:   He appears to be short winded. Objective:     Vitals:    09/02/21 1445 09/02/21 1725 09/02/21 2018 09/03/21 0546   BP: 136/81 132/81 110/75 126/87   Pulse: 61 78 72 78   Resp: 20  20 20   Temp: 97.6 °F (36.4 °C)  97.6 °F (36.4 °C)    SpO2: 96%  96% 94%   Weight:       Height:            Intake and Output:  Current Shift: No intake/output data recorded. Last three shifts: 09/01 1901 - 09/03 0700  In: 1040 [P.O.:1040]  Out: 8377 [Urine:2225]    Physical Exam:   General:  Alert, cooperative, no distress, appears stated age. Eyes:  Conjunctivae/corneas clear. PERRL, EOMs intact. Fundi benign   Ears:  Normal TMs and external ear canals both ears. Nose: Nares normal. Septum midline. Mucosa normal. No drainage or sinus tenderness. Mouth/Throat: Lips, mucosa, and tongue normal. Teeth and gums normal.   Neck: Supple, symmetrical, trachea midline, no adenopathy, thyroid: no enlargment/tenderness/nodules, no carotid bruit and no JVD. Back:   Symmetric, no curvature. ROM normal. No CVA tenderness. Lungs:   Clear to auscultation bilaterally. Heart:   Regular rate and rhythm   Abdomen:   Soft, non-tender. Bowel sounds normal. No masses,  No organomegaly. Extremities: Extremities normal, atraumatic, no cyanosis or edema. Pulses: 2+ and symmetric all extremities. Skin: Skin color, texture, turgor normal. No rashes or lesions   Lymph nodes: Cervical, supraclavicular, and axillary nodes normal.   Neurologic: CNII-XII intact. Normal strength, sensation and reflexes throughout. Lab/Data Review: All lab results for the last 24 hours reviewed.          Assessment:     Active Problems:    CHF (congestive heart failure) ([address] (8/9/2021)      Respiratory failure with hypoxia (HCC) (9/1/2021)      Fluid overload (9/1/2021)      Elevated brain natriuretic peptide (BNP) level (9/1/2021)      Noncompliance (9/1/2021)      Tobacco abuse (9/1/2021)    Patient is a 68-year-old -American male with:  1.  Acute kidney injury, improved  2.  Heart failure with reduced ejection fraction with decompensation  3.  COPD  4.  Medical noncompliance  5.  Cachexia  6.  Troponin in the indeterminate range, did not have any chest pain  7. Moderate aortic regurgitation  8. Pulmonary hypertension  9. Dizziness    Plan: We will transfer closer to the nursing station. Check ABG and chest x-ray. Kidney function yesterday was better. Reduce aspirin dose to 81 mg daily. Currently on carvedilol. Continue IV Lasix. Continue spironolactone.   Signed By: Cole Cadena MD     September 3, 2021

## 2021-09-03 NOTE — PROGRESS NOTES
PHYSICAL THERAPY TREATMENT  Patient: Michelle Orozco (89 y.o. male)  Date: 9/3/2021  Diagnosis: CHF (congestive heart failure) ([facility] [I50.9] <principal problem not specified>       Precautions:    Chart, physical therapy assessment, plan of care and goals were reviewed. ASSESSMENT  Patient received semi-supine in bed , agreeable for PT treatment . Pt continues with skilled PT services and is progressing towards goals. Pt requires SBA for bed mobility , demonstrates good static sitting balance . Completed there ex's for b/l LE strengthening  in unsupported sitting position . Unable to get accurate SPO2  readings , pulse oximeter fluctuating between 65-75% , however pt asymptomatic  , nsg aware . Pt able to perform STS transfers with Magdaleno and is able to ambulate - 8' with Rw  with min/modA  , narrow GENARO , decreased step length b/l LE , unsteady but no instances of LOB . Pt demonstrates improved sitting and standing balance and increased ability to perform transfers and ambulation . Recommend d/c to SNF when medically appropriate. Current Level of Function Impacting Discharge (mobility/balance): Requires Magdaleno for transfers and min/modA for ambulation . Other factors to consider for discharge: severity of deficits , time since onset         PLAN :  Patient continues to benefit from skilled intervention to address the above impairments. Continue treatment per established plan of care. to address goals.     Recommendation for discharge: (in order for the patient to meet his/her long term goals)  SNF    This discharge recommendation:  Has been made in collaboration with the attending provider and/or case management    IF patient discharges home will need the following DME: rolling walker       SUBJECTIVE:   Patient stated I am breathing fine     OBJECTIVE DATA SUMMARY:   Critical Behavior:  Neurologic State: Alert  Orientation Level: Oriented X4  Cognition: Appropriate decision making, Appropriate for age attention/concentration, Appropriate safety awareness     Functional Mobility Training:  Bed Mobility:  Rolling: Stand-by assistance  Supine to Sit: Stand-by assistance  Sit to Supine: Stand-by assistance  Scooting: Stand-by assistance    Transfers:  Sit to Stand: Minimum assistance  Stand to Sit: Minimum assistance       Balance:  Sitting: Impaired; Without support  Sitting - Static: Good (unsupported)  Sitting - Dynamic: Fair (occasional)  Standing: Impaired; With support  Standing - Static: Fair;Constant support  Standing - Dynamic : Poor;Constant support  Ambulation/Gait Training:  Distance (ft): 8 Feet (ft)  Assistive Device: Gait belt;Walker, rolling  Ambulation - Level of Assistance: Minimal assistance; Moderate assistance  Base of Support: Narrowed       Therapeutic Exercises:   AP, LAQ ,seated marches - 10 reps b/l LE  Pain Ratin/10    Activity Tolerance:   Fair and requires rest breaks  Please refer to the flowsheet for vital signs taken during this treatment. After treatment patient left in no apparent distress:   Supine in bed, Call bell within reach, and Bed / chair alarm activated    COMMUNICATION/COLLABORATION:   The patients plan of care was discussed with: Registered nurse. Problem: Mobility Impaired (Adult and Pediatric)  Goal: *Acute Goals and Plan of Care (Insert Text)  Description: Pt will be I with LE HEP in 7 days. Pt will perform bed mobility independently in 7 days. Pt will perform transfers with SBA in 7 days. Pt will amb -50' feet with LRAD safely with CGA in 7 days.    Outcome: Progressing Towards Goal       Serjio Ruth   Time Calculation: 31 mins

## 2021-09-03 NOTE — PROGRESS NOTES
Hospitalist Progress Note               Daily Progress Note: 9/3/2021  Patient is 57-year-old male with a PMH significant for HTN, COPD, cardiomyopathy, systolic and diastolic heart failure with EF 25%, tobacco abuse and previous alcohol use. He presents to the emergency room with shortness of breath. Recently discharged 1 week ago for same presentation admission. Discharge from new medications which he did not take. On examination he is tachycardic and hypoxic. Requiring supplemental oxygen. Significant labs on admission D-dimer 2.05, creatinine 1.78, troponin 0 0.09, BNP >35,000. Chest x-ray reveals bilateral interstitial and bronchial thickening unchanged from previous x-ray, left trace pleural effusion or scar and cardiomegaly. VQ scan low probability for PE. Admitted for further management of acute on chronic decompensation systolic and diastolic heart failure, respiratory failure with hypoxia, fluid overload, acute kidney injury. Consult cardiology. Started on IV Lasix. Continue oxygen use BiPAP if required. We added beta-blocker. Subjective: The patient is seen for follow  up.    Looks comfortable, pleasantly confused      Problem List:  Problem List as of 9/3/2021 Never Reviewed        Codes Class Noted - Resolved    Respiratory failure with hypoxia ([facility] ICD-10-CM: J96.91  ICD-9-CM: 518.81  9/1/2021 - Present        Fluid overload ICD-10-CM: E87.70  ICD-9-CM: 276.69  9/1/2021 - Present        Elevated brain natriuretic peptide (BNP) level ICD-10-CM: R79.89  ICD-9-CM: 790.99  9/1/2021 - Present        Noncompliance ICD-10-CM: Z91.19  ICD-9-CM: V15.81  9/1/2021 - Present        Tobacco abuse ICD-10-CM: Z72.0  ICD-9-CM: 305.1  9/1/2021 - Present        CHF (congestive heart failure) ([facility] ICD-10-CM: I50.9  ICD-9-CM: 428.0  8/9/2021 - Present              Medications reviewed  Current Facility-Administered Medications   Medication Dose Route Frequency    sodium chloride (NS) flush 5-40 mL 5-40 mL IntraVENous Q8H    sodium chloride (NS) flush 5-40 mL  5-40 mL IntraVENous PRN    acetaminophen (TYLENOL) tablet 650 mg  650 mg Oral Q6H PRN    Or    acetaminophen (TYLENOL) suppository 650 mg  650 mg Rectal Q6H PRN    polyethylene glycol (MIRALAX) packet 17 g  17 g Oral DAILY PRN    bisacodyL (DULCOLAX) tablet 5 mg  5 mg Oral DAILY PRN    ondansetron (ZOFRAN ODT) tablet 4 mg  4 mg Oral Q6H PRN    Or    ondansetron (ZOFRAN) injection 4 mg  4 mg IntraVENous Q6H PRN    enoxaparin (LOVENOX) injection 40 mg  40 mg SubCUTAneous DAILY    albuterol-ipratropium (DUO-NEB) 2.5 MG-0.5 MG/3 ML  3 mL Nebulization Q6H PRN    mirtazapine (REMERON) tablet 7.5 mg  7.5 mg Oral QHS    spironolactone (ALDACTONE) tablet 25 mg  25 mg Oral DAILY    aspirin tablet 325 mg  325 mg Oral DAILY    furosemide (LASIX) injection 40 mg  40 mg IntraVENous BID    carvediloL (COREG) tablet 3.125 mg  3.125 mg Oral BID WITH MEALS    nicotine (NICODERM CQ) 14 mg/24 hr patch 1 Patch  1 Patch TransDERmal DAILY    doxycycline (VIBRAMYCIN) capsule 100 mg  100 mg Oral Q12H       Review of Systems:   A comprehensive review of systems was negative except for that written in the HPI. Objective:   Physical Exam:     Visit Vitals  /83 (BP 1 Location: Left upper arm, BP Patient Position: Semi fowlers)   Pulse 63   Temp 97.7 °F (36.5 °C)   Resp 20   Ht 6' 2\" (1.88 m)   Wt 52.4 kg (115 lb 8.3 oz)   SpO2 97%   BMI 14.83 kg/m²    O2 Flow Rate (L/min): 3 l/min O2 Device: Nasal cannula    Temp (24hrs), Av.7 °F (36.5 °C), Min:97.6 °F (36.4 °C), Max:97.8 °F (36.6 °C)    No intake/output data recorded.  1901 -  0700  In: 1040 [P.O.:1040]  Out: 5765 [Urine:2225]    General:  Alert, cooperative, no distress, difficult to understand   Lungs:   Scattered rhonchi though diffusely diminished, no wheez   Chest wall:  No tenderness or deformity. Heart:  Regular rate and rhythm, S1, S2 normal, no murmur, click, rub or gallop. Abdomen:   Soft, non-tender. Bowel sounds normal. No masses,  No organomegaly. Extremities: Extremities normal, atraumatic, no cyanosis or edema. Pulses: 2+ and symmetric all extremities. Skin: Decreased turgor   Neurologic: CNII-XII intact.  No gross sensory or motor deficits     Data Review:       Recent Days:  Recent Labs     09/02/21  1104 09/01/21  1134   WBC 4.7 4.7   HGB 15.0 13.7   HCT 46.1 41.8    189     Recent Labs     09/03/21  1133 09/02/21  0747 09/01/21  1134    137 142   K 4.1 4.1 4.6    107 110*   CO2 25 23 25   * 82 115*   BUN 50* 41* 54*   CREA 1.13 1.15 1.78*   CA 8.5 8.3* 8.4*   MG 2.1  --   --    ALB  --   --  3.2*   TBILI  --   --  1.2*   ALT  --   --  100*     Recent Labs     09/03/21  1145 09/01/21  1140   PH 7.47* 7.29*   PCO2 45 51*   PO2 105* 63*   HCO3 31* 22   FIO2 100.0  --        24 Hour Results:  Recent Results (from the past 24 hour(s))   METABOLIC PANEL, BASIC    Collection Time: 09/03/21 11:33 AM   Result Value Ref Range    Sodium 140 136 - 145 mmol/L    Potassium 4.1 3.5 - 5.1 mmol/L    Chloride 106 97 - 108 mmol/L    CO2 25 21 - 32 mmol/L    Anion gap 9 5 - 15 mmol/L    Glucose 115 (H) 65 - 100 mg/dL    BUN 50 (H) 6 - 20 mg/dL    Creatinine 1.13 0.70 - 1.30 mg/dL    BUN/Creatinine ratio 44 (H) 12 - 20      GFR est AA >60 >60 ml/min/1.73m2    GFR est non-AA >60 >60 ml/min/1.73m2    Calcium 8.5 8.5 - 10.1 mg/dL   MAGNESIUM    Collection Time: 09/03/21 11:33 AM   Result Value Ref Range    Magnesium 2.1 1.6 - 2.4 mg/dL   BLOOD GAS, ARTERIAL    Collection Time: 09/03/21 11:45 AM   Result Value Ref Range    pH 7.47 (H) 7.35 - 7.45      PCO2 45 35 - 45 mmHg    PO2 105 (H) 75 - 100 mmHg    O2 SAT 99 >95 %    BICARBONATE 31 (H) 22 - 26 mmol/L    BASE EXCESS 7.5 (H) 0 - 2 mmol/L    FIO2 100.0 %    EPAP/CPAP/PEEP 0      SITE Right Brachial      MARIXA'S TEST PASS             Assessment/       CHF exacerbation systolic/diastolic  Fluid overload  Elevated BNP  Known heart failure with reduced ejection fraction 20-25% ef  BNP >35,000  Cardiology appreciated  Hold lasix as cr rising  Continue aspirin, spironolactone,BB     Respiratory failure with hypoxia  COPD exacerbation/bronchitis  Oxygen as needed to maintain saturations greater than 92%: Currently on 4 L  As needed duo nebs  Oral doxycycline continued     Tachycardia  Elevated troponins  Likely secondary to oxygen demand  Continue to trend     Tobacco abuse  Counseled  Nicotine replacement therapy initiated     Noncompliance  Counseled        DVT Prophylaxis: Lovenox  Code Status: Full  POA/NOK:    Total time spent with patient: 30 minutes. This dictation was done by dragon, computer voice recognition software. Often unanticipated grammatical, syntax, phones and other interpretive errors are inadvertently transcribed. Please excuse errors that have escaped final proofreading.     Dayton Valladares MD

## 2021-09-04 NOTE — PROGRESS NOTES
Problem: Falls - Risk of  Goal: *Absence of Falls  Description: Document Darline Christianson Fall Risk and appropriate interventions in the flowsheet. Outcome: Progressing Towards Goal  Note: Fall Risk Interventions:  Mobility Interventions: Bed/chair exit alarm, Patient to call before getting OOB    Mentation Interventions: Bed/chair exit alarm    Medication Interventions: Bed/chair exit alarm, Patient to call before getting OOB    Elimination Interventions: Call light in reach, Bed/chair exit alarm, Patient to call for help with toileting needs    History of Falls Interventions: Bed/chair exit alarm         Problem: Patient Education: Go to Patient Education Activity  Goal: Patient/Family Education  Outcome: Progressing Towards Goal     Problem: Pressure Injury - Risk of  Goal: *Prevention of pressure injury  Description: Document Jhonatan Scale and appropriate interventions in the flowsheet.   Outcome: Progressing Towards Goal  Note: Pressure Injury Interventions:  Sensory Interventions: Assess changes in LOC, Float heels, Keep linens dry and wrinkle-free, Minimize linen layers    Moisture Interventions: Absorbent underpads, Minimize layers    Activity Interventions: Pressure redistribution bed/mattress(bed type)    Mobility Interventions: HOB 30 degrees or less, Pressure redistribution bed/mattress (bed type)    Nutrition Interventions: Document food/fluid/supplement intake    Friction and Shear Interventions: HOB 30 degrees or less, Minimize layers                Problem: Patient Education: Go to Patient Education Activity  Goal: Patient/Family Education  Outcome: Progressing Towards Goal

## 2021-09-04 NOTE — PROGRESS NOTES
Hospitalist Progress Note               Daily Progress Note: 9/4/2021  Patient is 76-year-old male with a PMH significant for HTN, COPD, cardiomyopathy, systolic and diastolic heart failure with EF 25%, tobacco abuse and previous alcohol use. He presents to the emergency room with shortness of breath. Recently discharged 1 week ago for same presentation admission. Discharge from new medications which he did not take. On examination he is tachycardic and hypoxic. Requiring supplemental oxygen. Significant labs on admission D-dimer 2.05, creatinine 1.78, troponin 0 0.09, BNP >35,000. Chest x-ray reveals bilateral interstitial and bronchial thickening unchanged from previous x-ray, left trace pleural effusion or scar and cardiomegaly. VQ scan low probability for PE. Admitted for further management of acute on chronic decompensation systolic and diastolic heart failure, respiratory failure with hypoxia, fluid overload, acute kidney injury. Consult cardiology. Started on IV Lasix. Continue oxygen use BiPAP if required. We added beta-blocker. Subjective: The patient is seen for follow  up.    Looks comfortable, pleasantly confused  Denies complaints      Problem List:  Problem List as of 9/4/2021 Never Reviewed        Codes Class Noted - Resolved    Respiratory failure with hypoxia ([facility] ICD-10-CM: J96.91  ICD-9-CM: 518.81  9/1/2021 - Present        Fluid overload ICD-10-CM: E87.70  ICD-9-CM: 276.69  9/1/2021 - Present        Elevated brain natriuretic peptide (BNP) level ICD-10-CM: R79.89  ICD-9-CM: 790.99  9/1/2021 - Present        Noncompliance ICD-10-CM: Z91.19  ICD-9-CM: V15.81  9/1/2021 - Present        Tobacco abuse ICD-10-CM: Z72.0  ICD-9-CM: 305.1  9/1/2021 - Present        CHF (congestive heart failure) ([facility] ICD-10-CM: I50.9  ICD-9-CM: 428.0  8/9/2021 - Present              Medications reviewed  Current Facility-Administered Medications   Medication Dose Route Frequency    sodium chloride (NS) flush 5-40 mL  5-40 mL IntraVENous Q8H    sodium chloride (NS) flush 5-40 mL  5-40 mL IntraVENous PRN    acetaminophen (TYLENOL) tablet 650 mg  650 mg Oral Q6H PRN    Or    acetaminophen (TYLENOL) suppository 650 mg  650 mg Rectal Q6H PRN    polyethylene glycol (MIRALAX) packet 17 g  17 g Oral DAILY PRN    bisacodyL (DULCOLAX) tablet 5 mg  5 mg Oral DAILY PRN    ondansetron (ZOFRAN ODT) tablet 4 mg  4 mg Oral Q6H PRN    Or    ondansetron (ZOFRAN) injection 4 mg  4 mg IntraVENous Q6H PRN    enoxaparin (LOVENOX) injection 40 mg  40 mg SubCUTAneous DAILY    albuterol-ipratropium (DUO-NEB) 2.5 MG-0.5 MG/3 ML  3 mL Nebulization Q6H PRN    mirtazapine (REMERON) tablet 7.5 mg  7.5 mg Oral QHS    spironolactone (ALDACTONE) tablet 25 mg  25 mg Oral DAILY    aspirin tablet 325 mg  325 mg Oral DAILY    [Held by provider] furosemide (LASIX) injection 40 mg  40 mg IntraVENous BID    carvediloL (COREG) tablet 3.125 mg  3.125 mg Oral BID WITH MEALS    nicotine (NICODERM CQ) 14 mg/24 hr patch 1 Patch  1 Patch TransDERmal DAILY    doxycycline (VIBRAMYCIN) capsule 100 mg  100 mg Oral Q12H       Review of Systems:   A comprehensive review of systems was negative except for that written in the HPI. Objective:   Physical Exam:     Visit Vitals  /84 (BP Patient Position: Lying right side; At rest)   Pulse 71   Temp 97.6 °F (36.4 °C)   Resp 18   Ht 6' 2\" (1.88 m)   Wt 52.4 kg (115 lb 8.3 oz)   SpO2 99%   BMI 14.83 kg/m²    O2 Flow Rate (L/min): 5 l/min O2 Device: None (Room air)    Temp (24hrs), Av.4 °F (36.3 °C), Min:97.2 °F (36.2 °C), Max:97.6 °F (36.4 °C)    No intake/output data recorded. No intake/output data recorded. General:  Alert, cooperative, no distress, difficult to understand   Lungs:   Scattered rhonchi though diffusely diminished, no wheez   Chest wall:  No tenderness or deformity. Heart:  Regular rate and rhythm, S1, S2 normal, no murmur, click, rub or gallop.    Abdomen:   Soft, non-tender. Bowel sounds normal. No masses,  No organomegaly. Extremities: Extremities normal, atraumatic, no cyanosis or edema. Pulses: 2+ and symmetric all extremities. Skin: Decreased turgor   Neurologic: CNII-XII intact.  No gross sensory or motor deficits     Data Review:       Recent Days:  Recent Labs     09/02/21  1104   WBC 4.7   HGB 15.0   HCT 46.1        Recent Labs     09/04/21  0801 09/03/21  1133 09/02/21  0747    140 137   K 3.4* 4.1 4.1    106 107   CO2 32 25 23   * 115* 82   BUN 47* 50* 41*   CREA 0.99 1.13 1.15   CA 8.8 8.5 8.3*   MG  --  2.1  --      Recent Labs     09/03/21  1145   PH 7.47*   PCO2 45   PO2 105*   HCO3 31*   FIO2 100.0       24 Hour Results:  Recent Results (from the past 24 hour(s))   METABOLIC PANEL, BASIC    Collection Time: 09/04/21  8:01 AM   Result Value Ref Range    Sodium 143 136 - 145 mmol/L    Potassium 3.4 (L) 3.5 - 5.1 mmol/L    Chloride 104 97 - 108 mmol/L    CO2 32 21 - 32 mmol/L    Anion gap 7 5 - 15 mmol/L    Glucose 101 (H) 65 - 100 mg/dL    BUN 47 (H) 6 - 20 mg/dL    Creatinine 0.99 0.70 - 1.30 mg/dL    BUN/Creatinine ratio 47 (H) 12 - 20      GFR est AA >60 >60 ml/min/1.73m2    GFR est non-AA >60 >60 ml/min/1.73m2    Calcium 8.8 8.5 - 10.1 mg/dL           Assessment/       CHF exacerbation systolic/diastolic  Fluid overload  Elevated BNP  Known heart failure with reduced ejection fraction 20-25% ef  BNP >35,000  Cardiology appreciated  Hold lasix as cr rising  Continue aspirin, spironolactone,BB     Respiratory failure with hypoxia  COPD exacerbation/bronchitis  Oxygen as needed to maintain saturations greater than 92%: Currently on 4 L  As needed duo nebs  Oral doxycycline continued     Tachycardia  Elevated troponins  Likely secondary to oxygen demand  Continue to trend     Tobacco abuse  Counseled  Nicotine replacement therapy initiated     Noncompliance  Counseled        DVT Prophylaxis: Lovenox  Code Status: Full  POA/NOK:    Total time spent with patient: 30 minutes. This dictation was done by dragon, computer voice recognition software. Often unanticipated grammatical, syntax, phones and other interpretive errors are inadvertently transcribed. Please excuse errors that have escaped final proofreading.     Jagdeep Lanza MD

## 2021-09-04 NOTE — PROGRESS NOTES
Progress Note      9/4/2021 11:43 AM  NAME: Aravind Mcdonald   MRN:  [id number]   Admit Diagnosis: CHF (congestive heart failure) ([facility] [I50.9]      Problem List:    1.  Acute kidney injury, improved  2.  Heart failure with reduced ejection fraction with decompensation  3.  COPD  4.  Medical noncompliance  5.  Cachexia  6.  Troponin in the indeterminate range, did not have any chest pain  7.  Moderate aortic regurgitation  8.  Pulmonary hypertension  9.  Dizziness     Assessment/Plan:   -Patient lying comfortably in the bed and denies any symptoms but per nursing staff he is disoriented.  -Cardiomyopathy with an ejection fraction of 20 to 25%. -Moderate pulmonary hypertension with RVSP of 57 mmHg  -Moderate aortic stenosis  -Patient is otherwise clinically and hemodynamically stable per my cardiac assessment at this time.  -Continue guideline directed medical management for cardiomyopathy at this time.  -No further cardiovascular testing as it would not change my management.  -We will continue to follow while patient is in the hospital along with the team.         []       High complexity decision making was performed in this patient at high risk for decompensation with multiple organ involvement. Subjective:     Aravind Mcdonald is a 77-year-old -American male with history of cardiomyopathy with last known ejection fraction of 20 to 25%. Patient is lying comfortably in the bed and denies any symptoms. Nursing staff informed me that patient has altered mental status. No acute cardiac issue and we will continue to follow while patient is in the hospital along with the team.  Discussed with RN events overnight.      Review of Systems:    Symptom Y/N Comments  Symptom Y/N Comments   Fever/Chills N   Chest Pain N    Poor Appetite N   Edema N    Cough N   Abdominal Pain N    Sputum N   Joint Pain N    SOB/JONES N   Pruritis/Rash N    Nausea/vomit N   Tolerating PT/OT Y    Diarrhea N   Tolerating Diet Y Constipation N   Other       Could NOT obtain due to:      Objective:      Physical Exam:    Last 24hrs VS reviewed since prior progress note. Most recent are:    Visit Vitals  /85 (BP Patient Position: Semi fowlers)   Pulse 73   Temp 97.4 °F (36.3 °C)   Resp 20   Ht 6' 2\" (1.88 m)   Wt 52.4 kg (115 lb 8.3 oz)   SpO2 92%   BMI 14.83 kg/m²     No intake or output data in the 24 hours ending 09/04/21 1143     General Appearance: Well developed, well nourished, alert & oriented x 3,    no acute distress. Ears/Nose/Mouth/Throat: Hearing grossly normal.  Neck: Supple. Chest: Lungs clear to auscultation bilaterally. Cardiovascular: Regular rate and rhythm, S1S2 normal, no murmur. Abdomen: Soft, non-tender, bowel sounds are active. Extremities: No edema bilaterally. Skin: Warm and dry. []         Post-cath site without hematoma, bruit, tenderness, or thrill. Distal pulses intact. PMH/ reviewed - no change compared to H&P    Data Review    Telemetry: normal sinus rhythm     EKG:   []  No new EKG for review  CT HEAD WO CONT   Final Result   No acute process demonstrated. Atrophy and chronic changes of small   vessel disease. XR CHEST PORT   Final Result      CTA CHEST W OR W WO CONT   Final Result   1. There is an enlargement of the central pulmonary arteries consistent with   pulmonary artery hypertension. This examination is negative for pulmonary   embolism. 2.  There also is 4 chamber dilatation of the cardiac chambers. There is a   reflux of contrast in a retrograde fashion of the right heart into the inferior   vena cava and hepatic veins as might be associated with an elevation of the   right heart and diastolic pressures. 3.  There is limited assessment of the ascending aorta. Grossly the ascending   aorta is mildly dilated/ectatic without aneurysmal disease. 4.  The patient has underlying centrilobular emphysema. 5.   In addition, there are focal regions of bronchial wall thickening. There are   several small peribronchial nodular densities most pronounced posteriorly within   the upper lobes and within the lung bases. The differential diagnosis would   include an acute exacerbation of reactive airways disease versus early   bronchopneumonia. Recent aspiration into the bronchial tree could also present   with heart peribronchial nodular densities. XR CHEST PORT   Final Result   1. Unchanged bilateral interstitial and bronchial thickening, and trace left   pleural effusion or scar. 2. Unchanged cardiomegaly. Lab Data Personally Reviewed:    Recent Labs     09/02/21  1104   WBC 4.7   HGB 15.0   HCT 46.1        No results for input(s): INR, PTP, APTT, INREXT in the last 72 hours. Recent Labs     09/04/21  0801 09/03/21  1133 09/02/21  0747    140 137   K 3.4* 4.1 4.1    106 107   CO2 32 25 23   BUN 47* 50* 41*   CREA 0.99 1.13 1.15   * 115* 82   CA 8.8 8.5 8.3*   MG  --  2.1  --      Recent Labs     09/01/21  1452   TROIQ 0.11*     Lab Results   Component Value Date/Time    Cholesterol, total 178 08/10/2021 02:40 PM    HDL Cholesterol 41 08/10/2021 02:40 PM    LDL, calculated 107.8 (H) 08/10/2021 02:40 PM    Triglyceride 146 08/10/2021 02:40 PM    CHOL/HDL Ratio 4.3 08/10/2021 02:40 PM       No results for input(s): AP, TBIL, TP, ALB, GLOB, GGT, AML, LPSE in the last 72 hours.     No lab exists for component: SGOT, GPT, AMYP, HLPSE  Recent Labs     09/03/21  1145   PH 7.47*   PCO2 45   PO2 105*       Medications Personally Reviewed:    Current Facility-Administered Medications   Medication Dose Route Frequency    sodium chloride (NS) flush 5-40 mL  5-40 mL IntraVENous Q8H    sodium chloride (NS) flush 5-40 mL  5-40 mL IntraVENous PRN    acetaminophen (TYLENOL) tablet 650 mg  650 mg Oral Q6H PRN    Or    acetaminophen (TYLENOL) suppository 650 mg  650 mg Rectal Q6H PRN    polyethylene glycol (MIRALAX) packet 17 g  17 g Oral DAILY PRN  bisacodyL (DULCOLAX) tablet 5 mg  5 mg Oral DAILY PRN    ondansetron (ZOFRAN ODT) tablet 4 mg  4 mg Oral Q6H PRN    Or    ondansetron (ZOFRAN) injection 4 mg  4 mg IntraVENous Q6H PRN    enoxaparin (LOVENOX) injection 40 mg  40 mg SubCUTAneous DAILY    albuterol-ipratropium (DUO-NEB) 2.5 MG-0.5 MG/3 ML  3 mL Nebulization Q6H PRN    mirtazapine (REMERON) tablet 7.5 mg  7.5 mg Oral QHS    spironolactone (ALDACTONE) tablet 25 mg  25 mg Oral DAILY    aspirin tablet 325 mg  325 mg Oral DAILY    [Held by provider] furosemide (LASIX) injection 40 mg  40 mg IntraVENous BID    carvediloL (COREG) tablet 3.125 mg  3.125 mg Oral BID WITH MEALS    nicotine (NICODERM CQ) 14 mg/24 hr patch 1 Patch  1 Patch TransDERmal DAILY    doxycycline (VIBRAMYCIN) capsule 100 mg  100 mg Oral Q12H         Vic Sanders MD

## 2021-09-04 NOTE — PROGRESS NOTES
Problem: Falls - Risk of  Goal: *Absence of Falls  Description: Document Herlinda Nair Fall Risk and appropriate interventions in the flowsheet. Outcome: Progressing Towards Goal  Note: Fall Risk Interventions:  Mobility Interventions: Bed/chair exit alarm, Patient to call before getting OOB    Mentation Interventions: Bed/chair exit alarm, Adequate sleep, hydration, pain control, Reorient patient    Medication Interventions: Bed/chair exit alarm, Patient to call before getting OOB    Elimination Interventions: Bed/chair exit alarm, Call light in reach, Patient to call for help with toileting needs    History of Falls Interventions: Bed/chair exit alarm         Problem: Patient Education: Go to Patient Education Activity  Goal: Patient/Family Education  Outcome: Progressing Towards Goal     Problem: Pressure Injury - Risk of  Goal: *Prevention of pressure injury  Description: Document Jhonatan Scale and appropriate interventions in the flowsheet.   Outcome: Progressing Towards Goal  Note: Pressure Injury Interventions:  Sensory Interventions: Assess changes in LOC, Float heels, Keep linens dry and wrinkle-free    Moisture Interventions: Absorbent underpads, Minimize layers    Activity Interventions: Pressure redistribution bed/mattress(bed type)    Mobility Interventions: HOB 30 degrees or less, Pressure redistribution bed/mattress (bed type)    Nutrition Interventions: Document food/fluid/supplement intake    Friction and Shear Interventions: HOB 30 degrees or less, Lift sheet, Minimize layers           Problem: Patient Education: Go to Patient Education Activity  Goal: Patient/Family Education  Outcome: Progressing Towards Goal

## 2021-09-05 NOTE — PROGRESS NOTES
Problem: Mobility Impaired (Adult and Pediatric)  Goal: *Acute Goals and Plan of Care (Insert Text)  Description: Pt will be I with LE HEP in 7 days. Pt will perform bed mobility independently in 7 days. Pt will perform transfers with SBA in 7 days. Pt will amb -50' feet with LRAD safely with CGA in 7 days. Outcome: Progressing Towards Goal   PHYSICAL THERAPY TREATMENT  Patient: Isadora Harris (12 y.o. male)  Date: 9/5/2021  Diagnosis: CHF (congestive heart failure) ([facility]) [I50.9] <principal problem not specified>       Precautions:    Chart, physical therapy assessment, plan of care and goals were reviewed. ASSESSMENT  Patient continues with skilled PT services and is progressing towards goals. Upon entry into room, patient declining any out of bed activity, stating that he has been walking around the room himself and is planning to call his sister to come and get him and take him home in a few min. With max encouragement and patient education, patient willing to try a few exercises in semi-supine. He continued to declining sitting up or getting out of the bed to walk or use the restroom. He will benefit from continued skilled PT services to improve his strength and for practice with gait training to return to PLOF and decrease assistance from caregivers. Current Level of Function Impacting Discharge (mobility/balance): decr mobility, decr endurance    Other factors to consider for discharge: unmotivated, irritable, severely underweight, says he lives with sister and does chores around the house during the day to stay active          PLAN :  Patient continues to benefit from skilled intervention to address the above impairments. Continue treatment per established plan of care. to address goals.     Recommendation for discharge: (in order for the patient to meet his/her long term goals)  Therapy up to 5 days/week in SNF setting    This discharge recommendation:  Has been made in collaboration with the attending provider and/or case management    IF patient discharges home will need the following DME: rolling walker and to be determined (TBD)       SUBJECTIVE:   Patient stated I don't want to get up. I walk around by myself and I am going to have my sister come get me soon.     OBJECTIVE DATA SUMMARY:   Critical Behavior:  Neurologic State: Alert, Irritable  Orientation Level: Oriented X4  Cognition: Impaired decision making, Follows commands     Functional Mobility Training:  Bed Mobility:            He declined         Transfers:           He declined                        Balance:     Ambulation/Gait Training:     He declined     Therapeutic Exercises:   Semi-supine: a couple reps SLR, knee flexion, ankle pumps, hip abd/add, shoulder flexion, bicep curls performed bilaterally with good ROM and muscle activation noted   Pain Rating:  None rreported     Activity Tolerance:   Fair  Please refer to the flowsheet for vital signs taken during this treatment.     After treatment patient left in no apparent distress:   Supine in bed, Call bell within reach, and Bed / chair alarm activated    COMMUNICATION/COLLABORATION:   The patients plan of care was discussed with: Physical therapist.     July Hood   Time Calculation: 11 mins

## 2021-09-05 NOTE — PROGRESS NOTES
Problem: Falls - Risk of  Goal: *Absence of Falls  Description: Document Tavares Davies Fall Risk and appropriate interventions in the flowsheet. Outcome: Progressing Towards Goal  Note: Fall Risk Interventions:  Mobility Interventions: Bed/chair exit alarm, Patient to call before getting OOB    Mentation Interventions: Bed/chair exit alarm    Medication Interventions: Bed/chair exit alarm, Patient to call before getting OOB    Elimination Interventions: Bed/chair exit alarm, Call light in reach, Patient to call for help with toileting needs    History of Falls Interventions: Bed/chair exit alarm         Problem: Patient Education: Go to Patient Education Activity  Goal: Patient/Family Education  Outcome: Progressing Towards Goal     Problem: Pressure Injury - Risk of  Goal: *Prevention of pressure injury  Description: Document Jhonatan Scale and appropriate interventions in the flowsheet.   Outcome: Progressing Towards Goal  Note: Pressure Injury Interventions:  Sensory Interventions: Assess changes in LOC, Keep linens dry and wrinkle-free, Minimize linen layers    Moisture Interventions: Absorbent underpads, Minimize layers    Activity Interventions: Pressure redistribution bed/mattress(bed type)    Mobility Interventions: HOB 30 degrees or less, PT/OT evaluation    Nutrition Interventions: Document food/fluid/supplement intake    Friction and Shear Interventions: HOB 30 degrees or less, Minimize layers                Problem: Patient Education: Go to Patient Education Activity  Goal: Patient/Family Education  Outcome: Progressing Towards Goal

## 2021-09-05 NOTE — PROGRESS NOTES
Progress Note      9/5/2021 11:43 AM  NAME: Noah Post   MRN:  [id number]   Admit Diagnosis: CHF (congestive heart failure) ([facility] [I50.9]      Problem List:    1.  Acute kidney injury, improved  2.  Heart failure with reduced ejection fraction with decompensation  3.  COPD  4.  Medical noncompliance  5.  Cachexia  6.  Troponin in the indeterminate range, did not have any chest pain  7.  Moderate aortic regurgitation  8.  Pulmonary hypertension  9.  Dizziness     Assessment/Plan:   Note dictated September 5, 2021  -Patient is lying comfortably in the bed and in no acute distress.  -Plan discussed for cardiac catheterization and possible intervention he refused and he stated that he wants to go back to his own primary care cardiologist at [facility].  -For now continue current conservative medical management and optimize guideline directed medical management as tolerated.  -Okay to discharge per our cardiac assessment as patient is not willing to proceed with the next level of care including cardiac catheterization and possible intervention. If patient changes his mind we will be happy to proceed. ------------------------------------------------------------------------------------------------------------  -Patient lying comfortably in the bed and denies any symptoms but per nursing staff he is disoriented.  -Cardiomyopathy with an ejection fraction of 20 to 25%.   -Moderate pulmonary hypertension with RVSP of 57 mmHg  -Moderate aortic stenosis  -Patient is otherwise clinically and hemodynamically stable per my cardiac assessment at this time.  -Continue guideline directed medical management for cardiomyopathy at this time.  -No further cardiovascular testing as it would not change my management.  -We will continue to follow while patient is in the hospital along with the team.         []       High complexity decision making was performed in this patient at high risk for decompensation with multiple organ involvement. Subjective:     Karla Sanchez is a 23-year-old -American male with history of cardiomyopathy with last known ejection fraction of 20 to 25%. Patient is lying comfortably in the bed and denies any symptoms. Nursing staff informed me that patient has altered mental status. No acute cardiac issue and we will continue to follow while patient is in the hospital along with the team.  Discussed with RN events overnight. Review of Systems:    Symptom Y/N Comments  Symptom Y/N Comments   Fever/Chills N   Chest Pain N    Poor Appetite N   Edema N    Cough N   Abdominal Pain N    Sputum N   Joint Pain N    SOB/JONES N   Pruritis/Rash N    Nausea/vomit N   Tolerating PT/OT Y    Diarrhea N   Tolerating Diet Y    Constipation N   Other       Could NOT obtain due to:      Objective:      Physical Exam:    Last 24hrs VS reviewed since prior progress note. Most recent are:    Visit Vitals  /80 (BP Patient Position: At rest;Lying)   Pulse 76   Temp 97.4 °F (36.3 °C)   Resp 20   Ht 6' 2\" (1.88 m)   Wt 52.4 kg (115 lb 8.3 oz)   SpO2 97%   BMI 14.83 kg/m²     No intake or output data in the 24 hours ending 09/05/21 1239     General Appearance: Well developed, well nourished, alert & oriented x 3,    no acute distress. Ears/Nose/Mouth/Throat: Hearing grossly normal.  Neck: Supple. Chest: Lungs clear to auscultation bilaterally. Cardiovascular: Regular rate and rhythm, S1S2 normal, no murmur. Abdomen: Soft, non-tender, bowel sounds are active. Extremities: No edema bilaterally. Skin: Warm and dry. []         Post-cath site without hematoma, bruit, tenderness, or thrill. Distal pulses intact. PMH/SH reviewed - no change compared to H&P    Data Review    Telemetry: normal sinus rhythm     EKG:   []  No new EKG for review  CT HEAD WO CONT   Final Result   No acute process demonstrated. Atrophy and chronic changes of small   vessel disease.       XR CHEST PORT   Final Result      CTA CHEST W OR W WO CONT   Final Result   1. There is an enlargement of the central pulmonary arteries consistent with   pulmonary artery hypertension. This examination is negative for pulmonary   embolism. 2.  There also is 4 chamber dilatation of the cardiac chambers. There is a   reflux of contrast in a retrograde fashion of the right heart into the inferior   vena cava and hepatic veins as might be associated with an elevation of the   right heart and diastolic pressures. 3.  There is limited assessment of the ascending aorta. Grossly the ascending   aorta is mildly dilated/ectatic without aneurysmal disease. 4.  The patient has underlying centrilobular emphysema. 5. In addition, there are focal regions of bronchial wall thickening. There are   several small peribronchial nodular densities most pronounced posteriorly within   the upper lobes and within the lung bases. The differential diagnosis would   include an acute exacerbation of reactive airways disease versus early   bronchopneumonia. Recent aspiration into the bronchial tree could also present   with heart peribronchial nodular densities. XR CHEST PORT   Final Result   1. Unchanged bilateral interstitial and bronchial thickening, and trace left   pleural effusion or scar. 2. Unchanged cardiomegaly. Lab Data Personally Reviewed:    Recent Labs     09/05/21  0748   WBC 5.5   HGB 15.0   HCT 44.9        No results for input(s): INR, PTP, APTT, INREXT, INREXT in the last 72 hours. Recent Labs     09/05/21  0748 09/04/21  0801 09/03/21  1133    143 140   K 3.9 3.4* 4.1    104 106   CO2 34* 32 25   BUN 39* 47* 50*   CREA 0.84 0.99 1.13   GLU 89 101* 115*   CA 8.7 8.8 8.5   MG 2.2  --  2.1     No results for input(s): CPK, CKNDX, TROIQ in the last 72 hours.     No lab exists for component: CPKMB  Lab Results   Component Value Date/Time    Cholesterol, total 178 08/10/2021 02:40 PM    HDL Cholesterol 41 08/10/2021 02:40 PM    LDL, calculated 107.8 (H) 08/10/2021 02:40 PM    Triglyceride 146 08/10/2021 02:40 PM    CHOL/HDL Ratio 4.3 08/10/2021 02:40 PM       No results for input(s): AP, TBIL, TP, ALB, GLOB, GGT, AML, LPSE in the last 72 hours.     No lab exists for component: SGOT, GPT, AMYP, HLPSE  Recent Labs     09/03/21  1145   PH 7.47*   PCO2 45   PO2 105*       Medications Personally Reviewed:    Current Facility-Administered Medications   Medication Dose Route Frequency    sodium chloride (NS) flush 5-40 mL  5-40 mL IntraVENous Q8H    sodium chloride (NS) flush 5-40 mL  5-40 mL IntraVENous PRN    acetaminophen (TYLENOL) tablet 650 mg  650 mg Oral Q6H PRN    Or    acetaminophen (TYLENOL) suppository 650 mg  650 mg Rectal Q6H PRN    polyethylene glycol (MIRALAX) packet 17 g  17 g Oral DAILY PRN    bisacodyL (DULCOLAX) tablet 5 mg  5 mg Oral DAILY PRN    ondansetron (ZOFRAN ODT) tablet 4 mg  4 mg Oral Q6H PRN    Or    ondansetron (ZOFRAN) injection 4 mg  4 mg IntraVENous Q6H PRN    enoxaparin (LOVENOX) injection 40 mg  40 mg SubCUTAneous DAILY    albuterol-ipratropium (DUO-NEB) 2.5 MG-0.5 MG/3 ML  3 mL Nebulization Q6H PRN    mirtazapine (REMERON) tablet 7.5 mg  7.5 mg Oral QHS    spironolactone (ALDACTONE) tablet 25 mg  25 mg Oral DAILY    aspirin tablet 325 mg  325 mg Oral DAILY    [Held by provider] furosemide (LASIX) injection 40 mg  40 mg IntraVENous BID    carvediloL (COREG) tablet 3.125 mg  3.125 mg Oral BID WITH MEALS    nicotine (NICODERM CQ) 14 mg/24 hr patch 1 Patch  1 Patch TransDERmal DAILY    doxycycline (VIBRAMYCIN) capsule 100 mg  100 mg Oral Q12H         Kenneth Martinez MD

## 2021-09-06 NOTE — PROGRESS NOTES
13: 39PM Outbound call to [facility] @ [phone] [facility]). SW informed \"we have beds available; however we haven't recv'd any clinicals. We only recv'd the transfer request form. \"  Requesting updated clinicals and COVID results be faxed. Acknowledged understanding and sent updated clinicals via [facility].         Brie Quesada, MSKAYODE

## 2021-09-06 NOTE — PROGRESS NOTES
OCCUPATIONAL THERAPY EVALUATION  Patient: Celia Webber (9 y.o. male)  Date: 9/6/2021  Primary Diagnosis: CHF (congestive heart failure) ([facility] [I50.9]        Precautions: Fall, AMS/confusion    ASSESSMENT  Patient is a 59year old male, who came to the ED with SOB, recent discharge 1 week prior, tachycardia and hypoxia and admitted for respiratory failure with hypoxia, fluid overload, elevated BNP on 9/01/2021. Reported noncompliance with medication since last discharge. PMH includes: HTN, COPD, cardiomyopathy, systolic and diastolic heart failure with EF 25%, tobacco abuse, previous alcohol use. Based on the objective data described below, the patient presents with AMS/confusion, irritability and limited participation with therapy, impaired standing balance, poor safety awareness, decreased activity tolerance, and increased need for A with self care and functional mobility/transfers. Patient semi supine upon OT arrival and agreeable to working with therapy initially, however after standing patient declined any further participation stating he was tired and wants to rest. Patient A&O to person only and per pt report, pt lives with his niece in a basement apartment with 3 MIKE and dayday handrails. Patient reports ambulating with no AD and reports no DME in the home. Patient reports being independent for all ADLs and mobility PTA. Pt is not a reliable historian. Per chart review, patient does live with a niece in a basement apartment with 3-5 MIKE and patient was previously independent and did not use any DME. Patient SBA bed mobility, SBA sup <> sit and scooting. Patient CGA sit <> stand and declined ambulating to the bathroom at this time and abruptly sat back on bed. Patient did scoot at the EOB but would not take side steps. Patient refused all offered grooming and did not perform LE dressing EOB but did attempt to button top button on jeans and was unable.  Patient would benefit from continued skilled OT services to address above deficits and improve safety and independence with self care and functional mobility/transfers. Recommend discharge to SNF when medically appropriate. Current Level of Function Impacting Discharge (ADLs/self-care): pt refused participation with ADLs at this time. Other factors to consider for discharge: time since onset, reported PLOF I, AMS/confusion        PLAN :  Recommendations and Planned Interventions: self care training, functional mobility training, therapeutic exercise, balance training, therapeutic activities, endurance activities, patient education, home safety training and family training/education    Frequency/Duration: Patient will be followed by occupational therapy 5 times a week to address goals, may need to reduce if patient continues to decline participation with therapy    Recommendation for discharge: (in order for the patient to meet his/her long term goals)  SNF    This discharge recommendation:  Has been made in collaboration with the attending provider and/or case management    IF patient discharges home will need the following DME: TBD       SUBJECTIVE:   Patient stated I'm not doing any of that right now, I am tired and I need to rest.    OBJECTIVE DATA SUMMARY:   HISTORY:   Past Medical History:   Diagnosis Date    [facility] pulmonary [facility]     Heart failure ([facility]    History reviewed. No pertinent surgical history. Expanded or extensive additional review of patient history:     Home Situation  Home Environment: Apartment (basement apartment)  # Steps to Enter: 3  Rails to Enter: Yes  Hand Rails : Bilateral  Wheelchair Ramp: No  One/Two Story Residence: One story  Living Alone: No (lives with niece)  Support Systems: Family member(s)  Patient Expects to be Discharged to[de-identified] House  Current DME Used/Available at Home: None    PLOF: Pt I for ADLS/IADLS, I with mobility prior to admission.      EXAMINATION OF PERFORMANCE DEFICITS:  Cognitive/Behavioral Status:  Neurologic State: Alert;Irritable  Orientation Level: Oriented to person;Disoriented to place; Disoriented to time;Disoriented to situation  Cognition: Decreased command following;Decreased attention/concentration;Poor safety awareness  Safety/Judgement: Decreased insight into deficits; Decreased awareness of need for assistance    Skin: intact where visible    Edema: none noted    Hearing: Auditory  Auditory Impairment: None    Vision/Perceptual:    Not formally assessed at this time    Range of Motion:  AROM: Within functional limits    Strength:  Strength: Generally decreased, functional     RUE Strength  Observation: grossly observed to be 3+/5     LUE Strength  Observation: grossly observed to be 3+/5    Coordination:  Not formally assessed at this time    Tone & Sensation:  Tone: Normal    Balance:  Sitting: Intact; Without support  Standing: Impaired; Without support  Standing - Static: Fair;Constant support  Standing - Dynamic : Fair;Constant support    Functional Mobility and Transfers for ADLs:  Bed Mobility:  Rolling: Stand-by assistance  Supine to Sit: Stand-by assistance  Sit to Supine: Stand-by assistance  Scooting: Stand-by assistance    Transfers:  Sit to Stand: Contact guard assistance  Stand to Sit: Contact guard assistance    ADL Assessment:    Oral Facial Hygiene/Grooming: Other (comment) (pt refused)    Lower Body Dressing: Other (comment) (pt unable to button jeans at EOB)    ADL Intervention and task modifications:    Cognitive Retraining  Safety/Judgement: Decreased insight into deficits; Decreased awareness of need for assistance    Therapeutic Exercise:  Patient may benefit from UE HEP, initiate at next session as able. Functional Measure:    [address] [facility] [address] [address] AM-PACTM \"6 Clicks\"                                                       Daily Activity Inpatient Short Form  How much help from another person does the patient currently need. ..  Total; A Lot A Little None   1. Putting on and taking off regular lower body clothing? []  1 [x]  2 []  3 []  4   2. Bathing (including washing, rinsing, drying)? []  1 [x]  2 []  3 []  4   3. Toileting, which includes using toilet, bedpan or urinal? [] 1 [x]  2 []  3 []  4   4. Putting on and taking off regular upper body clothing? []  1 []  2 [x]  3 []  4   5. Taking care of personal grooming such as brushing teeth? []  1 []  2 [x]  3 []  4   6. Eating meals? []  1 []  2 [x]  3 []  4   © , Trustees of [facility] [employer], under license to [employer]. All rights reserved     Score: 15/24     Interpretation of Tool:  Represents clinically-significant functional categories (i.e. Activities of daily living). Percentage of Impairment CH    0%   CI    1-19% CJ    20-39% CK    40-59% CL    60-79% CM    80-99% CN     100%   [facility]  Score 6-24 24 23 20-22 15-19 10-14 7-9 6        Occupational Therapy Evaluation Charge Determination   History Examination Decision-Making   LOW Complexity : Brief history review  MEDIUM Complexity : 3-5 performance deficits relating to physical, cognitive , or psychosocial skils that result in activity limitations and / or participation restrictions MEDIUM Complexity : Patient may present with comorbidities that affect occupational performnce. Miniml to moderate modification of tasks or assistance (eg, physical or verbal ) with assesment(s) is necessary to enable patient to complete evaluation       Based on the above components, the patient evaluation is determined to be of the following complexity level: LOW     Pain Ratin/10    Activity Tolerance:   Fair, SpO2 stable on RA and requires rest breaks    After treatment patient left in no apparent distress:    Supine in bed, Call bell within reach, Bed / chair alarm activated and Side rails x 3    COMMUNICATION/EDUCATION:   The patients plan of care was discussed with: Registered nurse.      Patient is unable to participate in goal setting and plan of care. This patients plan of care is appropriate for delegation to [facility]IR[facility].     Problem: Self Care Deficits Care Plan (Adult)  Goal: *Acute Goals and Plan of Care (Insert Text)  Description: Pt will be mod I sup <> sit in prep for EOB ADLs  Pt will be I grooming standing at sink  Pt will be I LE dressing sitting EOB/long sit  Pt will be I sit <>  prep for toileting   Pt will be I toileting/toilet transfer/cloth mgmt  Pt will be I following UE HEP in prep for self care tasks   Outcome: Not Met     Thank you for this referral.  Lamonte Luque, OTR/L  Time Calculation: 10 mins

## 2021-09-06 NOTE — PROGRESS NOTES
Hospitalist Progress Note               Daily Progress Note: 9/5/2021  Patient is 18-year-old male with a PMH significant for HTN, COPD, cardiomyopathy, systolic and diastolic heart failure with EF 25%, tobacco abuse and previous alcohol use. He presents to the emergency room with shortness of breath. Recently discharged 1 week ago for same presentation admission. Discharge from new medications which he did not take. On examination he is tachycardic and hypoxic. Requiring supplemental oxygen. Significant labs on admission D-dimer 2.05, creatinine 1.78, troponin 0 0.09, BNP >35,000. Chest x-ray reveals bilateral interstitial and bronchial thickening unchanged from previous x-ray, left trace pleural effusion or scar and cardiomegaly. VQ scan low probability for PE. Admitted for further management of acute on chronic decompensation systolic and diastolic heart failure, respiratory failure with hypoxia, fluid overload, acute kidney injury. Consult cardiology. Started on IV Lasix. Continue oxygen use BiPAP if required. We added beta-blocker. Subjective: The patient is seen for follow  up.    Looks comfortable, pleasantly confused  Denies complaints  Cr improved  Walked hallway      Problem List:  Problem List as of 9/5/2021 Never Reviewed        Codes Class Noted - Resolved    Respiratory failure with hypoxia ([email or web address] 75[facility] ICD-10-CM: J96.91  ICD-9-CM: 518.81  9/1/2021 - Present        Fluid overload ICD-10-CM: E87.70  ICD-9-CM: 276.69  9/1/2021 - Present        Elevated brain natriuretic peptide (BNP) level ICD-10-CM: R79.89  ICD-9-CM: 790.99  9/1/2021 - Present        Noncompliance ICD-10-CM: Z91.19  ICD-9-CM: V15.81  9/1/2021 - Present        Tobacco abuse ICD-10-CM: Z72.0  ICD-9-CM: 305.1  9/1/2021 - Present        CHF (congestive heart failure) ([facility] [[email or web address] 75.) ICD-10-CM: I50.9  ICD-9-CM: 428.0  8/9/2021 - Present              Medications reviewed  Current Facility-Administered Medications   Medication Dose Route Frequency    sodium chloride (NS) flush 5-40 mL  5-40 mL IntraVENous Q8H    sodium chloride (NS) flush 5-40 mL  5-40 mL IntraVENous PRN    acetaminophen (TYLENOL) tablet 650 mg  650 mg Oral Q6H PRN    Or    acetaminophen (TYLENOL) suppository 650 mg  650 mg Rectal Q6H PRN    polyethylene glycol (MIRALAX) packet 17 g  17 g Oral DAILY PRN    bisacodyL (DULCOLAX) tablet 5 mg  5 mg Oral DAILY PRN    ondansetron (ZOFRAN ODT) tablet 4 mg  4 mg Oral Q6H PRN    Or    ondansetron (ZOFRAN) injection 4 mg  4 mg IntraVENous Q6H PRN    enoxaparin (LOVENOX) injection 40 mg  40 mg SubCUTAneous DAILY    albuterol-ipratropium (DUO-NEB) 2.5 MG-0.5 MG/3 ML  3 mL Nebulization Q6H PRN    mirtazapine (REMERON) tablet 7.5 mg  7.5 mg Oral QHS    spironolactone (ALDACTONE) tablet 25 mg  25 mg Oral DAILY    aspirin tablet 325 mg  325 mg Oral DAILY    [Held by provider] furosemide (LASIX) injection 40 mg  40 mg IntraVENous BID    carvediloL (COREG) tablet 3.125 mg  3.125 mg Oral BID WITH MEALS    nicotine (NICODERM CQ) 14 mg/24 hr patch 1 Patch  1 Patch TransDERmal DAILY    doxycycline (VIBRAMYCIN) capsule 100 mg  100 mg Oral Q12H       Review of Systems:   A comprehensive review of systems was negative except for that written in the HPI. Objective:   Physical Exam:     Visit Vitals  /81   Pulse 75   Temp (!) 94.5 °F (34.7 °C)   Resp 18   Ht 6' 2\" (1.88 m)   Wt 52.4 kg (115 lb 8.3 oz)   SpO2 97%   BMI 14.83 kg/m²    O2 Flow Rate (L/min): 3 l/min O2 Device: None (Room air)    Temp (24hrs), Av.9 °F (36.1 °C), Min:94.5 °F (34.7 °C), Max:98 °F (36.7 °C)    No intake/output data recorded. No intake/output data recorded. General:  Alert, cooperative, no distress, difficult to understand   Lungs:   Scattered rhonchi though diffusely diminished, no wheez   Chest wall:  No tenderness or deformity. Heart:  Regular rate and rhythm, S1, S2 normal, no murmur, click, rub or gallop. Abdomen:   Soft, non-tender.  Bowel sounds normal. No masses,  No organomegaly. Extremities: Extremities normal, atraumatic, no cyanosis or edema. Pulses: 2+ and symmetric all extremities. Skin: Decreased turgor   Neurologic: CNII-XII intact. No gross sensory or motor deficits     Data Review:       Recent Days:  Recent Labs     09/05/21  0748   WBC 5.5   HGB 15.0   HCT 44.9        Recent Labs     09/05/21  0748 09/04/21  0801 09/03/21  1133    143 140   K 3.9 3.4* 4.1    104 106   CO2 34* 32 25   GLU 89 101* 115*   BUN 39* 47* 50*   CREA 0.84 0.99 1.13   CA 8.7 8.8 8.5   MG 2.2  --  2.1     Recent Labs     09/03/21  1145   PH 7.47*   PCO2 45   PO2 105*   HCO3 31*   FIO2 100.0       24 Hour Results:  Recent Results (from the past 24 hour(s))   METABOLIC PANEL, BASIC    Collection Time: 09/05/21  7:48 AM   Result Value Ref Range    Sodium 139 136 - 145 mmol/L    Potassium 3.9 3.5 - 5.1 mmol/L    Chloride 102 97 - 108 mmol/L    CO2 34 (H) 21 - 32 mmol/L    Anion gap 3 (L) 5 - 15 mmol/L    Glucose 89 65 - 100 mg/dL    BUN 39 (H) 6 - 20 mg/dL    Creatinine 0.84 0.70 - 1.30 mg/dL    BUN/Creatinine ratio 46 (H) 12 - 20      GFR est AA >60 >60 ml/min/1.73m2    GFR est non-AA >60 >60 ml/min/1.73m2    Calcium 8.7 8.5 - 10.1 mg/dL   CBC WITH AUTOMATED DIFF    Collection Time: 09/05/21  7:48 AM   Result Value Ref Range    WBC 5.5 4.1 - 11.1 K/uL    RBC 4.85 4.10 - 5.70 M/uL    HGB 15.0 12.1 - 17.0 g/dL    HCT 44.9 36.6 - 50.3 %    MCV 92.6 80.0 - 99.0 FL    MCH 30.9 26.0 - 34.0 PG    MCHC 33.4 30.0 - 36.5 g/dL    RDW 15.9 (H) 11.5 - 14.5 %    PLATELET 759 950 - 282 K/uL    MPV 9.7 8.9 - 12.9 FL    NRBC 0.0 0.0  WBC    ABSOLUTE NRBC 0.00 0.00 - 0.01 K/uL    NEUTROPHILS 71 32 - 75 %    LYMPHOCYTES 21 12 - 49 %    MONOCYTES 8 5 - 13 %    EOSINOPHILS 0 0 - 7 %    BASOPHILS 0 0 - 1 %    IMMATURE GRANULOCYTES 0 0 - 0.5 %    ABS. NEUTROPHILS 3.8 1.8 - 8.0 K/UL    ABS. LYMPHOCYTES 1.1 0.8 - 3.5 K/UL    ABS.  MONOCYTES 0.5 0.0 - 1.0 K/UL ABS. EOSINOPHILS 0.0 0.0 - 0.4 K/UL    ABS. BASOPHILS 0.0 0.0 - 0.1 K/UL    ABS. IMM. GRANS. 0.0 0.00 - 0.04 K/UL    DF AUTOMATED     MAGNESIUM    Collection Time: 09/05/21  7:48 AM   Result Value Ref Range    Magnesium 2.2 1.6 - 2.4 mg/dL           Assessment/       CHF exacerbation systolic/diastolic  Fluid overload  Elevated BNP  Known heart failure with reduced ejection fraction 20-25% ef  BNP >35,000  Cardiology appreciated  Hold lasix as cr rising  Continue aspirin, spironolactone,BB  -conservative management, declines cath or intervention  Anticipate home am will follow [facility]     Respiratory failure with hypoxia  COPD exacerbation/bronchitis  Oxygen as needed to maintain saturations greater than 92%: Currently weaned to room air  As needed duo nebs  Oral doxycycline      Tachycardia  Elevated troponins  Likely secondary to oxygen demand  Continue to trend     Tobacco abuse  Counseled  Nicotine replacement therapy initiated     Noncompliance  Counseled        DVT Prophylaxis: Lovenox  Code Status: Full  POA/NOK:  Anticipate home am-vs snf as rec per pt  Total time spent with patient: 30 minutes. This dictation was done by dragon, computer voice recognition software. Often unanticipated grammatical, syntax, phones and other interpretive errors are inadvertently transcribed. Please excuse errors that have escaped final proofreading.     Latasha Maurice MD

## 2021-09-06 NOTE — PROGRESS NOTES
Hospitalist Progress Note               Daily Progress Note: 9/6/2021  Patient is 76-year-old male with a PMH significant for HTN, COPD, cardiomyopathy, systolic and diastolic heart failure with EF 25%, tobacco abuse and previous alcohol use. He presents to the emergency room with shortness of breath. Recently discharged 1 week ago for same presentation admission. Discharge from new medications which he did not take. On examination he is tachycardic and hypoxic. Requiring supplemental oxygen. Significant labs on admission D-dimer 2.05, creatinine 1.78, troponin 0 0.09, BNP >35,000. Chest x-ray reveals bilateral interstitial and bronchial thickening unchanged from previous x-ray, left trace pleural effusion or scar and cardiomegaly. VQ scan low probability for PE. Admitted for further management of acute on chronic decompensation systolic and diastolic heart failure, respiratory failure with hypoxia, fluid overload, acute kidney injury. Consult cardiology. Started on IV Lasix. Continue oxygen use BiPAP if required. We added beta-blocker. Subjective: The patient is seen for follow  up. Looks comfortable,   pleasantly confused  Denies complaints  Cr improved  Cardio noted, clear for dc  Pt rec's snf, left message with nok to discuss again home hhc vs snf.       Problem List:  Problem List as of 9/6/2021 Never Reviewed        Codes Class Noted - Resolved    Respiratory failure with hypoxia ([facility] 75.) ICD-10-CM: J96.91  ICD-9-CM: 518.81  9/1/2021 - Present        Fluid overload ICD-10-CM: E87.70  ICD-9-CM: 276.69  9/1/2021 - Present        Elevated brain natriuretic peptide (BNP) level ICD-10-CM: R79.89  ICD-9-CM: 790.99  9/1/2021 - Present        Noncompliance ICD-10-CM: Z91.19  ICD-9-CM: V15.81  9/1/2021 - Present        Tobacco abuse ICD-10-CM: Z72.0  ICD-9-CM: 305.1  9/1/2021 - Present        CHF (congestive heart failure) ([facility] 75.) ICD-10-CM: I50.9  ICD-9-CM: 428.0  8/9/2021 - Present Medications reviewed  Current Facility-Administered Medications   Medication Dose Route Frequency    hydrOXYzine (VISTARIL) injection 25 mg  25 mg IntraMUSCular Q6H PRN    sodium chloride (NS) flush 5-40 mL  5-40 mL IntraVENous Q8H    sodium chloride (NS) flush 5-40 mL  5-40 mL IntraVENous PRN    acetaminophen (TYLENOL) tablet 650 mg  650 mg Oral Q6H PRN    Or    acetaminophen (TYLENOL) suppository 650 mg  650 mg Rectal Q6H PRN    polyethylene glycol (MIRALAX) packet 17 g  17 g Oral DAILY PRN    bisacodyL (DULCOLAX) tablet 5 mg  5 mg Oral DAILY PRN    ondansetron (ZOFRAN ODT) tablet 4 mg  4 mg Oral Q6H PRN    Or    ondansetron (ZOFRAN) injection 4 mg  4 mg IntraVENous Q6H PRN    enoxaparin (LOVENOX) injection 40 mg  40 mg SubCUTAneous DAILY    albuterol-ipratropium (DUO-NEB) 2.5 MG-0.5 MG/3 ML  3 mL Nebulization Q6H PRN    mirtazapine (REMERON) tablet 7.5 mg  7.5 mg Oral QHS    spironolactone (ALDACTONE) tablet 25 mg  25 mg Oral DAILY    aspirin tablet 325 mg  325 mg Oral DAILY    furosemide (LASIX) injection 40 mg  40 mg IntraVENous BID    carvediloL (COREG) tablet 3.125 mg  3.125 mg Oral BID WITH MEALS    nicotine (NICODERM CQ) 14 mg/24 hr patch 1 Patch  1 Patch TransDERmal DAILY       Review of Systems:   A comprehensive review of systems was negative except for that written in the HPI. Objective:   Physical Exam:     Visit Vitals  /70 (BP Patient Position: At rest;Lying left side)   Pulse 64   Temp 97.7 °F (36.5 °C)   Resp 20   Ht 6' 2\" (1.88 m)   Wt 52.4 kg (115 lb 8.3 oz)   SpO2 94%   BMI 14.83 kg/m²    O2 Flow Rate (L/min): 3 l/min O2 Device: None (Room air)    Temp (24hrs), Av.2 °F (36.2 °C), Min:94.5 °F (34.7 °C), Max:98 °F (36.7 °C)    No intake/output data recorded. No intake/output data recorded.     General:  Alert, cooperative, no distress, difficult to understand   Lungs:   Scattered rhonchi though diffusely diminished, no wheez   Chest wall:  No tenderness or deformity. Heart:  Regular rate and rhythm, S1, S2 normal, no murmur, click, rub or gallop. Abdomen:   Soft, non-tender. Bowel sounds normal. No masses,  No organomegaly. Extremities: Extremities normal, atraumatic, no cyanosis or edema. Pulses: 2+ and symmetric all extremities. Skin: Decreased turgor   Neurologic: CNII-XII intact. No gross sensory or motor deficits     Data Review:       Recent Days:  Recent Labs     09/05/21  0748   WBC 5.5   HGB 15.0   HCT 44.9        Recent Labs     09/05/21  0748 09/04/21  0801    143   K 3.9 3.4*    104   CO2 34* 32   GLU 89 101*   BUN 39* 47*   CREA 0.84 0.99   CA 8.7 8.8   MG 2.2  --      No results for input(s): PH, PCO2, PO2, HCO3, FIO2 in the last 72 hours. 24 Hour Results:  No results found for this or any previous visit (from the past 24 hour(s)). Assessment/       CHF exacerbation systolic/diastolic  Fluid overload  Elevated BNP  Known heart failure with reduced ejection fraction 20-25% ef  BNP >35,000  Cardiology appreciated  Hold lasix as cr rising  Continue aspirin, spironolactone,BB  -conservative management, declines cath or intervention  Anticipate home am will follow [facility] at [facility]     Respiratory failure with hypoxia  COPD exacerbation/bronchitis  Oxygen as needed to maintain saturations greater than 92%: Currently weaned to room air  As needed duo nebs  Oral doxycycline      Tachycardia  Elevated troponins  Likely secondary to oxygen demand  Continue to trend     Tobacco abuse  Counseled  Nicotine replacement therapy initiated     Noncompliance  Counseled        DVT Prophylaxis: Lovenox  Code Status: Full  POA/NOK:  Anticipate home am-vs snf as rec per pt, left message with chato borrero to discuss home with c vs pt rec of snf as he is ready for dc. Addendum- I called again at 3pm without answer and messages are full. I had sent sms text with my call back number earlier but no response.    Total time spent with patient: 30 minutes. This dictation was done by dragon, computer voice recognition software. Often unanticipated grammatical, syntax, phones and other interpretive errors are inadvertently transcribed. Please excuse errors that have escaped final proofreading.     Suresh Bruno MD

## 2021-09-07 NOTE — DISCHARGE SUMMARY
Hospitalist Discharge Summary     Patient ID:  Sary Zeng  [id number]  15 y.o.  1960 9/1/2021    PCP on record: Zak Hart MD    Admit date: 9/1/2021  Discharge date and time: 9/7/2021    DISCHARGE DIAGNOSIS:    Acute exacerbation of systolic/diastolic CHF/acute respiratory failure with hypoxia/tachycardia/abnormal cardiac enzymes/history of tobacco use    CONSULTATIONS:  IP CONSULT TO CARDIOLOGY    Excerpted HPI from H&P of Brando Palacios MD:  Patient is 78-year-old male with a PMH significant for HTN, COPD, cardiomyopathy, systolic and diastolic heart failure with EF 25%, tobacco abuse and previous alcohol use. He presents to the emergency room with shortness of breath. Recently discharged 1 week ago for same presentation admission. Discharge from new medications which he did not take. On examination he is tachycardic and hypoxic. Requiring supplemental oxygen. Significant labs on admission D-dimer 2.05, creatinine 1.78, troponin 0 0.09, BNP >35,000. Chest x-ray reveals bilateral interstitial and bronchial thickening unchanged from previous x-ray, left trace pleural effusion or scar and cardiomegaly. VQ scan low probability for PE. Admitted for further management of acute on chronic decompensation systolic and diastolic heart failure, respiratory failure with hypoxia, fluid overload, acute kidney injury. Consult cardiology. Started on IV Lasix. Continue oxygen use BiPAP if required. We will add beta-blocker. ______________________________________________________________________  DISCHARGE SUMMARY/HOSPITAL COURSE:  for full details see H&P, daily progress notes, labs, consult notes.    Patient was subsequently admitted to [facility] further evaluation as well as management, patient remained on continuous telemetry monitoring, patient was started on IV Lasix for diuresis, patient's clinical status improved, subsequently transition to oral diuretics, patient was evaluated by cardiology, patient was evaluate physical therapy as well as Occupational Therapy as well as case management, after clearance by cardiology once patient was on room air patient was deemed stable for discharge, of note after evaluation by physical therapy initially recommended for skilled nursing facility versus home health however after evaluation by case management patient currently does not qualify for home health/skilled nursing facility based on insurance, patient will subsequently therefore be discharged home with outpatient follow-up with primary care physician as well as pulmonology as well as cardiology, the plan was explained to the patient in detail was agreeable to current plan.        _______________________________________________________________________  Patient seen and examined by me on discharge day. Pertinent Findings:  Gen:    Not in distress  Chest: Decreased air entry bilaterally in bilateral lower lung zones  CVS:   Regular rhythm. No edema  Abd:  Soft, not distended, not tender  Neuro:  Alert, Oriented x 4  _______________________________________________________________________  DISCHARGE MEDICATIONS:   Current Discharge Medication List      START taking these medications    Details   acetaminophen (TYLENOL) 325 mg tablet Take 2 Tablets by mouth every six (6) hours as needed for Pain or Fever. Qty: 60 Tablet, Refills: 0  Start date: 9/7/2021      carvediloL (COREG) 3.125 mg tablet Take 1 Tablet by mouth two (2) times daily (with meals). Qty: 60 Tablet, Refills: 0  Start date: 9/7/2021      nicotine (NICODERM CQ) 14 mg/24 hr patch 1 Patch by TransDERmal route daily for 30 days. Qty: 30 Patch, Refills: 0  Start date: 9/8/2021, End date: 10/8/2021      !! albuterol-ipratropium (DUO-NEB) 2.5 mg-0.5 mg/3 ml nebu 3 mL by Nebulization route every four (4) hours as needed for Wheezing.   Qty: 30 Nebule, Refills: 0  Start date: 9/7/2021      dextromethorphan-guaiFENesin (Robitussin Cough-Chest Arturo DM) 5-100 mg/5 mL liqd Take 10 mL by mouth every eight (8) hours as needed for Cough or Congestion. Qty: 237 mL, Refills: 0  Start date: 9/7/2021      methylPREDNISolone (MEDROL DOSEPACK) 4 mg tablet Use as directed  Qty: 1 Dose Pack, Refills: 0  Start date: 9/7/2021       !! - Potential duplicate medications found. Please discuss with provider. CONTINUE these medications which have CHANGED    Details   furosemide (LASIX) 40 mg tablet Take 1 Tablet by mouth two (2) times a day. Qty: 60 Tablet, Refills: 0  Start date: 9/7/2021         CONTINUE these medications which have NOT CHANGED    Details   !! albuterol-ipratropium (DUO-NEB) 2.5 mg-0.5 mg/3 ml nebu 3 mL by Nebulization route every six (6) hours as needed for Wheezing. Qty: 30 Nebule, Refills: 0      aspirin (ASPIRIN) 325 mg tablet Take 1 Tablet by mouth daily. Qty: 30 Tablet, Refills: 0      mirtazapine (REMERON) 7.5 mg tablet Take 1 Tablet by mouth nightly. Qty: 30 Tablet, Refills: 0      spironolactone (ALDACTONE) 25 mg tablet Take 1 Tablet by mouth daily. Qty: 30 Tablet, Refills: 0       !! - Potential duplicate medications found. Please discuss with provider. Patient Follow Up Instructions: Activity: PT/OT Eval and Treat  Diet: Cardiac Diet  Wound Care: As directed    Follow-up with PCP/Cardiology/Pulmonology in 2 weeks.   Follow-up tests/labs As per above physicians  Follow-up Information     Follow up With Specialties Details Why Contact Info    Hailey, MD Zak    Patient can only remember the practice name and not the physician         call to make follow up appt with PCP in 1 week     Jluis Ely MD Pulmonary Disease In 1 week  [address] [place] [address]      Delmi Charles MD Cardiology, Cardio Vascular Surgery In 1 week  [facility] [address]  [phone]          ________________________________________________________________    Risk of deterioration: Low    Condition at Discharge:  Stable  __________________________________________________________________    Disposition  Home with family and home health services    ____________________________________________________________________    Code Status: Full Code  ___________________________________________________________________      Total time in minutes spent coordinating this discharge (includes going over instructions, follow-up, prescriptions, and preparing report for sign off to her PCP) :  45 minutes    Signed:   Cait Adan MD

## 2021-09-07 NOTE — PROGRESS NOTES
11: 15AM Outbound call to Che[place], @ [phone]. Identified self, role, and nature of the call. Patient identifier's (name & ) verified per HIPAA policy. Informed patient has a d/c order; and the recommendation is for SNF. Further informed spoke w/[facility] representative and has no benefits d/t not being service connected. NOK acknowledged understanding. Voiced she will come and pick him up. HALLEY Mari            10:58AM Outbound f/u call to Sid Mathias [facility], @ [phone]. Inquired if updated clinicals were recv'd, informed \"yes. \"  SW informed [facility] rep, patient to be discharged today and recommendation is SNF. SW informed \"patient is not service connected, for any benefits. Only guaranteed to come to [facility] for PCP care. [facility] will not cover his cost for SNF. \"  Acknowledged understanding. Patient has no other health insurance. Will contact NOK/cousin Chetna to inform.          HALLEY Mari

## 2021-09-07 NOTE — PROGRESS NOTES
Physician Progress Note      PATIENT:               Raj Salinas  CSN #:                  [id number]  :                       1960  ADMIT DATE:       2021 10:00 AM  [address] [place] DATE:  RESPONDING  PROVIDER #:        Beauty Hero MD          QUERY TEXT:    Patient admitted with BMI of 14.8. If possible, please document in progress notes and discharge summary if you are evaluating and /or treating any of the following: The medical record reflects the following:  Risk Factors: 58 yo male with CHF, COPD, JUAN CARLOS  Clinical Indicators: Albumin 3.2;  Nutritional Consult: Severe malnutrition  - Severe body fat loss, Orbital, Triceps  Muscle Mass Loss:  7 - Severe muscle mass loss, Temples (temporalis), Thigh (quadriceps), Calf (gastrocnemius)  Treatment: Continue appetite stimulant, Add ensure enlive daily, Add magic cup daily    Thank you,  Lidia Vázquez RN, CCDS, CCP    ASPEN Criteria:  https://aspenjournals. onlinelibrary. perez. com/doi/full/10.1177/6127743575025800  Options provided:  -- Protein calorie malnutrition mild  -- Protein calorie malnutrition moderate  -- Protein calorie malnutrition severe  -- Other - I will add my own diagnosis  -- Disagree - Not applicable / Not valid  -- Disagree - Clinically unable to determine / Unknown  -- Refer to Clinical Documentation Reviewer    PROVIDER RESPONSE TEXT:    This patient has moderate protein calorie malnutrition.     Query created by: Anna Campos on 2021 12:41 PM      Electronically signed by:  Violet Diggs MD 2021 12:43 PM

## 2021-09-07 NOTE — PROGRESS NOTES
Progress Note    Patient: Chip Lobo MRN: [id number]  SSN: [id number]    YOB: 1960  Age: 59 y.o. Sex: male      Admit Date: 9/1/2021    LOS: 6 days     Subjective:     Patient with productive cough. Objective:     Vitals:    09/06/21 2000 09/06/21 2340 09/07/21 0345 09/07/21 0751   BP: 118/78 124/76 116/82 98/61   Pulse: 84 86 85 75   Resp: 18  12 18   Temp: 98 °F (36.7 °C)  98.4 °F (36.9 °C) 97.2 °F (36.2 °C)   SpO2: 97%  98% 94%   Weight:       Height:            Intake and Output:  Current Shift: No intake/output data recorded. Last three shifts: 09/05 1901 - 09/07 0700  In: -   Out: 400 [Urine:400]    Physical Exam:   General:  Alert, cooperative, no distress, appears stated age. Eyes:  Conjunctivae/corneas clear. PERRL, EOMs intact. Fundi benign   Ears:  Normal TMs and external ear canals both ears. Nose: Nares normal. Septum midline. Mucosa normal. No drainage or sinus tenderness. Mouth/Throat: Lips, mucosa, and tongue normal. Teeth and gums normal.   Neck: Supple, symmetrical, trachea midline, no adenopathy, thyroid: no enlargment/tenderness/nodules, no carotid bruit and no JVD. Back:   Symmetric, no curvature. ROM normal. No CVA tenderness. Lungs:   Clear to auscultation bilaterally. Heart:   Regular rate and rhythm   Abdomen:   Soft, non-tender. Bowel sounds normal. No masses,  No organomegaly. Extremities: Extremities normal, atraumatic, no cyanosis or edema. Pulses: 2+ and symmetric all extremities. Skin: Skin color, texture, turgor normal. No rashes or lesions   Lymph nodes: Cervical, supraclavicular, and axillary nodes normal.   Neurologic: CNII-XII intact. Normal strength, sensation and reflexes throughout. Lab/Data Review: All lab results for the last 24 hours reviewed.          Assessment:     Active Problems:    CHF (congestive heart failure) ([address] (8/9/2021)      Respiratory failure with hypoxia (HCC) (9/1/2021)      Fluid overload (9/1/2021)      Elevated brain natriuretic peptide (BNP) level (9/1/2021)      Noncompliance (9/1/2021)      Tobacco abuse (9/1/2021)    Patient is a 69-year-old -American male with:  1.  Acute kidney injury, improved  2.  Heart failure with reduced ejection fraction with decompensation  3.  COPD  4.  Medical noncompliance  5.  Cachexia  6.  Troponin in the indeterminate range, did not have any chest pain  7. Moderate aortic regurgitation  8. Pulmonary hypertension  9. Dizziness    Plan:     No labs today. Anyhow 2 days ago his creatinine 0.8, BUN was 39. His potassium 3.9. Hemoglobin 15. CT head on the fourth was nonacute. Currently on aspirin. Reduce the dose to 81 mg daily. Continue carvedilol, Lasix, spironolactone. Consider adding ACE inhibitor or ARB if tolerated. Continue breathing treatment if you would agree for the. Anyhow he has refused medical management before.     Signed By: Do Pedraza MD     September 7, 2021

## 2021-09-07 NOTE — PROGRESS NOTES
Problem: Falls - Risk of  Goal: *Absence of Falls  Description: Document Yuval Byers Fall Risk and appropriate interventions in the flowsheet. Outcome: Progressing Towards Goal  Note: Fall Risk Interventions:  Mobility Interventions: Bed/chair exit alarm, Patient to call before getting OOB    Mentation Interventions: Bed/chair exit alarm, Reorient patient    Medication Interventions: Bed/chair exit alarm, Patient to call before getting OOB    Elimination Interventions: Call light in reach, Bed/chair exit alarm, Patient to call for help with toileting needs    History of Falls Interventions: Bed/chair exit alarm         Problem: Patient Education: Go to Patient Education Activity  Goal: Patient/Family Education  Outcome: Progressing Towards Goal     Problem: Pressure Injury - Risk of  Goal: *Prevention of pressure injury  Description: Document Jhonatan Scale and appropriate interventions in the flowsheet.   Outcome: Progressing Towards Goal  Note: Pressure Injury Interventions:  Sensory Interventions: Assess changes in LOC    Moisture Interventions: Absorbent underpads    Activity Interventions: Pressure redistribution bed/mattress(bed type)    Mobility Interventions: Pressure redistribution bed/mattress (bed type)    Nutrition Interventions: Document food/fluid/supplement intake, Discuss nutritional consult with provider, Offer support with meals,snacks and hydration    Friction and Shear Interventions: HOB 30 degrees or less                Problem: Patient Education: Go to Patient Education Activity  Goal: Patient/Family Education  Outcome: Progressing Towards Goal     Problem: Patient Education: Go to Patient Education Activity  Goal: Patient/Family Education  Outcome: Progressing Towards Goal

## 2021-09-07 NOTE — DISCHARGE INSTRUCTIONS
Patient Education        Heart Failure: Care Instructions  Your Care Instructions     Heart failure occurs when your heart does not pump as much blood as the body needs. Failure does not mean that the heart has stopped pumping but rather that it is not pumping as well as it should. Over time, this causes fluid buildup in your lungs and other parts of your body. Fluid buildup can cause shortness of breath, fatigue, swollen ankles, and other problems. By taking medicines regularly, reducing sodium (salt) in your diet, checking your weight every day, and making lifestyle changes, you can feel better and live longer. Follow-up care is a key part of your treatment and safety. Be sure to make and go to all appointments, and call your doctor if you are having problems. It's also a good idea to know your test results and keep a list of the medicines you take. How can you care for yourself at home? Medicines    · Be safe with medicines. Take your medicines exactly as prescribed. Call your doctor if you think you are having a problem with your medicine.     · Do not take any vitamins, over-the-counter medicine, or herbal products without talking to your doctor first. Nicole Chaviraeusebio not take ibuprofen (Advil or Motrin) and naproxen (Aleve) without talking to your doctor first. They could make your heart failure worse.     · You may take some of the following medicine. ? Angiotensin-converting enzyme inhibitors (ACEIs) or angiotensin II receptor blockers (ARBs) reduce the heart's workload, lower blood pressure, and reduce swelling. Taking an ACEI or ARB may lower your chance of needing to be hospitalized. ? Beta-blockers can slow heart rate, decrease blood pressure, and improve your condition. Taking a beta-blocker may lower your chance of needing to be hospitalized. ? Diuretics, also called water pills, reduce swelling. You will get more details on the specific medicines your doctor prescribes.   Diet    · Your doctor may suggest that you limit sodium. Your doctor can tell you how much sodium is right for you. An example is less than 3,000 mg a day. This includes all the salt you eat in cooking or in packaged foods. People get most of their sodium from processed foods. Fast food and restaurant meals also tend to be very high in sodium.     · Ask your doctor how much liquid you can drink each day. You may have to limit liquids. Weight    · Weigh yourself without clothing at the same time each day. Record your weight. Call your doctor if you have a sudden weight gain, such as more than 2 to 3 pounds in a day or 5 pounds in a week. (Your doctor may suggest a different range of weight gain.) A sudden weight gain may mean that your heart failure is getting worse. Activity level    · Start light exercise (if your doctor says it is okay). Even if you can only do a small amount, exercise will help you get stronger, have more energy, and manage your weight and your stress. Walking is an easy way to get exercise. Start out by walking a little more than you did before. Bit by bit, increase the amount you walk.     · When you exercise, watch for signs that your heart is working too hard. You are pushing yourself too hard if you cannot talk while you are exercising. If you become short of breath or dizzy or have chest pain, stop, sit down, and rest.     · If you feel \"wiped out\" the day after you exercise, walk slower or for a shorter distance until you can work up to a better pace.     · Get enough rest at night. Sleeping with 1 or 2 pillows under your upper body and head may help you breathe easier. Lifestyle changes    · Do not smoke. Smoking can make a heart condition worse. If you need help quitting, talk to your doctor about stop-smoking programs and medicines. These can increase your chances of quitting for good.  Quitting smoking may be the most important step you can take to protect your heart.     · Limit alcohol to 2 drinks a day for men and 1 drink a day for women. Too much alcohol can cause health problems.     · Avoid getting sick from colds and the flu. Get a pneumococcal vaccine shot. If you have had one before, ask your doctor whether you need another dose. Get a flu shot each year. If you must be around people with colds or the flu, wash your hands often. When should you call for help? Call 911 if you have symptoms of sudden heart failure such as:    · You have severe trouble breathing.     · You cough up pink, foamy mucus.     · You have a new irregular or rapid heartbeat. Call your doctor now or seek immediate medical care if:    · You have new or increased shortness of breath.     · You are dizzy or lightheaded, or you feel like you may faint.     · You have sudden weight gain, such as more than 2 to 3 pounds in a day or 5 pounds in a week. (Your doctor may suggest a different range of weight gain.)     · You have increased swelling in your legs, ankles, or feet.     · You are suddenly so tired or weak that you cannot do your usual activities. Watch closely for changes in your health, and be sure to contact your doctor if you develop new symptoms. Where can you learn more? Go to http://www.gray.com/  Enter Q703 in the search box to learn more about \"Heart Failure: Care Instructions. \"  Current as of: August 31, 2020               Content Version: 12.8  © 6524-4149 [employer]. Care instructions adapted under license by [employer] (which disclaims liability or warranty for this information). If you have questions about a medical condition or this instruction, always ask your healthcare professional. [address] any warranty or liability for your use of this information. Patient Education        Fluid Restriction: Care Instructions  Your Care Instructions     A buildup of fluid in the body can cause low sodium levels in the blood.  It may also cause symptoms such as swelling and pain. Your doctor may suggest that you limit liquids, including foods that contain a lot of liquid. Limiting liquids is called fluid restriction. Keeping track of the amount of fluids you take in may help you feel better. Your doctor will tell you how much fluid you can have in a day. Follow-up care is a key part of your treatment and safety. Be sure to make and go to all appointments, and call your doctor if you are having problems. It's also a good idea to know your test results and keep a list of the medicines you take. How can you care for yourself at home? · Find a way of tracking the fluids you take in that works for you. Here are two methods you can try:  ? Write down how much you drink throughout the day. ? Keep a container filled with the amount of liquid allowed for the day. As you drink liquids during the day, such as a 6-ounce cup of coffee, pour that same amount out of the container. When the container is empty, you've had your liquid for the day. · Count any foods that will melt (such as ice cream, gelatin, or flavored ice treats) or liquid foods (such as soup) as part of your fluids for the day. Also count the liquid in canned fruits and vegetables as part of your daily intake, or drain them well before serving. · Space your liquids throughout the day. Then you won't be tempted to drink more than the amount your doctor recommends. · To relieve thirst without taking in extra water, try chewing gum, sucking on hard candy (sugarless if you have diabetes), or rinsing your mouth with water and spitting it out. Where can you learn more? Go to http://www.gray.com/  Enter Q662 in the search box to learn more about \"Fluid Restriction: Care Instructions. \"  Current as of: August 31, 2020               Content Version: 12.8  © 0420-8610 [employer].    Care instructions adapted under license by [employer] (which disclaims liability or warranty for this information). If you have questions about a medical condition or this instruction, always ask your healthcare professional. [address] any warranty or liability for your use of this information. Patient Education        Avoiding Triggers With Heart Failure: Care Instructions  Your Care Instructions     Triggers are anything that make your heart failure flare up. A flare-up is also called \"sudden heart failure\" or \"acute heart failure. \" When you have a flare-up, fluid builds up in your lungs, and you have problems breathing. You might need to go to the hospital. By watching for changes in your condition and avoiding triggers, you can prevent heart failure flare-ups. Follow-up care is a key part of your treatment and safety. Be sure to make and go to all appointments, and call your doctor if you are having problems. It's also a good idea to know your test results and keep a list of the medicines you take. How can you care for yourself at home? Watch for changes in your weight and condition  · Weigh yourself without clothing at the same time each day. Record your weight. Call your doctor if you have sudden weight gain, such as more than 2 to 3 pounds in a day or 5 pounds in a week. (Your doctor may suggest a different range of weight gain.) A sudden weight gain may mean that your heart failure is getting worse. · Keep a daily record of your symptoms. Write down any changes in how you feel, such as new shortness of breath, cough, or problems eating. Also record if your ankles are more swollen than usual and if you feel more tired than usual. Note anything that you ate or did that could have triggered these changes. Limit sodium  Sodium causes your body to hold on to extra water. This may cause your heart failure symptoms to get worse. People get most of their sodium from processed foods.  Fast food and restaurant meals also tend to be very high in sodium. · Your doctor may suggest that you limit sodium. Your doctor can tell you how much sodium is right for you. This includes limiting sodium in cooked and packaged foods. · Read food labels on cans and food packages. They tell you how much sodium you get in one serving. Check the serving size. If you eat more than one serving, you are getting more sodium. · Be aware that sodium can come in forms other than salt, including monosodium glutamate (MSG), sodium citrate, and sodium bicarbonate (baking soda). MSG is often added to Asian food. You can sometimes ask for food without MSG or salt. · Slowly reducing salt will help you adjust to the taste. Take the salt shaker off the table. · Flavor your food with garlic, lemon juice, onion, vinegar, herbs, and spices instead of salt. Do not use soy sauce, steak sauce, onion salt, garlic salt, mustard, or ketchup on your food, unless it is labeled \"low-sodium\" or \"low-salt. \"  · Make your own salad dressings, sauces, and ketchup without adding salt. · Use fresh or frozen ingredients, instead of canned ones, whenever you can. Choose low-sodium canned goods. · Eat less processed food and food from restaurants, including fast food. Exercise as directed  Moderate, regular exercise is very good for your heart. It improves your blood flow and helps control your weight. But too much exercise can stress your heart and cause a heart failure flare-up. · Check with your doctor before you start an exercise program.  · Walking is an easy way to get exercise. Start out slowly. Gradually increase the length and pace of your walk. Swimming, riding a bike, and using a treadmill are also good forms of exercise. · When you exercise, watch for signs that your heart is working too hard. You are pushing yourself too hard if you cannot talk while you are exercising.  If you become short of breath or dizzy or have chest pain, stop, sit down, and rest.  · Do not exercise when you do not feel well. Take medicines correctly  · Take your medicines exactly as prescribed. Call your doctor if you think you are having a problem with your medicine. · Make a list of all the medicines you take. Include those prescribed to you by other doctors and any over-the-counter medicines, vitamins, or supplements you take. Take this list with you when you go to any doctor. · Take your medicines at the same time every day. It may help you to post a list of all the medicines you take every day and what time of day you take them. · Make taking your medicine as simple as you can. Plan times to take your medicines when you are doing other things, such as eating a meal or getting ready for bed. This will make it easier to remember to take your medicines. · Get organized. Use helpful tools, such as daily or weekly pill containers. When should you call for help? Call 911 if you have symptoms of sudden heart failure such as:    · You have severe trouble breathing.     · You cough up pink, foamy mucus.     · You have a new irregular or rapid heartbeat. Call your doctor now or seek immediate medical care if:    · You have new or increased shortness of breath.     · You are dizzy or lightheaded, or you feel like you may faint.     · You have sudden weight gain, such as more than 2 to 3 pounds in a day or 5 pounds in a week. (Your doctor may suggest a different range of weight gain.)     · You have increased swelling in your legs, ankles, or feet.     · You are suddenly so tired or weak that you cannot do your usual activities. Watch closely for changes in your health, and be sure to contact your doctor if you develop new symptoms. Where can you learn more? Go to http://www.gray.com/  Enter V089 in the search box to learn more about \"Avoiding Triggers With Heart Failure: Care Instructions. \"  Current as of: August 31, 2020               Content Version: 12.8  © 1862-5387 [employer]wise[employer]. Care instructions adapted under license by "[employer] (which disclaims liability or warranty for this information). If you have questions about a medical condition or this instruction, always ask your healthcare professional. [address] any warranty or liability for your use of this information. Patient Education        Limiting Sodium With Heart Failure: Care Instructions  Your Care Instructions     Sodium causes your body to hold on to extra water. This may cause your heart failure symptoms to get worse. Limiting sodium may help you feel better. People get most of their sodium from processed foods. Fast food and restaurant meals also tend to be very high in sodium. Your doctor may suggest that you limit sodium. Your doctor can tell you how much sodium is right for you. An example is less than 3,000 mg a day. This includes all the salt you eat in cooked or packaged foods. Follow-up care is a key part of your treatment and safety. Be sure to make and go to all appointments, and call your doctor if you are having problems. It's also a good idea to know your test results and keep a list of the medicines you take. How can you care for yourself at home? Read food labels  · Read food labels on cans and food packages. The labels tell you how much sodium is in each serving. Make sure that you look at the serving size. If you eat more than the serving size, you have eaten more sodium than is listed for one serving. · Food labels also tell you the Percent Daily Value for sodium. Choose products with low Percent Daily Values for sodium. · Be aware that sodium can come in forms other than salt, including monosodium glutamate (MSG), sodium citrate, and sodium bicarbonate (baking soda). MSG is often added to Asian food. You can sometimes ask for food without MSG or salt.   Buy low-sodium foods  · Buy foods that are labeled \"unsalted\" (no salt added), \"sodium-free\" (less than 5 mg of sodium per serving), or \"low-sodium\" (140 mg or less of sodium per serving). A food labeled \"light sodium\" has less than half of the full-sodium version of that food. Foods labeled \"reduced-sodium\" may still have too much sodium. · Buy fresh vegetables or plain, frozen vegetables. Buy low-sodium versions of canned vegetables, soups, and other canned goods. Prepare low-sodium meals  · Use less salt each day when cooking. Reducing salt in this way will help you adjust to the taste. Do not add salt after cooking. Take the salt shaker off the table. · Flavor your food with garlic, lemon juice, onion, vinegar, herbs, and spices instead of salt. Do not use soy sauce, steak sauce, onion salt, garlic salt, or ketchup on your food. · Make your own salad dressings, sauces, and ketchup without adding salt. · Use less salt (or none) when recipes call for it. You can often use half the salt a recipe calls for without losing flavor. Other dishes like rice, pasta, and grains do not need added salt. · Rinse canned vegetables. This removes some--but not all--of the salt. · Avoid water that has a naturally high sodium content or that has been treated with water softeners, which add sodium. If you buy bottled water, read the label and choose a sodium-free brand. Avoid high-sodium foods, such as:  · Smoked, cured, salted, and canned meat, fish, and poultry. · Ham, pena, hot dogs, and luncheon meats. · Regular, hard, and processed cheese and regular peanut butter. · Crackers with salted tops. · Frozen prepared meals. · Canned and dried soups, broths, and bouillon, unless labeled sodium-free or low-sodium. · Canned vegetables, unless labeled sodium-free or low-sodium. · Salted snack foods such as chips and pretzels. · [place] fries, pizza, tacos, and other fast foods. · Pickles, olives, ketchup, and other condiments, especially soy sauce, unless labeled sodium-free or low-sodium.   If you cannot cook for yourself  · Have family members or friends help you, or have someone cook low-sodium meals. · Check with your local senior nutrition program to find out where meals are served and whether they offer a low-sodium option. You can often find these programs through your local health department or hospital.  · Have meals delivered to your home. [facility] have a Meals on [employer]. These programs provide one hot meal a day for older adults, delivered to their homes. Ask whether these meals are low-sodium. Let them know that you are on a low-sodium diet. Where can you learn more? Go to http://www.gray.com/  Enter A166 in the search box to learn more about \"Limiting Sodium With Heart Failure: Care Instructions. \"  Current as of: August 31, 2020               Content Version: 12.8  © 8799-7186 Healthwise, Inc[employer]. Care instructions adapted under license by [employer] (which disclaims liability or warranty for this information). If you have questions about a medical condition or this instruction, always ask your healthcare professional. [address] any warranty or liability for your use of this information.

## 2021-09-07 NOTE — PROGRESS NOTES
Patient discharged home with family. Discharge instructions given to patient and family. All questions answered.

## 2021-10-18 PROBLEM — R09.02 HYPOXIA: Status: ACTIVE | Noted: 2021-01-01

## 2021-10-18 PROBLEM — J18.9 PNEUMONIA: Status: ACTIVE | Noted: 2021-01-01

## 2021-10-18 NOTE — Clinical Note
Status[de-identified] INPATIENT [101]   Type of Bed: Medical [8]   Inpatient Hospitalization Certified Necessary for the Following Reasons: 9.  Other (further clarification in H&P documentation)   Admitting Diagnosis: Pneumonia [[id number]]   Admitting Physician: Dinora Noguera [[id number]]   Attending Physician: Dinora Noguera [[id number]]   Estimated Length of Stay: 2 Midnights   Discharge Plan[de-identified] Home with Office Follow-up

## 2021-10-18 NOTE — ED PROVIDER NOTES
EMERGENCY DEPARTMENT HISTORY AND PHYSICAL EXAM      Date: 10/18/2021  Patient Name: Itz England      History of Presenting Illness     Chief Complaint   Patient presents with    Respiratory Distress       History Provided By: EMS    HPI: Itz England, 59 y.o. male with a past medical history significant COPD and heart failure presents to the ED with cc of shortness of breath with increasingly altered mental status. Patient is unable to provide any history but was satting in the 60s upon arrival by EMS. They placed the patient on BiPAP to get him up into the 90s. He had increased work of breathing since that point time the been no other treatments prior to arrival.  Chinmay Jimenez does not convey to EMS any fever, chills, nausea, vomiting, chest pain, rash, diarrhea, headache, night sweats. There are no other complaints, changes, or physical findings at this time.     PCP: Zak Hart MD    Current Facility-Administered Medications   Medication Dose Route Frequency Provider Last Rate Last Admin    piperacillin-tazobactam (ZOSYN) 3.375 g in 0.9% sodium chloride (MBP/ADV) 100 mL MBP  3.375 g IntraVENous Q8H Maritza Jackson MD 25 mL/hr at 10/19/21 0656 3.375 g at 10/19/21 0656    sodium chloride (NS) flush 5-40 mL  5-40 mL IntraVENous Q8H Maritza Jackson MD        sodium chloride (NS) flush 5-40 mL  5-40 mL IntraVENous PRN Madeline Jackson MD        sodium chloride (NS) flush 5-10 mL  5-10 mL IntraVENous PRN Madeline Jackson MD        azithromycin (ZITHROMAX) 500 mg in 0.9% sodium chloride 250 mL (VIAL-MATE)  500 mg IntraVENous Q24H Lavonne Frankel, MD   IV Completed at 10/19/21 0428    albuterol-ipratropium (DUO-NEB) 2.5 MG-0.5 MG/3 ML  3 mL Nebulization Q6H PRN Madeline Jackson MD        aspirin tablet 325 mg  325 mg Oral DAILY Maritza Jackson MD   325 mg at 10/19/21 1114    carvediloL (COREG) tablet 3.125 mg  3.125 mg Oral BID WITH MEALS Madeline Jackson MD        furosemide (LASIX) injection 40 mg  40 mg IntraVENous DAILY Maritza Jackson MD   40 mg at 10/19/21 1115    methylPREDNISolone (PF) (SOLU-MEDROL) injection 40 mg  40 mg IntraVENous Q6H Maritza Jackson MD   40 mg at 10/19/21 0655    spironolactone (ALDACTONE) tablet 25 mg  25 mg Oral DAILY Lizeth Jackson MD        acetaminophen (TYLENOL) tablet 650 mg  650 mg Oral Q6H PRN Lizeth Jackson MD        Or   Lisa Stabeverly acetaminophen (TYLENOL) suppository 650 mg  650 mg Rectal Q6H PRN Lizeth Jackson MD        polyethylene glycol (MIRALAX) packet 17 g  17 g Oral DAILY PRN Lizeth Jackson MD        ondansetron (ZOFRAN ODT) tablet 4 mg  4 mg Oral Q8H PRN Maritza Jackson MD        Or    ondansetron [facility]) injection 4 mg  4 mg IntraVENous Q6H PRN Lizeth Jackson MD        heparin (porcine) injection 5,000 Units  5,000 Units SubCUTAneous Q8H Maritza Jackson MD   5,000 Units at 10/19/21 0655    albuterol-ipratropium (DUO-NEB) 2.5 MG-0.5 MG/3 ML  3 mL Nebulization Q6H RT Maritza Jackson MD   3 mL at 10/19/21 6976    budesonide-formoteroL (SYMBICORT) 160-4.5 mcg/actuation HFA inhaler 2 Puff  2 Puff Inhalation BID Lizeth Jackson MD   2 Puff at 10/19/21 0642     Current Outpatient Medications   Medication Sig Dispense Refill    furosemide (LASIX) 40 mg tablet Take 1 Tablet by mouth two (2) times a day. 60 Tablet 0    acetaminophen (TYLENOL) 325 mg tablet Take 2 Tablets by mouth every six (6) hours as needed for Pain or Fever. 60 Tablet 0    carvediloL (COREG) 3.125 mg tablet Take 1 Tablet by mouth two (2) times daily (with meals). 60 Tablet 0    albuterol-ipratropium (DUO-NEB) 2.5 mg-0.5 mg/3 ml nebu 3 mL by Nebulization route every four (4) hours as needed for Wheezing. 30 Nebule 0    dextromethorphan-guaiFENesin (Robitussin Cough-Chest Arturo DM) 5-100 mg/5 mL liqd Take 10 mL by mouth every eight (8) hours as needed for Cough or Congestion.  237 mL 0    methylPREDNISolone (MEDROL DOSEPACK) 4 mg tablet Use as directed 1 Dose Pack 0  albuterol-ipratropium (DUO-NEB) 2.5 mg-0.5 mg/3 ml nebu 3 mL by Nebulization route every six (6) hours as needed for Wheezing. 30 Nebule 0    aspirin (ASPIRIN) 325 mg tablet Take 1 Tablet by mouth daily. 30 Tablet 0    mirtazapine (REMERON) 7.5 mg tablet Take 1 Tablet by mouth nightly. 30 Tablet 0    spironolactone (ALDACTONE) 25 mg tablet Take 1 Tablet by mouth daily. 30 Tablet 0       Past History     Past Medical History:  Past Medical History:   Diagnosis Date    Chronic obstructive pulmonary disease ([facility] [place] [address]     Heart failure ([facility] [place] [address]        Past Surgical History:  No past surgical history on file. Family History:  Family History   Problem Relation Age of Onset    Hypertension Mother        Social History:  Social History     Tobacco Use    Smoking status: Current Every Day Smoker     Packs/day: 0.50    Smokeless tobacco: Never Used   Substance Use Topics    Alcohol use: Yes     Comment: ocassionally    Drug use: Not Currently       Allergies:  No Known Allergies      Review of Systems     Review of Systems   Unable to perform ROS: Patient nonverbal       Physical Exam     Physical Exam  Vitals and nursing note reviewed. Constitutional:       General: He is in acute distress. Appearance: He is well-developed. He is ill-appearing. HENT:      Head: Normocephalic and atraumatic. Nose: Nose normal.      Mouth/Throat:      Mouth: Mucous membranes are moist.      Pharynx: Oropharynx is clear. No oropharyngeal exudate. Eyes:      General:         Right eye: No discharge. Left eye: No discharge. Conjunctiva/sclera: Conjunctivae normal.      Pupils: Pupils are equal, round, and reactive to light. Cardiovascular:      Rate and Rhythm: Regular rhythm. Tachycardia present. Chest Wall: PMI is not displaced. No thrill. Heart sounds: Normal heart sounds. No murmur heard. No friction rub. No gallop. Pulmonary:      Effort: Respiratory distress present.       Breath sounds: Normal breath sounds. No wheezing or rales. Comments: Severe increased work of breathing  Chest:      Chest wall: No tenderness. Abdominal:      General: Bowel sounds are normal. There is no distension. Palpations: Abdomen is soft. There is no mass. Tenderness: There is no abdominal tenderness. There is no guarding or rebound. Musculoskeletal:         General: Normal range of motion. Cervical back: Normal range of motion and neck supple. Lymphadenopathy:      Cervical: No cervical adenopathy. Skin:     General: Skin is warm and dry. Capillary Refill: Capillary refill takes less than 2 seconds. Findings: No erythema or rash. Neurological:      Mental Status: He is alert and oriented to person, place, and time. Cranial Nerves: No cranial nerve deficit. Coordination: Coordination normal.   Psychiatric:         Mood and Affect: Mood normal.         Behavior: Behavior normal.         Lab and Diagnostic Study Results     Labs -     Recent Results (from the past 12 hour(s))   METABOLIC PANEL, COMPREHENSIVE    Collection Time: 10/19/21  2:14 AM   Result Value Ref Range    Sodium 144 136 - 145 mmol/L    Potassium 4.3 3.5 - 5.1 mmol/L    Chloride 111 (H) 97 - 108 mmol/L    CO2 24 21 - 32 mmol/L    Anion gap 9 5 - 15 mmol/L    Glucose 106 (H) 65 - 100 mg/dL    BUN 41 (H) 6 - 20 mg/dL    Creatinine 1.68 (H) 0.70 - 1.30 mg/dL    BUN/Creatinine ratio 24 (H) 12 - 20      GFR est AA 50 (L) >60 ml/min/1.73m2    GFR est non-AA 41 (L) >60 ml/min/1.73m2    Calcium 8.5 8.5 - 10.1 mg/dL    Bilirubin, total 1.0 0.2 - 1.0 mg/dL    AST (SGOT) 25 15 - 37 U/L    ALT (SGPT) 16 12 - 78 U/L    Alk.  phosphatase 59 45 - 117 U/L    Protein, total 6.2 (L) 6.4 - 8.2 g/dL    Albumin 2.6 (L) 3.5 - 5.0 g/dL    Globulin 3.6 2.0 - 4.0 g/dL    A-G Ratio 0.7 (L) 1.1 - 2.2     CBC WITH AUTOMATED DIFF    Collection Time: 10/19/21  2:14 AM   Result Value Ref Range    WBC 2.8 (L) 4.1 - 11.1 K/uL    RBC 4. 23 4. 10 - 5.70 M/uL    HGB 13.3 12.1 - 17.0 g/dL    HCT 40.8 36.6 - 50.3 %    MCV 96.5 80.0 - 99.0 FL    MCH 31.4 26.0 - 34.0 PG    MCHC 32.6 30.0 - 36.5 g/dL    RDW 18.7 (H) 11.5 - 14.5 %    PLATELET 540 (L) 556 - 400 K/uL    MPV 10.7 8.9 - 12.9 FL    NRBC 0.0 0.0  WBC    ABSOLUTE NRBC 0.00 0.00 - 0.01 K/uL    NEUTROPHILS 89 (H) 32 - 75 %    LYMPHOCYTES 9 (L) 12 - 49 %    MONOCYTES 1 (L) 5 - 13 %    EOSINOPHILS 0 0 - 7 %    BASOPHILS 0 0 - 1 %    IMMATURE GRANULOCYTES 1 (H) 0 - 0.5 %    ABS. NEUTROPHILS 2.5 1.8 - 8.0 K/UL    ABS. LYMPHOCYTES 0.3 (L) 0.8 - 3.5 K/UL    ABS. MONOCYTES 0.0 0.0 - 1.0 K/UL    ABS. EOSINOPHILS 0.0 0.0 - 0.4 K/UL    ABS. BASOPHILS 0.0 0.0 - 0.1 K/UL    ABS. IMM. GRANS. 0.0 0.00 - 0.04 K/UL    DF AUTOMATED     TYPE & SCREEN    Collection Time: 10/19/21  2:14 AM   Result Value Ref Range    Crossmatch Expiration 10/22/2021,2359     ABO/Rh(D) A Positive     Antibody screen Negative    TROPONIN-HIGH SENSITIVITY    Collection Time: 10/19/21  2:14 AM   Result Value Ref Range    Troponin-High Sensitivity 135 (HH) 0 - 76 ng/L       Radiologic Studies -   [unfilled]  CT Results  (Last 48 hours)               10/18/21 2311  CTA ABD PELV W WO CONT Final result    Impression:  Overall there is intact arterial circulation of the abdomen and   pelvis and there is no saurabh evidence of an acute GI hemorrhage. There is   however diffuse sequela of third spacing with bilateral pleural effusions,   profound abdominopelvic ascites, and diffuse subcutaneous edema. Considerations   for sequela of cardiac, hepatic and/or renal dysfunction. Bilateral areas of   renal parenchymal hypoperfusion are noted and could indicate sequela of   infection/pyelonephritis although by appearance might also suggest sequela of   renal infarcts of uncertain age. Component of CHF suggested given the lung base   findings described. .       Results called to Coty Yo on 10/19/2021 12:27 AM.       Narrative:  HISTORY: possible gi bleed   Dose reduction technique: All CT scans at this facility are performed using dose reduction optimization   technique as appropriate on the exam including the following: Automated exposure   control, adjustment of the MA and/or KV according to patient size of use of   iterative reconstructive technique. .       TECHNIQUE: CTA ABD PELV W WO CONT. CT angiogram of the abdomen and pelvis   performed and maximum intensity projection images (MIPS) generated. 100 cc Isovue-370 injected. COMPARISON: None   LIMITATIONS: None       CHEST: Multichamber cardiomegaly. Small right and moderate left pleural   effusion. Bibasilar airspace consolidation overlies the effusions. Moderate   emphysematous changes incidentally noted at the lung bases. AORTA: No aneurysm or dissection. Mild peripheral areas of aortic calcification   CELIAC AXIS: Patent. SUPERIOR MESENTERIC ARTERY: Patent. RENAL ARTERIES: Patent. INFERIOR MESENTERIC ARTERY: Patent. ILIAC ARTERIES: Patent. Mild peripheral areas of calcification. .      Visualized femoral vasculature:  Patent. LIVER: Normal.     GALLBLADDER: Distended. BILIARY TREE: Normal.      PANCREAS: Normal.     SPLEEN: Normal.     ADRENAL GLANDS: Normal.     KIDNEYS/URETERS/BLADDER: Multiple areas of peripheral hypoperfusion throughout   both kidneys which does persist on the venous and delayed images. No saurabh   obstructive changes. The bladder is catheterized/collapsed. RETROPERITONEUM: Normal.    BOWEL/MESENTERY: no obvious acute abnormality. No evidence of GI hemorrhage on   arterial, venous or delayed phase images. .    APPENDIX: Identified and normal.   PERITONEAL CAVITY: Profound abdominopelvic ascites distending the abdomen and   centralizing many of the bowel loops. REPRODUCTIVE ORGANS: Normal.     BONE/TISSUES: No acute osseous abnormality.  Diffuse edema throughout the   subcutaneous tissue of the visualized chest abdomen, pelvis and bilateral lower   extremities. .          OTHER: None               CXR Results  (Last 48 hours)               10/18/21 1928  XR CHEST PORT Final result    Impression:  1. Small left pleural effusion and multifocal airspace disease suspicious for   pneumonia. 2.Mild pulmonary vascular congestion. Narrative:  AP portable chest, 1915 hours. Comparison: 9/3/2021. Findings: There is mild to moderate cardiomegaly. Mild pulmonary vascular   congestion is noted. There is patchy bilateral airspace disease, left greater   than right. A small left pleural effusion is noted. There is more consolidative   airspace disease in the left lung base. No pneumothorax is identified. The   osseous structures are unremarkable. Medical Decision Making and ED Course   - I am the first and primary provider for this patient AND AM THE PRIMARY PROVIDER OF RECORD. - I reviewed the vital signs, available nursing notes, past medical history, past surgical history, family history and social history. - Initial assessment performed. The patients presenting problems have been discussed, and the staff are in agreement with the care plan formulated and outlined with them. I have encouraged them to ask questions as they arise throughout their visit. Vital Signs-Reviewed the patient's vital signs.     Patient Vitals for the past 12 hrs:   Temp Pulse Resp BP SpO2   10/19/21 1108  92 12 119/89 99 %   10/19/21 1040     97 %   10/19/21 0904 97.2 °F (36.2 °C) 96 18 111/86 100 %   10/19/21 0824     100 %   10/19/21 0818     100 %   10/19/21 0624 (!) 96.3 °F (35.7 °C) 90 18 103/80 100 %   10/19/21 0510 (!) 95 °F (35 °C) 69 20 96/76 100 %   10/19/21 0420 (!) 94.1 °F (34.5 °C)       10/19/21 0200     90 %   10/19/21 0115 (!) 92.5 °F (33.6 °C) 78 20 (!) 120/92 94 %         Records Reviewed: Nursing Notes    The patient presents with shortness of breath with a differential diagnosis of increased work of breathing, ACS, arrhythmia, COPD, CHF. Will provide basic cardiac labs EKG and chest x-ray. We will continue with warm blankets as patient is hypothermic. We will proceed with sepsis work-up. ED Course:              Provider Notes (Medical Decision Making):   68-year-old male with history of lung disease presents hypoxic on BiPAP and with pneumonia. Will be admitting to the hospital.  MDM           Consultations:       Consultations: -  Hospitalist Consultant: Dr. Migdalia Barron: We have asked for emergent assistance with regard to this patient. We have discussed the patients HPI, ROS, PE and results this far. They will come and evaluate the patient for admission. Procedures and Critical Care       Performed by: Ed Abrams MD  PROCEDURES:  Procedures               CRITICAL CARE NOTE :  6:59 PM  Amount of Critical Care Time: 45(minutes)    IMPENDING DETERIORATION -Respiratory  ASSOCIATED RISK FACTORS - Hypotension, Hypoxia, Metabolic changes and Dehydration  MANAGEMENT- Bedside Assessment and Supervision of Care  INTERPRETATION -  Xrays, Blood Gases and ECG  INTERVENTIONS - hemodynamic mngmt and vent mngmt  CASE REVIEW - Hospitalist/Intensivist  TREATMENT RESPONSE -Stable  PERFORMED BY - Self    NOTES   :  I have spent critical care time involved in lab review, consultations with specialist, family decision- making, bedside attention and documentation. This time excludes time spent in any separate billed procedures. During this entire length of time I was immediately available to the patient . Ed Abrams MD        Disposition     Disposition: Condition stable    Admitted    Diagnosis     Clinical Impression:   1. Community acquired pneumonia, unspecified laterality        Attestations:    Ed Abrams MD    Please note that this dictation was completed with [employer], the computer voice recognition software.   Quite often unanticipated grammatical, syntax, homophones, and other interpretive errors are inadvertently transcribed by the computer software. Please disregard these errors. Please excuse any errors that have escaped final proofreading. Thank you.

## 2021-10-18 NOTE — ED TRIAGE NOTES
Pt arrives hypoxic with oxygen in the mid 70's at home. Pt is lethargic. Unable to answer questions. Pt not on home o2.

## 2021-10-19 NOTE — PROGRESS NOTES
General Daily Progress Note          Patient Name:   Alma Velazquez       YOB: 1960       Age:  59 y.o. Admit Date: 10/18/2021      Subjective:     Patient is a 59y.o. year old male no past medical history of hypertension COPD cardiomyopathy with systolic heart failure with ejection fraction with 25% ongoing smoking came to emergency room because of shortness of breath patient was recently discharged from the hospital after multiple admission for same reason seen by the ER physician x-ray shows pneumonia patient was recommend to be admitted for further evaluation treatment at the same time patient has no abdominal pain  No work-up done in the ER initially , abdominal distended no CT scan was ordered, nurse try to put the [place] in no urine output at that time CT scan of the abdomen was ordered results pending      Patient on BiPAP 40% O2 awake but confused  In restraints           Objective:     Visit Vitals  /86   Pulse 96   Temp 97.2 °F (36.2 °C)   Resp 18   Ht 6' 2\" (1.88 m)   Wt 49.9 kg (110 lb)   SpO2 97%   BMI 14.12 kg/m²        Recent Results (from the past 24 hour(s))   METABOLIC PANEL, COMPREHENSIVE    Collection Time: 10/18/21  7:25 PM   Result Value Ref Range    Sodium 145 136 - 145 mmol/L    Potassium 4.4 3.5 - 5.1 mmol/L    Chloride 114 (H) 97 - 108 mmol/L    CO2 20 (L) 21 - 32 mmol/L    Anion gap 11 5 - 15 mmol/L    Glucose 108 (H) 65 - 100 mg/dL    BUN 37 (H) 6 - 20 mg/dL    Creatinine 1.47 (H) 0.70 - 1.30 mg/dL    BUN/Creatinine ratio 25 (H) 12 - 20      GFR est AA 59 (L) >60 ml/min/1.73m2    GFR est non-AA 48 (L) >60 ml/min/1.73m2    Calcium 8.1 (L) 8.5 - 10.1 mg/dL    Bilirubin, total 1.4 (H) 0.2 - 1.0 mg/dL    AST (SGOT) 25 15 - 37 U/L    ALT (SGPT) 16 12 - 78 U/L    Alk.  phosphatase 61 45 - 117 U/L    Protein, total 6.0 (L) 6.4 - 8.2 g/dL    Albumin 2.6 (L) 3.5 - 5.0 g/dL    Globulin 3.4 2.0 - 4.0 g/dL    A-G Ratio 0.8 (L) 1.1 - 2.2     CBC WITH AUTOMATED DIFF Collection Time: 10/18/21  7:25 PM   Result Value Ref Range    WBC 3.1 (L) 4.1 - 11.1 K/uL    RBC 4.12 4.10 - 5.70 M/uL    HGB 12.9 12.1 - 17.0 g/dL    HCT 40.3 36.6 - 50.3 %    MCV 97.8 80.0 - 99.0 FL    MCH 31.3 26.0 - 34.0 PG    MCHC 32.0 30.0 - 36.5 g/dL    RDW 18.6 (H) 11.5 - 14.5 %    PLATELET 126 (L) 004 - 400 K/uL    MPV 11.1 8.9 - 12.9 FL    NRBC 0.0 0.0  WBC    ABSOLUTE NRBC 0.00 0.00 - 0.01 K/uL    NEUTROPHILS 76 (H) 32 - 75 %    LYMPHOCYTES 19 12 - 49 %    MONOCYTES 5 5 - 13 %    EOSINOPHILS 0 0 - 7 %    BASOPHILS 0 0 - 1 %    IMMATURE GRANULOCYTES 0 0 - 0.5 %    ABS. NEUTROPHILS 2.3 1.8 - 8.0 K/UL    ABS. LYMPHOCYTES 0.6 (L) 0.8 - 3.5 K/UL    ABS. MONOCYTES 0.2 0.0 - 1.0 K/UL    ABS. EOSINOPHILS 0.0 0.0 - 0.4 K/UL    ABS. BASOPHILS 0.0 0.0 - 0.1 K/UL    ABS. IMM.  GRANS. 0.0 0.00 - 0.04 K/UL    DF AUTOMATED     NT-PRO BNP    Collection Time: 10/18/21  7:25 PM   Result Value Ref Range    NT pro-BNP >35,000 (H) <125 pg/mL   LACTIC ACID    Collection Time: 10/18/21  7:25 PM   Result Value Ref Range    Lactic acid 2.4 (HH) 0.4 - 2.0 mmol/L   SARS-COV-2    Collection Time: 10/18/21  8:55 PM   Result Value Ref Range    SARS-CoV-2 Please find results under separate order     COVID-19 RAPID TEST    Collection Time: 10/18/21  8:55 PM   Result Value Ref Range    Specimen source Nasopharyngeal      COVID-19 rapid test Not Detected Not Detected     URINALYSIS W/ REFLEX CULTURE    Collection Time: 10/18/21  9:36 PM    Specimen: Urine   Result Value Ref Range    Color Dark Yellow      Appearance Turbid (A) Clear      Specific gravity 1.025 1.003 - 1.030      pH (UA) 5.0 5.0 - 8.0      Protein 100 (A) Negative mg/dL    Glucose Negative Negative mg/dL    Ketone Negative Negative mg/dL    Bilirubin Negative Negative      Blood Small (A) Negative      Urobilinogen 4.0 (H) 0.1 - 1.0 EU/dL    Nitrites Negative Negative      Leukocyte Esterase Moderate (A) Negative      UA:UC IF INDICATED Urine Culture Ordered (A) Culture not indicated by UA result      WBC 20-50 0 - 4 /hpf    RBC 10-20 0 - 5 /hpf    Bacteria 4+ (A) Negative /hpf    Mucus 4+ /lpf   TROPONIN-HIGH SENSITIVITY    Collection Time: 10/18/21 10:53 PM   Result Value Ref Range    Troponin-High Sensitivity 122 (HH) 0 - 76 ng/L   LACTIC ACID    Collection Time: 10/18/21 10:53 PM   Result Value Ref Range    Lactic acid 2.7 (HH) 0.4 - 2.0 mmol/L   METABOLIC PANEL, COMPREHENSIVE    Collection Time: 10/19/21  2:14 AM   Result Value Ref Range    Sodium 144 136 - 145 mmol/L    Potassium 4.3 3.5 - 5.1 mmol/L    Chloride 111 (H) 97 - 108 mmol/L    CO2 24 21 - 32 mmol/L    Anion gap 9 5 - 15 mmol/L    Glucose 106 (H) 65 - 100 mg/dL    BUN 41 (H) 6 - 20 mg/dL    Creatinine 1.68 (H) 0.70 - 1.30 mg/dL    BUN/Creatinine ratio 24 (H) 12 - 20      GFR est AA 50 (L) >60 ml/min/1.73m2    GFR est non-AA 41 (L) >60 ml/min/1.73m2    Calcium 8.5 8.5 - 10.1 mg/dL    Bilirubin, total 1.0 0.2 - 1.0 mg/dL    AST (SGOT) 25 15 - 37 U/L    ALT (SGPT) 16 12 - 78 U/L    Alk. phosphatase 59 45 - 117 U/L    Protein, total 6.2 (L) 6.4 - 8.2 g/dL    Albumin 2.6 (L) 3.5 - 5.0 g/dL    Globulin 3.6 2.0 - 4.0 g/dL    A-G Ratio 0.7 (L) 1.1 - 2.2     CBC WITH AUTOMATED DIFF    Collection Time: 10/19/21  2:14 AM   Result Value Ref Range    WBC 2.8 (L) 4.1 - 11.1 K/uL    RBC 4.23 4. 10 - 5.70 M/uL    HGB 13.3 12.1 - 17.0 g/dL    HCT 40.8 36.6 - 50.3 %    MCV 96.5 80.0 - 99.0 FL    MCH 31.4 26.0 - 34.0 PG    MCHC 32.6 30.0 - 36.5 g/dL    RDW 18.7 (H) 11.5 - 14.5 %    PLATELET 271 (L) 401 - 400 K/uL    MPV 10.7 8.9 - 12.9 FL    NRBC 0.0 0.0  WBC    ABSOLUTE NRBC 0.00 0.00 - 0.01 K/uL    NEUTROPHILS 89 (H) 32 - 75 %    LYMPHOCYTES 9 (L) 12 - 49 %    MONOCYTES 1 (L) 5 - 13 %    EOSINOPHILS 0 0 - 7 %    BASOPHILS 0 0 - 1 %    IMMATURE GRANULOCYTES 1 (H) 0 - 0.5 %    ABS. NEUTROPHILS 2.5 1.8 - 8.0 K/UL    ABS. LYMPHOCYTES 0.3 (L) 0.8 - 3.5 K/UL    ABS. MONOCYTES 0.0 0.0 - 1.0 K/UL    ABS.  EOSINOPHILS 0.0 0.0 - 0.4 K/UL    ABS. BASOPHILS 0.0 0.0 - 0.1 K/UL    ABS. IMM. GRANS. 0.0 0.00 - 0.04 K/UL    DF AUTOMATED     TYPE & SCREEN    Collection Time: 10/19/21  2:14 AM   Result Value Ref Range    Crossmatch Expiration 10/22/2021,2359     ABO/Rh(D) A Positive     Antibody screen Negative    TROPONIN-HIGH SENSITIVITY    Collection Time: 10/19/21  2:14 AM   Result Value Ref Range    Troponin-High Sensitivity 135 (HH) 0 - 76 ng/L     [unfilled]      Review of Systems    Unable to obtain . Physical Exam:      Constitutional: Awake confused. HENT:   Head: Normocephalic and atraumatic. Eyes: Pupils are equal, round, and reactive to light. EOM are normal.   Cardiovascular: Normal rate, regular rhythm and normal heart sounds. Pulmonary/Chest: Breath sounds normal. No wheezes. No rales. Exhibits no tenderness. Abdominal: Soft. Bowel sounds are normal. There is no abdominal tenderness. There is no rebound and no guarding. Musculoskeletal: Normal range of motion. Neurological: Awake and confused    CTA ABD PELV W WO CONT   Final Result   Overall there is intact arterial circulation of the abdomen and   pelvis and there is no saurabh evidence of an acute GI hemorrhage. There is   however diffuse sequela of third spacing with bilateral pleural effusions,   profound abdominopelvic ascites, and diffuse subcutaneous edema. Considerations   for sequela of cardiac, hepatic and/or renal dysfunction. Bilateral areas of   renal parenchymal hypoperfusion are noted and could indicate sequela of   infection/pyelonephritis although by appearance might also suggest sequela of   renal infarcts of uncertain age. Component of CHF suggested given the lung base   findings described. .      Results called to Kaleigh Standing on 10/19/2021 12:27 AM.      XR CHEST PORT   Final Result   1. Small left pleural effusion and multifocal airspace disease suspicious for   pneumonia. 2.Mild pulmonary vascular congestion.             Recent Results (from the past 24 hour(s))   METABOLIC PANEL, COMPREHENSIVE    Collection Time: 10/18/21  7:25 PM   Result Value Ref Range    Sodium 145 136 - 145 mmol/L    Potassium 4.4 3.5 - 5.1 mmol/L    Chloride 114 (H) 97 - 108 mmol/L    CO2 20 (L) 21 - 32 mmol/L    Anion gap 11 5 - 15 mmol/L    Glucose 108 (H) 65 - 100 mg/dL    BUN 37 (H) 6 - 20 mg/dL    Creatinine 1.47 (H) 0.70 - 1.30 mg/dL    BUN/Creatinine ratio 25 (H) 12 - 20      GFR est AA 59 (L) >60 ml/min/1.73m2    GFR est non-AA 48 (L) >60 ml/min/1.73m2    Calcium 8.1 (L) 8.5 - 10.1 mg/dL    Bilirubin, total 1.4 (H) 0.2 - 1.0 mg/dL    AST (SGOT) 25 15 - 37 U/L    ALT (SGPT) 16 12 - 78 U/L    Alk. phosphatase 61 45 - 117 U/L    Protein, total 6.0 (L) 6.4 - 8.2 g/dL    Albumin 2.6 (L) 3.5 - 5.0 g/dL    Globulin 3.4 2.0 - 4.0 g/dL    A-G Ratio 0.8 (L) 1.1 - 2.2     CBC WITH AUTOMATED DIFF    Collection Time: 10/18/21  7:25 PM   Result Value Ref Range    WBC 3.1 (L) 4.1 - 11.1 K/uL    RBC 4.12 4.10 - 5.70 M/uL    HGB 12.9 12.1 - 17.0 g/dL    HCT 40.3 36.6 - 50.3 %    MCV 97.8 80.0 - 99.0 FL    MCH 31.3 26.0 - 34.0 PG    MCHC 32.0 30.0 - 36.5 g/dL    RDW 18.6 (H) 11.5 - 14.5 %    PLATELET 043 (L) 352 - 400 K/uL    MPV 11.1 8.9 - 12.9 FL    NRBC 0.0 0.0  WBC    ABSOLUTE NRBC 0.00 0.00 - 0.01 K/uL    NEUTROPHILS 76 (H) 32 - 75 %    LYMPHOCYTES 19 12 - 49 %    MONOCYTES 5 5 - 13 %    EOSINOPHILS 0 0 - 7 %    BASOPHILS 0 0 - 1 %    IMMATURE GRANULOCYTES 0 0 - 0.5 %    ABS. NEUTROPHILS 2.3 1.8 - 8.0 K/UL    ABS. LYMPHOCYTES 0.6 (L) 0.8 - 3.5 K/UL    ABS. MONOCYTES 0.2 0.0 - 1.0 K/UL    ABS. EOSINOPHILS 0.0 0.0 - 0.4 K/UL    ABS. BASOPHILS 0.0 0.0 - 0.1 K/UL    ABS. IMM.  GRANS. 0.0 0.00 - 0.04 K/UL    DF AUTOMATED     NT-PRO BNP    Collection Time: 10/18/21  7:25 PM   Result Value Ref Range    NT pro-BNP >35,000 (H) <125 pg/mL   LACTIC ACID    Collection Time: 10/18/21  7:25 PM   Result Value Ref Range    Lactic acid 2.4 (HH) 0.4 - 2.0 mmol/L   SARS-COV-2    Collection Time: 10/18/21  8:55 PM   Result Value Ref Range    SARS-CoV-2 Please find results under separate order     COVID-19 RAPID TEST    Collection Time: 10/18/21  8:55 PM   Result Value Ref Range    Specimen source Nasopharyngeal      COVID-19 rapid test Not Detected Not Detected     URINALYSIS W/ REFLEX CULTURE    Collection Time: 10/18/21  9:36 PM    Specimen: Urine   Result Value Ref Range    Color Dark Yellow      Appearance Turbid (A) Clear      Specific gravity 1.025 1.003 - 1.030      pH (UA) 5.0 5.0 - 8.0      Protein 100 (A) Negative mg/dL    Glucose Negative Negative mg/dL    Ketone Negative Negative mg/dL    Bilirubin Negative Negative      Blood Small (A) Negative      Urobilinogen 4.0 (H) 0.1 - 1.0 EU/dL    Nitrites Negative Negative      Leukocyte Esterase Moderate (A) Negative      UA:UC IF INDICATED Urine Culture Ordered (A) Culture not indicated by UA result      WBC 20-50 0 - 4 /hpf    RBC 10-20 0 - 5 /hpf    Bacteria 4+ (A) Negative /hpf    Mucus 4+ /lpf   TROPONIN-HIGH SENSITIVITY    Collection Time: 10/18/21 10:53 PM   Result Value Ref Range    Troponin-High Sensitivity 122 (HH) 0 - 76 ng/L   LACTIC ACID    Collection Time: 10/18/21 10:53 PM   Result Value Ref Range    Lactic acid 2.7 (HH) 0.4 - 2.0 mmol/L   METABOLIC PANEL, COMPREHENSIVE    Collection Time: 10/19/21  2:14 AM   Result Value Ref Range    Sodium 144 136 - 145 mmol/L    Potassium 4.3 3.5 - 5.1 mmol/L    Chloride 111 (H) 97 - 108 mmol/L    CO2 24 21 - 32 mmol/L    Anion gap 9 5 - 15 mmol/L    Glucose 106 (H) 65 - 100 mg/dL    BUN 41 (H) 6 - 20 mg/dL    Creatinine 1.68 (H) 0.70 - 1.30 mg/dL    BUN/Creatinine ratio 24 (H) 12 - 20      GFR est AA 50 (L) >60 ml/min/1.73m2    GFR est non-AA 41 (L) >60 ml/min/1.73m2    Calcium 8.5 8.5 - 10.1 mg/dL    Bilirubin, total 1.0 0.2 - 1.0 mg/dL    AST (SGOT) 25 15 - 37 U/L    ALT (SGPT) 16 12 - 78 U/L    Alk.  phosphatase 59 45 - 117 U/L    Protein, total 6.2 (L) 6.4 - 8.2 g/dL    Albumin 2.6 (L) 3.5 - 5.0 g/dL    Globulin 3.6 2.0 - 4.0 g/dL    A-G Ratio 0.7 (L) 1.1 - 2.2     CBC WITH AUTOMATED DIFF    Collection Time: 10/19/21  2:14 AM   Result Value Ref Range    WBC 2.8 (L) 4.1 - 11.1 K/uL    RBC 4.23 4. 10 - 5.70 M/uL    HGB 13.3 12.1 - 17.0 g/dL    HCT 40.8 36.6 - 50.3 %    MCV 96.5 80.0 - 99.0 FL    MCH 31.4 26.0 - 34.0 PG    MCHC 32.6 30.0 - 36.5 g/dL    RDW 18.7 (H) 11.5 - 14.5 %    PLATELET 718 (L) 141 - 400 K/uL    MPV 10.7 8.9 - 12.9 FL    NRBC 0.0 0.0  WBC    ABSOLUTE NRBC 0.00 0.00 - 0.01 K/uL    NEUTROPHILS 89 (H) 32 - 75 %    LYMPHOCYTES 9 (L) 12 - 49 %    MONOCYTES 1 (L) 5 - 13 %    EOSINOPHILS 0 0 - 7 %    BASOPHILS 0 0 - 1 %    IMMATURE GRANULOCYTES 1 (H) 0 - 0.5 %    ABS. NEUTROPHILS 2.5 1.8 - 8.0 K/UL    ABS. LYMPHOCYTES 0.3 (L) 0.8 - 3.5 K/UL    ABS. MONOCYTES 0.0 0.0 - 1.0 K/UL    ABS. EOSINOPHILS 0.0 0.0 - 0.4 K/UL    ABS. BASOPHILS 0.0 0.0 - 0.1 K/UL    ABS. IMM.  GRANS. 0.0 0.00 - 0.04 K/UL    DF AUTOMATED     TYPE & SCREEN    Collection Time: 10/19/21  2:14 AM   Result Value Ref Range    Crossmatch Expiration 10/22/2021,2359     ABO/Rh(D) A Positive     Antibody screen Negative    TROPONIN-HIGH SENSITIVITY    Collection Time: 10/19/21  2:14 AM   Result Value Ref Range    Troponin-High Sensitivity 135 (HH) 0 - 76 ng/L       Results     Procedure Component Value Units Date/Time    CULTURE, BLOOD, LINE [[id number]]     Order Status: Sent Specimen: Blood     CULTURE, BLOOD #1 [[id number]]     Order Status: Canceled Specimen: Blood     CULTURE, BLOOD #2 [[id number]]     Order Status: Canceled Specimen: Blood     CULTURE, URINE [[id number]] Collected: 10/18/21 2136    Order Status: Completed Specimen: Urine Updated: 10/18/21 2247    COVID-19 RAPID TEST [[id number]] Collected: 10/18/21 2055    Order Status: Completed Specimen: Nasopharyngeal Updated: 10/18/21 2148     Specimen source Nasopharyngeal        COVID-19 rapid test Not Detected        Comment: Rapid Abbott ID Now   Rapid NAAT:  The specimen is NEGATIVE for SARS-CoV-2, the novel coronavirus associated with COVID-19. Negative results should be treated as presumptive and, if inconsistent with clinical signs and symptoms or necessary for patient management, should be tested with an alternative molecular assay. Negative results do not preclude SARS-CoV-2 infection and should not be used as the sole basis for patient management decisions. This test has been authorized by the FDA under   an Emergency Use Authorization (EUA) for use by authorized laboratories. Fact sheet for Healthcare Providers: ConventionUpdate.co.nz Fact sheet for Patients: ConventionUpdate.co.nz   Methodology: Isothermal Nucleic Acid Amplification         CULTURE, BLOOD, PAIRED [[id number]] Collected: 10/18/21 1935    Order Status: Completed Specimen: Blood Updated: 10/18/21 2023           Labs:     Recent Labs     10/19/21  0214 10/18/21  1925   WBC 2.8* 3.1*   HGB 13.3 12.9   HCT 40.8 40.3   * 103*     Recent Labs     10/19/21  0214 10/18/21  1925    145   K 4.3 4.4   * 114*   CO2 24 20*   BUN 41* 37*   CREA 1.68* 1.47*   * 108*   CA 8.5 8.1*     Recent Labs     10/19/21  0214 10/18/21  1925   ALT 16 16   AP 59 61   TBILI 1.0 1.4*   TP 6.2* 6.0*   ALB 2.6* 2.6*   GLOB 3.6 3.4     No results for input(s): INR, PTP, APTT, INREXT in the last 72 hours. No results for input(s): FE, TIBC, PSAT, FERR in the last 72 hours. Lab Results   Component Value Date/Time    Folate 18.6 08/12/2021 11:00 AM      No results for input(s): PH, PCO2, PO2 in the last 72 hours. No results for input(s): CPK, CKNDX, TROIQ in the last 72 hours.     No lab exists for component: CPKMB  Lab Results   Component Value Date/Time    Cholesterol, total 178 08/10/2021 02:40 PM    HDL Cholesterol 41 08/10/2021 02:40 PM    LDL, calculated 107.8 (H) 08/10/2021 02:40 PM    Triglyceride 146 08/10/2021 02:40 PM    CHOL/HDL Ratio 4.3 08/10/2021 02:40 PM     No results found for: [facility]  Lab Results   Component Value Date/Time    Color Dark Yellow 10/18/2021 09:36 PM    Appearance Turbid (A) 10/18/2021 09:36 PM    Specific gravity 1.025 10/18/2021 09:36 PM    pH (UA) 5.0 10/18/2021 09:36 PM    Protein 100 (A) 10/18/2021 09:36 PM    Glucose Negative 10/18/2021 09:36 PM    Ketone Negative 10/18/2021 09:36 PM    Bilirubin Negative 10/18/2021 09:36 PM    Urobilinogen 4.0 (H) 10/18/2021 09:36 PM    Nitrites Negative 10/18/2021 09:36 PM    Leukocyte Esterase Moderate (A) 10/18/2021 09:36 PM    Bacteria 4+ (A) 10/18/2021 09:36 PM    WBC 20-50 10/18/2021 09:36 PM    RBC 10-20 10/18/2021 09:36 PM         Assessment:     Acute hypoxemic respiratory failure on Bipap  Gram-negative pneumonia  Acute exacerbation of COPD  Acute systolic heart failure   Abdominal distention  Possible urinary retention  Thrombocytopenia  Neutropenia  Acute kidney injury  Elevated BNP  Elevated troponin      Plan:     Continue nebulizer treatment every 6 hours  On IV Zosyn and IV Zithromax follow blood culture urine cultures    On Lasix 40 mg IV daily  Continue IV Solu-Medrol    Repeat the labs  Cardiology and pulmonary consult pending  Repeat the labs  We will do nephrology consult  Monitor platelet count      Current Facility-Administered Medications:     piperacillin-tazobactam (ZOSYN) 3.375 g in 0.9% sodium chloride (MBP/ADV) 100 mL MBP, 3.375 g, IntraVENous, Q8H, Maritza Jackson MD, Last Rate: 25 mL/hr at 10/19/21 0656, 3.375 g at 10/19/21 0656    sodium chloride (NS) flush 5-40 mL, 5-40 mL, IntraVENous, Q8H, aMritza Jackson MD    sodium chloride (NS) flush 5-40 mL, 5-40 mL, IntraVENous, PRN, Stephanie Jackson MD    sodium chloride (NS) flush 5-10 mL, 5-10 mL, IntraVENous, PRN, Stephanie Jackson MD    azithromycin (ZITHROMAX) 500 mg in 0.9% sodium chloride 250 mL (VIAL-MATE), 500 mg, IntraVENous, Q24H, Maritza Jackson MD, IV Completed at 10/19/21 0422    albuterol-ipratropium (DUO-NEB) 2.5 MG-0.5 MG/3 ML, 3 mL, Nebulization, Q6H PRN, Low Jackson MD    aspirin tablet 325 mg, 325 mg, Oral, DAILY, Low Jackson MD    carvediloL (COREG) tablet 3.125 mg, 3.125 mg, Oral, BID WITH MEALS, Low Jackson MD    furosemide (LASIX) injection 40 mg, 40 mg, IntraVENous, DAILY, Low Jackson MD    methylPREDNISolone (PF) (SOLU-MEDROL) injection 40 mg, 40 mg, IntraVENous, Q6H, Maritza Jackson MD, 40 mg at 10/19/21 4151    spironolactone (ALDACTONE) tablet 25 mg, 25 mg, Oral, DAILY, Low Jackson MD    acetaminophen (TYLENOL) tablet 650 mg, 650 mg, Oral, Q6H PRN **OR** acetaminophen (TYLENOL) suppository 650 mg, 650 mg, Rectal, Q6H PRN, Low Jackson MD    polyethylene glycol (MIRALAX) packet 17 g, 17 g, Oral, DAILY PRN, Low Jackson MD    ondansetron (ZOFRAN ODT) tablet 4 mg, 4 mg, Oral, Q8H PRN **OR** ondansetron (ZOFRAN) injection 4 mg, 4 mg, IntraVENous, Q6H PRN, Maritza Jackson MD    heparin (porcine) injection 5,000 Units, 5,000 Units, SubCUTAneous, Q8H, Maritza Jackson MD, 5,000 Units at 10/19/21 0655    albuterol-ipratropium (DUO-NEB) 2.5 MG-0.5 MG/3 ML, 3 mL, Nebulization, Q6H RT, Maritza Jackson MD, 3 mL at 10/19/21 9948    budesonide-formoteroL (SYMBICORT) 160-4.5 mcg/actuation HFA inhaler 2 Puff, 2 Puff, Inhalation, BID, Maritza Jackson MD, 2 Puff at 10/19/21 8788    Current Outpatient Medications:     furosemide (LASIX) 40 mg tablet, Take 1 Tablet by mouth two (2) times a day., Disp: 60 Tablet, Rfl: 0    acetaminophen (TYLENOL) 325 mg tablet, Take 2 Tablets by mouth every six (6) hours as needed for Pain or Fever., Disp: 60 Tablet, Rfl: 0    carvediloL (COREG) 3.125 mg tablet, Take 1 Tablet by mouth two (2) times daily (with meals). , Disp: 60 Tablet, Rfl: 0    albuterol-ipratropium (DUO-NEB) 2.5 mg-0.5 mg/3 ml nebu, 3 mL by Nebulization route every four (4) hours as needed for Wheezing., Disp: 30 Nebule, Rfl: 0   dextromethorphan-guaiFENesin (Robitussin Cough-Chest Arturo DM) 5-100 mg/5 mL liqd, Take 10 mL by mouth every eight (8) hours as needed for Cough or Congestion. , Disp: 237 mL, Rfl: 0    methylPREDNISolone (MEDROL DOSEPACK) 4 mg tablet, Use as directed, Disp: 1 Dose Pack, Rfl: 0    albuterol-ipratropium (DUO-NEB) 2.5 mg-0.5 mg/3 ml nebu, 3 mL by Nebulization route every six (6) hours as needed for Wheezing., Disp: 30 Nebule, Rfl: 0    aspirin (ASPIRIN) 325 mg tablet, Take 1 Tablet by mouth daily. , Disp: 30 Tablet, Rfl: 0    mirtazapine (REMERON) 7.5 mg tablet, Take 1 Tablet by mouth nightly., Disp: 30 Tablet, Rfl: 0    spironolactone (ALDACTONE) 25 mg tablet, Take 1 Tablet by mouth daily. , Disp: 30 Tablet, Rfl: 0

## 2021-10-19 NOTE — PROGRESS NOTES
10/19/21. PCP: pt she's MD's @ [id number] [facility] spoke with maria elena Copeunders  @ [phone]) stating she & pt are roommates, he uses no DME, & no home health. D/C Plan is home & maria elena Mann) will transport home upon discharge.

## 2021-10-19 NOTE — CONSULTS
Consult  Pulmonary, Critical Care    Name: Bola Lyle MRN: [id number]   : 1960 Hospital: [facility]   Date: 10/19/2021  Admission date: 10/18/2021 Hospital Day: 2       Subjective/Interval History:   Seen in the ICU on nasal oxygen. He is awake alert extremely poor historian all I can tell from him is that he had shortness of breath and came to the hospital    Patient Active Problem List   Diagnosis Code    CHF (congestive heart failure) ([facility]) I50.9    Respiratory failure with hypoxia ([address] J96.91    Fluid overload E87.70    Elevated brain natriuretic peptide (BNP) level R79.89    Noncompliance Z91.19    Tobacco abuse Z72.0    Pneumonia J18.9    Hypoxia R09.02       IMPRESSION:   1. Acute hypoxic respiratory failure  2. Pulmonary edema  3. Bilateral pleural effusions left greater than right  4. Ascites  5. Severe cardiomyopathy  6. Pyuria  7. Body mass index is 14.12 kg/m². 8.       RECOMMENDATIONS/PLAN:   1. Increase Lasix every 8 hour dosing  2. Follow renal function and blood pressure  3. Follow urine culture  4. Continue DuoNeb and Symbicort  5. We will repeat chest x-ray in a.m. to see if mobilizing fluid  6. Ascites present may need to consider paracentesis         Subjective/Initial History:   I have reviewed the flowsheet and previous days notes. I was asked by Vira Forte MD to see Bola Lyle a 59 y.o.  male  in consultation for a chief complaint of acute hypoxic respiratory failure pulmonary edema pleural effusion      The patient is unable to give any meaningful history or review of systems due to patient factors. Patient PCP: Hailey, MD Zak  PMH:  has a past medical history of Chronic obstructive pulmonary disease ([address] and Heart failure ([address] PSH:   has no past surgical history on file. FHX: family history includes Hypertension in his mother. SHX:  reports that he has been smoking.  He has been smoking about 0.50 packs per day. He has never used smokeless tobacco. He reports current alcohol use. He reports previous drug use.   Systemic review unreliable he is very confused    No Known Allergies   MEDS:   Current Facility-Administered Medications   Medication    piperacillin-tazobactam (ZOSYN) 3.375 g in 0.9% sodium chloride (MBP/ADV) 100 mL MBP    furosemide (LASIX) injection 40 mg    sodium chloride (NS) flush 5-40 mL    sodium chloride (NS) flush 5-40 mL    sodium chloride (NS) flush 5-10 mL    azithromycin (ZITHROMAX) 500 mg in 0.9% sodium chloride 250 mL (VIAL-MATE)    albuterol-ipratropium (DUO-NEB) 2.5 MG-0.5 MG/3 ML    aspirin tablet 325 mg    carvediloL (COREG) tablet 3.125 mg    methylPREDNISolone (PF) (SOLU-MEDROL) injection 40 mg    spironolactone (ALDACTONE) tablet 25 mg    acetaminophen (TYLENOL) tablet 650 mg    Or    acetaminophen (TYLENOL) suppository 650 mg    polyethylene glycol (MIRALAX) packet 17 g    ondansetron (ZOFRAN ODT) tablet 4 mg    Or    ondansetron (ZOFRAN) injection 4 mg    heparin (porcine) injection 5,000 Units    albuterol-ipratropium (DUO-NEB) 2.5 MG-0.5 MG/3 ML    budesonide-formoteroL (SYMBICORT) 160-4.5 mcg/actuation HFA inhaler 2 Puff        Current Facility-Administered Medications:     piperacillin-tazobactam (ZOSYN) 3.375 g in 0.9% sodium chloride (MBP/ADV) 100 mL MBP, 3.375 g, IntraVENous, Q8H, Maritza Jackson MD, Last Rate: 25 mL/hr at 10/19/21 1422, 3.375 g at 10/19/21 1422    furosemide (LASIX) injection 40 mg, 40 mg, IntraVENous, BID, Khushi Brown MD    sodium chloride (NS) flush 5-40 mL, 5-40 mL, IntraVENous, Q8H, Maritza Jackson MD, 10 mL at 10/19/21 1426    sodium chloride (NS) flush 5-40 mL, 5-40 mL, IntraVENous, PRN, Maritza Jackson MD    sodium chloride (NS) flush 5-10 mL, 5-10 mL, IntraVENous, PRN, Delmi Jackson MD    azithromycin (ZITHROMAX) 500 mg in 0.9% sodium chloride 250 mL (VIAL-MATE), 500 mg, IntraVENous, Q24H, Manuel, Joaquina Mckeon MD, IV Completed at 10/19/21 0428    albuterol-ipratropium (DUO-NEB) 2.5 MG-0.5 MG/3 ML, 3 mL, Nebulization, Q6H PRN, Joaquina Jackson MD    aspirin tablet 325 mg, 325 mg, Oral, DAILY, Maritza Jackson MD, 325 mg at 10/19/21 1114    carvediloL (COREG) tablet 3.125 mg, 3.125 mg, Oral, BID WITH MEALS, Joaquina Jackson MD    methylPREDNISolone (PF) (SOLU-MEDROL) injection 40 mg, 40 mg, IntraVENous, Q6H, Maritza Jackson MD, 40 mg at 10/19/21 1402    spironolactone (ALDACTONE) tablet 25 mg, 25 mg, Oral, DAILY, Maritza Jackson MD, 25 mg at 10/19/21 1404    acetaminophen (TYLENOL) tablet 650 mg, 650 mg, Oral, Q6H PRN **OR** acetaminophen (TYLENOL) suppository 650 mg, 650 mg, Rectal, Q6H PRN, Joaquina Jackson MD    polyethylene glycol (MIRALAX) packet 17 g, 17 g, Oral, DAILY PRN, Joaquina Jackson MD    ondansetron (ZOFRAN ODT) tablet 4 mg, 4 mg, Oral, Q8H PRN **OR** ondansetron (ZOFRAN) injection 4 mg, 4 mg, IntraVENous, Q6H PRN, Maritza Jackson MD    heparin (porcine) injection 5,000 Units, 5,000 Units, SubCUTAneous, Q8H, Maritza Jackson MD, 5,000 Units at 10/19/21 1407    albuterol-ipratropium (DUO-NEB) 2.5 MG-0.5 MG/3 ML, 3 mL, Nebulization, Q6H RT, Maritza Jackson MD, 3 mL at 10/19/21 1310    budesonide-formoteroL (SYMBICORT) 160-4.5 mcg/actuation HFA inhaler 2 Puff, 2 Puff, Inhalation, BID, Maritza Jackson MD, 2 Puff at 10/19/21 1357      Objective:     Vital Signs: Telemetry:    normal sinus rhythm Intake/Output:   Visit Vitals  /84   Pulse 84   Temp 97.5 °F (36.4 °C)   Resp 28   Ht 6' 2\" (1.88 m)   Wt 49.9 kg (110 lb)   SpO2 94%   BMI 14.12 kg/m²       Temp (24hrs), Av.8 °F (34.3 °C), Min:88.3 °F (31.3 °C), Max:97.5 °F (36.4 °C)        O2 Device: (P) Nasal cannula O2 Flow Rate (L/min): (P) 3 l/min         Body mass index is 14.12 kg/m².     Wt Readings from Last 4 Encounters:   10/19/21 49.9 kg (110 lb)   21 52.4 kg (115 lb 8.3 oz)   21 54.2 kg (119 lb 7.8 oz) Intake/Output Summary (Last 24 hours) at 10/19/2021 1705  Last data filed at 10/19/2021 1417  Gross per 24 hour   Intake 1350 ml   Output 550 ml   Net 800 ml       Last shift:      10/19 0701 - 10/19 1900  In: -   Out: 550 [Urine:550]  Last 3 shifts: 10/17 1901 - 10/19 0700  In: 1350 [I.V.:1350]  Out: -        Hemodynamics:    CO:    CI:    CVP:    SVR:   PAP Systolic:    PAP Diastolic:    PVR:    HA25:       Ventilator Settings:      Mode Rate TV Press PEEP FiO2 PIP Min. Vent               40 %     13.9 l/min      Physical Exam:    General:  male; lying in bed no distress on nasal oxygen  HEENT: NCAT, poor dentition, oral mucosa moist  Eyes: anicteric; conjunctiva clear extraocular movements intact  Neck: no nodes, JVD present, no accessory MM use. Chest: no deformity,  Cardiac: Regular rate and rhythm systolic murmur present peripheral edema present  Lungs: Poor breath sounds prolongation of exhalation no wheezing rales present right lateral right base dullness left lateral and base  Abd: Distended tense nontender bowel sounds present  Ext: Leg edema edema; no joint swelling;  No clubbing  : clear urine  Neuro: Awake alert confused not able to give a good history does follow commands moves all 4 extremities  Psych- no agitation, oriented to person; unable to give clear history  Skin: warm, dry, no cyanosis;  Pulses: Brachial and radial pulses intact  Capillary: Slow capillary refill      Labs:    Recent Labs     10/19/21  0214 10/18/21  1925   WBC 2.8* 3.1*   HGB 13.3 12.9   * 103*     Recent Labs     10/19/21  0214 10/18/21  2253 10/18/21  1925     --  145   K 4.3  --  4.4   *  --  114*   CO2 24  --  20*   *  --  108*   BUN 41*  --  37*   CREA 1.68*  --  1.47*   CA 8.5  --  8.1*   LAC  --  2.7* 2.4*   ALB 2.6*  --  2.6*   ALT 16  --  16   3 L nasal oxygen saturation 94%  BNP greater than 35,000  Troponin I 178, 135, 122  COVID-19 antigen negative  8/11/2021 echo ejection fraction 20% moderate aortic regurg mild to moderate mitral regurg IVC dilated PASP 57  Imaging:  I have personally reviewed the patients radiographs and have reviewed the reports:    CXR Results  (Last 48 hours)               10/18/21 1928  XR CHEST PORT Final result    Impression:  1. Small left pleural effusion and multifocal airspace disease suspicious for   pneumonia. 2.Mild pulmonary vascular congestion. Narrative:  AP portable chest, 1915 hours. Comparison: 9/3/2021. Findings: There is mild to moderate cardiomegaly. Mild pulmonary vascular   congestion is noted. There is patchy bilateral airspace disease, left greater   than right. A small left pleural effusion is noted. There is more consolidative   airspace disease in the left lung base. No pneumothorax is identified. The   osseous structures are unremarkable. To me the x-ray looks more like accentuated interstitial markings consistent with pulmonary edema and the left effusion           Results from Hospital Encounter encounter on 10/18/21    XR CHEST PORT    Narrative  AP portable chest, 1915 hours. Comparison: 9/3/2021. Findings: There is mild to moderate cardiomegaly. Mild pulmonary vascular  congestion is noted. There is patchy bilateral airspace disease, left greater  than right. A small left pleural effusion is noted. There is more consolidative  airspace disease in the left lung base. No pneumothorax is identified. The  osseous structures are unremarkable. Impression  1. Small left pleural effusion and multifocal airspace disease suspicious for  pneumonia. 2.Mild pulmonary vascular congestion. Results from [place] encounter on 09/01/21    XR CHEST PORT    Narrative  Chest single view. Comparison single view chest September 1, 2021. Increased lung volumes. COPD/emphysema. No definite superimposed active process. Cardiac and mediastinal structures unchanged.  No pneumothorax or sizable pleural  effusion. XR CHEST PORT    Narrative  Dyspnea. Comparison chest x-ray 8/10/2021. FINDINGS: Single frontal view of the chest. Unchanged cardiomegaly. The lungs  are well inflated. Bilateral interstitial and bronchial thickening mostly in the  mid to lower lung zones, unchanged. No lobar consolidation. Trace left pleural  effusion or scar. No pneumothorax. No free air under the diaphragm. Bony  structures grossly intact. Impression  1. Unchanged bilateral interstitial and bronchial thickening, and trace left  pleural effusion or scar. 2. Unchanged cardiomegaly. Results from [place] encounter on 10/18/21    CTA ABD PELV W WO CONT    Narrative  HISTORY:  possible gi bleed  Dose reduction technique: All CT scans at this facility are performed using dose reduction optimization  technique as appropriate on the exam including the following: Automated exposure  control, adjustment of the MA and/or KV according to patient size of use of  iterative reconstructive technique. .    TECHNIQUE: CTA ABD PELV W WO CONT. CT angiogram of the abdomen and pelvis  performed and maximum intensity projection images (MIPS) generated. 100 cc Isovue-370 injected. COMPARISON: None  LIMITATIONS: None    CHEST: Multichamber cardiomegaly. Small right and moderate left pleural  effusion. Bibasilar airspace consolidation overlies the effusions. Moderate  emphysematous changes incidentally noted at the lung bases. AORTA: No aneurysm or dissection. Mild peripheral areas of aortic calcification  CELIAC AXIS: Patent. SUPERIOR MESENTERIC ARTERY: Patent. RENAL ARTERIES: Patent. INFERIOR MESENTERIC ARTERY: Patent. ILIAC ARTERIES: Patent. Mild peripheral areas of calcification. .  Visualized femoral vasculature:  Patent. LIVER: Normal.  GALLBLADDER: Distended.   BILIARY TREE: Normal.  PANCREAS: Normal.  SPLEEN: Normal.  ADRENAL GLANDS: Normal.  KIDNEYS/URETERS/BLADDER: Multiple areas of peripheral hypoperfusion throughout  both kidneys which does persist on the venous and delayed images. No saurabh  obstructive changes. The bladder is catheterized/collapsed. RETROPERITONEUM: Normal.  BOWEL/MESENTERY: no obvious acute abnormality. No evidence of GI hemorrhage on  arterial, venous or delayed phase images. .  APPENDIX: Identified and normal.  PERITONEAL CAVITY: Profound abdominopelvic ascites distending the abdomen and  centralizing many of the bowel loops. REPRODUCTIVE ORGANS: Normal.  BONE/TISSUES: No acute osseous abnormality. Diffuse edema throughout the  subcutaneous tissue of the visualized chest abdomen, pelvis and bilateral lower  extremities. .    OTHER: None    Impression  Overall there is intact arterial circulation of the abdomen and  pelvis and there is no saurabh evidence of an acute GI hemorrhage. There is  however diffuse sequela of third spacing with bilateral pleural effusions,  profound abdominopelvic ascites, and diffuse subcutaneous edema. Considerations  for sequela of cardiac, hepatic and/or renal dysfunction. Bilateral areas of  renal parenchymal hypoperfusion are noted and could indicate sequela of  infection/pyelonephritis although by appearance might also suggest sequela of  renal infarcts of uncertain age. Component of CHF suggested given the lung base  findings described. .    Results called to Jose Ramon Joseph on 10/19/2021 12:27 AM.      · Discussion severe cardiomyopathy ejection fraction 20% with ascites peripheral edema bilateral pleural effusion left greater than right on CT scan December this changes on CT scan. He continues to smoke. Will increase Lasix to every 8 hour dosing for the next 24 hours  · Follow blood gases electrolytes and chest x-ray  · Time of care 50 minutes           Thank you for allowing us to participate in the care of this patient.   We will follow along with you   Earnestine Dance, MD

## 2021-10-19 NOTE — PROGRESS NOTES
Problem: Dysphagia (Adult)  Goal: *Acute Goals and Plan of Care (Insert Text)  Description: Speech Therapy Goals  Initiated 10/19/2021  -Patient will participate in modified barium swallow study within 7 day(s). [ ] Not met  [ ]  MET   [ ] Progressing  [ ] Vivien Raya  -Patient will tolerate mm5 diet with honey/mod thick liquids without signs/symptoms of aspiration given no cues within 7 day(s). [ ] Not met  [ ]  MET   [ ] Progressing  [ ] Vivien Fling  -Patient will demonstrate understanding of swallow safety precautions and aspiration precautions, diet recs with no cues within 7 day(s). [ ] Not met  [ ]  MET   [ ] Progressing  [ ] Discontinue    Outcome: Not Met   SPEECH [address] EVALUATION  Patient: Carlos Hernandez (13 y.o. male)  Date: 10/19/2021  Primary Diagnosis: Pneumonia [J18.9]  Hypoxia [R09.02]        Precautions: aspiration       ASSESSMENT :  Based on the objective data described below, the patient presents with mild-mod oropharyngeal dysphagia with aspiration concerns and concerns for underlying GI dysfunction. Pt positioned upright in bed, is confused but alert and pleasant. Spoke with nsg prior to assessment, reports pt tolerated thin liquids without overt s/s aspiration. Current diet is regular/thin, 2 gm Na, low fat, low cholesterol high fiber. Pt seen in ICu  Pt reports hx of dysphagia that has been ongoing \" for a while. \" Pt reports choking with foods and liquids, unable to provide further details but does indicate weight loss and poor intake related to dysphagia. Pt given trials puree, thin via straw, mildly thick via straw and solids. Oral phase WFL with missing teeth noted. Pharyngeal phase with function Hyolaryngeal elevation, reduced protraction to palpation, persistent mild delay with increased WOB and cough with rapid sequential sips.  Unable to obtain reliable 02 sats at this time- nsg obtained and reports lung sounds clear but diminished. Vocal quality is weak, hypophonic. Pt with cachectic appearance. Patient will benefit from skilled intervention to address the above impairments. Patients rehabilitation potential is considered to be Fair     PLAN :  Recommendations and Planned Interventions: At this time, recommend diet downgrade to Mm5/mod thick with 1:1 assistance with ALL PO intake, STRICT aspiration and GERD precautions, monitor pt closely for s/s aspiration, meds crushed if able in puree, FEED ONLY IF AWAKE AND ALERT. Recommend MBS when pt is medically appropriate to participate. Further recs pending MBS. Pt's baseline cognition is unknown at this time. Frequency/Duration: Patient will be followed by speech-language pathology 5 times a week to address goals. Discharge Recommendations: recommend continued SLP intervention at this time. SUBJECTIVE:   Patient alert, agreeable, confused. OBJECTIVE:     CXR Results  (Last 48 hours)                 10/18/21 1928  XR CHEST PORT Final result    Impression:  1. Small left pleural effusion and multifocal airspace disease suspicious for   pneumonia. 2.Mild pulmonary vascular congestion. Narrative:  AP portable chest, 1915 hours. Comparison: 9/3/2021. Findings: There is mild to moderate cardiomegaly. Mild pulmonary vascular   congestion is noted. There is patchy bilateral airspace disease, left greater   than right. A small left pleural effusion is noted. There is more consolidative   airspace disease in the left lung base. No pneumothorax is identified. The   osseous structures are unremarkable. CT Results  (Last 48 hours)                 10/18/21 2311  CTA ABD PELV W WO CONT Final result    Impression:  Overall there is intact arterial circulation of the abdomen and   pelvis and there is no saurabh evidence of an acute GI hemorrhage.  There is   however diffuse sequela of third spacing with bilateral pleural effusions,   profound abdominopelvic ascites, and diffuse subcutaneous edema. Considerations   for sequela of cardiac, hepatic and/or renal dysfunction. Bilateral areas of   renal parenchymal hypoperfusion are noted and could indicate sequela of   infection/pyelonephritis although by appearance might also suggest sequela of   renal infarcts of uncertain age. Component of CHF suggested given the lung base   findings described. .       Results called to Isrrael Ferrari on 10/19/2021 12:27 AM.       Narrative:  HISTORY:  possible gi bleed   Dose reduction technique: All CT scans at this facility are performed using dose reduction optimization   technique as appropriate on the exam including the following: Automated exposure   control, adjustment of the MA and/or KV according to patient size of use of   iterative reconstructive technique. .       TECHNIQUE: CTA ABD PELV W WO CONT. CT angiogram of the abdomen and pelvis   performed and maximum intensity projection images (MIPS) generated. 100 cc Isovue-370 injected. COMPARISON: None   LIMITATIONS: None       CHEST: Multichamber cardiomegaly. Small right and moderate left pleural   effusion. Bibasilar airspace consolidation overlies the effusions. Moderate   emphysematous changes incidentally noted at the lung bases. AORTA: No aneurysm or dissection. Mild peripheral areas of aortic calcification   CELIAC AXIS: Patent. SUPERIOR MESENTERIC ARTERY: Patent. RENAL ARTERIES: Patent. INFERIOR MESENTERIC ARTERY: Patent. ILIAC ARTERIES: Patent. Mild peripheral areas of calcification. .      Visualized femoral vasculature:  Patent. LIVER: Normal.     GALLBLADDER: Distended. BILIARY TREE: Normal.      PANCREAS: Normal.     SPLEEN: Normal.     ADRENAL GLANDS: Normal.     KIDNEYS/URETERS/BLADDER: Multiple areas of peripheral hypoperfusion throughout   both kidneys which does persist on the venous and delayed images.  No saurabh obstructive changes. The bladder is catheterized/collapsed. RETROPERITONEUM: Normal.    BOWEL/MESENTERY: no obvious acute abnormality. No evidence of GI hemorrhage on   arterial, venous or delayed phase images. .    APPENDIX: Identified and normal.   PERITONEAL CAVITY: Profound abdominopelvic ascites distending the abdomen and   centralizing many of the bowel loops. REPRODUCTIVE ORGANS: Normal.     BONE/TISSUES: No acute osseous abnormality. Diffuse edema throughout the   subcutaneous tissue of the visualized chest abdomen, pelvis and bilateral lower   extremities. .          OTHER: None                    Past Medical History:   Diagnosis Date    Chronic obstructive pulmonary disease ([address]     Heart failure (HCC)    No past surgical history on file. Prior Level of Function/Home Situation:   Home Situation  Support Systems: Other Family Member(s) (Cousin ( Jeannette Hood). )  Diet prior to admission: regular/thin with difficulty  Current Diet:  regular/thin   Cognitive and Communication Status:  Neurologic State: Alert, Confused  Orientation Level: Oriented to person    Pain:  0    After treatment:   Patient left in no apparent distress in bed, Call bell within reach, and Nursing notified    COMMUNICATION/EDUCATION:   Patient was educated regarding purpose of SLP assessment, POC, diet recs and sw safety precautions. Patient demonstrated Guarded understanding as evidenced by AMS. The patient's plan of care including recommendations, planned interventions, and recommended diet changes were discussed with: Registered nurse. Patient/family have participated as able in goal setting and plan of care. Patient/family agree to work toward stated goals and plan of care.     Thank you for this referral.  Bijal Daniel M.S. CCC-SLP  Time Calculation: 12 mins

## 2021-10-19 NOTE — ED NOTES
TRANSFER - OUT REPORT:    Verbal report given to Bassem(name) on Glorious Ion  being transferred to [facility] ICU(unit) for routine progression of care       Report consisted of patients Situation, Background, Assessment and   Recommendations(SBAR). Information from the following report(s) SBAR, ED Summary, [facility] and Recent Results was reviewed with the receiving nurse. Lines:   Peripheral IV 10/18/21 Distal;Right Basilic (Active)       Peripheral IV 10/18/21 Anterior;Distal;Right Forearm (Active)       Peripheral IV 10/19/21 Anterior;Right (Active)   Site Assessment Clean, dry, & intact 10/19/21 1454   Dressing Status Clean, dry, & intact 10/19/21 1454   Dressing Type Transparent 10/19/21 1454        Opportunity for questions and clarification was provided.       Patient transported with:   Monitor  O2 @ 3 liters  Registered Nurse

## 2021-10-19 NOTE — CONSULTS
Consult    Patient: Emilie Love MRN: [id number]  SSN: [id number]    YOB: 1960  Age: 59 y.o. Sex: male      Subjective:      Emilie Love is a 59 y.o. male who is being seen for heart failure. Patient with history of heart failure with reduced ejection fraction, aortic regurgitation, COPD, medical noncompliance, and cachexia. He presented to the emergency room with abdominal pain and shortness of breath. He has been more confused recently. He was brought by EMS to the emergency room. Upon their arrival sats were in the 60% range. He was placed on a BiPAP and subsequently improved. Currently on oxygen by nasal cannula. Past Medical History:   Diagnosis Date    Chronic obstructive pulmonary disease ([address]     Heart failure ([facility] [address]      No past surgical history on file.    Family History   Problem Relation Age of Onset    Hypertension Mother      Social History     Tobacco Use    Smoking status: Current Every Day Smoker     Packs/day: 0.50    Smokeless tobacco: Never Used   Substance Use Topics    Alcohol use: Yes     Comment: ocassionally      Current Facility-Administered Medications   Medication Dose Route Frequency Provider Last Rate Last Admin    piperacillin-tazobactam (ZOSYN) 3.375 g in 0.9% sodium chloride (MBP/ADV) 100 mL MBP  3.375 g IntraVENous Q8H Maritza Jackson MD 25 mL/hr at 10/19/21 1422 3.375 g at 10/19/21 1422    sodium chloride (NS) flush 5-40 mL  5-40 mL IntraVENous Q8H Maritza Jackson MD   10 mL at 10/19/21 1426    sodium chloride (NS) flush 5-40 mL  5-40 mL IntraVENous PRN Lizeth Jackson MD        sodium chloride (NS) flush 5-10 mL  5-10 mL IntraVENous PRN Lizeth Jackson MD        azithromycin (ZITHROMAX) 500 mg in 0.9% sodium chloride 250 mL (VIAL-MATE)  500 mg IntraVENous Q24H Maritza Jackson MD   IV Completed at 10/19/21 0428    albuterol-ipratropium (DUO-NEB) 2.5 MG-0.5 MG/3 ML  3 mL Nebulization Q6H PRN Martina Rodriguez MD       [facility] aspirin tablet 325 mg  325 mg Oral DAILY Maritza Jackson MD   325 mg at 10/19/21 1114    carvediloL (COREG) tablet 3.125 mg  3.125 mg Oral BID WITH MEALS Berlin Jackson MD        furosemide (LASIX) injection 40 mg  40 mg IntraVENous DAILY Maritza Jackson MD   40 mg at 10/19/21 1115    methylPREDNISolone (PF) (SOLU-MEDROL) injection 40 mg  40 mg IntraVENous Q6H Maritza Jackson MD   40 mg at 10/19/21 1402    spironolactone (ALDACTONE) tablet 25 mg  25 mg Oral DAILY Maritza Jackson MD   25 mg at 10/19/21 1404    acetaminophen (TYLENOL) tablet 650 mg  650 mg Oral Q6H PRN Berlin Jackson MD        Or   [employer] acetaminophen (TYLENOL) suppository 650 mg  650 mg Rectal Q6H PRN Berlin Jackson MD        polyethylene glycol (MIRALAX) packet 17 g  17 g Oral DAILY PRN Berlin Jackson MD        ondansetron (ZOFRAN ODT) tablet 4 mg  4 mg Oral Q8H PRN Maritza Jackson MD        Or    ondansetron [facility]) injection 4 mg  4 mg IntraVENous Q6H PRN Berlin Jackson MD        heparin (porcine) injection 5,000 Units  5,000 Units SubCUTAneous Q8H Maritza Jackson MD   5,000 Units at 10/19/21 1407    albuterol-ipratropium (DUO-NEB) 2.5 MG-0.5 MG/3 ML  3 mL Nebulization Q6H RT Maritza Jackson MD   3 mL at 10/19/21 1310    budesonide-formoteroL (SYMBICORT) 160-4.5 mcg/actuation HFA inhaler 2 Puff  2 Puff Inhalation BID Anthony Elaine MD   2 Puff at 10/19/21 7358        No Known Allergies    Review of Systems:  He is poor historian. Confused. Not reliable review of system    Objective:     Vitals:    10/19/21 1230 10/19/21 1301 10/19/21 1311 10/19/21 1404   BP: (!) 132/99 111/87  135/84   Pulse:  76  84   Resp:  (!) 39  28   Temp:    97.5 °F (36.4 °C)   SpO2:  93% 93% 94%   Weight:       Height:            Physical Exam:  General:   Appears to be short winded, ill-appearing. Eyes:  Conjunctivae/corneas clear. PERRL, EOMs intact. Fundi benign   Ears:  Normal TMs and external ear canals both ears.    Nose: Nares normal. Septum midline. Mucosa normal. No drainage or sinus tenderness. Mouth/Throat: Lips, mucosa, and tongue normal. Teeth and gums normal.   Neck: Supple, symmetrical, trachea midline, no adenopathy, thyroid: no enlargment/tenderness/nodules, no carotid bruit and no JVD. Back:   Symmetric, no curvature. ROM normal. No CVA tenderness. Lungs:   Clear to auscultation bilaterally. Heart:   Tachycardic. Abdomen:    Distended. Extremities: Extremities normal, atraumatic, no cyanosis or edema. Pulses: 2+ and symmetric all extremities. Skin: Skin color, texture, turgor normal. No rashes or lesions   Lymph nodes: Cervical, supraclavicular, and axillary nodes normal.   Neurologic: CNII-XII intact. Normal strength, sensation and reflexes throughout. Assessment:     Hospital Problems  Never Reviewed        Codes Class Noted POA    Pneumonia ICD-10-CM: J18.9  ICD-9-CM: 486  10/18/2021 Unknown        Hypoxia ICD-10-CM: R09.02  ICD-9-CM: 799.02  10/18/2021 Unknown            Patient is a 57-year-old -American male with:  1.  Acute kidney injury, probably cardiorenal syndrome aggravated by significant ascites  2.  Heart failure with reduced ejection fraction with decompensation  3.  COPD  4.  Medical noncompliance  5.  Cachexia  6.  Troponin in the indeterminate range, denies any chest pain  7.  Moderate aortic regurgitation  8.  Pulmonary hypertension       Plan:     EKG showed normal sinus rhythm, nonspecific ST-T wave changes. High-sensitivity troponin were mildly elevated. Anyhow BNP is more than 35,000. Creatinine is 1.68, BUN at 41. Potassium 4.3 and sodium 144. LFT within normal limits except for hypoalbuminemia at 2.6 and total protein is 6.2. CTA of abdomen and pelvis showed no evidence of acute GI hemorrhage. There is third spacing and bilateral pleural effusion. Profound ascites. Echo in August showed EF of 25%, dilated left ventricle with mild LVH.   RVSP 57 mmHg.      1.  Currently on aspirin 325 that will reduce to 81 mg  2. I agree on IV Lasix that we will increase to 40 mg twice a day. 3.  Currently on spironolactone 25 mg daily. 4.  Consult IR for paracentesis. Five. Monitor ins and outs, daily weight and kidney function closely    Further recommendation depending on clinical progression      Thank you  For involving me in Mr. Avery hodges. I will follow.     Signed By: Tim Sahu MD     October 19, 2021

## 2021-10-19 NOTE — ACP (ADVANCE CARE PLANNING)
Advance Care Planning   Healthcare Decision Maker:       Primary Decision Maker: Jenelle Wade - Other Relative - [phone] Cousin.

## 2021-10-19 NOTE — PROGRESS NOTES
Reason for Admission:   PNA                    RUR Score:    22%              PCP: First and Last name:   Seen in Clinics @ [facility].       Name of Practice:    Are you a current patient: Yes/No: Yes   Approximate date of last visit: Been a while. Can you participate in a virtual visit if needed:   No  Do you (patient/family) have any concerns for transition/discharge? No                  Plan for utilizing home health:   None @ this time/used no DME. Current Advanced Directive/Advance Care Plan:  Full Code      Healthcare Decision Maker:               Primary Decision Maker: Birdia Fleischer - Other Relative - [phone] Cousin    Transition of Care Plan:  D/C Plan is home with cousin/roomate & she will transport home upon discharge.

## 2021-10-19 NOTE — ED NOTES
Restraints removed at this time. Patient is alert but confused and cooperative, swallow eval also done at this time. Water intake was good.

## 2021-10-19 NOTE — PROGRESS NOTES
Patient arrival to ICU. Patient is disoriented but calm and cooperative. No restraints on when pt arrived to unit. Order discontinued. VSS at this time. Will continue to monitor.

## 2021-10-19 NOTE — PROGRESS NOTES
10/19/21. CM attempted interview - pt with [facility]MS. Listed contact ( Shelby Mccann @ [phone]) called - no answer/mailbox full/unable to leave message.

## 2021-10-19 NOTE — ED NOTES
Pt abdomen distended. Bladder scan done and shows over 1000 mls fluid in bladder. León placed under sterile procedure and returned 10mls of yellow urine. Dr. Virgin Gilford called and informed about situation ordered ct abdomen without contrast stat and stat consult to urology.  If none is in house, pt will have to be transferred

## 2021-10-19 NOTE — H&P
History and Physical    NAME: Po Marlow   :  1960   MRN:  [id number]     Date/Time:  10/18/2021 10:54 PM    Patient PCP: Zak Hart MD  ______________________________________________________________________             Subjective:     CHIEF COMPLAINT:     Shortness of breath of breath and abdominal pain    HISTORY OF PRESENT ILLNESS:       Patient is a 59y.o. year old male no past medical history of hypertension COPD cardiomyopathy with systolic heart failure with ejection fraction with 25% ongoing smoking came to emergency room because of shortness of breath patient was recently discharged from the hospital after multiple admission for same reason seen by the ER physician x-ray shows pneumonia patient was recommend to be admitted for further evaluation treatment at the same time patient has no abdominal pain  No work-up done in the ER initially , abdominal distended no CT scan was ordered, nurse try to put the León in no urine output at that time CT scan of the abdomen was ordered results pending    Patient admitted to ICU for further work-up    No ABG done so far I ordered ABG for acute hypoxemia    Past Medical History:   Diagnosis Date    Chronic obstructive pulmonary disease ([address]     Heart failure ([address]         No past surgical history on file. Social History     Tobacco Use    Smoking status: Current Every Day Smoker     Packs/day: 0.50    Smokeless tobacco: Never Used   Substance Use Topics    Alcohol use: Yes     Comment: ocassionally        Family History   Problem Relation Age of Onset    Hypertension Mother        No Known Allergies     Prior to Admission medications    Medication Sig Start Date End Date Taking? Authorizing Provider   furosemide (LASIX) 40 mg tablet Take 1 Tablet by mouth two (2) times a day. 21   Marta Hagan MD   acetaminophen (TYLENOL) 325 mg tablet Take 2 Tablets by mouth every six (6) hours as needed for Pain or Fever.  21   Dorie Kika Walden MD   carvediloL (COREG) 3.125 mg tablet Take 1 Tablet by mouth two (2) times daily (with meals). 9/7/21   Jayson Kelly MD   albuterol-ipratropium (DUO-NEB) 2.5 mg-0.5 mg/3 ml nebu 3 mL by Nebulization route every four (4) hours as needed for Wheezing. 9/7/21   Jayson Kelly MD   dextromethorphan-guaiFENesin (Robitussin Cough-Chest Arturo DM) 5-100 mg/5 mL liqd Take 10 mL by mouth every eight (8) hours as needed for Cough or Congestion. 9/7/21   Jayson Kelly MD   methylPREDNISolone ([facility]) 4 mg tablet Use as directed 9/7/21   Jayson Kelly MD   albuterol-ipratropium (DUO-NEB) 2.5 mg-0.5 mg/3 ml nebu 3 mL by Nebulization route every six (6) hours as needed for Wheezing. 8/14/21   Berlin Jackson MD   aspirin (ASPIRIN) 325 mg tablet Take 1 Tablet by mouth daily. 8/15/21   Berlin Jackson MD   mirtazapine (REMERON) 7.5 mg tablet Take 1 Tablet by mouth nightly. 8/14/21   Berlin Jackson MD   spironolactone (ALDACTONE) 25 mg tablet Take 1 Tablet by mouth daily.  8/15/21   Berlin Jackson MD         Current Facility-Administered Medications:     sodium chloride (NS) flush 5-40 mL, 5-40 mL, IntraVENous, Q8H, Maritza Jackson MD    sodium chloride (NS) flush 5-40 mL, 5-40 mL, IntraVENous, PRN, Berlin Jackson MD    sodium chloride (NS) flush 5-10 mL, 5-10 mL, IntraVENous, PRN, Berlin Jackson MD    azithromycin (ZITHROMAX) 500 mg in 0.9% sodium chloride 250 mL (VIAL-MATE), 500 mg, IntraVENous, Q24H, Maritza Jacskon MD, Last Rate: 250 mL/hr at 10/18/21 2050, 500 mg at 10/18/21 2050    albuterol-ipratropium (DUO-NEB) 2.5 MG-0.5 MG/3 ML, 3 mL, Nebulization, Q6H PRN, Maritza Jackson MD  [facility]  [START ON 10/19/2021] aspirin tablet 325 mg, 325 mg, Oral, DAILY, Maritza Jackson MD  [facility]  [START ON 10/19/2021] carvediloL (COREG) tablet 3.125 mg, 3.125 mg, Oral, BID WITH MEALS, Berlin Jackson MD    [START ON 10/19/2021] furosemide (LASIX) injection 40 mg, 40 mg, IntraVENous, DAILY, Elaine Jackson MD    [START ON 10/19/2021] methylPREDNISolone (PF) (SOLU-MEDROL) injection 40 mg, 40 mg, IntraVENous, Q6H, Elaine Jackson MD    [START ON 10/19/2021] spironolactone (ALDACTONE) tablet 25 mg, 25 mg, Oral, DAILY, Elaine Jackson MD    acetaminophen (TYLENOL) tablet 650 mg, 650 mg, Oral, Q6H PRN **OR** acetaminophen (TYLENOL) suppository 650 mg, 650 mg, Rectal, Q6H PRN, Elaine Jackson MD    polyethylene glycol (MIRALAX) packet 17 g, 17 g, Oral, DAILY PRN, Elaine Jackson MD    ondansetron (ZOFRAN ODT) tablet 4 mg, 4 mg, Oral, Q8H PRN **OR** ondansetron (ZOFRAN) injection 4 mg, 4 mg, IntraVENous, Q6H PRN, Maritza Jackson MD    heparin (porcine) injection 5,000 Units, 5,000 Units, SubCUTAneous, Q8H, Maritza Jackson MD    piperacillin-tazobactam (ZOSYN) 3.375 g in 0.9% sodium chloride (MBP/ADV) 100 mL MBP, 3.375 g, IntraVENous, Q8H, Maritza Jackson MD    Current Outpatient Medications:     furosemide (LASIX) 40 mg tablet, Take 1 Tablet by mouth two (2) times a day., Disp: 60 Tablet, Rfl: 0    acetaminophen (TYLENOL) 325 mg tablet, Take 2 Tablets by mouth every six (6) hours as needed for Pain or Fever., Disp: 60 Tablet, Rfl: 0    carvediloL (COREG) 3.125 mg tablet, Take 1 Tablet by mouth two (2) times daily (with meals). , Disp: 60 Tablet, Rfl: 0    albuterol-ipratropium (DUO-NEB) 2.5 mg-0.5 mg/3 ml nebu, 3 mL by Nebulization route every four (4) hours as needed for Wheezing., Disp: 30 Nebule, Rfl: 0    dextromethorphan-guaiFENesin (Robitussin Cough-Chest Arturo DM) 5-100 mg/5 mL liqd, Take 10 mL by mouth every eight (8) hours as needed for Cough or Congestion. , Disp: 237 mL, Rfl: 0    methylPREDNISolone (MEDROL DOSEPACK) 4 mg tablet, Use as directed, Disp: 1 Dose Pack, Rfl: 0    albuterol-ipratropium (DUO-NEB) 2.5 mg-0.5 mg/3 ml nebu, 3 mL by Nebulization route every six (6) hours as needed for Wheezing., Disp: 30 Nebule, Rfl: 0    aspirin (ASPIRIN) 325 mg tablet, Take 1 Tablet by mouth daily. , Disp: 30 Tablet, Rfl: 0    mirtazapine (REMERON) 7.5 mg tablet, Take 1 Tablet by mouth nightly., Disp: 30 Tablet, Rfl: 0    spironolactone (ALDACTONE) 25 mg tablet, Take 1 Tablet by mouth daily. , Disp: 30 Tablet, Rfl: 0    LAB DATA REVIEWED:    Recent Results (from the past 24 hour(s))   METABOLIC PANEL, COMPREHENSIVE    Collection Time: 10/18/21  7:25 PM   Result Value Ref Range    Sodium 145 136 - 145 mmol/L    Potassium 4.4 3.5 - 5.1 mmol/L    Chloride 114 (H) 97 - 108 mmol/L    CO2 20 (L) 21 - 32 mmol/L    Anion gap 11 5 - 15 mmol/L    Glucose 108 (H) 65 - 100 mg/dL    BUN 37 (H) 6 - 20 mg/dL    Creatinine 1.47 (H) 0.70 - 1.30 mg/dL    BUN/Creatinine ratio 25 (H) 12 - 20      GFR est AA 59 (L) >60 ml/min/1.73m2    GFR est non-AA 48 (L) >60 ml/min/1.73m2    Calcium 8.1 (L) 8.5 - 10.1 mg/dL    Bilirubin, total 1.4 (H) 0.2 - 1.0 mg/dL    AST (SGOT) 25 15 - 37 U/L    ALT (SGPT) 16 12 - 78 U/L    Alk. phosphatase 61 45 - 117 U/L    Protein, total 6.0 (L) 6.4 - 8.2 g/dL    Albumin 2.6 (L) 3.5 - 5.0 g/dL    Globulin 3.4 2.0 - 4.0 g/dL    A-G Ratio 0.8 (L) 1.1 - 2.2     CBC WITH AUTOMATED DIFF    Collection Time: 10/18/21  7:25 PM   Result Value Ref Range    WBC 3.1 (L) 4.1 - 11.1 K/uL    RBC 4.12 4.10 - 5.70 M/uL    HGB 12.9 12.1 - 17.0 g/dL    HCT 40.3 36.6 - 50.3 %    MCV 97.8 80.0 - 99.0 FL    MCH 31.3 26.0 - 34.0 PG    MCHC 32.0 30.0 - 36.5 g/dL    RDW 18.6 (H) 11.5 - 14.5 %    PLATELET 619 (L) 930 - 400 K/uL    MPV 11.1 8.9 - 12.9 FL    NRBC 0.0 0.0  WBC    ABSOLUTE NRBC 0.00 0.00 - 0.01 K/uL    NEUTROPHILS 76 (H) 32 - 75 %    LYMPHOCYTES 19 12 - 49 %    MONOCYTES 5 5 - 13 %    EOSINOPHILS 0 0 - 7 %    BASOPHILS 0 0 - 1 %    IMMATURE GRANULOCYTES 0 0 - 0.5 %    ABS. NEUTROPHILS 2.3 1.8 - 8.0 K/UL    ABS. LYMPHOCYTES 0.6 (L) 0.8 - 3.5 K/UL    ABS. MONOCYTES 0.2 0.0 - 1.0 K/UL    ABS. EOSINOPHILS 0.0 0.0 - 0.4 K/UL    ABS.  BASOPHILS 0.0 0.0 - 0.1 K/UL    ABS. IMM. GRANS. 0.0 0.00 - 0.04 K/UL    DF AUTOMATED     NT-PRO BNP    Collection Time: 10/18/21  7:25 PM   Result Value Ref Range    NT pro-BNP >35,000 (H) <125 pg/mL   LACTIC ACID    Collection Time: 10/18/21  7:25 PM   Result Value Ref Range    Lactic acid 2.4 (HH) 0.4 - 2.0 mmol/L   SARS-COV-2    Collection Time: 10/18/21  8:55 PM   Result Value Ref Range    SARS-CoV-2 Please find results under separate order     COVID-19 RAPID TEST    Collection Time: 10/18/21  8:55 PM   Result Value Ref Range    Specimen source Nasopharyngeal      COVID-19 rapid test Not Detected Not Detected     URINALYSIS W/ REFLEX CULTURE    Collection Time: 10/18/21  9:36 PM    Specimen: Urine   Result Value Ref Range    Color Dark Yellow      Appearance Turbid (A) Clear      Specific gravity 1.025 1.003 - 1.030      pH (UA) 5.0 5.0 - 8.0      Protein 100 (A) Negative mg/dL    Glucose Negative Negative mg/dL    Ketone Negative Negative mg/dL    Bilirubin Negative Negative      Blood Small (A) Negative      Urobilinogen 4.0 (H) 0.1 - 1.0 EU/dL    Nitrites Negative Negative      Leukocyte Esterase Moderate (A) Negative      UA:UC IF INDICATED Urine Culture Ordered (A) Culture not indicated by UA result      WBC 20-50 0 - 4 /hpf    RBC 10-20 0 - 5 /hpf    Bacteria 4+ (A) Negative /hpf    Mucus 4+ /lpf       XR Results (most recent):  Results from Hospital Encounter encounter on 10/18/21    XR CHEST PORT    Narrative  AP portable chest, 1915 hours. Comparison: 9/3/2021. Findings: There is mild to moderate cardiomegaly. Mild pulmonary vascular  congestion is noted. There is patchy bilateral airspace disease, left greater  than right. A small left pleural effusion is noted. There is more consolidative  airspace disease in the left lung base. No pneumothorax is identified. The  osseous structures are unremarkable. Impression  1.  Small left pleural effusion and multifocal airspace disease suspicious for  pneumonia. 2.Mild pulmonary vascular congestion. XR CHEST PORT   Final Result   1. Small left pleural effusion and multifocal airspace disease suspicious for   pneumonia. 2.Mild pulmonary vascular congestion. CTA ABDOMEN PELV W CONT    (Results Pending)        Review of Systems:  Constitutional: Negative for chills and fever. HENT: Negative. Eyes: Negative. Respiratory: Shortness of breath  Cardiovascular: Negative. Gastrointestinal: Abdominal pain but. Skin: Negative. Neurological: Negative. Objective:   VITALS:    Visit Vitals  BP (!) 116/97   Pulse 68   Temp (!) 91.2 °F (32.9 °C)   Resp 22   SpO2 100%       Physical Exam:   Constitutional: pt is oriented to person, place, and time. HENT:   Head: Normocephalic and atraumatic. Eyes: Pupils are equal, round, and reactive to light. EOM are normal.   Cardiovascular: Normal rate, regular rhythm and normal heart sounds. Pulmonary/Chest: Decreased breath sounds bilaterally exhibits no tenderness. Abdominal: Abdominal distended   musculoskeletal: Normal range of motion. Neurological: pt is alert and oriented to person, place, and time. Alert. Normal strength. No cranial nerve deficit or sensory deficit. Displays a negative Romberg sign.         ASSESSMENT & PLAN:    Acute hypoxemic respiratory failure on Bipap  Gram-negative pneumonia  Acute exacerbation of COPD  Acute systolic heart failure   Abdominal distention  Possible urinary retention  Thrombocytopenia  Neutropenia  Acute kidney injury  Elevated BNP      Admit to the ICU  Order Lasix 40 mg IV stat and daily  Nebulizer treatment every 6 hours  After blood cultures sputum cultures start on IV Zithromax and Zosyn  IV Solu-Medrol    Cardiology and pulmonary consult  Stat CT scan of the abdomen the pelvis  Continue other home medication      Current Facility-Administered Medications:     sodium chloride (NS) flush 5-40 mL, 5-40 mL, IntraVENous, Q8H, Maritza Jackson, MD    sodium chloride (NS) flush 5-40 mL, 5-40 mL, IntraVENous, PRN, Delmi Jackson MD    sodium chloride (NS) flush 5-10 mL, 5-10 mL, IntraVENous, PRN, Delmi Jackson MD    azithromycin (ZITHROMAX) 500 mg in 0.9% sodium chloride 250 mL (VIAL-MATE), 500 mg, IntraVENous, Q24H, Maritza Jackson MD, Last Rate: 250 mL/hr at 10/18/21 2050, 500 mg at 10/18/21 2050    albuterol-ipratropium (DUO-NEB) 2.5 MG-0.5 MG/3 ML, 3 mL, Nebulization, Q6H PRN, Delmi Jackson MD  [facility]  [START ON 10/19/2021] aspirin tablet 325 mg, 325 mg, Oral, DAILY, Delmi Jackson MD  [facility]  [START ON 10/19/2021] carvediloL (COREG) tablet 3.125 mg, 3.125 mg, Oral, BID WITH MEALS, Beba Vasquez MD  [facility]  [START ON 10/19/2021] furosemide (LASIX) injection 40 mg, 40 mg, IntraVENous, DAILY, Delmi Jackson MD  [facility]  [START ON 10/19/2021] methylPREDNISolone (PF) (SOLU-MEDROL) injection 40 mg, 40 mg, IntraVENous, Q6H, Maritza Jackson MD  [facility]  [START ON 10/19/2021] spironolactone (ALDACTONE) tablet 25 mg, 25 mg, Oral, DAILY, Delmi Jackson MD    acetaminophen (TYLENOL) tablet 650 mg, 650 mg, Oral, Q6H PRN **OR** acetaminophen (TYLENOL) suppository 650 mg, 650 mg, Rectal, Q6H PRN, Delmi Jackson MD    polyethylene glycol (MIRALAX) packet 17 g, 17 g, Oral, DAILY PRN, Delmi Jackson MD    ondansetron (ZOFRAN ODT) tablet 4 mg, 4 mg, Oral, Q8H PRN **OR** ondansetron (ZOFRAN) injection 4 mg, 4 mg, IntraVENous, Q6H PRN, Maritza Jackson MD    heparin (porcine) injection 5,000 Units, 5,000 Units, SubCUTAneous, Q8H, Maritza Jackson MD    piperacillin-tazobactam (ZOSYN) 3.375 g in 0.9% sodium chloride (MBP/ADV) 100 mL MBP, 3.375 g, IntraVENous, Q8H, Maritza Jackson MD    Current Outpatient Medications:     furosemide (LASIX) 40 mg tablet, Take 1 Tablet by mouth two (2) times a day., Disp: 60 Tablet, Rfl: 0    acetaminophen (TYLENOL) 325 mg tablet, Take 2 Tablets by mouth every six (6) hours as needed for Pain or Fever., Disp: 60 Tablet, Rfl: 0    carvediloL (COREG) 3.125 mg tablet, Take 1 Tablet by mouth two (2) times daily (with meals). , Disp: 60 Tablet, Rfl: 0    albuterol-ipratropium (DUO-NEB) 2.5 mg-0.5 mg/3 ml nebu, 3 mL by Nebulization route every four (4) hours as needed for Wheezing., Disp: 30 Nebule, Rfl: 0    dextromethorphan-guaiFENesin (Robitussin Cough-Chest Arturo DM) 5-100 mg/5 mL liqd, Take 10 mL by mouth every eight (8) hours as needed for Cough or Congestion. , Disp: 237 mL, Rfl: 0    methylPREDNISolone (MEDROL DOSEPACK) 4 mg tablet, Use as directed, Disp: 1 Dose Pack, Rfl: 0    albuterol-ipratropium (DUO-NEB) 2.5 mg-0.5 mg/3 ml nebu, 3 mL by Nebulization route every six (6) hours as needed for Wheezing., Disp: 30 Nebule, Rfl: 0    aspirin (ASPIRIN) 325 mg tablet, Take 1 Tablet by mouth daily. , Disp: 30 Tablet, Rfl: 0    mirtazapine (REMERON) 7.5 mg tablet, Take 1 Tablet by mouth nightly., Disp: 30 Tablet, Rfl: 0    spironolactone (ALDACTONE) 25 mg tablet, Take 1 Tablet by mouth daily. , Disp: 30 Tablet, Rfl: 0      ___  I spent critical care time independently 35 minutes _____________________________________________________________________    Signed: Carmen Kam MD

## 2021-10-20 PROBLEM — E43 SEVERE PROTEIN-CALORIE MALNUTRITION (HCC): Status: ACTIVE | Noted: 2021-01-01

## 2021-10-20 NOTE — PROGRESS NOTES
Visited patient in the 2 CVICU unit for code blue. Staff were providing care for the patient during the visit initially, and later a medical personnel asked  into the room. Provided support and prayer by the patient's bedside. No family were present. Advised nurse to contact [facility] for any further referrals. Chaplains will follow up if further referrals are requested. Chaplain Bruce Stark M.Div.    can be reached by calling the  at [facility]  [phone]

## 2021-10-20 NOTE — PROGRESS NOTES
General Daily Progress Note          Patient Name:   Christopher Ruggiero       YOB: 1960       Age:  59 y.o.       Admit Date: 10/18/2021      Subjective:     Patient is a 59y.o. year old male no past medical history of hypertension COPD cardiomyopathy with systolic heart failure with ejection fraction with 25% ongoing smoking came to emergency room because of shortness of breath patient was recently discharged from the hospital after multiple admission for same reason seen by the ER physician x-ray shows pneumonia patient was recommend to be admitted for further evaluation treatment at the same time patient has no abdominal pain  No work-up done in the ER initially , abdominal distended no CT scan was ordered, nurse try to put the [place] in no urine output at that time CT scan of the abdomen was ordered results pending          Patient have a cardiac arrest and intubated on treatment now  Hypotensive on Levophed  On propofol for sedation           Objective:     Visit Vitals  BP 94/74 (BP 1 Location: Right upper arm, BP Patient Position: At rest)   Pulse 84   Temp 98 °F (36.7 °C)   Resp 12   Ht 6' 2\" (1.88 m)   Wt 63.5 kg (139 lb 15.9 oz)   SpO2 99%   BMI 17.97 kg/m²        Recent Results (from the past 24 hour(s))   TROPONIN-HIGH SENSITIVITY    Collection Time: 10/19/21  2:20 PM   Result Value Ref Range    Troponin-High Sensitivity 178 (HH) 0 - 76 ng/L   GLUCOSE, POC    Collection Time: 10/19/21  9:42 PM   Result Value Ref Range    Glucose (POC) 139 (H) 65 - 117 mg/dL    Performed by Anna Mcqueen(ICU)    RENAL FUNCTION PANEL    Collection Time: 10/20/21  4:55 AM   Result Value Ref Range    Sodium 146 (H) 136 - 145 mmol/L    Potassium 5.0 3.5 - 5.1 mmol/L    Chloride 112 (H) 97 - 108 mmol/L    CO2 23 21 - 32 mmol/L    Anion gap 11 5 - 15 mmol/L    Glucose 111 (H) 65 - 100 mg/dL    BUN 53 (H) 6 - 20 mg/dL    Creatinine 2.13 (H) 0.70 - 1.30 mg/dL    BUN/Creatinine ratio 25 (H) 12 - 20      GFR est AA 38 (L) >60 ml/min/1.73m2    GFR est non-AA 32 (L) >60 ml/min/1.73m2    Calcium 7.7 (L) 8.5 - 10.1 mg/dL    Phosphorus 7.6 (H) 2.6 - 4.7 mg/dL    Albumin 2.4 (L) 3.5 - 5.0 g/dL   MAGNESIUM    Collection Time: 10/20/21  4:55 AM   Result Value Ref Range    Magnesium 2.2 1.6 - 2.4 mg/dL   CBC WITH AUTOMATED DIFF    Collection Time: 10/20/21  4:55 AM   Result Value Ref Range    WBC 4.7 4.1 - 11.1 K/uL    RBC 3.81 (L) 4.10 - 5.70 M/uL    HGB 11.9 (L) 12.1 - 17.0 g/dL    HCT 36.2 (L) 36.6 - 50.3 %    MCV 95.0 80.0 - 99.0 FL    MCH 31.2 26.0 - 34.0 PG    MCHC 32.9 30.0 - 36.5 g/dL    RDW 18.5 (H) 11.5 - 14.5 %    PLATELET 223 (L) 669 - 400 K/uL    MPV 11.0 8.9 - 12.9 FL    NRBC 1.1 (H) 0.0  WBC    ABSOLUTE NRBC 0.05 (H) 0.00 - 0.01 K/uL    NEUTROPHILS 79 (H) 32 - 75 %    LYMPHOCYTES 14 12 - 49 %    MONOCYTES 6 5 - 13 %    EOSINOPHILS 0 0 - 7 %    BASOPHILS 0 0 - 1 %    IMMATURE GRANULOCYTES 1 (H) 0 - 0.5 %    ABS. NEUTROPHILS 3.7 1.8 - 8.0 K/UL    ABS. LYMPHOCYTES 0.7 (L) 0.8 - 3.5 K/UL    ABS. MONOCYTES 0.3 0.0 - 1.0 K/UL    ABS. EOSINOPHILS 0.0 0.0 - 0.4 K/UL    ABS. BASOPHILS 0.0 0.0 - 0.1 K/UL    ABS. IMM. GRANS. 0.1 (H) 0.00 - 0.04 K/UL    DF AUTOMATED     BLOOD GAS, ARTERIAL    Collection Time: 10/20/21  5:25 AM   Result Value Ref Range    pH 7.40 7.35 - 7.45      PCO2 36 35 - 45 mmHg    PO2 90 75 - 100 mmHg    O2 SAT 94 (L) >95 %    BICARBONATE 23 22 - 26 mmol/L    BASE DEFICIT 2.2 (H) 0 - 2 mmol/L    O2 METHOD VENT      FIO2 40.0 %    MODE Assist Control/Volume Control      Tidal volume 500      SET RATE 12      EPAP/CPAP/PEEP 5.0      SITE Right Brachial      MARIXA'S TEST PASS     MRSA SCREEN - PCR (NASAL)    Collection Time: 10/20/21  8:00 AM   Result Value Ref Range    MRSA by PCR, Nasal Not Detected Not Detected       [unfilled]      Review of Systems    Unable to obtain . Physical Exam:      Constitutional: On ventilator :   Head: Normocephalic and atraumatic.    Eyes: Pupils are equal, round, and reactive to light. EOM are normal.   Cardiovascular: Normal rate, regular rhythm and normal heart sounds. Pulmonary/Chest: Breath sounds normal. No wheezes. No rales. Exhibits no tenderness. Abdominal: Soft. Bowel sounds are normal. There is no abdominal tenderness. There is no rebound and no guarding. Musculoskeletal: Normal range of motion. Neurological: Ventilator on sedation  XR CHEST PORT   Final Result      XR CHEST PORT   Final Result   Cardiomegaly and/or pericardial effusion with mild sequela of   presumed CHF and/or pulmonary edema, infection, or combination of these and with   associated left pleural effusion. ET tube in place as described      CTA ABD PELV W WO CONT   Final Result   Overall there is intact arterial circulation of the abdomen and   pelvis and there is no saurabh evidence of an acute GI hemorrhage. There is   however diffuse sequela of third spacing with bilateral pleural effusions,   profound abdominopelvic ascites, and diffuse subcutaneous edema. Considerations   for sequela of cardiac, hepatic and/or renal dysfunction. Bilateral areas of   renal parenchymal hypoperfusion are noted and could indicate sequela of   infection/pyelonephritis although by appearance might also suggest sequela of   renal infarcts of uncertain age. Component of CHF suggested given the lung base   findings described. .      Results called to Migdalia Oppenheim on 10/19/2021 12:27 AM.      XR CHEST PORT   Final Result   1. Small left pleural effusion and multifocal airspace disease suspicious for   pneumonia. 2.Mild pulmonary vascular congestion.        US RETROPERITONEUM COMP    (Results Pending)        Recent Results (from the past 24 hour(s))   TROPONIN-HIGH SENSITIVITY    Collection Time: 10/19/21  2:20 PM   Result Value Ref Range    Troponin-High Sensitivity 178 (HH) 0 - 76 ng/L   GLUCOSE, POC    Collection Time: 10/19/21  9:42 PM   Result Value Ref Range    Glucose (POC) 139 (H) 65 - 117 mg/dL Performed by [facility](ICU)    RENAL FUNCTION PANEL    Collection Time: 10/20/21  4:55 AM   Result Value Ref Range    Sodium 146 (H) 136 - 145 mmol/L    Potassium 5.0 3.5 - 5.1 mmol/L    Chloride 112 (H) 97 - 108 mmol/L    CO2 23 21 - 32 mmol/L    Anion gap 11 5 - 15 mmol/L    Glucose 111 (H) 65 - 100 mg/dL    BUN 53 (H) 6 - 20 mg/dL    Creatinine 2.13 (H) 0.70 - 1.30 mg/dL    BUN/Creatinine ratio 25 (H) 12 - 20      GFR est AA 38 (L) >60 ml/min/1.73m2    GFR est non-AA 32 (L) >60 ml/min/1.73m2    Calcium 7.7 (L) 8.5 - 10.1 mg/dL    Phosphorus 7.6 (H) 2.6 - 4.7 mg/dL    Albumin 2.4 (L) 3.5 - 5.0 g/dL   MAGNESIUM    Collection Time: 10/20/21  4:55 AM   Result Value Ref Range    Magnesium 2.2 1.6 - 2.4 mg/dL   CBC WITH AUTOMATED DIFF    Collection Time: 10/20/21  4:55 AM   Result Value Ref Range    WBC 4.7 4.1 - 11.1 K/uL    RBC 3.81 (L) 4.10 - 5.70 M/uL    HGB 11.9 (L) 12.1 - 17.0 g/dL    HCT 36.2 (L) 36.6 - 50.3 %    MCV 95.0 80.0 - 99.0 FL    MCH 31.2 26.0 - 34.0 PG    MCHC 32.9 30.0 - 36.5 g/dL    RDW 18.5 (H) 11.5 - 14.5 %    PLATELET 824 (L) 133 - 400 K/uL    MPV 11.0 8.9 - 12.9 FL    NRBC 1.1 (H) 0.0  WBC    ABSOLUTE NRBC 0.05 (H) 0.00 - 0.01 K/uL    NEUTROPHILS 79 (H) 32 - 75 %    LYMPHOCYTES 14 12 - 49 %    MONOCYTES 6 5 - 13 %    EOSINOPHILS 0 0 - 7 %    BASOPHILS 0 0 - 1 %    IMMATURE GRANULOCYTES 1 (H) 0 - 0.5 %    ABS. NEUTROPHILS 3.7 1.8 - 8.0 K/UL    ABS. LYMPHOCYTES 0.7 (L) 0.8 - 3.5 K/UL    ABS. MONOCYTES 0.3 0.0 - 1.0 K/UL    ABS. EOSINOPHILS 0.0 0.0 - 0.4 K/UL    ABS. BASOPHILS 0.0 0.0 - 0.1 K/UL    ABS. IMM.  GRANS. 0.1 (H) 0.00 - 0.04 K/UL    DF AUTOMATED     BLOOD GAS, ARTERIAL    Collection Time: 10/20/21  5:25 AM   Result Value Ref Range    pH 7.40 7.35 - 7.45      PCO2 36 35 - 45 mmHg    PO2 90 75 - 100 mmHg    O2 SAT 94 (L) >95 %    BICARBONATE 23 22 - 26 mmol/L    BASE DEFICIT 2.2 (H) 0 - 2 mmol/L    O2 METHOD VENT      FIO2 40.0 %    MODE Assist Control/Volume Control      Tidal volume 500      SET RATE 12      EPAP/CPAP/PEEP 5.0      SITE Right Brachial      MARIXA'S TEST PASS     MRSA SCREEN - PCR (NASAL)    Collection Time: 10/20/21  8:00 AM   Result Value Ref Range    MRSA by PCR, Nasal Not Detected Not Detected         Results     Procedure Component Value Units Date/Time    MRSA SCREEN - PCR (NASAL) [[id number]] Collected: 10/20/21 0800    Order Status: Completed Specimen: Swab Updated: 10/20/21 0959     MRSA by PCR, Nasal Not Detected       CULTURE, RESPIRATORY/SPUTUM/BRONCH W Aylin Garcia [[id number]] Collected: 10/20/21 0800    Order Status: Completed Specimen: Sputum Updated: 10/20/21 0827    CULTURE, BLOOD, LINE [[id number]]     Order Status: Sent Specimen: Blood     CULTURE, BLOOD #1 [[id number]]     Order Status: Canceled Specimen: Blood     CULTURE, BLOOD #2 [[id number]]     Order Status: Canceled Specimen: Blood     CULTURE, URINE [[id number]] Collected: 10/18/21 2136    Order Status: Completed Specimen: Urine Updated: 10/18/21 2247    COVID-19 RAPID TEST [[id number]] Collected: 10/18/21 2055    Order Status: Completed Specimen: Nasopharyngeal Updated: 10/18/21 2148     Specimen source Nasopharyngeal        COVID-19 rapid test Not Detected        Comment: Rapid Abbott ID Now   Rapid NAAT:  The specimen is NEGATIVE for SARS-CoV-2, the novel coronavirus associated with COVID-19. Negative results should be treated as presumptive and, if inconsistent with clinical signs and symptoms or necessary for patient management, should be tested with an alternative molecular assay. Negative results do not preclude SARS-CoV-2 infection and should not be used as the sole basis for patient management decisions. This test has been authorized by the FDA under   an Emergency Use Authorization (EUA) for use by authorized laboratories.  Fact sheet for Healthcare Providers: ConventionUpdate.co.nz Fact sheet for Patients: ConventionUpdate.co.nz   Methodology: Isothermal Nucleic Acid Amplification         CULTURE, BLOOD, PAIRED [[id number]] Collected: 10/18/21 1935    Order Status: Completed Specimen: Blood Updated: 10/20/21 0830     Special Requests: No Special Requests        Culture result:       Gram pos cocci in chains One of four bottles has been flagged positive by instrument. Bottle has been sent to [facility] laboratory to assess for possible growth. CALLED TO AND READ BACK BY Brady Galindo RN AT [phone] BY [facility]                 Labs:     Recent Labs     10/20/21  0455 10/19/21  0214   WBC 4.7 2.8*   HGB 11.9* 13.3   HCT 36.2* 40.8   * 113*     Recent Labs     10/20/21  0455 10/19/21  0214 10/18/21  1925   * 144 145   K 5.0 4.3 4.4   * 111* 114*   CO2 23 24 20*   BUN 53* 41* 37*   CREA 2.13* 1.68* 1.47*   * 106* 108*   CA 7.7* 8.5 8.1*   MG 2.2  --   --    PHOS 7.6*  --   --      Recent Labs     10/20/21  0455 10/19/21  0214 10/18/21  1925   ALT  --  16 16   AP  --  59 61   TBILI  --  1.0 1.4*   TP  --  6.2* 6.0*   ALB 2.4* 2.6* 2.6*   GLOB  --  3.6 3.4     No results for input(s): INR, PTP, APTT, INREXT, INREXT in the last 72 hours. No results for input(s): FE, TIBC, PSAT, FERR in the last 72 hours. Lab Results   Component Value Date/Time    Folate 18.6 08/12/2021 11:00 AM      Recent Labs     10/20/21  0525   PH 7.40   PCO2 36   PO2 90     No results for input(s): CPK, CKNDX, TROIQ in the last 72 hours.     No lab exists for component: CPKMB  Lab Results   Component Value Date/Time    Cholesterol, total 178 08/10/2021 02:40 PM    HDL Cholesterol 41 08/10/2021 02:40 PM    LDL, calculated 107.8 (H) 08/10/2021 02:40 PM    Triglyceride 146 08/10/2021 02:40 PM    CHOL/HDL Ratio 4.3 08/10/2021 02:40 PM     Lab Results   Component Value Date/Time    Glucose (POC) 139 (H) 10/19/2021 09:42 PM     Lab Results   Component Value Date/Time    Color Dark Yellow 10/18/2021 09:36 PM    Appearance Turbid (A) 10/18/2021 09:36 PM    Specific gravity 1.025 10/18/2021 09:36 PM    pH (UA) 5.0 10/18/2021 09:36 PM    Protein 100 (A) 10/18/2021 09:36 PM    Glucose Negative 10/18/2021 09:36 PM    Ketone Negative 10/18/2021 09:36 PM    Bilirubin Negative 10/18/2021 09:36 PM    Urobilinogen 4.0 (H) 10/18/2021 09:36 PM    Nitrites Negative 10/18/2021 09:36 PM    Leukocyte Esterase Moderate (A) 10/18/2021 09:36 PM    Bacteria 4+ (A) 10/18/2021 09:36 PM    WBC 20-50 10/18/2021 09:36 PM    RBC 10-20 10/18/2021 09:36 PM         Assessment:     Acute hypoxemic respiratory failure  on ventilator   gram-negative pneumonia  Acute exacerbation of COPD  Acute systolic heart failure   Abdominal distention  Possible urinary retention  Thrombocytopenia  Neutropenia  Acute kidney injury  Elevated BNP  Elevated troponin        Plan:     Continue nebulizer treatment every 6 hours  On IV Zosyn and IV Zithromax follow blood culture urine cultures    On Lasix 60 mg IV daily  Continue IV Solu-Medrol    Repeat the labs  On Levophed for hypotension  On propofol for sedation    Monitor platelet count  Monitor renal function  Monitor sodium level  NG tube placement for feeding        Current Facility-Administered Medications:     propofol (DIPRIVAN) 10 mg/mL infusion, 5 mcg/kg/min, IntraVENous, TITRATE, Dann Jackson MD    NOREPINephrine (LEVOPHED) 8 mg in 5% dextrose 250mL (32 mcg/mL) infusion, 0.5-16 mcg/min, IntraVENous, TITRATE, Maritza Jackson MD, Last Rate: 28.1 mL/hr at 10/20/21 0414, 15 mcg/min at 10/20/21 0414    furosemide (LASIX) injection 60 mg, 60 mg, IntraVENous, BID, Gisela Escobar MD    calcium gluconate 1 gram in sodium chloride (ISO-OSM) 50 mL infusion, 1 g, IntraVENous, ONCE, Shanna Bruno MD    sodium bicarbonate 8.4 % (1 mEq/mL) injection 100 mEq, 100 mEq, IntraVENous, ONCE, Shanna Bruno MD    piperacillin-tazobactam (ZOSYN) 3.375 g in 0.9% sodium chloride (MBP/ADV) 100 mL MBP, 3.375 g, IntraVENous, Q8H, Maritza Jackson MD, Last Rate: 25 mL/hr at 10/20/21 6493, 3.375 g at 10/20/21 7845    aspirin chewable tablet 81 mg, 81 mg, Oral, DAILY, Khushi Brown MD    sodium chloride (NS) flush 5-40 mL, 5-40 mL, IntraVENous, Q8H, Maritza Jackson MD, 10 mL at 10/20/21 0543    sodium chloride (NS) flush 5-40 mL, 5-40 mL, IntraVENous, PRN, Delmi Jackson MD    sodium chloride (NS) flush 5-10 mL, 5-10 mL, IntraVENous, PRN, Delmi Jackson MD    azithromycin (ZITHROMAX) 500 mg in 0.9% sodium chloride 250 mL (VIAL-MATE), 500 mg, IntraVENous, Q24H, Maritza Jackson MD, Last Rate: 250 mL/hr at 10/19/21 2015, 500 mg at 10/19/21 2015    albuterol-ipratropium (DUO-NEB) 2.5 MG-0.5 MG/3 ML, 3 mL, Nebulization, Q6H PRN, Delmi Jackson MD    carvediloL (COREG) tablet 3.125 mg, 3.125 mg, Oral, BID WITH MEALS, Delmi Jackson MD, 3.125 mg at 10/19/21 1823    methylPREDNISolone (PF) (SOLU-MEDROL) injection 40 mg, 40 mg, IntraVENous, Q6H, Maritza Jackson MD, 40 mg at 10/20/21 0542    acetaminophen (TYLENOL) tablet 650 mg, 650 mg, Oral, Q6H PRN **OR** acetaminophen (TYLENOL) suppository 650 mg, 650 mg, Rectal, Q6H PRN, Delmi Jackson MD    polyethylene glycol (MIRALAX) packet 17 g, 17 g, Oral, DAILY PRN, Delmi Jackson MD    ondansetron (ZOFRAN ODT) tablet 4 mg, 4 mg, Oral, Q8H PRN **OR** ondansetron (ZOFRAN) injection 4 mg, 4 mg, IntraVENous, Q6H PRN, Maritza Jackson MD    heparin (porcine) injection 5,000 Units, 5,000 Units, SubCUTAneous, Q8H, Maritza Jackson MD, 5,000 Units at 10/20/21 0542    albuterol-ipratropium (DUO-NEB) 2.5 MG-0.5 MG/3 ML, 3 mL, Nebulization, Q6H RT, Maritza Jackson MD, 3 mL at 10/20/21 0831    budesonide-formoteroL (SYMBICORT) 160-4.5 mcg/actuation HFA inhaler 2 Puff, 2 Puff, Inhalation, BID, Maritza Jackson MD, 2 Puff at 10/19/21 3305

## 2021-10-20 NOTE — PROGRESS NOTES
CM reviewed clinical chart. Patient is still being actively treated by nephrology and pulmonology. He was intubated yesterday and remains on vent today. CM team will continue to follow for any needs at discharge.

## 2021-10-20 NOTE — CONSULTS
Gastroenterology Consult     Referring Physician: Zak Hart MD     Consult Date: 10/20/2021     Subjective:     Chief Complaint: Respiratory distress    History of Present Illness: Demetrio Fontana is a 59 y.o. male who is seen in consultation for  NGT/OGT placement. Patient was admitted to the hospital with complaints of shortness of breath. History obtained thru chart review and staff report. Patient is intubated. Patient with a PMH of COPD cardiomyopathy with systolic heart failure with EF of 25%, current smoker. He has been hospitalized several times for the same problem of shortness of breath. Reported abdominal pain. CTA of abdomen and pelvis showed no evidence of acute GI hemorrhage. There is third spacing and bilateral pleural effusion. Profound ascites. Initial XR chest showed small left pleural effusion and multifocal airspace disease suspicious for Pneumonia. Mild pulmonary vascular congestion. He was placed on BiPAP and transferred to ICU. He developed respiratory failure and required intubation. Now on vent and sedated. Staff consulted GI for NGT placement. They have tried several times with no success. ET tube with bloody output. Hgb 11.9. Past Medical History:   Diagnosis Date    Chronic obstructive pulmonary disease ([address]     Heart failure ([address]      No past surgical history on file.    Family History   Problem Relation Age of Onset    Hypertension Mother      Social History     Tobacco Use    Smoking status: Current Every Day Smoker     Packs/day: 0.50    Smokeless tobacco: Never Used   Substance Use Topics    Alcohol use: Yes     Comment: ocassionally      No Known Allergies  Current Facility-Administered Medications   Medication Dose Route Frequency    NOREPINephrine (LEVOPHED) 8 mg in 5% dextrose 250mL (32 mcg/mL) infusion  0.5-120 mcg/min IntraVENous TITRATE    furosemide (LASIX) injection 60 mg  60 mg IntraVENous BID    methylPREDNISolone (PF) (SOLU-MEDROL) injection 40 mg 40 mg IntraVENous Q12H    albumin human 25% (BUMINATE) solution 25 g  25 g IntraVENous Q6H    propofol (DIPRIVAN) 10 mg/mL infusion  0-50 mcg/kg/min IntraVENous TITRATE    piperacillin-tazobactam (ZOSYN) 3.375 g in 0.9% sodium chloride (MBP/ADV) 100 mL MBP  3.375 g IntraVENous Q8H    aspirin chewable tablet 81 mg  81 mg Oral DAILY    sodium chloride (NS) flush 5-40 mL  5-40 mL IntraVENous Q8H    sodium chloride (NS) flush 5-40 mL  5-40 mL IntraVENous PRN    sodium chloride (NS) flush 5-10 mL  5-10 mL IntraVENous PRN    azithromycin (ZITHROMAX) 500 mg in 0.9% sodium chloride 250 mL (VIAL-MATE)  500 mg IntraVENous Q24H    albuterol-ipratropium (DUO-NEB) 2.5 MG-0.5 MG/3 ML  3 mL Nebulization Q6H PRN    carvediloL (COREG) tablet 3.125 mg  3.125 mg Oral BID WITH MEALS    acetaminophen (TYLENOL) tablet 650 mg  650 mg Oral Q6H PRN    Or    acetaminophen (TYLENOL) suppository 650 mg  650 mg Rectal Q6H PRN    polyethylene glycol (MIRALAX) packet 17 g  17 g Oral DAILY PRN    ondansetron (ZOFRAN ODT) tablet 4 mg  4 mg Oral Q8H PRN    Or    ondansetron (ZOFRAN) injection 4 mg  4 mg IntraVENous Q6H PRN    heparin (porcine) injection 5,000 Units  5,000 Units SubCUTAneous Q8H        Review of Systems:  A detailed 10 organ review of systems is obtained with pertinent positives as listed in the History of Present Illness and Past Medical History. All others are negative. Objective:     Physical Exam:  Visit Vitals  /82 (BP 1 Location: Left upper arm, BP Patient Position: At rest)   Pulse 77   Temp 99.1 °F (37.3 °C)   Resp 12   Ht 6' 2\" (1.88 m)   Wt 63.5 kg (139 lb 15.9 oz)   SpO2 100%   BMI 17.97 kg/m²        Skin:  Extremities and face reveal no rashes. No motta erythema. No telangiectasias on the chest wall. HEENT: Sclerae anicteric. Extra-occular muscles are intact. No oral ulcers. No abnormal pigmentation of the lips. The neck is supple. ET tube inplace  Cardiovascular: Regular rate and rhythm.  No murmurs, gallops, or rubs. PMI nondisplaced. Carotids without bruits. Respiratory: On mechanical vent  GI:  Abdomen nondistended, soft, and nontender. Normal active bowel sounds. No enlargement of the liver or spleen. No masses palpable. Rectal:  Deferred  Musculoskeletal:  No pitting edema of the lower legs. Extremities have good range of motion. No costovertebral tenderness. Neurological: Intubated and sedated. Psychiatric:  Intubated and sedated. Lymphatic:  No cervical or supraclavicular adenopathy.     Lab/Data Review:  Recent Results (from the past 24 hour(s))   GLUCOSE, POC    Collection Time: 10/19/21  9:42 PM   Result Value Ref Range    Glucose (POC) 139 (H) 65 - 117 mg/dL    Performed by Chelly Mcqueen(ICU)    RENAL FUNCTION PANEL    Collection Time: 10/20/21  4:55 AM   Result Value Ref Range    Sodium 146 (H) 136 - 145 mmol/L    Potassium 5.0 3.5 - 5.1 mmol/L    Chloride 112 (H) 97 - 108 mmol/L    CO2 23 21 - 32 mmol/L    Anion gap 11 5 - 15 mmol/L    Glucose 111 (H) 65 - 100 mg/dL    BUN 53 (H) 6 - 20 mg/dL    Creatinine 2.13 (H) 0.70 - 1.30 mg/dL    BUN/Creatinine ratio 25 (H) 12 - 20      GFR est AA 38 (L) >60 ml/min/1.73m2    GFR est non-AA 32 (L) >60 ml/min/1.73m2    Calcium 7.7 (L) 8.5 - 10.1 mg/dL    Phosphorus 7.6 (H) 2.6 - 4.7 mg/dL    Albumin 2.4 (L) 3.5 - 5.0 g/dL   MAGNESIUM    Collection Time: 10/20/21  4:55 AM   Result Value Ref Range    Magnesium 2.2 1.6 - 2.4 mg/dL   CBC WITH AUTOMATED DIFF    Collection Time: 10/20/21  4:55 AM   Result Value Ref Range    WBC 4.7 4.1 - 11.1 K/uL    RBC 3.81 (L) 4.10 - 5.70 M/uL    HGB 11.9 (L) 12.1 - 17.0 g/dL    HCT 36.2 (L) 36.6 - 50.3 %    MCV 95.0 80.0 - 99.0 FL    MCH 31.2 26.0 - 34.0 PG    MCHC 32.9 30.0 - 36.5 g/dL    RDW 18.5 (H) 11.5 - 14.5 %    PLATELET 795 (L) 231 - 400 K/uL    MPV 11.0 8.9 - 12.9 FL    NRBC 1.1 (H) 0.0  WBC    ABSOLUTE NRBC 0.05 (H) 0.00 - 0.01 K/uL    NEUTROPHILS 79 (H) 32 - 75 %    LYMPHOCYTES 14 12 - 49 % MONOCYTES 6 5 - 13 %    EOSINOPHILS 0 0 - 7 %    BASOPHILS 0 0 - 1 %    IMMATURE GRANULOCYTES 1 (H) 0 - 0.5 %    ABS. NEUTROPHILS 3.7 1.8 - 8.0 K/UL    ABS. LYMPHOCYTES 0.7 (L) 0.8 - 3.5 K/UL    ABS. MONOCYTES 0.3 0.0 - 1.0 K/UL    ABS. EOSINOPHILS 0.0 0.0 - 0.4 K/UL    ABS. BASOPHILS 0.0 0.0 - 0.1 K/UL    ABS. IMM.  GRANS. 0.1 (H) 0.00 - 0.04 K/UL    DF AUTOMATED     BLOOD GAS, ARTERIAL    Collection Time: 10/20/21  5:25 AM   Result Value Ref Range    pH 7.40 7.35 - 7.45      PCO2 36 35 - 45 mmHg    PO2 90 75 - 100 mmHg    O2 SAT 94 (L) >95 %    BICARBONATE 23 22 - 26 mmol/L    BASE DEFICIT 2.2 (H) 0 - 2 mmol/L    O2 METHOD VENT      FIO2 40.0 %    MODE Assist Control/Volume Control      Tidal volume 500      SET RATE 12      EPAP/CPAP/PEEP 5.0      SITE Right Brachial      MARIXA'S TEST PASS     MRSA SCREEN - PCR (NASAL)    Collection Time: 10/20/21  8:00 AM   Result Value Ref Range    MRSA by PCR, Nasal Not Detected Not Detected     CULTURE, RESPIRATORY/SPUTUM/BRONCH W GRAM STAIN    Collection Time: 10/20/21  8:00 AM    Specimen: Sputum   Result Value Ref Range    Special Requests: No Special Requests      GRAM STAIN Occasional WBCs seen      GRAM STAIN 1+ Epithelial cells seen      GRAM STAIN No organisms seen      Culture result: PENDING    METABOLIC PANEL, BASIC    Collection Time: 10/20/21 12:45 PM   Result Value Ref Range    Sodium 149 (H) 136 - 145 mmol/L    Potassium 3.4 (L) 3.5 - 5.1 mmol/L    Chloride 106 97 - 108 mmol/L    CO2 21 21 - 32 mmol/L    Anion gap 22 (H) 5 - 15 mmol/L    Glucose 69 65 - 100 mg/dL    BUN 38 (H) 6 - 20 mg/dL    Creatinine 1.56 (H) 0.70 - 1.30 mg/dL    BUN/Creatinine ratio 24 (H) 12 - 20      GFR est AA 55 (L) >60 ml/min/1.73m2    GFR est non-AA 45 (L) >60 ml/min/1.73m2    Calcium <5.0 (LL) 8.5 - 10.1 mg/dL   PROTEIN/CREATININE RATIO, URINE    Collection Time: 10/20/21 12:55 PM   Result Value Ref Range    Protein, urine random 20 (H) 0.0 - 11.9 mg/dL    Creatinine, urine 26.00 mg/dL    Protein/Creat. urine Ratio 0.8     CHLORIDE, URINE RANDOM    Collection Time: 10/20/21 12:55 PM   Result Value Ref Range    Chloride,urine random 128 mmol/L   URINALYSIS W/MICROSCOPIC    Collection Time: 10/20/21 12:55 PM   Result Value Ref Range    Color Yellow/Straw      Appearance Turbid (A) Clear      Specific gravity 1.014 1.003 - 1.030      pH (UA) 5.0 5.0 - 8.0      Protein Negative Negative mg/dL    Glucose Negative Negative mg/dL    Ketone Negative Negative mg/dL    Bilirubin Negative Negative      Blood Moderate (A) Negative      Urobilinogen 0.1 0.1 - 1.0 EU/dL    Nitrites Negative Negative      Leukocyte Esterase Trace (A) Negative      WBC 0-4 0 - 4 /hpf    RBC 5-10 0 - 5 /hpf    Bacteria Negative Negative /hpf    Hyaline cast 0-2 0 - 5 /lpf        US RETROPERITONEUM COMP   Final Result   Echogenic kidneys consistent with medical renal disease. No   hydronephrosis. Ascites. XR CHEST PORT   Final Result      XR CHEST PORT   Final Result   Cardiomegaly and/or pericardial effusion with mild sequela of   presumed CHF and/or pulmonary edema, infection, or combination of these and with   associated left pleural effusion. ET tube in place as described      CTA ABD PELV W WO CONT   Final Result   Overall there is intact arterial circulation of the abdomen and   pelvis and there is no saurabh evidence of an acute GI hemorrhage. There is   however diffuse sequela of third spacing with bilateral pleural effusions,   profound abdominopelvic ascites, and diffuse subcutaneous edema. Considerations   for sequela of cardiac, hepatic and/or renal dysfunction. Bilateral areas of   renal parenchymal hypoperfusion are noted and could indicate sequela of   infection/pyelonephritis although by appearance might also suggest sequela of   renal infarcts of uncertain age. Component of CHF suggested given the lung base   findings described. .      Results called to [facility] JOSÉ MIGUEL LONG on 10/19/2021 12:27 AM.      XR CHEST PORT   Final Result   1. Small left pleural effusion and multifocal airspace disease suspicious for   pneumonia. 2.Mild pulmonary vascular congestion. XR CHEST PORT    (Results Pending)          Assessment/Plan:   1. Failed NGT attempts     -Please remove NGT (not in correct position). Schedule EGD with NGT placement in am.    2. Respiratory failure      -XR chest - Patchy reticular markings through lungs, same distribution. Cardiac silhouette enlargement. No pneumothorax or sizable pleural effusion.     -currently intubated     -pulmonary is following, input appreciated. 3. Acute kidney Injury       -Creatinine 1.56 today       -Nephrology following - No indications for RRT at present.      -Avoid nephrotoxins as much as possible  4. CHF      -8/11 echocardiogram Estimated LVEF is 20 - 25%.       -Cardiology following       -on IV Lasix     Active Problems:    Pneumonia (10/18/2021)      Hypoxia (10/18/2021)      Severe protein-calorie malnutrition ([facility] [address] (10/20/2021)         IP CONSULT TO PULMONOLOGY  IP CONSULT TO CARDIOLOGY  IP CONSULT TO NEPHROLOGY  IP CONSULT TO INTERVENTIONAL RADIOLOGY  IP CONSULT TO GASTROENTEROLOGY    Thank you for allowing me to participate in this patients care  Cc Referring Physician   Hailey, MD Zak

## 2021-10-20 NOTE — PROGRESS NOTES
Comprehensive Nutrition Assessment    Type and Reason for Visit: Initial (Intubation, Low BMI)    Nutrition Recommendations/Plan:   As g-tube available for use, initiate TF of Osmolite 1.2 at 20mL/hr    Increase by 10mL q4hr to goal rate 60mL/hr, continuous  Add 1 pkt Prosource daily (60kcal, 15g protein)  Flush with 75mL free water q4hr  Goal feeds provide 1788kcal, 95g pro, 1631mL fluid (94%kcal, 100%pro, 104%fluid)    Document TF rate, protein modular provision, water flushes, and GRVs in EMR    Obtain updated measured BW    Provide miralax daily if pt without BMI today    Nutrition Assessment:  Worsening SOB, AMS. Placed on BiPAP but worsening resp status so intubated last night (10/19). RD spoke with RN yesterday, at this time awaiting SLP eval for diet downgrade- SLP rec'd MM5/mod thick lq; remains ordered. RD to order TF. Labs: Na 146, BUN 53, Cr 2.13, , Ca 7.7, lytes wnl. Meds: norepi, methylprednisolone, zosyn, heparin, miralax prn. Malnutrition Assessment:  Malnutrition Status:  Severe malnutrition    Context:  Chronic illness     Findings of the 6 clinical characteristics of malnutrition:   Energy Intake:  7 - 75% or less est energy requirements for 1 month or longer (per previous diet hx)  Weight Loss:  No significant weight loss     Body Fat Loss:  7 - Severe body fat loss, Orbital, Triceps   Muscle Mass Loss:  7 - Severe muscle mass loss, Temples (temporalis), Thigh (quadriceps), Calf (gastrocnemius), Clavicles (pectoralis &deltoids)  Fluid Accumulation:  No significant fluid accumulation,      Estimated Daily Nutrient Needs:  Energy (kcal): 1900kcal (30kcal/kg); Weight Used for Energy Requirements: Current  Protein (g): 95g (1.5g/kg); Weight Used for Protein Requirements: Current  Fluid (ml/day): 1575mL (25mL/kg); Method Used for Fluid Requirements:      Nutrition Related Findings:  NFPE (10/19) finding moderate-severe muscle and fat wasting. No g-tube documentation yet available.  Pt reported hx dysphagia. RD unable to assess dentition d/t BiPAP. No noted n/v or c/d. No BM documented since admit. Trace generalized edema. Wounds:    None       Current Nutrition Therapies:  No diet orders on file    Anthropometric Measures:  · Height:  6' 2\" (188 cm)  · Current Body Wt:  63.5 kg (139 lb 15.9 oz)   · Ideal Body Wt:  190 lbs:  73.7 %   · BMI Category:  Underweight (BMI less than 22) age over 72     Wt Readings from Last 3 Encounters:   10/20/21 63.5 kg (139 lb 15.9 oz)   09/02/21 52.4 kg (115 lb 8.3 oz)   08/12/21 54.2 kg (119 lb 7.8 oz)   Pt reported wt loss pta, appears with recent wt gain but recent BW appears est. Noted historically, pt reports wt loss while wt hx indicates stability. Nutrition Diagnosis:   · Increased nutrient needs related to increased demand for energy/nutrients as evidenced by BMI, severe loss of subcutaneous fat    Nutrition Interventions:   Food and/or Nutrient Delivery: Continue NPO, Start tube feeding  Nutrition Education and Counseling: No recommendations at this time  Coordination of Nutrition Care: Continue to monitor while inpatient    Goals:  Initiate means of nutrition to meet >50% EENs in 5 days, Lytes wnl, Wt gain 1kg +/- 0.5kg per week       Nutrition Monitoring and Evaluation:   Behavioral-Environmental Outcomes: None identified  Food/Nutrient Intake Outcomes: Enteral nutrition intake/tolerance  Physical Signs/Symptoms Outcomes: Weight, Biochemical data    Discharge Planning:     Too soon to determine     Electronically signed by Camryn Esqueda on 10/20/2021 at 8:28 AM    Contact:

## 2021-10-20 NOTE — CONSULTS
Consult Date: 10/20/2021    Consults    Subjective      59y M w/ pmhx remarkable for HTN, CHF c/b ischemic cardiomyopathy and EF < 25%, COPD and smoking BIBEMS due to complaints of progressive SOB. Limited information obtained from the pt due to baseline condition. Hospital course c/b acute respiratory failure s/p MV and shock at present on vasopressors for hemodynamics. Evaluated and seen at bedside, sedated/ intubated on MV. Routine labs showed SCR at  w/o evidence of electrolyte imbalances or acid base disturbances. Nephrology consulted in account of JUAN CARLOS       Past Medical History:   Diagnosis Date    Chronic obstructive pulmonary disease ([facility] [address]     Heart failure ([facility] [address]       No past surgical history on file.   Family History   Problem Relation Age of Onset    Hypertension Mother       Social History     Tobacco Use    Smoking status: Current Every Day Smoker     Packs/day: 0.50    Smokeless tobacco: Never Used   Substance Use Topics    Alcohol use: Yes     Comment: ocassionally       Current Facility-Administered Medications   Medication Dose Route Frequency Provider Last Rate Last Admin    propofol (DIPRIVAN) 10 mg/mL infusion  5 mcg/kg/min IntraVENous TITRATE Natalie Jackson MD        NOREPINephrine (LEVOPHED) 8 mg in 5% dextrose 250mL (32 mcg/mL) infusion  0.5-16 mcg/min IntraVENous TITRATE Maritza Jackson MD 28.1 mL/hr at 10/20/21 0414 15 mcg/min at 10/20/21 0414    furosemide (LASIX) injection 60 mg  60 mg IntraVENous BID Rk Lassiter MD        calcium gluconate 1 gram in sodium chloride (ISO-OSM) 50 mL infusion  1 g IntraVENous ONCE Cayetano Deleon MD        sodium bicarbonate 8.4 % (1 mEq/mL) injection 100 mEq  100 mEq IntraVENous ONCE Cayetano Deleon MD        budesonide (PULMICORT) 500 mcg/2 ml nebulizer suspension  500 mcg Nebulization BID RT Natalie Jacskon MD        piperacillin-tazobactam (ZOSYN) 3.375 g in 0.9% sodium chloride (MBP/ADV) 100 mL MBP  3.375 g IntraVENous Q8H Keyonna Gonzalez MD 25 mL/hr at 10/20/21 0634 3.375 g at 10/20/21 5274    aspirin chewable tablet 81 mg  81 mg Oral DAILY Khushi Brown MD        sodium chloride (NS) flush 5-40 mL  5-40 mL IntraVENous Q8H Maritza Jackson MD   10 mL at 10/20/21 0543    sodium chloride (NS) flush 5-40 mL  5-40 mL IntraVENous PRN Dayton Jackson MD        sodium chloride (NS) flush 5-10 mL  5-10 mL IntraVENous PRN Dayton Jackson MD        azithromycin (ZITHROMAX) 500 mg in 0.9% sodium chloride 250 mL (VIAL-MATE)  500 mg IntraVENous Q24H Maritza Jackson  mL/hr at 10/19/21 2015 500 mg at 10/19/21 2015    albuterol-ipratropium (DUO-NEB) 2.5 MG-0.5 MG/3 ML  3 mL Nebulization Q6H PRN Dayton Jackson MD        carvediloL (COREG) tablet 3.125 mg  3.125 mg Oral BID WITH MEALS Dayton Jackson MD   3.125 mg at 10/19/21 1823    methylPREDNISolone (PF) (SOLU-MEDROL) injection 40 mg  40 mg IntraVENous Q6H Maritza Jackson MD   40 mg at 10/20/21 0542    acetaminophen (TYLENOL) tablet 650 mg  650 mg Oral Q6H PRN Dayton Jackson MD        Or   Brandy Le acetaminophen (TYLENOL) suppository 650 mg  650 mg Rectal Q6H PRN Dayton Jackson MD        polyethylene glycol (MIRALAX) packet 17 g  17 g Oral DAILY PRN Dayton Jackson MD        ondansetron (ZOFRAN ODT) tablet 4 mg  4 mg Oral Q8H PRN Dayton Jackson MD        Or    ondansetron [facility]) injection 4 mg  4 mg IntraVENous Q6H PRN Dayton Jackson MD        heparin (porcine) injection 5,000 Units  5,000 Units SubCUTAneous Q8H Maritza Jackson MD   5,000 Units at 10/20/21 0542    albuterol-ipratropium (DUO-NEB) 2.5 MG-0.5 MG/3 ML  3 mL Nebulization Q6H RT Maritza Jackson MD   3 mL at 10/20/21 0831        Review of Systems   Unable to perform ROS: Intubated       Objective     Vital signs for last 24 hours:  Visit Vitals  BP 94/74 (BP 1 Location: Right upper arm, BP Patient Position: At rest)   Pulse 84   Temp 97.5 °F (36.4 °C)   Resp 12   Ht 6' 2\" (1.88 m)   Wt 63.5 kg (139 lb 15.9 oz)   SpO2 99%   BMI 17.97 kg/m²       Intake/Output this shift:  Current Shift: No intake/output data recorded. Last 3 Shifts: 10/18 1901 - 10/20 0700  In: 1350 [I.V.:1350]  Out: 1025 [Urine:1025]    Data Review:   Recent Results (from the past 24 hour(s))   TROPONIN-HIGH SENSITIVITY    Collection Time: 10/19/21  2:20 PM   Result Value Ref Range    Troponin-High Sensitivity 178 (HH) 0 - 76 ng/L   GLUCOSE, POC    Collection Time: 10/19/21  9:42 PM   Result Value Ref Range    Glucose (POC) 139 (H) 65 - 117 mg/dL    Performed by Demetrice Mcqueen(ICU)    RENAL FUNCTION PANEL    Collection Time: 10/20/21  4:55 AM   Result Value Ref Range    Sodium 146 (H) 136 - 145 mmol/L    Potassium 5.0 3.5 - 5.1 mmol/L    Chloride 112 (H) 97 - 108 mmol/L    CO2 23 21 - 32 mmol/L    Anion gap 11 5 - 15 mmol/L    Glucose 111 (H) 65 - 100 mg/dL    BUN 53 (H) 6 - 20 mg/dL    Creatinine 2.13 (H) 0.70 - 1.30 mg/dL    BUN/Creatinine ratio 25 (H) 12 - 20      GFR est AA 38 (L) >60 ml/min/1.73m2    GFR est non-AA 32 (L) >60 ml/min/1.73m2    Calcium 7.7 (L) 8.5 - 10.1 mg/dL    Phosphorus 7.6 (H) 2.6 - 4.7 mg/dL    Albumin 2.4 (L) 3.5 - 5.0 g/dL   MAGNESIUM    Collection Time: 10/20/21  4:55 AM   Result Value Ref Range    Magnesium 2.2 1.6 - 2.4 mg/dL   CBC WITH AUTOMATED DIFF    Collection Time: 10/20/21  4:55 AM   Result Value Ref Range    WBC 4.7 4.1 - 11.1 K/uL    RBC 3.81 (L) 4.10 - 5.70 M/uL    HGB 11.9 (L) 12.1 - 17.0 g/dL    HCT 36.2 (L) 36.6 - 50.3 %    MCV 95.0 80.0 - 99.0 FL    MCH 31.2 26.0 - 34.0 PG    MCHC 32.9 30.0 - 36.5 g/dL    RDW 18.5 (H) 11.5 - 14.5 %    PLATELET 926 (L) 178 - 400 K/uL    MPV 11.0 8.9 - 12.9 FL    NRBC 1.1 (H) 0.0  WBC    ABSOLUTE NRBC 0.05 (H) 0.00 - 0.01 K/uL    NEUTROPHILS 79 (H) 32 - 75 %    LYMPHOCYTES 14 12 - 49 %    MONOCYTES 6 5 - 13 %    EOSINOPHILS 0 0 - 7 %    BASOPHILS 0 0 - 1 %    IMMATURE GRANULOCYTES 1 (H) 0 - 0.5 %    ABS. NEUTROPHILS 3.7 1.8 - 8.0 K/UL    ABS. LYMPHOCYTES 0.7 (L) 0.8 - 3.5 K/UL    ABS. MONOCYTES 0.3 0.0 - 1.0 K/UL    ABS. EOSINOPHILS 0.0 0.0 - 0.4 K/UL    ABS. BASOPHILS 0.0 0.0 - 0.1 K/UL    ABS. IMM. GRANS. 0.1 (H) 0.00 - 0.04 K/UL    DF AUTOMATED     BLOOD GAS, ARTERIAL    Collection Time: 10/20/21  5:25 AM   Result Value Ref Range    pH 7.40 7.35 - 7.45      PCO2 36 35 - 45 mmHg    PO2 90 75 - 100 mmHg    O2 SAT 94 (L) >95 %    BICARBONATE 23 22 - 26 mmol/L    BASE DEFICIT 2.2 (H) 0 - 2 mmol/L    O2 METHOD VENT      FIO2 40.0 %    MODE Assist Control/Volume Control      Tidal volume 500      SET RATE 12      EPAP/CPAP/PEEP 5.0      SITE Right Brachial      MARIXA'S TEST PASS     MRSA SCREEN - PCR (NASAL)    Collection Time: 10/20/21  8:00 AM   Result Value Ref Range    MRSA by PCR, Nasal Not Detected Not Detected         Physical Exam  Vitals reviewed. Constitutional:       Comments: Sedated and intubated   Cardiovascular:      Rate and Rhythm: Normal rate. Heart sounds: Normal heart sounds. No murmur heard. No gallop. Pulmonary:      Comments: Coarse breath sounds b/l   Abdominal:      General: Abdomen is flat. Musculoskeletal:      Cervical back: Neck supple. Skin:     General: Skin is warm.    Neurological:      Comments: Sedated and intubated         Recent Results (from the past 24 hour(s))   TROPONIN-HIGH SENSITIVITY    Collection Time: 10/19/21  2:20 PM   Result Value Ref Range    Troponin-High Sensitivity 178 (HH) 0 - 76 ng/L   GLUCOSE, POC    Collection Time: 10/19/21  9:42 PM   Result Value Ref Range    Glucose (POC) 139 (H) 65 - 117 mg/dL    Performed by Rafal Mcqueen(ICU)    RENAL FUNCTION PANEL    Collection Time: 10/20/21  4:55 AM   Result Value Ref Range    Sodium 146 (H) 136 - 145 mmol/L    Potassium 5.0 3.5 - 5.1 mmol/L    Chloride 112 (H) 97 - 108 mmol/L    CO2 23 21 - 32 mmol/L    Anion gap 11 5 - 15 mmol/L    Glucose 111 (H) 65 - 100 mg/dL    BUN 53 (H) 6 - 20 mg/dL    Creatinine 2.13 (H) 0.70 - 1.30 mg/dL BUN/Creatinine ratio 25 (H) 12 - 20      GFR est AA 38 (L) >60 ml/min/1.73m2    GFR est non-AA 32 (L) >60 ml/min/1.73m2    Calcium 7.7 (L) 8.5 - 10.1 mg/dL    Phosphorus 7.6 (H) 2.6 - 4.7 mg/dL    Albumin 2.4 (L) 3.5 - 5.0 g/dL   MAGNESIUM    Collection Time: 10/20/21  4:55 AM   Result Value Ref Range    Magnesium 2.2 1.6 - 2.4 mg/dL   CBC WITH AUTOMATED DIFF    Collection Time: 10/20/21  4:55 AM   Result Value Ref Range    WBC 4.7 4.1 - 11.1 K/uL    RBC 3.81 (L) 4.10 - 5.70 M/uL    HGB 11.9 (L) 12.1 - 17.0 g/dL    HCT 36.2 (L) 36.6 - 50.3 %    MCV 95.0 80.0 - 99.0 FL    MCH 31.2 26.0 - 34.0 PG    MCHC 32.9 30.0 - 36.5 g/dL    RDW 18.5 (H) 11.5 - 14.5 %    PLATELET 504 (L) 301 - 400 K/uL    MPV 11.0 8.9 - 12.9 FL    NRBC 1.1 (H) 0.0  WBC    ABSOLUTE NRBC 0.05 (H) 0.00 - 0.01 K/uL    NEUTROPHILS 79 (H) 32 - 75 %    LYMPHOCYTES 14 12 - 49 %    MONOCYTES 6 5 - 13 %    EOSINOPHILS 0 0 - 7 %    BASOPHILS 0 0 - 1 %    IMMATURE GRANULOCYTES 1 (H) 0 - 0.5 %    ABS. NEUTROPHILS 3.7 1.8 - 8.0 K/UL    ABS. LYMPHOCYTES 0.7 (L) 0.8 - 3.5 K/UL    ABS. MONOCYTES 0.3 0.0 - 1.0 K/UL    ABS. EOSINOPHILS 0.0 0.0 - 0.4 K/UL    ABS. BASOPHILS 0.0 0.0 - 0.1 K/UL    ABS. IMM.  GRANS. 0.1 (H) 0.00 - 0.04 K/UL    DF AUTOMATED     BLOOD GAS, ARTERIAL    Collection Time: 10/20/21  5:25 AM   Result Value Ref Range    pH 7.40 7.35 - 7.45      PCO2 36 35 - 45 mmHg    PO2 90 75 - 100 mmHg    O2 SAT 94 (L) >95 %    BICARBONATE 23 22 - 26 mmol/L    BASE DEFICIT 2.2 (H) 0 - 2 mmol/L    O2 METHOD VENT      FIO2 40.0 %    MODE Assist Control/Volume Control      Tidal volume 500      SET RATE 12      EPAP/CPAP/PEEP 5.0      SITE Right Brachial      MARIXA'S TEST PASS     MRSA SCREEN - PCR (NASAL)    Collection Time: 10/20/21  8:00 AM   Result Value Ref Range    MRSA by PCR, Nasal Not Detected Not Detected           Current Facility-Administered Medications:     propofol (DIPRIVAN) 10 mg/mL infusion, 5 mcg/kg/min, IntraVENous, TITRATE, Manuel, Bertha Keene MD    NOREPINephrine (LEVOPHED) 8 mg in 5% dextrose 250mL (32 mcg/mL) infusion, 0.5-16 mcg/min, IntraVENous, TITRATE, Maritza Jackson MD, Last Rate: 28.1 mL/hr at 10/20/21 0414, 15 mcg/min at 10/20/21 0414    furosemide (LASIX) injection 60 mg, 60 mg, IntraVENous, BID, Anay Rockwell MD    calcium gluconate 1 gram in sodium chloride (ISO-OSM) 50 mL infusion, 1 g, IntraVENous, ONCE, Lauren Arzola MD    sodium bicarbonate 8.4 % (1 mEq/mL) injection 100 mEq, 100 mEq, IntraVENous, ONCE, Lauren Arzola MD    budesonide (PULMICORT) 500 mcg/2 ml nebulizer suspension, 500 mcg, Nebulization, BID RT, Bertha Jackson MD    piperacillin-tazobactam (ZOSYN) 3.375 g in 0.9% sodium chloride (MBP/ADV) 100 mL MBP, 3.375 g, IntraVENous, Q8H, Maritza Jackson MD, Last Rate: 25 mL/hr at 10/20/21 0634, 3.375 g at 10/20/21 4351    aspirin chewable tablet 81 mg, 81 mg, Oral, DAILY, Khushi Brown MD    sodium chloride (NS) flush 5-40 mL, 5-40 mL, IntraVENous, Q8H, Maritza Jackson MD, 10 mL at 10/20/21 0543    sodium chloride (NS) flush 5-40 mL, 5-40 mL, IntraVENous, PRN, Bertha Jackson MD    sodium chloride (NS) flush 5-10 mL, 5-10 mL, IntraVENous, PRN, Bertha Jackson MD    azithromycin (ZITHROMAX) 500 mg in 0.9% sodium chloride 250 mL (VIAL-MATE), 500 mg, IntraVENous, Q24H, Maritza Jackson MD, Last Rate: 250 mL/hr at 10/19/21 2015, 500 mg at 10/19/21 2015    albuterol-ipratropium (DUO-NEB) 2.5 MG-0.5 MG/3 ML, 3 mL, Nebulization, Q6H PRN, Maritza Jackson MD    carvediloL (COREG) tablet 3.125 mg, 3.125 mg, Oral, BID WITH MEALS, Maritza Jackson MD, 3.125 mg at 10/19/21 1823    methylPREDNISolone (PF) (SOLU-MEDROL) injection 40 mg, 40 mg, IntraVENous, Q6H, Maritza Jackson MD, 40 mg at 10/20/21 0542    acetaminophen (TYLENOL) tablet 650 mg, 650 mg, Oral, Q6H PRN **OR** acetaminophen (TYLENOL) suppository 650 mg, 650 mg, Rectal, Q6H PRN, Bertha Jackson MD    polyethylene glycol (MIRALAX) packet 17 g, 17 g, Oral, DAILY PRN, Ana Laura Jackson MD    ondansetron (ZOFRAN ODT) tablet 4 mg, 4 mg, Oral, Q8H PRN **OR** ondansetron (ZOFRAN) injection 4 mg, 4 mg, IntraVENous, Q6H PRN, Maritza Jackson MD    heparin (porcine) injection 5,000 Units, 5,000 Units, SubCUTAneous, Q8H, Maritza Jackson MD, 5,000 Units at 10/20/21 0542    albuterol-ipratropium (DUO-NEB) 2.5 MG-0.5 MG/3 ML, 3 mL, Nebulization, Q6H RT, Maritza Jackson MD, 3 mL at 10/20/21 0831    Assessment       Plan       1. Acute respiratory failure s/p MV - vent parameters as per primary team   2. Septic shock multifactorial - titrate vasopressors to maintain MAP > 65  C/w renally adjusted broad spectrum antibiotics  3. Non oliguric JUAN CARLOS sec to ischemic ATN - SCR at 2.13  No indications for RRT at present. UOP 1025cc ~ C/w strict I/O   Avoid nephrotoxins as much as possible  Daily BMP trend for additional monitoring  Tube feeds to be adjusted for current requirements   New urine studies and renal US requested  4. Hx of CHF - c/w fluid restriction <1L/d and as per cardiology  5. Serositis and Ascites - pending IR eval for additional evals. 6. Free water deficit of 1.7L approx-  Na+ at 147  Will increase free water flushes for gradual correction   Trend Na+ for additional monitoring  7. MODS - prognosis guarded.      Signed By: Shiraz Canela MD     October 20, 2021

## 2021-10-20 NOTE — PROGRESS NOTES
Per chart review, patient w/ decline in status and now intubated. Will d/c ST at this time. Please re-consult once medically indicated.

## 2021-10-20 NOTE — PROGRESS NOTES
Consult  Pulmonary, Critical Care    Name: Patrick Nails MRN: [id number]   : 1960 Hospital: [address] [place] [address]   Date: 10/20/2021  Admission date: 10/18/2021 Hospital Day: 3       Subjective/Interval History:   Seen in the ICU on nasal oxygen. He is awake alert extremely poor historian all I can tell from him is that he had shortness of breath and came to the hospital  10/20 worsening respiratory status last evening and required intubation now on the ventilator unresponsive on IV propofol    Patient Active Problem List   Diagnosis Code    CHF (congestive heart failure) ([facility]) I50.9    Respiratory failure with hypoxia ([facility] [address] J96.91    Fluid overload E87.70    Elevated brain natriuretic peptide (BNP) level R79.89    Noncompliance Z91.19    Tobacco abuse Z72.0    Pneumonia J18.9    Hypoxia R09.02    Severe protein-calorie malnutrition ([facility] [address] E43       IMPRESSION:   1. Acute hypoxic respiratory failure controlled on the ventilator  2. Pulmonary edema chest x-ray still shows accentuated interstitial markings  3. Bilateral pleural effusions left greater than right  4. Ascites  5. Severe cardiomyopathy  6. Pyuria  Body mass index is 17.97 kg/m². 7.       RECOMMENDATIONS/PLAN:   1. Lasix now twice daily will give 2 doses albumin  2. Creatinine up from yesterday continue  3. Follow urine culture  4. Continue DuoNeb and Symbicort will decrease Solu-Medrol  5. Ascites present may need to consider paracentesis         Subjective/Initial History:   I have reviewed the flowsheet and previous days notes. I was asked by Kranthi Hector MD to see Patrick Nails a 59 y.o.  male  in consultation for a chief complaint of acute hypoxic respiratory failure pulmonary edema pleural effusion      The patient is unable to give any meaningful history or review of systems due to patient factors.          Patient PCP: Other, MD Zak  PMH:  has a past medical history of Chronic obstructive pulmonary disease ([facility] [address] and Heart failure ([facility] [address] PSH:   has no past surgical history on file. FHX: family history includes Hypertension in his mother. SHX:  reports that he has been smoking. He has been smoking about 0.50 packs per day. He has never used smokeless tobacco. He reports current alcohol use. He reports previous drug use.   Systemic review unreliable he is very confused    No Known Allergies   MEDS:   Current Facility-Administered Medications   Medication    propofol (DIPRIVAN) 10 mg/mL infusion    NOREPINephrine (LEVOPHED) 8 mg in 5% dextrose 250mL (32 mcg/mL) infusion    furosemide (LASIX) injection 60 mg    calcium gluconate 1 gram in sodium chloride (ISO-OSM) 50 mL infusion    budesonide (PULMICORT) 500 mcg/2 ml nebulizer suspension    piperacillin-tazobactam (ZOSYN) 3.375 g in 0.9% sodium chloride (MBP/ADV) 100 mL MBP    aspirin chewable tablet 81 mg    sodium chloride (NS) flush 5-40 mL    sodium chloride (NS) flush 5-40 mL    sodium chloride (NS) flush 5-10 mL    azithromycin (ZITHROMAX) 500 mg in 0.9% sodium chloride 250 mL (VIAL-MATE)    albuterol-ipratropium (DUO-NEB) 2.5 MG-0.5 MG/3 ML    carvediloL (COREG) tablet 3.125 mg    methylPREDNISolone (PF) (SOLU-MEDROL) injection 40 mg    acetaminophen (TYLENOL) tablet 650 mg    Or    acetaminophen (TYLENOL) suppository 650 mg    polyethylene glycol (MIRALAX) packet 17 g    ondansetron (ZOFRAN ODT) tablet 4 mg    Or    ondansetron (ZOFRAN) injection 4 mg    heparin (porcine) injection 5,000 Units    albuterol-ipratropium (DUO-NEB) 2.5 MG-0.5 MG/3 ML        Current Facility-Administered Medications:     propofol (DIPRIVAN) 10 mg/mL infusion, 5 mcg/kg/min, IntraVENous, TITRATE, Maritza Jackson MD    NOREPINephrine (LEVOPHED) 8 mg in 5% dextrose 250mL (32 mcg/mL) infusion, 0.5-16 mcg/min, IntraVENous, TITRATE, Maritza Jackson MD, Last Rate: 28.1 mL/hr at 10/20/21 0414, 15 mcg/min at 10/20/21 0414    furosemide (LASIX) injection 60 mg, 60 mg, IntraVENous, BID, Siria Palacio MD    calcium gluconate 1 gram in sodium chloride (ISO-OSM) 50 mL infusion, 1 g, IntraVENous, ONCE, Bimal Sharma MD    budesonide (PULMICORT) 500 mcg/2 ml nebulizer suspension, 500 mcg, Nebulization, BID RT, Lizeth Jackson MD    piperacillin-tazobactam (ZOSYN) 3.375 g in 0.9% sodium chloride (MBP/ADV) 100 mL MBP, 3.375 g, IntraVENous, Q8H, Maritza Jackson MD, Last Rate: 25 mL/hr at 10/20/21 0634, 3.375 g at 10/20/21 8019    aspirin chewable tablet 81 mg, 81 mg, Oral, DAILY, Khushi Brown MD    sodium chloride (NS) flush 5-40 mL, 5-40 mL, IntraVENous, Q8H, Maritza Jackson MD, 10 mL at 10/20/21 0543    sodium chloride (NS) flush 5-40 mL, 5-40 mL, IntraVENous, PRN, Lizeth Jackson MD    sodium chloride (NS) flush 5-10 mL, 5-10 mL, IntraVENous, PRN, Lizeth Jackson MD    azithromycin (ZITHROMAX) 500 mg in 0.9% sodium chloride 250 mL (VIAL-MATE), 500 mg, IntraVENous, Q24H, Maritza Jackson MD, Last Rate: 250 mL/hr at 10/19/21 2015, 500 mg at 10/19/21 2015    albuterol-ipratropium (DUO-NEB) 2.5 MG-0.5 MG/3 ML, 3 mL, Nebulization, Q6H PRN, Lizeth Jackson MD    carvediloL (COREG) tablet 3.125 mg, 3.125 mg, Oral, BID WITH MEALS, Lizeth Jackson MD, 3.125 mg at 10/19/21 1823    methylPREDNISolone (PF) (SOLU-MEDROL) injection 40 mg, 40 mg, IntraVENous, Q6H, Maritza Jackson MD, 40 mg at 10/20/21 0542    acetaminophen (TYLENOL) tablet 650 mg, 650 mg, Oral, Q6H PRN **OR** acetaminophen (TYLENOL) suppository 650 mg, 650 mg, Rectal, Q6H PRN, Lizeth Jackson MD    polyethylene glycol (MIRALAX) packet 17 g, 17 g, Oral, DAILY PRN, Lizeth Jackson MD    ondansetron (ZOFRAN ODT) tablet 4 mg, 4 mg, Oral, Q8H PRN **OR** ondansetron (ZOFRAN) injection 4 mg, 4 mg, IntraVENous, Q6H PRN, Maritza Jackson MD    heparin (porcine) injection 5,000 Units, 5,000 Units, SubCUTAneous, Q8H, Maritza Jackson MD, 5,000 Units at 10/20/21 0542   albuterol-ipratropium (DUO-NEB) 2.5 MG-0.5 MG/3 ML, 3 mL, Nebulization, Q6H RT, Maritza Jackson MD, 3 mL at 10/20/21 0831      Objective:     Vital Signs: Telemetry:    normal sinus rhythm Intake/Output:   Visit Vitals  BP 94/74 (BP 1 Location: Right upper arm, BP Patient Position: At rest)   Pulse 84   Temp 97.5 °F (36.4 °C)   Resp 12   Ht 6' 2\" (1.88 m)   Wt 63.5 kg (139 lb 15.9 oz)   SpO2 99%   BMI 17.97 kg/m²       Temp (24hrs), Av.2 °F (36.2 °C), Min:95.5 °F (35.3 °C), Max:98.1 °F (36.7 °C)        O2 Device: Ventilator O2 Flow Rate (L/min): 3 l/min         Body mass index is 17.97 kg/m². Wt Readings from Last 4 Encounters:   10/20/21 63.5 kg (139 lb 15.9 oz)   21 52.4 kg (115 lb 8.3 oz)   21 54.2 kg (119 lb 7.8 oz)          Intake/Output Summary (Last 24 hours) at 10/20/2021 1123  Last data filed at 10/20/2021 0600  Gross per 24 hour   Intake    Output 1025 ml   Net -1025 ml       Last shift:      No intake/output data recorded. Last 3 shifts: 10/18 1901 - 10/20 0700  In: 1350 [I.V.:1350]  Out: 1025 [Urine:1025]       Hemodynamics:    CO:    CI:    CVP:    SVR:   PAP Systolic:    PAP Diastolic:    PVR:    YS61:       Ventilator Settings:      Mode Rate TV Press PEEP FiO2 PIP Min. Vent   Assist control    500 ml    5 cm H20 40 %  16 cm H2O  5.9 l/min      Physical Exam:    General:  male; unresponsive on propofol on the ventilator  HEENT: NCAT, poor dentition, oral mucosa moist  Eyes: anicteric; conjunctiva clear doll's eye reflex  Neck: no nodes, JVD present, no accessory MM use. Chest: no deformity,  Cardiac: Regular rate and rhythm systolic murmur present peripheral edema present  Lungs: Clear anteriorly with lateral rales extending posteriorly no wheezing  Abd: Relatively soft nontender bowel sounds present  Ext: Leg edema  no joint swelling;  No clubbing  : clear urine  Neuro: Unresponsive on propofol doll's eye reflex present flaccid extremities  Psych-unable to assess  Skin: warm, dry, no cyanosis;  Pulses: Brachial and radial pulses intact  Capillary: Slow capillary refill      Labs:    Recent Labs     10/20/21  0455 10/19/21  0214 10/18/21  1925   WBC 4.7 2.8* 3.1*   HGB 11.9* 13.3 12.9   * 113* 103*     Recent Labs     10/20/21  0455 10/19/21  0214 10/18/21  2253 10/18/21  1925   * 144  --  145   K 5.0 4.3  --  4.4   * 111*  --  114*   CO2 23 24  --  20*   * 106*  --  108*   BUN 53* 41*  --  37*   CREA 2.13* 1.68*  --  1.47*   CA 7.7* 8.5  --  8.1*   MG 2.2  --   --   --    PHOS 7.6*  --   --   --    LAC  --   --  2.7* 2.4*   ALB 2.4* 2.6*  --  2.6*   ALT  --  16  --  16   10/28/C 12  PEEP 5 FiO2 40% with PO2 90 PCO2 36 pH 7.40  Blood 1 of 4 with gram-positive cocci  Sputum culture pending  Urine culture pending  Nasal MRSA smear negative  BNP greater than 35,000  Troponin I 178, 135, 122  COVID-19 antigen negative  8/11/2021 echo ejection fraction 20% moderate aortic regurg mild to moderate mitral regurg IVC dilated PASP 57  Imaging:  I have personally reviewed the patients radiographs and have reviewed the reports:    CXR Results  (Last 48 hours)               10/18/21 1928  XR CHEST PORT Final result    Impression:  1. Small left pleural effusion and multifocal airspace disease suspicious for   pneumonia. 2.Mild pulmonary vascular congestion. Narrative:  AP portable chest, 1915 hours. Comparison: 9/3/2021. Findings: There is mild to moderate cardiomegaly. Mild pulmonary vascular   congestion is noted. There is patchy bilateral airspace disease, left greater   than right. A small left pleural effusion is noted. There is more consolidative   airspace disease in the left lung base. No pneumothorax is identified. The   osseous structures are unremarkable.    To me the x-ray looks more like accentuated interstitial markings consistent with pulmonary edema and the left effusion           Results from [facility] encounter on 10/18/21    XR CHEST PORT    Narrative  HISTORY:  post intubation    TECHNIQUE:  XR CHEST PORT    COMPARISON: One day prior  LIMITATIONS: None    TUBES/LINES: Endotracheal tube is 4.8 cm above brian    LUNG PARENCHYMA: Persistent areas of mild diffuse interstitial marking and some  patchy airspace disease as on prior with the latter most notable in the left  upper lobe TRACHEA/BRONCHI: Normal  PULMONARY VESSELS: Normal  PLEURA: Stable small left pleural effusion  HEART: There is stable enlargement of the cardiac silhouette  AORTIC SHADOW:Normal.  MEDIASTINUM: Normal  BONE/SOFT TISSUES: No acute abnormality. OTHER: None    Impression  Cardiomegaly and/or pericardial effusion with mild sequela of  presumed CHF and/or pulmonary edema, infection, or combination of these and with  associated left pleural effusion. ET tube in place as described      XR CHEST PORT    Narrative  Chest single view. Comparison single view chest October 19, 2021. Stable tracheostomy tube. Patchy reticular markings through lungs, same distribution. Cardiac silhouette  enlargement. No pneumothorax or sizable pleural effusion. XR CHEST PORT    Narrative  AP portable chest, 1915 hours. Comparison: 9/3/2021. Findings: There is mild to moderate cardiomegaly. Mild pulmonary vascular  congestion is noted. There is patchy bilateral airspace disease, left greater  than right. A small left pleural effusion is noted. There is more consolidative  airspace disease in the left lung base. No pneumothorax is identified. The  osseous structures are unremarkable. Impression  1. Small left pleural effusion and multifocal airspace disease suspicious for  pneumonia. 2.Mild pulmonary vascular congestion. Results from [place] encounter on 10/18/21    CTA ABD PELV W WO CONT    Narrative  HISTORY:  possible gi bleed  Dose reduction technique:   All CT scans at this facility are performed using dose reduction optimization  technique as appropriate on the exam including the following: Automated exposure  control, adjustment of the MA and/or KV according to patient size of use of  iterative reconstructive technique. .    TECHNIQUE: CTA ABD PELV W WO CONT. CT angiogram of the abdomen and pelvis  performed and maximum intensity projection images (MIPS) generated. 100 cc [id number]-370 injected. COMPARISON: None  LIMITATIONS: None    CHEST: Multichamber cardiomegaly. Small right and moderate left pleural  effusion. Bibasilar airspace consolidation overlies the effusions. Moderate  emphysematous changes incidentally noted at the lung bases. AORTA: No aneurysm or dissection. Mild peripheral areas of aortic calcification  CELIAC AXIS: Patent. SUPERIOR MESENTERIC ARTERY: Patent. RENAL ARTERIES: Patent. INFERIOR MESENTERIC ARTERY: Patent. ILIAC ARTERIES: Patent. Mild peripheral areas of calcification. .  Visualized femoral vasculature:  Patent. LIVER: Normal.  GALLBLADDER: Distended. BILIARY TREE: Normal.  PANCREAS: Normal.  SPLEEN: Normal.  ADRENAL GLANDS: Normal.  KIDNEYS/URETERS/BLADDER: Multiple areas of peripheral hypoperfusion throughout  both kidneys which does persist on the venous and delayed images. No saurabh  obstructive changes. The bladder is catheterized/collapsed. RETROPERITONEUM: Normal.  BOWEL/MESENTERY: no obvious acute abnormality. No evidence of GI hemorrhage on  arterial, venous or delayed phase images. .  APPENDIX: Identified and normal.  PERITONEAL CAVITY: Profound abdominopelvic ascites distending the abdomen and  centralizing many of the bowel loops. REPRODUCTIVE ORGANS: Normal.  BONE/TISSUES: No acute osseous abnormality. Diffuse edema throughout the  subcutaneous tissue of the visualized chest abdomen, pelvis and bilateral lower  extremities. .    OTHER: None    Impression  Overall there is intact arterial circulation of the abdomen and  pelvis and there is no saurabh evidence of an acute GI hemorrhage. There is  however diffuse sequela of third spacing with bilateral pleural effusions,  profound abdominopelvic ascites, and diffuse subcutaneous edema. Considerations  for sequela of cardiac, hepatic and/or renal dysfunction. Bilateral areas of  renal parenchymal hypoperfusion are noted and could indicate sequela of  infection/pyelonephritis although by appearance might also suggest sequela of  renal infarcts of uncertain age. Component of CHF suggested given the lung base  findings described. .    Results called to Amanda Moralez on 10/19/2021 12:27 AM.      · Discussion severe cardiomyopathy ejection fraction 20% with ascites peripheral edema bilateral pleural effusion left greater than right on CT scan December this changes on CT scan. He continues to smoke. Will increase Lasix to every 8 hour dosing for the next 24 hours  · Follow blood gases electrolytes and chest x-ray  · 10/20 intubated last evening blood gases well-maintained on the ventilator will maintain as is today. Lasix twice daily ordered. Will give 2 doses of albumin. Creatinine has risen from yesterday. · Time of care 30 minutes           Thank you for allowing us to participate in the care of this patient.   We will follow along with you   Candy Arroyo MD

## 2021-10-20 NOTE — ANESTHESIA PROCEDURE NOTES
Emergent Intubation  Performed by: Chris Ayala MD  Authorized by: Chris Ayala MD     Emergent Intubation:   Location:  ICU  Date/Time:  10/19/2021 9:47 PM  Indications:  Respiratory failure    Spontaneous Ventilation: absent    Level of Consciousness: awake  Preoxygenated: Yes      Airway Documentation:   Airway:  ETT - Cuffed  Advanced Technique:  Radha  Insertion Site:  Oral  Blade Type:  Kyler  Blade Size:  4  ETT size (mm):  8.0  ETT Line Shon:  Lips  ETT Insertion depth (cm):  21  Placement verified by: auscultation, EtCO2 and BBS    Attempts:  2  Difficult airway: No    ICU CALLED FOR EMERGENT INTUBATION.   INDUCTION MED/S:  PROPOFOL 180mg I/V AT 21:45

## 2021-10-21 PROBLEM — E83.51 HYPOCALCEMIA: Status: ACTIVE | Noted: 2021-01-01

## 2021-10-21 PROBLEM — N17.9 ACUTE KIDNEY INJURY SUPERIMPOSED ON CHRONIC KIDNEY DISEASE (HCC): Status: ACTIVE | Noted: 2021-01-01

## 2021-10-21 PROBLEM — E83.39 HYPERPHOSPHATEMIA: Status: ACTIVE | Noted: 2021-01-01

## 2021-10-21 PROBLEM — N18.9 ACUTE KIDNEY INJURY SUPERIMPOSED ON CHRONIC KIDNEY DISEASE (HCC): Status: ACTIVE | Noted: 2021-01-01

## 2021-10-21 NOTE — WOUND CARE
IP WOUND CONSULT    [address] RECORD NUMBER:  [id number]  AGE: 59 y.o. GENDER: male  : 1960  TODAY'S DATE:  10/21/2021    GENERAL     [] Follow-up   [x] New Consult    Unique Abreu is a 59 y.o. male referred by:   [] Physician  [x] Nursing  [] Other:         PAST MEDICAL HISTORY    Past Medical History:   Diagnosis Date    Chronic obstructive pulmonary disease ([facility] [address]     Heart failure ([facility] [address]         PAST SURGICAL HISTORY    No past surgical history on file. FAMILY HISTORY    Family History   Problem Relation Age of Onset    Hypertension Mother          ALLERGIES    No Known Allergies    MEDICATIONS    No current facility-administered medications on file prior to encounter. Current Outpatient Medications on File Prior to Encounter   Medication Sig Dispense Refill    furosemide (LASIX) 40 mg tablet Take 1 Tablet by mouth two (2) times a day. 60 Tablet 0    carvediloL (COREG) 3.125 mg tablet Take 1 Tablet by mouth two (2) times daily (with meals). 60 Tablet 0    albuterol-ipratropium (DUO-NEB) 2.5 mg-0.5 mg/3 ml nebu 3 mL by Nebulization route every four (4) hours as needed for Wheezing. 30 Nebule 0    mirtazapine (REMERON) 7.5 mg tablet Take 1 Tablet by mouth nightly. 30 Tablet 0    spironolactone (ALDACTONE) 25 mg tablet Take 1 Tablet by mouth daily.  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  Dressing change: Yes, see flow chart  Verbal consent for picture: Intubated and sedated    Contributing Factors: anticoagulation therapy, decreased mobility, shear force, incontinence of stool, incontinence of urine and smoking    Wound Hip Left Partial Thickness (Active)   Wound Image   10/21/21 6732 Wound Etiology Pressure Stage 2 10/21/21 1614   Dressing Status Clean;Dry 10/19/21 1600   Cleansed Cleansed with saline 10/21/21 1614   Dressing/Treatment Foam;Honey gel/honey paste 10/21/21 1614   Dressing Change Due 10/22/21 10/21/21 1614   Wound Length (cm) 2 cm 10/21/21 1614   Wound Width (cm) 1 cm 10/21/21 1614   Wound Depth (cm) 0.1 cm 10/21/21 1614   Wound Surface Area (cm^2) 2 cm^2 10/21/21 1614   Wound Volume (cm^3) 0.2 cm^3 10/21/21 1614   Wound Assessment Dry;Pink/red 10/21/21 1614   Drainage Amount None 10/21/21 1614   Wound Odor None 10/21/21 1614   [place]-Wound/Incision Assessment Dry/flaky; Other (Comment) 10/21/21 1614   Edges Defined edges; Unattached edges 10/21/21 1614   Wound Thickness Description Partial thickness 10/21/21 1614   Number of days: 2          PLAN     Skin Care & Pressure Relief Recommendations  Speciality bed Progressa  Minimize layers of linen  Turn/reposition approximately every 2 hours  Pillow wedges  Manage incontinence   Promote continence; Skin Protective lotion/cream to buttocks and sacrum daily and as needed with incontinence care  Offloading boots    [address]  Blood Glucose: 122 on 10/21/21                             Albumin: 3.0 on 10/21/21  WBCs: 5.0 on 10/21/21    Support Surface: Progressa    Physician/Provider notified:   Recommendations: Stage 2 PI noted to left hip. Surrounding area is slightly darker than normal skin color for ethnicity. Apply Therahoney gel every other day, see dressing order. Maintain heel boots at all times while in the bed for offloading of the heels. Use foam wedge to turn q2h at 30 degree angle or more to offload sacrum and buttocks. Currently has indwelling novoa catheter. Apply zinc paste TID and prn for soiling to buttocks and gluteal folds / cleft. Will continue to follow.       Discharge Wound Care Needs: TBD    Teaching completed with:   [] Patient           [] Family member       [] Caregiver          [] Nursing  [] Other    Patient/Caregiver Teaching:  Level of patient/caregiver understanding able to:   [] Indicates understanding       [] Needs reinforcement  [] Unsuccessful      [] Verbal Understanding  [] Demonstrated understanding       [] No evidence of learning  [] Refused teaching         [] N/A       Electronically signed by Michael Mcbride RN on 10/21/2021 at 4:20 PM

## 2021-10-21 NOTE — ANESTHESIA POSTPROCEDURE EVALUATION
Procedure(s):  ESOPHAGOGASTRODUODENOSCOPY (EGD) with NG tube placement. total IV anesthesia, general    Anesthesia Post Evaluation      Multimodal analgesia: multimodal analgesia not used between 6 hours prior to anesthesia start to PACU discharge  Patient location during evaluation: ICU (Endoscopy suite)  Patient participation: complete - patient cannot participate  Level of consciousness: responsive to painful stimuli  Airway patency: patent  Anesthetic complications: no  Cardiovascular status: acceptable and hemodynamically stable  Respiratory status: nasal cannula and ETT  Hydration status: euvolemic  Comments: The patient was handed off to the endoscopy nursing team.  All questions regarding pre-, intra-, and postoperative care were answered.   Post anesthesia nausea and vomiting:  none  Final Post Anesthesia Temperature Assessment:  Normothermia (36.0-37.5 degrees C)      INITIAL Post-op Vital signs:   Vitals Value Taken Time   /93 10/21/21 0923   Temp 37.7 °C (99.9 °F) 10/21/21 0923   Pulse 85 10/21/21 0923   Resp 12 10/21/21 0923   SpO2 98 % 10/21/21 0923

## 2021-10-21 NOTE — PROGRESS NOTES
Pulmonary, Critical Care Note    Name: Anne Valladares MRN: [id number]   : 1960 Hospital: [address] [place] [address]   Date: 10/21/2021  Admission date: 10/18/2021 Hospital Day: 4       Subjective/Interval History:   Seen in the ICU on nasal oxygen. He is awake alert extremely poor historian all I can tell from him is that he had shortness of breath and came to the hospital  10/20 worsening respiratory status last evening and required intubation now on the ventilator unresponsive on IV propofol    Patient Active Problem List   Diagnosis Code    CHF (congestive heart failure) ([facility]) I50.9    Respiratory failure with hypoxia ([facility] [address] J96.91    Fluid overload E87.70    Elevated brain natriuretic peptide (BNP) level R79.89    Noncompliance Z91.19    Tobacco abuse Z72.0    Pneumonia J18.9    Hypoxia R09.02    Severe protein-calorie malnutrition ([facility] [address] E43       IMPRESSION:   1. Acute hypoxic respiratory failure controlled on the ventilator  2. Pulmonary edema chest x-ray still shows accentuated interstitial markings  3. Bilateral pleural effusions left greater than right  4. Ascites  5. Severe cardiomyopathy  6. Pyuria  Body mass index is 17.95 kg/m². RECOMMENDATIONS/PLAN:   1. Patient remained on ventilator 40% FiO2 tidal volume 500 PEEP of 5 rate of 12 we will start weaning  2. Lasix now twice daily will give 2 doses albumin  3. Creatinine up from yesterday continue  4. Follow urine culture  5. Continue DuoNeb and Symbicort will decrease Solu-Medrol  6. Ascites present may need to consider paracentesis  7. GI seen the patient NG tube removed scheduled EGD with NG tube placement today  8. Unable to get consent for paracentesis         Subjective/Initial History:   I have reviewed the flowsheet and previous days notes. I was asked by Neptali Carrero MD to see Anne Valladares a 59 y.o.    male  in consultation for a chief complaint of acute hypoxic respiratory failure pulmonary edema pleural effusion      The patient is unable to give any meaningful history or review of systems due to patient factors. Patient PCP: Hailey, MD Zak  PMH:  has a past medical history of Chronic obstructive pulmonary disease ([facility] [address] and Heart failure ([facility] [address] PSH:   has no past surgical history on file. FHX: family history includes Hypertension in his mother. SHX:  reports that he has been smoking. He has been smoking about 0.50 packs per day. He has never used smokeless tobacco. He reports current alcohol use. He reports previous drug use.   Systemic review unreliable he is very confused    No Known Allergies   MEDS:   Current Facility-Administered Medications   Medication    NOREPINephrine (LEVOPHED) 8 mg in 5% dextrose 250mL (32 mcg/mL) infusion    furosemide (LASIX) injection 60 mg    methylPREDNISolone (PF) (SOLU-MEDROL) injection 40 mg    propofol (DIPRIVAN) 10 mg/mL infusion    pantoprazole (PROTONIX) 40 mg in 0.9% sodium chloride 10 mL injection    piperacillin-tazobactam (ZOSYN) 3.375 g in 0.9% sodium chloride (MBP/ADV) 100 mL MBP    aspirin chewable tablet 81 mg    sodium chloride (NS) flush 5-40 mL    sodium chloride (NS) flush 5-40 mL    sodium chloride (NS) flush 5-10 mL    azithromycin (ZITHROMAX) 500 mg in 0.9% sodium chloride 250 mL (VIAL-MATE)    albuterol-ipratropium (DUO-NEB) 2.5 MG-0.5 MG/3 ML    carvediloL (COREG) tablet 3.125 mg    acetaminophen (TYLENOL) tablet 650 mg    Or    acetaminophen (TYLENOL) suppository 650 mg    polyethylene glycol (MIRALAX) packet 17 g    ondansetron (ZOFRAN ODT) tablet 4 mg    Or    ondansetron (ZOFRAN) injection 4 mg    heparin (porcine) injection 5,000 Units        Current Facility-Administered Medications:     NOREPINephrine (LEVOPHED) 8 mg in 5% dextrose 250mL (32 mcg/mL) infusion, 0.5-120 mcg/min, IntraVENous, TITRATE, Maritza Jackson MD, Last Rate: 45 mL/hr at 10/21/21 0813, 24 mcg/min at 10/21/21 0813   furosemide (LASIX) injection 60 mg, 60 mg, IntraVENous, BID, Andres DIAZ MD, 60 mg at 10/20/21 2021    methylPREDNISolone (PF) (SOLU-MEDROL) injection 40 mg, 40 mg, IntraVENous, Q12H, Jan Groves MD, 40 mg at 10/20/21 2021    propofol (DIPRIVAN) 10 mg/mL infusion, 0-50 mcg/kg/min, IntraVENous, TITRATE, Jan Groves MD, Last Rate: 5.7 mL/hr at 10/21/21 0812, 15 mcg/kg/min at 10/21/21 0812    pantoprazole (PROTONIX) 40 mg in 0.9% sodium chloride 10 mL injection, 40 mg, IntraVENous, Q12H, Samara Ramirez NP, 40 mg at 10/20/21 2030    piperacillin-tazobactam (ZOSYN) 3.375 g in 0.9% sodium chloride (MBP/ADV) 100 mL MBP, 3.375 g, IntraVENous, Q8H, Maritza Jackson MD, Last Rate: 25 mL/hr at 10/21/21 0445, 3.375 g at 10/21/21 0445    aspirin chewable tablet 81 mg, 81 mg, Oral, DAILY, Khushi Brown MD    sodium chloride (NS) flush 5-40 mL, 5-40 mL, IntraVENous, Q8H, Maritza Jackson MD, 10 mL at 10/21/21 0438    sodium chloride (NS) flush 5-40 mL, 5-40 mL, IntraVENous, PRN, Gregor Jackson MD    sodium chloride (NS) flush 5-10 mL, 5-10 mL, IntraVENous, PRN, Gregor Jackson MD    azithromycin (ZITHROMAX) 500 mg in 0.9% sodium chloride 250 mL (VIAL-MATE), 500 mg, IntraVENous, Q24H, Maritza Jackson MD, Last Rate: 250 mL/hr at 10/20/21 1949, 500 mg at 10/20/21 1949    albuterol-ipratropium (DUO-NEB) 2.5 MG-0.5 MG/3 ML, 3 mL, Nebulization, Q6H PRN, Gregor Jackson MD    carvediloL (COREG) tablet 3.125 mg, 3.125 mg, Oral, BID WITH MEALS, Gregor Jackson MD, 3.125 mg at 10/19/21 1823    acetaminophen (TYLENOL) tablet 650 mg, 650 mg, Oral, Q6H PRN **OR** acetaminophen (TYLENOL) suppository 650 mg, 650 mg, Rectal, Q6H PRN, Gregor Jackson MD    polyethylene glycol (MIRALAX) packet 17 g, 17 g, Oral, DAILY PRN, Gregor Jackson MD    ondansetron (ZOFRAN ODT) tablet 4 mg, 4 mg, Oral, Q8H PRN **OR** ondansetron (ZOFRAN) injection 4 mg, 4 mg, IntraVENous, Q6H PRN, Gregor Jackson, MD    heparin (porcine) injection 5,000 Units, 5,000 Units, SubCUTAneous, Q8H, Maritza Jackson MD, 5,000 Units at 10/21/21 3508      Objective:     Vital Signs: Telemetry:    normal sinus rhythm Intake/Output:   Visit Vitals  /72 (BP 1 Location: Left upper arm, BP Patient Position: At rest)   Pulse 81   Temp 99.7 °F (37.6 °C)   Resp 12   Ht 6' 2\" (1.88 m)   Wt 63.4 kg (139 lb 12.4 oz)   SpO2 99%   BMI 17.95 kg/m²       Temp (24hrs), Av.9 °F (37.2 °C), Min:97.8 °F (36.6 °C), Max:99.7 °F (37.6 °C)        O2 Device: Ventilator O2 Flow Rate (L/min): 3 l/min         Body mass index is 17.95 kg/m². Wt Readings from Last 4 Encounters:   10/21/21 63.4 kg (139 lb 12.4 oz)   21 52.4 kg (115 lb 8.3 oz)   21 54.2 kg (119 lb 7.8 oz)          Intake/Output Summary (Last 24 hours) at 10/21/2021 0821  Last data filed at 10/21/2021 0800  Gross per 24 hour   Intake 827.94 ml   Output 2000 ml   Net -1172.06 ml       Last shift:      10/21 0701 - 10/21 1900  In: 52.6 [I.V.:52.6]  Out: -   Last 3 shifts: 10/19 1901 - 10/21 0700  In: 809.1 [I.V.:809.1]  Out: 7537 [Urine:2475]       Hemodynamics:    CO:    CI:    CVP:    SVR:   PAP Systolic:    PAP Diastolic:    PVR:    RO62:       Ventilator Settings:      Mode Rate TV Press PEEP FiO2 PIP Min. Vent   Assist control, Volume control    500 ml    5 cm H20 40 %  15 cm H2O  5.97 l/min      Physical Exam:    General:  male; unresponsive on propofol on the ventilator  HEENT: NCAT, poor dentition, oral mucosa moist  Eyes: anicteric; conjunctiva clear doll's eye reflex  Neck: no nodes, JVD present, no accessory MM use. Chest: no deformity,  Cardiac: Regular rate and rhythm systolic murmur present peripheral edema present  Lungs: Clear anteriorly with lateral rales extending posteriorly no wheezing  Abd: Relatively soft nontender bowel sounds present  Ext: Leg edema  no joint swelling;  No clubbing  : clear urine  Neuro: Unresponsive on propofol doll's eye reflex present flaccid extremities  Psych-unable to assess  Skin: warm, dry, no cyanosis;  Pulses: Brachial and radial pulses intact  Capillary: Slow capillary refill      Labs:    Recent Labs     10/21/21  0430 10/20/21  0455 10/19/21  0214   WBC 5.0 4.7 2.8*   HGB 12.8 11.9* 13.3   * 125* 113*     Recent Labs     10/21/21  0430 10/20/21  1245 10/20/21  0455 10/19/21  0214 10/19/21  0214 10/18/21  2253 10/18/21  1925 10/18/21  1925    149* 146*   < > 144  --    < > 145   K 4.6 3.4* 5.0   < > 4.3  --    < > 4.4   * 106 112*   < > 111*  --    < > 114*   CO2 24 21 23   < > 24  --    < > 20*   * 69 111*   < > 106*  --    < > 108*   BUN 60* 38* 53*   < > 41*  --    < > 37*   CREA 2.25* 1.56* 2.13*   < > 1.68*  --    < > 1.47*   CA 7.5* <5.0* 7.7*   < > 8.5  --    < > 8.1*   MG 2.2  --  2.2  --   --   --   --   --    PHOS 7.2*  --  7.6*  --   --   --   --   --    LAC  --   --   --   --   --  2.7*  --  2.4*   ALB 3.0*  --  2.4*  --  2.6*  --    < > 2.6*   ALT 53  --   --   --  16  --   --  16    < > = values in this interval not displayed. 10/28/C 12  PEEP 5 FiO2 40% with PO2 90 PCO2 36 pH 7.40  Blood 1 of 4 with gram-positive cocci  Sputum culture pending  Urine culture pending  Nasal MRSA smear negative  BNP greater than 35,000  Troponin I 178, 135, 122  COVID-19 antigen negative  8/11/2021 echo ejection fraction 20% moderate aortic regurg mild to moderate mitral regurg IVC dilated PASP 57  Imaging:  I have personally reviewed the patients radiographs and have reviewed the reports:    CXR Results  (Last 48 hours)               10/18/21 1928  XR CHEST PORT Final result    Impression:  1. Small left pleural effusion and multifocal airspace disease suspicious for   pneumonia. 2.Mild pulmonary vascular congestion. Narrative:  AP portable chest, 1915 hours. Comparison: 9/3/2021. Findings: There is mild to moderate cardiomegaly.  Mild pulmonary vascular   congestion is noted. There is patchy bilateral airspace disease, left greater   than right. A small left pleural effusion is noted. There is more consolidative   airspace disease in the left lung base. No pneumothorax is identified. The   osseous structures are unremarkable. To me the x-ray looks more like accentuated interstitial markings consistent with pulmonary edema and the left effusion           Results from Hospital Encounter encounter on 10/18/21    XR CHEST PORT    Narrative  EXAM: Chest radiograph. HISTORY: OG tube placement. Comparison chest: 10/20/2021. Impression  Findings/impression:  1. Interval placement of an OG tube which is coiled in the upper neck. Please  remove and replace. 2.  The ET tube is in good position stable. Right chest defibrillator pad. 3.  Slightly more conspicuous bibasilar atelectasis and interstitial hilar  opacities. No new consolidation. 4.  Redemonstration of enlarged cardiac silhouette, stable. XR CHEST PORT    Narrative  HISTORY:  post intubation    TECHNIQUE:  XR CHEST PORT    COMPARISON: One day prior  LIMITATIONS: None    TUBES/LINES: Endotracheal tube is 4.8 cm above brian    LUNG PARENCHYMA: Persistent areas of mild diffuse interstitial marking and some  patchy airspace disease as on prior with the latter most notable in the left  upper lobe TRACHEA/BRONCHI: Normal  PULMONARY VESSELS: Normal  PLEURA: Stable small left pleural effusion  HEART: There is stable enlargement of the cardiac silhouette  AORTIC SHADOW:Normal.  MEDIASTINUM: Normal  BONE/SOFT TISSUES: No acute abnormality. OTHER: None    Impression  Cardiomegaly and/or pericardial effusion with mild sequela of  presumed CHF and/or pulmonary edema, infection, or combination of these and with  associated left pleural effusion. ET tube in place as described      XR CHEST PORT    Narrative  Chest single view. Comparison single view chest October 19, 2021.     Stable tracheostomy tube.    Patchy reticular markings through lungs, same distribution. Cardiac silhouette  enlargement. No pneumothorax or sizable pleural effusion. Results from [place] encounter on 10/18/21    CTA ABD PELV W WO CONT    Narrative  HISTORY:  possible gi bleed  Dose reduction technique: All CT scans at this facility are performed using dose reduction optimization  technique as appropriate on the exam including the following: Automated exposure  control, adjustment of the MA and/or KV according to patient size of use of  iterative reconstructive technique. .    TECHNIQUE: CTA ABD PELV W WO CONT. CT angiogram of the abdomen and pelvis  performed and maximum intensity projection images (MIPS) generated. 100 cc [id number]-[id number] injected. COMPARISON: None  LIMITATIONS: None    CHEST: Multichamber cardiomegaly. Small right and moderate left pleural  effusion. Bibasilar airspace consolidation overlies the effusions. Moderate  emphysematous changes incidentally noted at the lung bases. AORTA: No aneurysm or dissection. Mild peripheral areas of aortic calcification  CELIAC AXIS: Patent. SUPERIOR MESENTERIC ARTERY: Patent. RENAL ARTERIES: Patent. INFERIOR MESENTERIC ARTERY: Patent. ILIAC ARTERIES: Patent. Mild peripheral areas of calcification. .  Visualized femoral vasculature:  Patent. LIVER: Normal.  GALLBLADDER: Distended. BILIARY TREE: Normal.  PANCREAS: Normal.  SPLEEN: Normal.  ADRENAL GLANDS: Normal.  KIDNEYS/URETERS/BLADDER: Multiple areas of peripheral hypoperfusion throughout  both kidneys which does persist on the venous and delayed images. No saurabh  obstructive changes. The bladder is catheterized/collapsed. RETROPERITONEUM: Normal.  BOWEL/MESENTERY: no obvious acute abnormality. No evidence of GI hemorrhage on  arterial, venous or delayed phase images. .  APPENDIX: Identified and normal.  PERITONEAL CAVITY: Profound abdominopelvic ascites distending the abdomen and  centralizing many of the bowel loops. REPRODUCTIVE ORGANS: Normal.  BONE/TISSUES: No acute osseous abnormality. Diffuse edema throughout the  subcutaneous tissue of the visualized chest abdomen, pelvis and bilateral lower  extremities. .    OTHER: None    Impression  Overall there is intact arterial circulation of the abdomen and  pelvis and there is no saurabh evidence of an acute GI hemorrhage. There is  however diffuse sequela of third spacing with bilateral pleural effusions,  profound abdominopelvic ascites, and diffuse subcutaneous edema. Considerations  for sequela of cardiac, hepatic and/or renal dysfunction. Bilateral areas of  renal parenchymal hypoperfusion are noted and could indicate sequela of  infection/pyelonephritis although by appearance might also suggest sequela of  renal infarcts of uncertain age. Component of CHF suggested given the lung base  findings described. .    Results called to Chin Phipps on 10/19/2021 12:27 AM.      · Discussion severe cardiomyopathy ejection fraction 20% with ascites peripheral edema bilateral pleural effusion left greater than right on CT scan December this changes on CT scan. He continues to smoke. Will increase Lasix to every 8 hour dosing for the next 24 hours  · Follow blood gases electrolytes and chest x-ray  · 10/20 intubated last evening blood gases well-maintained on the ventilator will maintain as is today. Lasix twice daily ordered. Will give 2 doses of albumin. Creatinine has risen from yesterday. · 10/21 still has significant ascites for EGD today with NG tube placement  · Time of care 30 minutes           Thank you for allowing us to participate in the care of this patient.   We will follow along with you   Remy Aggarwal MD

## 2021-10-21 NOTE — ANESTHESIA PREPROCEDURE EVALUATION
Relevant Problems   RESPIRATORY SYSTEM   (+) Pneumonia      CARDIOVASCULAR   (+) CHF (congestive heart failure) (HCC)       Anesthetic History               Review of Systems / Medical History  Patient summary reviewed, nursing notes reviewed and pertinent labs reviewed    Pulmonary    COPD      Smoker      Comments: RESP FAILURE   Neuro/Psych              Cardiovascular          CHF          Comments: ECHO (8-11-21)  Interpretation Summary    · LV: Estimated LVEF is 20 - 25%. Moderately dilated left ventricle. Mild concentric hypertrophy. Severely and globally reduced systolic function. · LA: Mildly dilated left atrium. · RV: Mildly dilated right ventricle. Reduced systolic function. · AV: Aortic valve leaflet calcification present. Moderate aortic valve regurgitation is present. · MV: Mild to moderate mitral valve regurgitation is present. · TV: Moderate tricuspid valve regurgitation is present. · IVC: Severely elevated central venous pressure (15 mmHg); IVC diameter is larger than 21 mm and collapses less than 50% with respiration. · RA: Mildly dilated right atrium. · PA: Pulmonary arterial systolic pressure is 57 mmHg. · Pericardium: There is a small left pleural effusion. GI/Hepatic/Renal         Renal disease (BUN/Cr 60/2.25)      Comments: PROTEIN CALORIE MALNUTRITION. Endo/Other             Other Findings   Comments: NON COMPLIANCE           Physical Exam    Airway          Intubated   Cardiovascular    Rhythm: regular  Rate: normal         Dental         Pulmonary      Decreased breath sounds           Abdominal  GI exam deferred       Other Findings            Anesthetic Plan    ASA: 3, emergent  Anesthesia type: MAC and total IV anesthesia          Induction: Intravenous  Anesthetic plan and risks discussed with: Patient       Not able to obtain consent.  Procedure is emergent as per Alejandra Zeng MD

## 2021-10-21 NOTE — ASSESSMENT & PLAN NOTE
In the setting of CKD and superimposed JUAN CARLOS  In the setting of reduced phos clearance  Continued monitoring while in ICU

## 2021-10-21 NOTE — PROGRESS NOTES
X-ray shows NGT in the duodenum. Tube pulled out about 6 inches and new x-ray ordered to verify correct placement.

## 2021-10-21 NOTE — PROGRESS NOTES
Progress Note    Patient: Rony Tian MRN: [id number]  SSN: [id number]    YOB: 1960  Age: 59 y.o. Sex: male      Admit Date: 10/18/2021    LOS: 3 days     Subjective:   GI is following in consultation for NGT placement    Patient seen in ICU remains intubated and sedated, non responsive. NGT placed today endoscopically. EGD revealed edema in the oropharynx and nasopharynx, esophageal varices without bleeding, gastritis, and duodenitis. XR of abdomen ordered to confirm placement - shows NGT in the duodenum. Tube adjusted, pulled out about 6 inches. Repeat XR shows NG tube tip to the right of L1, possibly gastric antral region. 10/20 GI consult note:  Patient was admitted to the hospital with complaints of shortness of breath. History obtained thru chart review and staff report. Patient is intubated. Patient with a PMH of COPD cardiomyopathy with systolic heart failure with EF of 25%, current smoker. He has been hospitalized several times for the same problem of shortness of breath. Reported abdominal pain. CTA of abdomen and pelvis showed no evidence of acute GI hemorrhage. There is third spacing and bilateral pleural effusion. Profound ascites. Initial XR chest showed small left pleural effusion and multifocal airspace disease suspicious for Pneumonia. Mild pulmonary vascular congestion. He was placed on BiPAP and transferred to ICU. He developed respiratory failure and required intubation. Now on vent and sedated. Staff consulted GI for NGT placement. They have tried several times with no success. ET tube with bloody output. Hgb 11.9.      Objective:     Vitals:    10/21/21 1128 10/21/21 1200 10/21/21 1300 10/21/21 1400   BP:  123/84 118/88 119/87   Pulse: 88 84 84 83   Resp: 13 15 16 16   Temp:  100.2 °F (37.9 °C) (!) 100.6 °F (38.1 °C) (!) 100.6 °F (38.1 °C)   SpO2: 99% 96% 99% 99%   Weight:       Height:            Intake and Output:  Current Shift: 10/21 0701 - 10/21 1900  In: 52.6 [I.V.:52.6]  Out: -   Last three shifts: 10/19 1901 - 10/21 0700  In: 809.1 [I.V.:809.1]  Out: 7194 [Urine:2475]    Physical Exam:   Skin:  Extremities and face reveal no rashes. No motta erythema. HEENT: ET and NGT in place. Cardiovascular: Regular rate and rhythm. Respiratory: On mechanical vent  GI:  Abdomen nondistended, soft, and nontender. No enlargement of the liver or spleen. No masses palpable. Rectal:  Deferred  Musculoskeletal: intubated, sedated  Neurological:  Intubated, sedated  Psychiatric:  Intubated, sedated  Lymphatic:  No visible adenopathy      Lab/Data Review:  Recent Results (from the past 24 hour(s))   BLOOD GAS, ARTERIAL    Collection Time: 10/21/21  3:30 AM   Result Value Ref Range    pH 7.43 7.35 - 7.45      PCO2 37 35 - 45 mmHg    PO2 91 75 - 100 mmHg    O2 SAT 98 >95 %    BICARBONATE 25 22 - 26 mmol/L    BASE EXCESS 0.1 0 - 2 mmol/L    O2 METHOD VENT      FIO2 40.0 %    MODE Assist Control/Volume Control      Tidal volume 500      SET RATE 12      IPAP/PIP 0      EPAP/CPAP/PEEP 5.0      SITE Right Radial      MARIXA'S TEST PASS     METABOLIC PANEL, COMPREHENSIVE    Collection Time: 10/21/21  4:30 AM   Result Value Ref Range    Sodium 143 136 - 145 mmol/L    Potassium 4.6 3.5 - 5.1 mmol/L    Chloride 109 (H) 97 - 108 mmol/L    CO2 24 21 - 32 mmol/L    Anion gap 10 5 - 15 mmol/L    Glucose 122 (H) 65 - 100 mg/dL    BUN 60 (H) 6 - 20 mg/dL    Creatinine 2.25 (H) 0.70 - 1.30 mg/dL    BUN/Creatinine ratio 27 (H) 12 - 20      GFR est AA 36 (L) >60 ml/min/1.73m2    GFR est non-AA 30 (L) >60 ml/min/1.73m2    Calcium 7.5 (L) 8.5 - 10.1 mg/dL    Bilirubin, total 0.7 0.2 - 1.0 mg/dL    AST (SGOT) 72 (H) 15 - 37 U/L    ALT (SGPT) 53 12 - 78 U/L    Alk.  phosphatase 124 (H) 45 - 117 U/L    Protein, total 5.7 (L) 6.4 - 8.2 g/dL    Albumin 3.0 (L) 3.5 - 5.0 g/dL    Globulin 2.7 2.0 - 4.0 g/dL    A-G Ratio 1.1 1.1 - 2.2     CBC WITH AUTOMATED DIFF    Collection Time: 10/21/21  4:30 AM   Result Value Ref Range    WBC 5.0 4.1 - 11.1 K/uL    RBC 4.06 (L) 4.10 - 5.70 M/uL    HGB 12.8 12.1 - 17.0 g/dL    HCT 36.7 36.6 - 50.3 %    MCV 90.4 80.0 - 99.0 FL    MCH 31.5 26.0 - 34.0 PG    MCHC 34.9 30.0 - 36.5 g/dL    RDW 17.5 (H) 11.5 - 14.5 %    PLATELET 807 (L) 151 - 400 K/uL    MPV 11.8 8.9 - 12.9 FL    NRBC 1.4 (H) 0.0  WBC    ABSOLUTE NRBC 0.07 (H) 0.00 - 0.01 K/uL    NEUTROPHILS 86 (H) 32 - 75 %    LYMPHOCYTES 9 (L) 12 - 49 %    MONOCYTES 5 5 - 13 %    EOSINOPHILS 0 0 - 7 %    BASOPHILS 0 0 - 1 %    IMMATURE GRANULOCYTES 0 0 - 0.5 %    ABS. NEUTROPHILS 4.3 1.8 - 8.0 K/UL    ABS. LYMPHOCYTES 0.5 (L) 0.8 - 3.5 K/UL    ABS. MONOCYTES 0.2 0.0 - 1.0 K/UL    ABS. EOSINOPHILS 0.0 0.0 - 0.4 K/UL    ABS. BASOPHILS 0.0 0.0 - 0.1 K/UL    ABS. IMM. GRANS. 0.0 0.00 - 0.04 K/UL    DF AUTOMATED     MAGNESIUM    Collection Time: 10/21/21  4:30 AM   Result Value Ref Range    Magnesium 2.2 1.6 - 2.4 mg/dL   PHOSPHORUS    Collection Time: 10/21/21  4:30 AM   Result Value Ref Range    Phosphorus 7.2 (H) 2.6 - 4.7 mg/dL              XR CHEST PORT   Final Result      XR CHEST PORT   Final Result      XR CHEST PORT   Final Result   Findings/impression:   1. Stable ET tube. Interval removal of defibrillator pads. The previously noted   malpositioned OG, is not visualized. 2.  Patchy groundglass opacities in the hilar and perihilar region bilaterally   overall stable. No new consolidation. 3.  Remainder unchanged            XR CHEST PORT   Final Result   Findings/impression:   1. Interval placement of an OG tube which is coiled in the upper neck. Please   remove and replace. 2.  The ET tube is in good position stable. Right chest defibrillator pad. 3.  Slightly more conspicuous bibasilar atelectasis and interstitial hilar   opacities. No new consolidation. 4.  Redemonstration of enlarged cardiac silhouette, stable.       US RETROPERITONEUM COMP   Final Result   Echogenic kidneys consistent with medical renal disease. No   hydronephrosis. Ascites. XR CHEST PORT   Final Result      XR CHEST PORT   Final Result   Cardiomegaly and/or pericardial effusion with mild sequela of   presumed CHF and/or pulmonary edema, infection, or combination of these and with   associated left pleural effusion. ET tube in place as described      CTA ABD PELV W WO CONT   Final Result   Overall there is intact arterial circulation of the abdomen and   pelvis and there is no saurabh evidence of an acute GI hemorrhage. There is   however diffuse sequela of third spacing with bilateral pleural effusions,   profound abdominopelvic ascites, and diffuse subcutaneous edema. Considerations   for sequela of cardiac, hepatic and/or renal dysfunction. Bilateral areas of   renal parenchymal hypoperfusion are noted and could indicate sequela of   infection/pyelonephritis although by appearance might also suggest sequela of   renal infarcts of uncertain age. Component of CHF suggested given the lung base   findings described. .      Results called to Phong Nichols on 10/19/2021 12:27 AM.      XR CHEST PORT   Final Result   1. Small left pleural effusion and multifocal airspace disease suspicious for   pneumonia. 2.Mild pulmonary vascular congestion. XR CHEST PORT    (Results Pending)       Assessment:     Active Problems:    Pneumonia (10/18/2021)      Hypoxia (10/18/2021)      Severe protein-calorie malnutrition ([address] (10/20/2021)      Acute kidney injury superimposed on chronic kidney disease ([address] (10/21/2021)      Hyperphosphatemia (10/21/2021)      Hypocalcemia (10/21/2021)        Plan:   1. Failed NGT attempts      -EGD with NGT placement done. May start using NGT for feeding and medication.       -consult nutrition for feeding formula and volume. 2. Respiratory failure      -XR chest - Patchy reticular markings through lungs, same distribution. Cardiac silhouette enlargement.  No pneumothorax or sizable pleural effusion.     -currently intubated     -pulmonary is following, input appreciated. 3. Acute kidney Injury       -Creatinine 1.56 today       -Nephrology following - No indications for RRT at present.      -Avoid nephrotoxins as much as possible  4. CHF      -8/11 echocardiogram Estimated LVEF is 20 - 25%. -Cardiology following       -on IV Lasix     GI will now sign off. Please re-consult as needed. Patient discussed with Dr Adrien Triplett and agrees to above impression and plan. Thank you for allowing me to participate in this patients care    Signed By: Peter Beckwith.  DAISY Ramirez     October 21, 2021

## 2021-10-21 NOTE — ASSESSMENT & PLAN NOTE
In the setting of hyperphosphatemia and possibly secondary hyperparathyroidism of renal disease      Plan:   Obtain PTH, 25-OH vitamin D  Monitor for QT prolongation and symptomatic hypocalcemia

## 2021-10-21 NOTE — CONSULTS
Patient is intubated and sedated and unable to give consent for EGD and NG tube placement and no one is available to give consent for him. Several attempts were made by nursing staff to get consent but were unable to get in touch with listed contact.   Procedure is deemed emergent to give oral medication and nutrition

## 2021-10-21 NOTE — PROGRESS NOTES
General Daily Progress Note          Patient Name:   Stuart Naranjo       YOB: 1960       Age:  59 y.o.       Admit Date: 10/18/2021      Subjective:     Patient is a 59y.o. year old male no past medical history of hypertension COPD cardiomyopathy with systolic heart failure with ejection fraction with 25% ongoing smoking came to emergency room because of shortness of breath patient was recently discharged from the hospital after multiple admission for same reason seen by the ER physician x-ray shows pneumonia patient was recommend to be admitted for further evaluation treatment at the same time patient has no abdominal pain  No work-up done in the ER initially , abdominal distended no CT scan was ordered, nurse try to put the [place] in no urine output at that time CT scan of the abdomen was ordered results pending      On ventilator on 40% O2    Patient have a cardiac arrest and intubated on treatment now  Hypotensive on Levophed  On propofol for sedation    Patient is going endoscopy NG tube placement today           Objective:     Visit Vitals  BP (!) 117/93   Pulse 85   Temp 99.9 °F (37.7 °C)   Resp 12   Ht 6' 2\" (1.88 m)   Wt 63.4 kg (139 lb 12.4 oz)   SpO2 98%   BMI 17.95 kg/m²        Recent Results (from the past 24 hour(s))   METABOLIC PANEL, BASIC    Collection Time: 10/20/21 12:45 PM   Result Value Ref Range    Sodium 149 (H) 136 - 145 mmol/L    Potassium 3.4 (L) 3.5 - 5.1 mmol/L    Chloride 106 97 - 108 mmol/L    CO2 21 21 - 32 mmol/L    Anion gap 22 (H) 5 - 15 mmol/L    Glucose 69 65 - 100 mg/dL    BUN 38 (H) 6 - 20 mg/dL    Creatinine 1.56 (H) 0.70 - 1.30 mg/dL    BUN/Creatinine ratio 24 (H) 12 - 20      GFR est AA 55 (L) >60 ml/min/1.73m2    GFR est non-AA 45 (L) >60 ml/min/1.73m2    Calcium <5.0 (LL) 8.5 - 10.1 mg/dL   PROTEIN/CREATININE RATIO, URINE    Collection Time: 10/20/21 12:55 PM   Result Value Ref Range    Protein, urine random 20 (H) 0.0 - 11.9 mg/dL    Creatinine, urine 26.00 mg/dL    Protein/Creat. urine Ratio 0.8     CHLORIDE, URINE RANDOM    Collection Time: 10/20/21 12:55 PM   Result Value Ref Range    Chloride,urine random 128 mmol/L   URINALYSIS W/MICROSCOPIC    Collection Time: 10/20/21 12:55 PM   Result Value Ref Range    Color Yellow/Straw      Appearance Turbid (A) Clear      Specific gravity 1.014 1.003 - 1.030      pH (UA) 5.0 5.0 - 8.0      Protein Negative Negative mg/dL    Glucose Negative Negative mg/dL    Ketone Negative Negative mg/dL    Bilirubin Negative Negative      Blood Moderate (A) Negative      Urobilinogen 0.1 0.1 - 1.0 EU/dL    Nitrites Negative Negative      Leukocyte Esterase Trace (A) Negative      WBC 0-4 0 - 4 /hpf    RBC 5-10 0 - 5 /hpf    Bacteria Negative Negative /hpf    Hyaline cast 0-2 0 - 5 /lpf   BLOOD GAS, ARTERIAL    Collection Time: 10/21/21  3:30 AM   Result Value Ref Range    pH 7.43 7.35 - 7.45      PCO2 37 35 - 45 mmHg    PO2 91 75 - 100 mmHg    O2 SAT 98 >95 %    BICARBONATE 25 22 - 26 mmol/L    BASE EXCESS 0.1 0 - 2 mmol/L    O2 METHOD VENT      FIO2 40.0 %    MODE Assist Control/Volume Control      Tidal volume 500      SET RATE 12      IPAP/PIP 0      EPAP/CPAP/PEEP 5.0      SITE Right Radial      MARIXA'S TEST PASS     METABOLIC PANEL, COMPREHENSIVE    Collection Time: 10/21/21  4:30 AM   Result Value Ref Range    Sodium 143 136 - 145 mmol/L    Potassium 4.6 3.5 - 5.1 mmol/L    Chloride 109 (H) 97 - 108 mmol/L    CO2 24 21 - 32 mmol/L    Anion gap 10 5 - 15 mmol/L    Glucose 122 (H) 65 - 100 mg/dL    BUN 60 (H) 6 - 20 mg/dL    Creatinine 2.25 (H) 0.70 - 1.30 mg/dL    BUN/Creatinine ratio 27 (H) 12 - 20      GFR est AA 36 (L) >60 ml/min/1.73m2    GFR est non-AA 30 (L) >60 ml/min/1.73m2    Calcium 7.5 (L) 8.5 - 10.1 mg/dL    Bilirubin, total 0.7 0.2 - 1.0 mg/dL    AST (SGOT) 72 (H) 15 - 37 U/L    ALT (SGPT) 53 12 - 78 U/L    Alk.  phosphatase 124 (H) 45 - 117 U/L    Protein, total 5.7 (L) 6.4 - 8.2 g/dL    Albumin 3.0 (L) 3.5 - 5.0 g/dL    Globulin 2.7 2.0 - 4.0 g/dL    A-G Ratio 1.1 1.1 - 2.2     CBC WITH AUTOMATED DIFF    Collection Time: 10/21/21  4:30 AM   Result Value Ref Range    WBC 5.0 4.1 - 11.1 K/uL    RBC 4.06 (L) 4.10 - 5.70 M/uL    HGB 12.8 12.1 - 17.0 g/dL    HCT 36.7 36.6 - 50.3 %    MCV 90.4 80.0 - 99.0 FL    MCH 31.5 26.0 - 34.0 PG    MCHC 34.9 30.0 - 36.5 g/dL    RDW 17.5 (H) 11.5 - 14.5 %    PLATELET 208 (L) 792 - 400 K/uL    MPV 11.8 8.9 - 12.9 FL    NRBC 1.4 (H) 0.0  WBC    ABSOLUTE NRBC 0.07 (H) 0.00 - 0.01 K/uL    NEUTROPHILS 86 (H) 32 - 75 %    LYMPHOCYTES 9 (L) 12 - 49 %    MONOCYTES 5 5 - 13 %    EOSINOPHILS 0 0 - 7 %    BASOPHILS 0 0 - 1 %    IMMATURE GRANULOCYTES 0 0 - 0.5 %    ABS. NEUTROPHILS 4.3 1.8 - 8.0 K/UL    ABS. LYMPHOCYTES 0.5 (L) 0.8 - 3.5 K/UL    ABS. MONOCYTES 0.2 0.0 - 1.0 K/UL    ABS. EOSINOPHILS 0.0 0.0 - 0.4 K/UL    ABS. BASOPHILS 0.0 0.0 - 0.1 K/UL    ABS. IMM. GRANS. 0.0 0.00 - 0.04 K/UL    DF AUTOMATED     MAGNESIUM    Collection Time: 10/21/21  4:30 AM   Result Value Ref Range    Magnesium 2.2 1.6 - 2.4 mg/dL   PHOSPHORUS    Collection Time: 10/21/21  4:30 AM   Result Value Ref Range    Phosphorus 7.2 (H) 2.6 - 4.7 mg/dL     [unfilled]      Review of Systems    Unable to obtain . Physical Exam:      Constitutional: On ventilator :   Head: Normocephalic and atraumatic. Eyes: Pupils are equal, round, and reactive to light. EOM are normal.   Cardiovascular: Normal rate, regular rhythm and normal heart sounds. Pulmonary/Chest: Breath sounds normal. No wheezes. No rales. Exhibits no tenderness. Abdominal: Soft. Bowel sounds are normal. There is no abdominal tenderness. There is no rebound and no guarding. Musculoskeletal: Normal range of motion. Neurological: Ventilator on sedation  XR CHEST PORT   Final Result      XR CHEST PORT   Final Result   Findings/impression:   1. Stable ET tube. Interval removal of defibrillator pads.  The previously noted   malpositioned OG, is not visualized. 2.  Patchy groundglass opacities in the hilar and perihilar region bilaterally   overall stable. No new consolidation. 3.  Remainder unchanged            XR CHEST PORT   Final Result   Findings/impression:   1. Interval placement of an OG tube which is coiled in the upper neck. Please   remove and replace. 2.  The ET tube is in good position stable. Right chest defibrillator pad. 3.  Slightly more conspicuous bibasilar atelectasis and interstitial hilar   opacities. No new consolidation. 4.  Redemonstration of enlarged cardiac silhouette, stable. US RETROPERITONEUM COMP   Final Result   Echogenic kidneys consistent with medical renal disease. No   hydronephrosis. Ascites. XR CHEST PORT   Final Result      XR CHEST PORT   Final Result   Cardiomegaly and/or pericardial effusion with mild sequela of   presumed CHF and/or pulmonary edema, infection, or combination of these and with   associated left pleural effusion. ET tube in place as described      CTA ABD PELV W WO CONT   Final Result   Overall there is intact arterial circulation of the abdomen and   pelvis and there is no saurabh evidence of an acute GI hemorrhage. There is   however diffuse sequela of third spacing with bilateral pleural effusions,   profound abdominopelvic ascites, and diffuse subcutaneous edema. Considerations   for sequela of cardiac, hepatic and/or renal dysfunction. Bilateral areas of   renal parenchymal hypoperfusion are noted and could indicate sequela of   infection/pyelonephritis although by appearance might also suggest sequela of   renal infarcts of uncertain age. Component of CHF suggested given the lung base   findings described. .      Results called to Luanne Verduzco on 10/19/2021 12:27 AM.      XR CHEST PORT   Final Result   1. Small left pleural effusion and multifocal airspace disease suspicious for   pneumonia. 2.Mild pulmonary vascular congestion.        XR CHEST PORT (Results Pending)        Recent Results (from the past 24 hour(s))   METABOLIC PANEL, BASIC    Collection Time: 10/20/21 12:45 PM   Result Value Ref Range    Sodium 149 (H) 136 - 145 mmol/L    Potassium 3.4 (L) 3.5 - 5.1 mmol/L    Chloride 106 97 - 108 mmol/L    CO2 21 21 - 32 mmol/L    Anion gap 22 (H) 5 - 15 mmol/L    Glucose 69 65 - 100 mg/dL    BUN 38 (H) 6 - 20 mg/dL    Creatinine 1.56 (H) 0.70 - 1.30 mg/dL    BUN/Creatinine ratio 24 (H) 12 - 20      GFR est AA 55 (L) >60 ml/min/1.73m2    GFR est non-AA 45 (L) >60 ml/min/1.73m2    Calcium <5.0 (LL) 8.5 - 10.1 mg/dL   PROTEIN/CREATININE RATIO, URINE    Collection Time: 10/20/21 12:55 PM   Result Value Ref Range    Protein, urine random 20 (H) 0.0 - 11.9 mg/dL    Creatinine, urine 26.00 mg/dL    Protein/Creat.  urine Ratio 0.8     CHLORIDE, URINE RANDOM    Collection Time: 10/20/21 12:55 PM   Result Value Ref Range    Chloride,urine random 128 mmol/L   URINALYSIS W/MICROSCOPIC    Collection Time: 10/20/21 12:55 PM   Result Value Ref Range    Color Yellow/Straw      Appearance Turbid (A) Clear      Specific gravity 1.014 1.003 - 1.030      pH (UA) 5.0 5.0 - 8.0      Protein Negative Negative mg/dL    Glucose Negative Negative mg/dL    Ketone Negative Negative mg/dL    Bilirubin Negative Negative      Blood Moderate (A) Negative      Urobilinogen 0.1 0.1 - 1.0 EU/dL    Nitrites Negative Negative      Leukocyte Esterase Trace (A) Negative      WBC 0-4 0 - 4 /hpf    RBC 5-10 0 - 5 /hpf    Bacteria Negative Negative /hpf    Hyaline cast 0-2 0 - 5 /lpf   BLOOD GAS, ARTERIAL    Collection Time: 10/21/21  3:30 AM   Result Value Ref Range    pH 7.43 7.35 - 7.45      PCO2 37 35 - 45 mmHg    PO2 91 75 - 100 mmHg    O2 SAT 98 >95 %    BICARBONATE 25 22 - 26 mmol/L    BASE EXCESS 0.1 0 - 2 mmol/L    O2 METHOD VENT      FIO2 40.0 %    MODE Assist Control/Volume Control      Tidal volume 500      SET RATE 12      IPAP/PIP 0      EPAP/CPAP/PEEP 5.0      SITE Right Radial MARIXA'S TEST PASS     METABOLIC PANEL, COMPREHENSIVE    Collection Time: 10/21/21  4:30 AM   Result Value Ref Range    Sodium 143 136 - 145 mmol/L    Potassium 4.6 3.5 - 5.1 mmol/L    Chloride 109 (H) 97 - 108 mmol/L    CO2 24 21 - 32 mmol/L    Anion gap 10 5 - 15 mmol/L    Glucose 122 (H) 65 - 100 mg/dL    BUN 60 (H) 6 - 20 mg/dL    Creatinine 2.25 (H) 0.70 - 1.30 mg/dL    BUN/Creatinine ratio 27 (H) 12 - 20      GFR est AA 36 (L) >60 ml/min/1.73m2    GFR est non-AA 30 (L) >60 ml/min/1.73m2    Calcium 7.5 (L) 8.5 - 10.1 mg/dL    Bilirubin, total 0.7 0.2 - 1.0 mg/dL    AST (SGOT) 72 (H) 15 - 37 U/L    ALT (SGPT) 53 12 - 78 U/L    Alk. phosphatase 124 (H) 45 - 117 U/L    Protein, total 5.7 (L) 6.4 - 8.2 g/dL    Albumin 3.0 (L) 3.5 - 5.0 g/dL    Globulin 2.7 2.0 - 4.0 g/dL    A-G Ratio 1.1 1.1 - 2.2     CBC WITH AUTOMATED DIFF    Collection Time: 10/21/21  4:30 AM   Result Value Ref Range    WBC 5.0 4.1 - 11.1 K/uL    RBC 4.06 (L) 4.10 - 5.70 M/uL    HGB 12.8 12.1 - 17.0 g/dL    HCT 36.7 36.6 - 50.3 %    MCV 90.4 80.0 - 99.0 FL    MCH 31.5 26.0 - 34.0 PG    MCHC 34.9 30.0 - 36.5 g/dL    RDW 17.5 (H) 11.5 - 14.5 %    PLATELET 991 (L) 258 - 400 K/uL    MPV 11.8 8.9 - 12.9 FL    NRBC 1.4 (H) 0.0  WBC    ABSOLUTE NRBC 0.07 (H) 0.00 - 0.01 K/uL    NEUTROPHILS 86 (H) 32 - 75 %    LYMPHOCYTES 9 (L) 12 - 49 %    MONOCYTES 5 5 - 13 %    EOSINOPHILS 0 0 - 7 %    BASOPHILS 0 0 - 1 %    IMMATURE GRANULOCYTES 0 0 - 0.5 %    ABS. NEUTROPHILS 4.3 1.8 - 8.0 K/UL    ABS. LYMPHOCYTES 0.5 (L) 0.8 - 3.5 K/UL    ABS. MONOCYTES 0.2 0.0 - 1.0 K/UL    ABS. EOSINOPHILS 0.0 0.0 - 0.4 K/UL    ABS. BASOPHILS 0.0 0.0 - 0.1 K/UL    ABS. IMM.  GRANS. 0.0 0.00 - 0.04 K/UL    DF AUTOMATED     MAGNESIUM    Collection Time: 10/21/21  4:30 AM   Result Value Ref Range    Magnesium 2.2 1.6 - 2.4 mg/dL   PHOSPHORUS    Collection Time: 10/21/21  4:30 AM   Result Value Ref Range    Phosphorus 7.2 (H) 2.6 - 4.7 mg/dL       Results     Procedure Component Value Units Date/Time    MRSA SCREEN - PCR (NASAL) [[id number]] Collected: 10/20/21 0800    Order Status: Completed Specimen: Swab Updated: 10/20/21 0959     MRSA by PCR, Nasal Not Detected       CULTURE, RESPIRATORY/SPUTUM/BRONCH Mahamed Mckusick STAIN [[id number]] Collected: 10/20/21 0800    Order Status: Completed Specimen: Sputum Updated: 10/20/21 1412     Special Requests: No Special Requests        GRAM STAIN Occasional WBCs seen         1+ Epithelial cells seen         No organisms seen        Culture result: PENDING    CULTURE, BLOOD, LINE [[id number]]     Order Status: Sent Specimen: Blood     CULTURE, BLOOD #1 [[id number]]     Order Status: Canceled Specimen: Blood     CULTURE, BLOOD #2 [[id number]]     Order Status: Canceled Specimen: Blood     CULTURE, URINE [[id number]]  (Abnormal)  (Susceptibility) Collected: 10/18/21 2136    Order Status: Completed Specimen: Urine Updated: 10/21/21 0946     Special Requests: --        No Special Requests  Reflexed from [id number]       [place] Count --        >100,000  colonies/ml       Culture result: Escherichia coli       Susceptibility      Escherichia coli      NEHA      Amikacin ($) Susceptible      Ampicillin ($) Susceptible      Ampicillin/sulbactam ($) Susceptible      Cefazolin ($) Susceptible      Cefepime ($$) Susceptible      Cefoxitin Susceptible      Ceftazidime ($) Susceptible      Ceftriaxone ($) Susceptible      Ciprofloxacin ($) Susceptible      Gentamicin ($) Susceptible      Levofloxacin ($) Susceptible      Meropenem ($$) Susceptible      Nitrofurantoin Susceptible      Piperacillin/Tazobac ($) Susceptible      Tobramycin ($) Susceptible      Trimeth/Sulfa Susceptible               Linear View                   COVID-19 RAPID TEST [[id number]] Collected: 10/18/21 2055    Order Status: Completed Specimen: Nasopharyngeal Updated: 10/18/21 2148     Specimen source Nasopharyngeal        COVID-19 rapid test Not Detected        Comment: Rapid Abbott ID Now   Rapid NAAT:  The specimen is NEGATIVE for SARS-CoV-2, the novel coronavirus associated with COVID-19. Negative results should be treated as presumptive and, if inconsistent with clinical signs and symptoms or necessary for patient management, should be tested with an alternative molecular assay. Negative results do not preclude SARS-CoV-2 infection and should not be used as the sole basis for patient management decisions. This test has been authorized by the FDA under   an Emergency Use Authorization (EUA) for use by authorized laboratories. Fact sheet for Healthcare Providers: ConventionDearLocaldate.co.nz Fact sheet for Patients: ConventionDearLocaldate.co.nz   Methodology: Isothermal Nucleic Acid Amplification         CULTURE, BLOOD, PAIRED [[id number]]  (Abnormal) Collected: 10/18/21 1935    Order Status: Completed Specimen: Blood Updated: 10/20/21 1251     Special Requests: No Special Requests        Culture result:       Gram Positive Cocci in chains growing in 1 of 4 bottles drawn (SITE NOT INDICATED)                 Labs:     Recent Labs     10/21/21  0430 10/20/21  0455   WBC 5.0 4.7   HGB 12.8 11.9*   HCT 36.7 36.2*   * 125*     Recent Labs     10/21/21  0430 10/20/21  1245 10/20/21  0455    149* 146*   K 4.6 3.4* 5.0   * 106 112*   CO2 24 21 23   BUN 60* 38* 53*   CREA 2.25* 1.56* 2.13*   * 69 111*   CA 7.5* <5.0* 7.7*   MG 2.2  --  2.2   PHOS 7.2*  --  7.6*     Recent Labs     10/21/21  0430 10/20/21  0455 10/19/21  0214 10/18/21  1925 10/18/21  1925   ALT 53  --  16  --  16   *  --  59  --  61   TBILI 0.7  --  1.0  --  1.4*   TP 5.7*  --  6.2*  --  6.0*   ALB 3.0* 2.4* 2.6*   < > 2.6*   GLOB 2.7  --  3.6  --  3.4    < > = values in this interval not displayed. No results for input(s): INR, PTP, APTT, INREXT, INREXT in the last 72 hours. No results for input(s): FE, TIBC, PSAT, FERR in the last 72 hours.    Lab Results   Component Value Date/Time Folate 18.6 08/12/2021 11:00 AM      Recent Labs     10/21/21  0330 10/20/21  0525   PH 7.43 7.40   PCO2 37 36   PO2 91 90     No results for input(s): CPK, CKNDX, TROIQ in the last 72 hours.     No lab exists for component: CPKMB  Lab Results   Component Value Date/Time    Cholesterol, total 178 08/10/2021 02:40 PM    HDL Cholesterol 41 08/10/2021 02:40 PM    LDL, calculated 107.8 (H) 08/10/2021 02:40 PM    Triglyceride 146 08/10/2021 02:40 PM    CHOL/HDL Ratio 4.3 08/10/2021 02:40 PM     Lab Results   Component Value Date/Time    Glucose (POC) 139 (H) 10/19/2021 09:42 PM     Lab Results   Component Value Date/Time    Color Yellow/Straw 10/20/2021 12:55 PM    Appearance Turbid (A) 10/20/2021 12:55 PM    Specific gravity 1.014 10/20/2021 12:55 PM    pH (UA) 5.0 10/20/2021 12:55 PM    Protein Negative 10/20/2021 12:55 PM    Glucose Negative 10/20/2021 12:55 PM    Ketone Negative 10/20/2021 12:55 PM    Bilirubin Negative 10/20/2021 12:55 PM    Urobilinogen 0.1 10/20/2021 12:55 PM    Nitrites Negative 10/20/2021 12:55 PM    Leukocyte Esterase Trace (A) 10/20/2021 12:55 PM    Bacteria Negative 10/20/2021 12:55 PM    WBC 0-4 10/20/2021 12:55 PM    RBC 5-10 10/20/2021 12:55 PM         Assessment:     Acute hypoxemic respiratory failure  on ventilator 40% O2  gram-negative pneumonia  Acute exacerbation of COPD  Acute systolic heart failure   Abdominal distention  Possible urinary retention  Thrombocytopenia  Neutropenia  Acute kidney injury  Elevated BNP  Elevated troponin        Plan:     Continue nebulizer treatment every 6 hours  On IV Zosyn and IV Zithromax follow blood culture urine cultures    On Lasix 60 mg IV twice daily   continue IV Solu-Medrol    Repeat the labs  On Levophed for hypotension  On propofol for sedation    Monitor platelet count  Monitor renal function  Monitor sodium level      Plan endoscopy NG tube placement now        Current Facility-Administered Medications:     NOREPINephrine (LEVOPHED) 8 mg in 5% dextrose 250mL (32 mcg/mL) infusion, 0.5-120 mcg/min, IntraVENous, TITRATE, Maritza Jackson MD, Last Rate: 43.1 mL/hr at 10/21/21 0939, 23 mcg/min at 10/21/21 0939    furosemide (LASIX) injection 60 mg, 60 mg, IntraVENous, BID, Freeman DIAZ MD, 60 mg at 10/21/21 0823    methylPREDNISolone (PF) (SOLU-MEDROL) injection 40 mg, 40 mg, IntraVENous, Q12H, Gaudencio Lorenz MD, 40 mg at 10/21/21 3397    propofol (DIPRIVAN) 10 mg/mL infusion, 0-50 mcg/kg/min, IntraVENous, TITRATE, Gaudencio Lorenz MD, Last Rate: 5.7 mL/hr at 10/21/21 0812, 15 mcg/kg/min at 10/21/21 0812    pantoprazole (PROTONIX) 40 mg in 0.9% sodium chloride 10 mL injection, 40 mg, IntraVENous, Q12H, Samara Ramirez NP, 40 mg at 10/21/21 0823    piperacillin-tazobactam (ZOSYN) 3.375 g in 0.9% sodium chloride (MBP/ADV) 100 mL MBP, 3.375 g, IntraVENous, Q8H, Maritza Jackson MD, Last Rate: 25 mL/hr at 10/21/21 0445, 3.375 g at 10/21/21 0445    aspirin chewable tablet 81 mg, 81 mg, Oral, DAILY, Khushi Brown MD    sodium chloride (NS) flush 5-40 mL, 5-40 mL, IntraVENous, Q8H, Maritza Jackson MD, 10 mL at 10/21/21 0438    sodium chloride (NS) flush 5-40 mL, 5-40 mL, IntraVENous, PRN, Joqauina Jackson MD    sodium chloride (NS) flush 5-10 mL, 5-10 mL, IntraVENous, PRN, Joaquina Jackson MD    azithromycin (ZITHROMAX) 500 mg in 0.9% sodium chloride 250 mL (VIAL-MATE), 500 mg, IntraVENous, Q24H, Maritza Jackson MD, Last Rate: 250 mL/hr at 10/20/21 1949, 500 mg at 10/20/21 1949    albuterol-ipratropium (DUO-NEB) 2.5 MG-0.5 MG/3 ML, 3 mL, Nebulization, Q6H PRN, Maritza Jackson MD    carvediloL (COREG) tablet 3.125 mg, 3.125 mg, Oral, BID WITH MEALS, Maritza Jackson MD, 3.125 mg at 10/19/21 1823    acetaminophen (TYLENOL) tablet 650 mg, 650 mg, Oral, Q6H PRN **OR** acetaminophen (TYLENOL) suppository 650 mg, 650 mg, Rectal, Q6H PRN, Maritza Jackson MD    polyethylene glycol (MIRALAX) packet 17 g, 17 g, Oral, DAILY PRN, Lizeth Jackson MD    ondansetron (ZOFRAN ODT) tablet 4 mg, 4 mg, Oral, Q8H PRN **OR** ondansetron (ZOFRAN) injection 4 mg, 4 mg, IntraVENous, Q6H PRN, Maritza Jackson MD    heparin (porcine) injection 5,000 Units, 5,000 Units, SubCUTAneous, Q8H, Maritza Jackson MD, 5,000 Units at 10/21/21 3101

## 2021-10-21 NOTE — PROGRESS NOTES
Progress Note    Patient: Alma Velazquez MRN: [id number]  SSN: [id number]    YOB: 1960  Age: 59 y.o. Sex: male      Admit Date: 10/18/2021    LOS: 3 days     Subjective:     Patient is intubated. Objective:     Vitals:    10/21/21 0923 10/21/21 1000 10/21/21 1100 10/21/21 1128   BP: (!) 117/93 (!) 120/90 111/80    Pulse: 85 82 83 88   Resp: 12 14 14 13   Temp: 99.9 °F (37.7 °C) 99.9 °F (37.7 °C) 98.1 °F (36.7 °C)    SpO2: 98% 99% 99% 99%   Weight:       Height:            Intake and Output:  Current Shift: 10/21 0701 - 10/21 1900  In: 52.6 [I.V.:52.6]  Out: -   Last three shifts: 10/19 1901 - 10/21 0700  In: 809.1 [I.V.:809.1]  Out: 2475 [Urine:2475]    Physical Exam:   General:  Alert, cooperative, no distress, appears stated age. Eyes:  Conjunctivae/corneas clear. PERRL, EOMs intact. Fundi benign   Ears:  Normal TMs and external ear canals both ears. Nose: Nares normal. Septum midline. Mucosa normal. No drainage or sinus tenderness. Mouth/Throat: Lips, mucosa, and tongue normal. Teeth and gums normal.   Neck: Supple, symmetrical, trachea midline, no adenopathy, thyroid: no enlargment/tenderness/nodules, no carotid bruit and no JVD. Back:   Symmetric, no curvature. ROM normal. No CVA tenderness. Lungs:   Clear to auscultation bilaterally. Heart:  Regular rate and rhythm, S1, S2 normal, no murmur, click, rub or gallop. Abdomen:   Soft, non-tender. Bowel sounds normal. No masses,  No organomegaly. Extremities: Extremities normal, atraumatic, no cyanosis or edema. Pulses: 2+ and symmetric all extremities. Skin: Skin color, texture, turgor normal. No rashes or lesions   Lymph nodes: Cervical, supraclavicular, and axillary nodes normal.   Neurologic: CNII-XII intact. Normal strength, sensation and reflexes throughout. Lab/Data Review: All lab results for the last 24 hours reviewed.          Assessment:     Active Problems:    Pneumonia (10/18/2021)      Hypoxia (10/18/2021)      Severe protein-calorie malnutrition ([facility] [address] (10/20/2021)      Acute kidney injury superimposed on chronic kidney disease ([facility] [address] (10/21/2021)      Hyperphosphatemia (10/21/2021)      Hypocalcemia (10/21/2021)    Patient is a 57-year-old -American male with:  1.  Acute kidney injury, probably cardiorenal syndrome aggravated by significant ascites  2.  Heart failure with reduced ejection fraction with decompensation  3.  COPD  4.  Medical noncompliance  5.  Cachexia  6.  Troponin in the indeterminate range, denies any chest pain  7.  Moderate aortic regurgitation  8.  Pulmonary hypertension    Plan:     Patient with acute kidney injury. Kidney function is getting worse. Agree to hold off diuretic. Appreciate nephrology input. He underwent NG tube placement by endoscopy.     Signed By: Beatrice Lazo MD     October 21, 2021

## 2021-10-21 NOTE — PROGRESS NOTES
Physician Progress Note      PATIENT:               Daya Benavidez  CSN #:                  [id number]  :                       1960  ADMIT DATE:       10/18/2021 6:46 PM  [address] [place] DATE:  RESPONDING  PROVIDER #:        Ben Walter MD          QUERY TEXT:    Dear Dr. Kiley Nguyễn -    Pt admitted with hypoxia, PNA, COPD, CHF, JUAN CARLOS. Pt noted to have hypothermia, neurotpenia, lactic acidosis. If possible, please document in the progress notes and discharge summary if you are evaluating and /or treating any of the following: The medical record reflects the following:  Risk Factors: 59 M presented with hypoxia; admitted with acute respiratory failure, PNA, CHF, COPD, JUAN CARLOS  Clinical Indicators: temp 88.3; WBC 3.1. ..2.8...4.7; LA 2.4. Marlon Trivedi Marlon Trivedi 2.7; gram negative PNA per H&P; UA+; currently s/p cardiac arrest on mechanical ventilation  Treatment: labs, CXR, CT scan, IV Azithromycin, IV Ceftriaxone, IV Zosyn    Thank you,  Maya Wilson, JOSE JN, RN, [facility]  Clinical   [phone]  Options provided:  -- Sepsis, present on admission  -- Sepsis, present on admission now resolved  -- Pneumonia, UTI without Sepsis  -- Sepsis was ruled out  -- Other - I will add my own diagnosis  -- Disagree - Not applicable / Not valid  -- Disagree - Clinically unable to determine / Unknown  -- Refer to Clinical Documentation Reviewer    PROVIDER RESPONSE TEXT:    This patient has sepsis which was present on admission. Query created by: Aurelia Gallo on 10/20/2021 11:18 AM      QUERY TEXT:    Dear Dr. Kiley Nguyễn -    Pt admitted with acute respiratory failure, PNA, COPD, CHF, JUAN CARLOS. Pt noted to have cardiac arrest on 10/19/21 . If possible, please document in the progress notes and discharge summary if you are evaluating and/or treating any of the following:     The medical record reflects the following:  Risk Factors: 59 M presented with hypoxia; admitted with acute respiratory failure, PNA, COPD, CHF, JUAN CARLOS  Clinical Indicators: cardiac arrest with intubation 10/19/21; WBC 3.1. ..2.8...4.7; troponin . ..135. .. 178; pBNP >47791; lactic acid 2.4. [place] 2.7; BPs 88/72. ..87/64; hypothermia  Treatment: labs, CXR, CT scan, IV Albumin, IV ABT, mechanical ventilation, IV Levophed, IV Lasix, IV Solu-Medrol    Thank you,  Bronson Sotomayor, JOSE JN, RN, [facility]  Clinical   [phone]  Options provided:  -- Cardiogenic Shock  -- Septic Shock  -- Hypovolemic Shock  -- Hypovolemia without Shock  -- Hypotension without Shock  -- Other - I will add my own diagnosis  -- Disagree - Not applicable / Not valid  -- Disagree - Clinically unable to determine / Unknown  -- Refer to Clinical Documentation Reviewer    PROVIDER RESPONSE TEXT:    This patient has Septic Shock.     Query created by: Michael Car on 10/20/2021 11:22 AM      Electronically signed by:  Juliane Gonzalez MD 10/21/2021 8:43 AM

## 2021-10-21 NOTE — PROGRESS NOTES
Progress Note  Date:10/21/2021       Room:[facility]  Patient Taylor Jung     YOB: 1960     Age:61 y.o. Subjective    Subjective:  Symptoms:  Worsening. Review of Systems  Objective         Vitals Last 24 Hours:  TEMPERATURE:  Temp  Av.9 °F (37.2 °C)  Min: 97.8 °F (36.6 °C)  Max: 99.9 °F (37.7 °C)  RESPIRATIONS RANGE: Resp  Av.4  Min: 12  Max: 16  PULSE OXIMETRY RANGE: SpO2  Av.1 %  Min: 97 %  Max: 100 %  PULSE RANGE: Pulse  Av.7  Min: 75  Max: 99  BLOOD PRESSURE RANGE: Systolic (62TZD), BJU:103 , Min:101 , DGH:759   ; Diastolic (54DHU), FEM:10, Min:68, Max:94    I/O (24Hr): Intake/Output Summary (Last 24 hours) at 10/21/2021 1312  Last data filed at 10/21/2021 0800  Gross per 24 hour   Intake 576.54 ml   Output 2000 ml   Net -1423.46 ml     Objective:  Vital signs: (most recent): Blood pressure 111/80, pulse 88, temperature 98.1 °F (36.7 °C), resp. rate 13, height 6' 2\" (1.88 m), weight 63.4 kg (139 lb 12.4 oz), SpO2 99 %. Vital signs are normal.    Heart: (Missed beats  S4  Apical heave  No edema  No JVD)  Abdomen: Abdomen is soft. Labs/Imaging/Diagnostics    Labs:  CBC:  Recent Labs     10/21/21  0430 10/20/21  0455 10/19/21  0214   WBC 5.0 4.7 2.8*   RBC 4.06* 3.81* 4.23   HGB 12.8 11.9* 13.3   HCT 36.7 36.2* 40.8   MCV 90.4 95.0 96.5   RDW 17.5* 18.5* 18.7*   * 125* 113*     CHEMISTRIES:  Recent Labs     10/21/21  0430 10/20/21  1245 10/20/21  0455    149* 146*   K 4.6 3.4* 5.0   * 106 112*   CO2 24 21 23   BUN 60* 38* 53*   CA 7.5* <5.0* 7.7*   PHOS 7.2*  --  7.6*   MG 2.2  --  2.2   PT/INR:No results for input(s): INR, INREXT in the last 72 hours. No lab exists for component: PROTIME  APTT:No results for input(s): APTT in the last 72 hours.   LIVER PROFILE:  Recent Labs     10/21/21  0430 10/19/21  0214 10/18/21  1925   AST 72* 25 25   ALT 53 16 16     Lab Results   Component Value Date/Time    ALT (SGPT) 53 10/21/2021 04:30 AM    AST (SGOT) 72 (H) 10/21/2021 04:30 AM    Alk. phosphatase 124 (H) 10/21/2021 04:30 AM    Bilirubin, total 0.7 10/21/2021 04:30 AM       Imaging Last 24 Hours:  XR CHEST PORT    Result Date: 10/21/2021  Indication: NG tube. AP semiupright portable chest radiograph 0944 hours 10/21/2021. Comparison examination earlier the same date. Heterogeneous bilateral airspace disease unchanged. Dense opacity left lung base obscuring the diaphragm unchanged. Cardiomegaly unchanged. No pneumothorax. No apparent pleural effusion. ET tube tip 4.5 cm cephalad to the brian. Interval NG tube, tip to the right of L3, likely proximal duodenum. XR CHEST PORT    Result Date: 10/21/2021  EXAM: Chest radiograph. HISTORY: Respiratory failure. Comparison chest: 10/20/2021     Findings/impression: 1. Stable ET tube. Interval removal of defibrillator pads. The previously noted malpositioned OG, is not visualized. 2.  Patchy groundglass opacities in the hilar and perihilar region bilaterally overall stable. No new consolidation. 3.  Remainder unchanged     XR CHEST PORT    Result Date: 10/20/2021  EXAM: Chest radiograph. HISTORY: OG tube placement. Comparison chest: 10/20/2021. Findings/impression: 1. Interval placement of an OG tube which is coiled in the upper neck. Please remove and replace. 2.  The ET tube is in good position stable. Right chest defibrillator pad. 3.  Slightly more conspicuous bibasilar atelectasis and interstitial hilar opacities. No new consolidation. 4.  Redemonstration of enlarged cardiac silhouette, stable.     Assessment//Plan   Active Problems:    Pneumonia (10/18/2021)      Hypoxia (10/18/2021)      Severe protein-calorie malnutrition ([address] (10/20/2021)      Acute kidney injury superimposed on chronic kidney disease ([address] (10/21/2021)      Hyperphosphatemia (10/21/2021)      Hypocalcemia (10/21/2021)      Assessment:  (Acute kidney injury superimposed on chronic kidney disease ([facility] [address]  Worsening creatinine trend from 1.5-2.25  UA significant for 1.014/pH 5.0/negative for protein glucose ketone blood   moderate, negative for nitrites and leukocyte esterase    Concurrent conditions: respiratory failure, ventillator dependence, HFrEF,   volume overload being treated with diuretics. Renal US: increased cortical echogenecity with no evidence of   obstruction/hydronephrosis    Plan:   Hold furosemide at this time; can restart once renal function stabilizes  Hyperphosphatemia  In the setting of CKD and superimposed JUAN CARLOS  In the setting of reduced phos clearance  Continued monitoring while in ICU   Hypocalcemia  In the setting of hyperphosphatemia and possibly secondary   hyperparathyroidism of renal disease      Plan:   Obtain PTH, 25-OH vitamin D  Monitor for QT prolongation and symptomatic hypocalcemia    ).        Electronically signed by Shanta Rocha MD on 10/21/2021 at 1:12 PM

## 2021-10-21 NOTE — ASSESSMENT & PLAN NOTE
Worsening creatinine trend from 1.5-2.25  UA significant for 1.014/pH 5.0/negative for protein glucose ketone blood moderate, negative for nitrites and leukocyte esterase    Concurrent conditions: respiratory failure, ventillator dependence, HFrEF, volume overload being treated with diuretics. Renal US: increased cortical echogenecity with no evidence of obstruction/hydronephrosis    Plan:   Continue to hold furosemide.  Patient continues to have adequate urine output

## 2021-10-22 PROBLEM — R35.89 POLYURIA: Status: ACTIVE | Noted: 2021-01-01

## 2021-10-22 NOTE — PROGRESS NOTES
Progress Note  Date:10/22/2021       Room:[facility]  Patient Kathi Becker     YOB: 1960     Age:61 y.o. Subjective    Subjective:  Symptoms:  Improved. Review of Systems  Objective         Vitals Last 24 Hours:  TEMPERATURE:  Temp  Av.2 °F (37.3 °C)  Min: 96.8 °F (36 °C)  Max: 100.8 °F (38.2 °C)  RESPIRATIONS RANGE: Resp  Av.2  Min: 12  Max: 20  PULSE OXIMETRY RANGE: SpO2  Av.6 %  Min: 94 %  Max: 100 %  PULSE RANGE: Pulse  Av.5  Min: 65  Max: 88  BLOOD PRESSURE RANGE: Systolic (71JXO), XWA:87 , Min:69 , BUW:125   ; Diastolic (49EMM), [place]:78, Min:53, Max:88      Physical Exam:   Intubated sedated  On Norepinnephrine; /79  No JVD; RRR  Breathing at vent rate  [place] bag with urine  No swelling      I/O (24Hr): Intake/Output Summary (Last 24 hours) at 10/22/2021 1047  Last data filed at 10/22/2021 1000  Gross per 24 hour   Intake 1885.07 ml   Output 3050 ml   Net -1164.93 ml     Objective  Labs/Imaging/Diagnostics    Labs:  CBC:  Recent Labs     10/22/21  0515 10/21/21  0430 10/20/21  0455   WBC 5.9 5.0 4.7   RBC 4.21 4.06* 3.81*   HGB 13.1 12.8 11.9*   HCT 38.3 36.7 36.2*   MCV 91.0 90.4 95.0   RDW 18.1* 17.5* 18.5*   * 124* 125*     CHEMISTRIES:  Recent Labs     10/22/21  0515 10/21/21  0430 10/20/21  1245 10/20/21  0455 10/20/21  0455    143 149*   < > 146*   K 4.4 4.6 3.4*   < > 5.0    109* 106   < > 112*   CO2 28 24 21   < > 23   BUN 68* 60* 38*   < > 53*   CA 7.3* 7.5* <5.0*   < > 7.7*   PHOS  --  7.2*  --   --  7.6*   MG 2.3 2.2  --   --  2.2    < > = values in this interval not displayed. PT/INR:No results for input(s): INR, INREXT in the last 72 hours. No lab exists for component: PROTIME  APTT:No results for input(s): APTT in the last 72 hours.   LIVER PROFILE:  Recent Labs     10/22/21  0515 10/21/21  0430   AST 44* 72*   ALT 37 53     Lab Results   Component Value Date/Time    ALT (SGPT) 37 10/22/2021 05:15 AM    AST (SGOT) 44 (H) 10/22/2021 05:15 AM    Alk. phosphatase 97 10/22/2021 05:15 AM    Bilirubin, total 0.6 10/22/2021 05:15 AM       Imaging Last 24 Hours:  XR CHEST PORT    Result Date: 10/22/2021  Indication: CHF. AP semiupright portable chest radiograph 0227 hours 10/22/2021. Comparison 21 October 2021. ET tube tip 7 cm cephalad to the brian. NG tube tip over gastric body. Unchanged bibasilar opacity which could represent airspace disease and pleural effusions. Mild cardiomegaly unchanged. No pneumothorax. XR CHEST PORT    Result Date: 10/21/2021  Indication: NG tube placement. AP portable chest radiograph 12:54 PM 21 October 2021. Comparison examination earlier the same date. ET tube tip 6.5 cm cephalad to the brian. NG tube tip to the right of L1, possibly gastric antral region. Persistent dense left base opacification obscuring the hemidiaphragm likely combination of pleural fluid and nonspecific airspace disease. Heterogeneous bilateral airspace opacities unchanged. Mild cardiomegaly unchanged. No pneumothorax. Assessment//Plan   Active Problems:    Pneumonia (10/18/2021)      Hypoxia (10/18/2021)      Severe protein-calorie malnutrition ([address] (10/20/2021)      Acute kidney injury superimposed on chronic kidney disease ([address] (10/21/2021)      Hyperphosphatemia (10/21/2021)      Hypocalcemia (10/21/2021)      Polyuria (10/22/2021)      Assessment & Plan   Acute kidney injury superimposed on chronic kidney disease ([address]  Worsening creatinine trend from 1.5-2.25  UA significant for 1.014/pH 5.0/negative for protein glucose ketone blood moderate, negative for nitrites and leukocyte esterase    Concurrent conditions: respiratory failure, ventillator dependence, HFrEF, volume overload being treated with diuretics. Renal US: increased cortical echogenecity with no evidence of obstruction/hydronephrosis    Plan:   Continue to hold furosemide.  Patient continues to have adequate urine output    Hyperphosphatemia  In the setting of CKD and superimposed JUAN CARLOS  In the setting of reduced phos clearance  Continued monitoring while in ICU     Hypocalcemia  In the setting of hyperphosphatemia and possibly secondary hyperparathyroidism of renal disease      Plan:   Obtain PTH, 25-OH vitamin D  Monitor for QT prolongation and symptomatic hypocalcemia      Polyuria  Post ATN diuresis  Monitor for hypernatremia, hypokalemia          Electronically signed by Mandy Hernandez MD on 10/22/2021 at 10:47 AM

## 2021-10-22 NOTE — PROGRESS NOTES
Pulmonary, Critical Care Note    Name: Rony Tian MRN: [id number]   : 1960 Hospital: [address] [place] [address]   Date: 10/22/2021  Admission date: 10/18/2021 Hospital Day: 5       Subjective/Interval History:   Seen in the ICU on nasal oxygen. He is awake alert extremely poor historian all I can tell from him is that he had shortness of breath and came to the hospital  10/20 worsening respiratory status last evening and required intubation now on the ventilator unresponsive on IV propofol    Patient Active Problem List   Diagnosis Code    CHF (congestive heart failure) ([facility]) I50.9    Respiratory failure with hypoxia ([facility] [address] J96.91    Fluid overload E87.70    Elevated brain natriuretic peptide (BNP) level R79.89    Noncompliance Z91.19    Tobacco abuse Z72.0    Pneumonia J18.9    Hypoxia R09.02    Severe protein-calorie malnutrition ([facility] [address] E43    Acute kidney injury superimposed on chronic kidney disease ([facility]) N17.9, N18.9    Hyperphosphatemia E83.39    Hypocalcemia E83.51       IMPRESSION:   1. Acute hypoxic respiratory failure controlled on the ventilator  2. Pulmonary edema chest x-ray still shows accentuated interstitial markings  3. Bilateral pleural effusions left greater than right  4. Ascites  5. Severe cardiomyopathy  6. Pyuria  Body mass index is 17.95 kg/m². RECOMMENDATIONS/PLAN:   1. Patient remained on ventilator 40% FiO2 tidal volume 500 PEEP of 5 rate of 12 we will start weaning  2. Lasix now twice daily received albumin  3. Creatinine up from yesterday continue  4. Follow urine culture  5. Continue DuoNeb and Symbicort will decrease Solu-Medrol  6. Ascites present may need to consider paracentesis  7. Status post EGD with NG tube placement was done yesterday  8. Unable to get consent for paracentesis         Subjective/Initial History:   I have reviewed the flowsheet and previous days notes. I was asked by Cinthya Angeles MD to see Rony Tian a 59 y.o.  male  in consultation for a chief complaint of acute hypoxic respiratory failure pulmonary edema pleural effusion      The patient is unable to give any meaningful history or review of systems due to patient factors. Patient PCP: [place], MD Zak  PMH:  has a past medical history of Chronic obstructive pulmonary disease ([facility] [address] and Heart failure ([facility] [address] PSH:   has no past surgical history on file. FHX: family history includes Hypertension in his mother. SHX:  reports that he has been smoking. He has been smoking about 0.50 packs per day. He has never used smokeless tobacco. He reports current alcohol use. He reports previous drug use.   Systemic review unreliable he is very confused    No Known Allergies   MEDS:   Current Facility-Administered Medications   Medication    zinc oxide-white petrolatum 17-57 % topical paste    NOREPINephrine (LEVOPHED) 8 mg in 5% dextrose 250mL (32 mcg/mL) infusion    [Held by provider] furosemide (LASIX) injection 60 mg    methylPREDNISolone (PF) (SOLU-MEDROL) injection 40 mg    propofol (DIPRIVAN) 10 mg/mL infusion    pantoprazole (PROTONIX) 40 mg in 0.9% sodium chloride 10 mL injection    piperacillin-tazobactam (ZOSYN) 3.375 g in 0.9% sodium chloride (MBP/ADV) 100 mL MBP    aspirin chewable tablet 81 mg    sodium chloride (NS) flush 5-40 mL    sodium chloride (NS) flush 5-40 mL    sodium chloride (NS) flush 5-10 mL    azithromycin (ZITHROMAX) 500 mg in 0.9% sodium chloride 250 mL (VIAL-MATE)    albuterol-ipratropium (DUO-NEB) 2.5 MG-0.5 MG/3 ML    carvediloL (COREG) tablet 3.125 mg    acetaminophen (TYLENOL) tablet 650 mg    Or    acetaminophen (TYLENOL) suppository 650 mg    polyethylene glycol (MIRALAX) packet 17 g    ondansetron (ZOFRAN ODT) tablet 4 mg    Or    ondansetron (ZOFRAN) injection 4 mg    heparin (porcine) injection 5,000 Units        Current Facility-Administered Medications:     zinc oxide-white petrolatum 17-57 % topical paste, , Topical, TID, Maritza Jackson MD, Given at 10/21/21 2257    NOREPINephrine (LEVOPHED) 8 mg in 5% dextrose 250mL (32 mcg/mL) infusion, 0.5-120 mcg/min, IntraVENous, TITRATE, Maritza Jackson MD, Last Rate: 15 mL/hr at 10/22/21 0703, 8 mcg/min at 10/22/21 0703    [Held by provider] furosemide (LASIX) injection 60 mg, 60 mg, IntraVENous, BID, Saúl DIAZ MD, 60 mg at 10/21/21 0823    methylPREDNISolone (PF) (SOLU-MEDROL) injection 40 mg, 40 mg, IntraVENous, Q12H, Herrera Lehman MD, 40 mg at 10/21/21 2038    propofol (DIPRIVAN) 10 mg/mL infusion, 0-50 mcg/kg/min, IntraVENous, TITRATE, Herrera Lehman MD, Last Rate: 7.6 mL/hr at 10/22/21 0703, 20 mcg/kg/min at 10/22/21 0703    pantoprazole (PROTONIX) 40 mg in 0.9% sodium chloride 10 mL injection, 40 mg, IntraVENous, Q12H, Samara Ramirez NP, 40 mg at 10/21/21 2040    piperacillin-tazobactam (ZOSYN) 3.375 g in 0.9% sodium chloride (MBP/ADV) 100 mL MBP, 3.375 g, IntraVENous, Q8H, Maritza Jackson MD, Last Rate: 25 mL/hr at 10/22/21 0628, 3.375 g at 10/22/21 5925    aspirin chewable tablet 81 mg, 81 mg, Oral, DAILY, Khushi Brown MD    sodium chloride (NS) flush 5-40 mL, 5-40 mL, IntraVENous, Q8H, Maritza Jackson MD, 10 mL at 10/22/21 6486    sodium chloride (NS) flush 5-40 mL, 5-40 mL, IntraVENous, PRN, Verónica Jackson MD    sodium chloride (NS) flush 5-10 mL, 5-10 mL, IntraVENous, PRN, Verónica Jackson Army, MD    azithromycin (ZITHROMAX) 500 mg in 0.9% sodium chloride 250 mL (VIAL-MATE), 500 mg, IntraVENous, Q24H, Maritza Jackson MD, Last Rate: 250 mL/hr at 10/21/21 2038, 500 mg at 10/21/21 2038    albuterol-ipratropium (DUO-NEB) 2.5 MG-0.5 MG/3 ML, 3 mL, Nebulization, Q6H PRN, Maritza Jackson MD    carvediloL (COREG) tablet 3.125 mg, 3.125 mg, Oral, BID WITH MEALS, Maritza Jackson MD, 3.125 mg at 10/21/21 1649    acetaminophen (TYLENOL) tablet 650 mg, 650 mg, Oral, Q6H PRN, 650 mg at 10/21/21 1648 **OR** acetaminophen (TYLENOL) suppository 650 mg, 650 mg, Rectal, Q6H PRN, Yesica Jackson MD    polyethylene glycol (MIRALAX) packet 17 g, 17 g, Oral, DAILY PRN, Yesica Jackson MD    ondansetron (ZOFRAN ODT) tablet 4 mg, 4 mg, Oral, Q8H PRN **OR** ondansetron (ZOFRAN) injection 4 mg, 4 mg, IntraVENous, Q6H PRN, Maritza Jackson MD    heparin (porcine) injection 5,000 Units, 5,000 Units, SubCUTAneous, Q8H, Maritza Jackson MD, 5,000 Units at 10/22/21 3142      Objective:     Vital Signs: Telemetry:    normal sinus rhythm Intake/Output:   Visit Vitals  BP 91/69 (BP 1 Location: Left upper arm, BP Patient Position: At rest)   Pulse 67   Temp 96.8 °F (36 °C)   Resp 12   Ht 6' 2\" (1.88 m)   Wt 63.4 kg (139 lb 12.4 oz)   SpO2 99%   BMI 17.95 kg/m²       Temp (24hrs), Av.5 °F (37.5 °C), Min:96.8 °F (36 °C), Max:100.8 °F (38.2 °C)        O2 Device: Ventilator O2 Flow Rate (L/min): 3 l/min         Body mass index is 17.95 kg/m². Wt Readings from Last 4 Encounters:   10/21/21 63.4 kg (139 lb 12.4 oz)   21 52.4 kg (115 lb 8.3 oz)   21 54.2 kg (119 lb 7.8 oz)          Intake/Output Summary (Last 24 hours) at 10/22/2021 0921  Last data filed at 10/22/2021 0300  Gross per 24 hour   Intake 1046.68 ml   Output 3050 ml   Net -2003.32 ml       Last shift:      No intake/output data recorded. Last 3 shifts: 10/20 1901 - 10/22 0700  In: 1099.3 [I.V.:789.3]  Out: 3850 [Urine:3850]       Hemodynamics:    CO:    CI:    CVP:    SVR:   PAP Systolic:    PAP Diastolic:    PVR:    [id number]:       Ventilator Settings:      Mode Rate TV Press PEEP FiO2 PIP Min. Vent   Assist control, Volume control    50 ml    5 cm H20 40 %  15 cm H2O  5.99 l/min      Physical Exam:    General:  male; unresponsive on propofol on the ventilator  HEENT: NCAT, poor dentition, oral mucosa moist  Eyes: anicteric; conjunctiva clear doll's eye reflex  Neck: no nodes, JVD present, no accessory MM use.   Chest: no deformity,  Cardiac: Regular rate and rhythm systolic murmur present peripheral edema present  Lungs: Clear anteriorly with lateral rales extending posteriorly no wheezing  Abd: Relatively soft nontender bowel sounds present  Ext: Leg edema  no joint swelling; No clubbing  : clear urine  Neuro: Unresponsive on propofol doll's eye reflex present flaccid extremities  Psych-unable to assess  Skin: warm, dry, no cyanosis;  Pulses: Brachial and radial pulses intact  Capillary: Slow capillary refill      Labs:    Recent Labs     10/22/21  0515 10/21/21  0430 10/20/21  0455   WBC 5.9 5.0 4.7   HGB 13.1 12.8 11.9*   * 124* 125*     Recent Labs     10/22/21  0515 10/21/21  0430 10/20/21  1245 10/20/21  0455 10/20/21  0455    143 149*   < > 146*   K 4.4 4.6 3.4*   < > 5.0    109* 106   < > 112*   CO2 28 24 21   < > 23   * 122* 69   < > 111*   BUN 68* 60* 38*   < > 53*   CREA 2.22* 2.25* 1.56*   < > 2.13*   CA 7.3* 7.5* <5.0*   < > 7.7*   MG 2.3 2.2  --   --  2.2   PHOS  --  7.2*  --   --  7.6*   ALB 2.5* 3.0*  --   --  2.4*   ALT 37 53  --   --   --     < > = values in this interval not displayed. 10/28/C 12  PEEP 5 FiO2 40% with PO2 90 PCO2 36 pH 7.40  Blood 1 of 4 with gram-positive cocci  Sputum culture pending  Urine culture pending  Nasal MRSA smear negative  BNP greater than 35,000  Troponin I 178, 135, 122  COVID-19 antigen negative  8/11/2021 echo ejection fraction 20% moderate aortic regurg mild to moderate mitral regurg IVC dilated PASP 57  Imaging:  I have personally reviewed the patients radiographs and have reviewed the reports:    CXR Results  (Last 48 hours)               10/18/21 1928  XR CHEST PORT Final result    Impression:  1. Small left pleural effusion and multifocal airspace disease suspicious for   pneumonia. 2.Mild pulmonary vascular congestion. Narrative:  AP portable chest, 1915 hours. Comparison: 9/3/2021. Findings: There is mild to moderate cardiomegaly.  Mild pulmonary vascular   congestion is noted. There is patchy bilateral airspace disease, left greater   than right. A small left pleural effusion is noted. There is more consolidative   airspace disease in the left lung base. No pneumothorax is identified. The   osseous structures are unremarkable. To me the x-ray looks more like accentuated interstitial markings consistent with pulmonary edema and the left effusion           Results from Hospital Encounter encounter on 10/18/21    XR CHEST PORT    Narrative  Indication: NG tube placement. AP portable chest radiograph 12:54 PM 21 October 2021. Comparison examination  earlier the same date. ET tube tip 6.5 cm cephalad to the brian. NG tube tip to the right of L1, possibly gastric antral region. Persistent dense left base opacification obscuring the hemidiaphragm likely  combination of pleural fluid and nonspecific airspace disease. Heterogeneous bilateral airspace opacities unchanged. Mild cardiomegaly unchanged. No pneumothorax. XR CHEST PORT    Narrative  Indication: NG tube. AP semiupright portable chest radiograph 0944 hours 10/21/2021. Comparison  examination earlier the same date. Heterogeneous bilateral airspace disease unchanged. Dense opacity left lung base  obscuring the diaphragm unchanged. Cardiomegaly unchanged. No pneumothorax. No apparent pleural effusion. ET tube tip 4.5 cm cephalad to the brian. Interval NG tube, tip to the right of L3, likely proximal duodenum. XR CHEST PORT    Narrative  Indication: CHF. AP semiupright portable chest radiograph 0227 hours 10/22/2021. Comparison 21 October 2021. ET tube tip 7 cm cephalad to the brian. NG tube tip over gastric body. Unchanged bibasilar opacity which could represent airspace disease and pleural  effusions. Mild cardiomegaly unchanged. No pneumothorax.     Results from [place] encounter on 10/18/21    CTA ABD PELV W WO CONT    Narrative  HISTORY: possible gi bleed  Dose reduction technique: All CT scans at this facility are performed using dose reduction optimization  technique as appropriate on the exam including the following: Automated exposure  control, adjustment of the MA and/or KV according to patient size of use of  iterative reconstructive technique. .    TECHNIQUE: CTA ABD PELV W WO CONT. CT angiogram of the abdomen and pelvis  performed and maximum intensity projection images (MIPS) generated. 100 cc Isovue-370 injected. COMPARISON: None  LIMITATIONS: None    CHEST: Multichamber cardiomegaly. Small right and moderate left pleural  effusion. Bibasilar airspace consolidation overlies the effusions. Moderate  emphysematous changes incidentally noted at the lung bases. AORTA: No aneurysm or dissection. Mild peripheral areas of aortic calcification  CELIAC AXIS: Patent. SUPERIOR MESENTERIC ARTERY: Patent. RENAL ARTERIES: Patent. INFERIOR MESENTERIC ARTERY: Patent. ILIAC ARTERIES: Patent. Mild peripheral areas of calcification. .  Visualized femoral vasculature:  Patent. LIVER: Normal.  GALLBLADDER: Distended. BILIARY TREE: Normal.  PANCREAS: Normal.  SPLEEN: Normal.  ADRENAL GLANDS: Normal.  KIDNEYS/URETERS/BLADDER: Multiple areas of peripheral hypoperfusion throughout  both kidneys which does persist on the venous and delayed images. No saurabh  obstructive changes. The bladder is catheterized/collapsed. RETROPERITONEUM: Normal.  BOWEL/MESENTERY: no obvious acute abnormality. No evidence of GI hemorrhage on  arterial, venous or delayed phase images. .  APPENDIX: Identified and normal.  PERITONEAL CAVITY: Profound abdominopelvic ascites distending the abdomen and  centralizing many of the bowel loops. REPRODUCTIVE ORGANS: Normal.  BONE/TISSUES: No acute osseous abnormality. Diffuse edema throughout the  subcutaneous tissue of the visualized chest abdomen, pelvis and bilateral lower  extremities. .    OTHER: None    Impression  Overall there is intact arterial circulation of the abdomen and  pelvis and there is no saurabh evidence of an acute GI hemorrhage. There is  however diffuse sequela of third spacing with bilateral pleural effusions,  profound abdominopelvic ascites, and diffuse subcutaneous edema. Considerations  for sequela of cardiac, hepatic and/or renal dysfunction. Bilateral areas of  renal parenchymal hypoperfusion are noted and could indicate sequela of  infection/pyelonephritis although by appearance might also suggest sequela of  renal infarcts of uncertain age. Component of CHF suggested given the lung base  findings described. .    Results called to Huang Atilio on 10/19/2021 12:27 AM.      · Discussion severe cardiomyopathy ejection fraction 20% with ascites peripheral edema bilateral pleural effusion left greater than right on CT scan December this changes on CT scan. He continues to smoke. Will increase Lasix to every 8 hour dosing for the next 24 hours  · Follow blood gases electrolytes and chest x-ray  · 10/20 intubated last evening blood gases well-maintained on the ventilator will maintain as is today. Lasix twice daily ordered. Will give 2 doses of albumin. Creatinine has risen from yesterday. · 10/21 still has significant ascites for EGD today with NG tube placement  · 10/22 start weaning change vent setting to spontaneous if weaning criteria acceptable will extubate. · Time of care 30 minutes           Thank you for allowing us to participate in the care of this patient.   We will follow along with you   Leroy Leone MD

## 2021-10-22 NOTE — PROGRESS NOTES
CM reviewed clinical chart. Patient is still being actively treated by cardiology, nephrology, and pulmonology. CM team will continue to follow for any needs at discharge.

## 2021-10-22 NOTE — PROGRESS NOTES
Comprehensive Nutrition Assessment    Type and Reason for Visit: Reassess (interim)    Nutrition Recommendations/Plan:   Adjust TF to Osmolite 1.2, increasing rate to 60mL/hr, continuous  Add 1 pkt Prosource daily (60kcal, 15g protein)  Flush with 75mL free water q4hr  Goal feeds provide 1788kcal, 95g pro, 1631mL fluid (94%kcal, 100%pro, 104%fluid)     Propofol (15mcg/kg/min) providing 157kcal/day (102%kcal needs with TF)     Document TF rate, protein modular provision, water flushes, and GRVs in EMR    Provide 100mg/d Thiamine x5-7 days  Consider addition of Phos Binder; rec'd q4h while pt on continuous feeds    Obtain updated measured BW     Provide miralax daily    Nutrition Assessment:  Worsening SOB, AMS. Intubated (10/19) for worsening resp distress. Nephrology following for JUAN CARLOS. RD ordered TF (10/20). Difficulty placing NG so EGD NG (10/21). CXR showing NGT in Duodenum so pulled back. TF initiated same day as Jevity 1.5 at 30mL/hr, remains at this rate. RD provide recs again. Prior to intubation, SLP rec'd MM5/Mod Thick. Labs: Na wnl, BUN 68, Cr 2.22, , Ca 7.3, Phos 7.2, Mg + K wnl. Meds: norepi, methylprednisolone, Propofol (15mcg/kg/min), zosyn, heparin, PPI, miralax prn. Malnutrition Assessment:  Malnutrition Status:  Severe malnutrition    Context:  Chronic illness       Estimated Daily Nutrient Needs:  Energy (kcal): 1900kcal (30kcal/kg); Weight Used for Energy Requirements: Current  Protein (g): 95g (1.5g/kg); Weight Used for Protein Requirements: Current  Fluid (ml/day): 1575mL (25mL/kg); Method Used for Fluid Requirements:      Nutrition Related Findings:  NFPE (10/19) finding moderate-severe muscle and fat wasting. NG in place, infusing. Pt reported hx dysphagia. RD unable to assess dentition d/t BiPAP. No noted n/v or c/d. No BM documented since admit- no bowel regimen yet provided. Trace generalized edema.       Wounds:    None       Current Nutrition Therapies:  ADULT TUBE FEEDING Orogastric; Standard without Fiber; Delivery Method: Continuous; Continuous Initial Rate (mL/hr): 20; Continuous Advance Tube Feeding: Yes; Advancement Volume (mL/hr): 10; Advancement Frequency: Q 4 hours; Continuous Goal Rate (... ADULT TUBE FEEDING Nasogastric; Standard with Fiber; Delivery Method: Continuous; Continuous Initial Rate (mL/hr): 30; Continuous Advance Tube Feeding: No; Water Flush Volume (mL): 30; Water Flush Frequency: Q 3 hours    Anthropometric Measures:  · Height:  6' 2\" (188 cm)  · Current Body Wt:  63.5 kg (139 lb 15.9 oz)   · Ideal Body Wt:  190 lbs:  73.7 %   · BMI Category:  Underweight (BMI less than 22) age over 72       Nutrition Diagnosis:   · Increased nutrient needs related to increased demand for energy/nutrients as evidenced by BMI, severe loss of subcutaneous fat    Nutrition Interventions:   Food and/or Nutrient Delivery: Continue NPO, Start tube feeding  Nutrition Education and Counseling: No recommendations at this time  Coordination of Nutrition Care: Continue to monitor while inpatient    Goals:  Initiate means of nutrition to meet >50% EENs in 5 days, Lytes wnl, Wt gain 1kg +/- 0.5kg per week       Nutrition Monitoring and Evaluation:   Behavioral-Environmental Outcomes: None identified  Food/Nutrient Intake Outcomes: Enteral nutrition intake/tolerance  Physical Signs/Symptoms Outcomes: Weight, Biochemical data    Discharge Planning:     Too soon to determine     Electronically signed by Yahir Toure on 10/22/2021 at 12:47 PM    Contact:

## 2021-10-22 NOTE — PROGRESS NOTES
General Daily Progress Note          Patient Name:   Uniuqe Abreu       YOB: 1960       Age:  59 y.o.       Admit Date: 10/18/2021      Subjective:     Patient is a 59y.o. year old male no past medical history of hypertension COPD cardiomyopathy with systolic heart failure with ejection fraction with 25% ongoing smoking came to emergency room because of shortness of breath patient was recently discharged from the hospital after multiple admission for same reason seen by the ER physician x-ray shows pneumonia patient was recommend to be admitted for further evaluation treatment at the same time patient has no abdominal pain  No work-up done in the ER initially , abdominal distended no CT scan was ordered, nurse try to put the [place] in no urine output at that time CT scan of the abdomen was ordered results pending      On ventilator on 40% O2    Patient have a cardiac arrest and intubated on treatment now  Hypotensive on Levophed  On propofol for sedation    Endoscopy done yesterday NG tube was placed           Objective:     Visit Vitals  BP 91/69 (BP 1 Location: Left upper arm, BP Patient Position: At rest)   Pulse 67   Temp 96.8 °F (36 °C)   Resp 12   Ht 6' 2\" (1.88 m)   Wt 63.4 kg (139 lb 12.4 oz)   SpO2 99%   BMI 17.95 kg/m²        Recent Results (from the past 24 hour(s))   CBC WITH AUTOMATED DIFF    Collection Time: 10/22/21  5:15 AM   Result Value Ref Range    WBC 5.9 4.1 - 11.1 K/uL    RBC 4.21 4.10 - 5.70 M/uL    HGB 13.1 12.1 - 17.0 g/dL    HCT 38.3 36.6 - 50.3 %    MCV 91.0 80.0 - 99.0 FL    MCH 31.1 26.0 - 34.0 PG    MCHC 34.2 30.0 - 36.5 g/dL    RDW 18.1 (H) 11.5 - 14.5 %    PLATELET 455 (L) 832 - 400 K/uL    MPV 11.5 8.9 - 12.9 FL    NRBC 1.0 (H) 0.0  WBC    ABSOLUTE NRBC 0.06 (H) 0.00 - 0.01 K/uL    NEUTROPHILS 83 (H) 32 - 75 %    LYMPHOCYTES 10 (L) 12 - 49 %    MONOCYTES 6 5 - 13 %    EOSINOPHILS 0 0 - 7 %    BASOPHILS 0 0 - 1 %    IMMATURE GRANULOCYTES 1 (H) 0 - 0.5 % ABS. NEUTROPHILS 4.9 1.8 - 8.0 K/UL    ABS. LYMPHOCYTES 0.6 (L) 0.8 - 3.5 K/UL    ABS. MONOCYTES 0.4 0.0 - 1.0 K/UL    ABS. EOSINOPHILS 0.0 0.0 - 0.4 K/UL    ABS. BASOPHILS 0.0 0.0 - 0.1 K/UL    ABS. IMM. GRANS. 0.0 0.00 - 0.04 K/UL    DF AUTOMATED     METABOLIC PANEL, COMPREHENSIVE    Collection Time: 10/22/21  5:15 AM   Result Value Ref Range    Sodium 144 136 - 145 mmol/L    Potassium 4.4 3.5 - 5.1 mmol/L    Chloride 107 97 - 108 mmol/L    CO2 28 21 - 32 mmol/L    Anion gap 9 5 - 15 mmol/L    Glucose 130 (H) 65 - 100 mg/dL    BUN 68 (H) 6 - 20 mg/dL    Creatinine 2.22 (H) 0.70 - 1.30 mg/dL    BUN/Creatinine ratio 31 (H) 12 - 20      GFR est AA 36 (L) >60 ml/min/1.73m2    GFR est non-AA 30 (L) >60 ml/min/1.73m2    Calcium 7.3 (L) 8.5 - 10.1 mg/dL    Bilirubin, total 0.6 0.2 - 1.0 mg/dL    AST (SGOT) 44 (H) 15 - 37 U/L    ALT (SGPT) 37 12 - 78 U/L    Alk. phosphatase 97 45 - 117 U/L    Protein, total 5.3 (L) 6.4 - 8.2 g/dL    Albumin 2.5 (L) 3.5 - 5.0 g/dL    Globulin 2.8 2.0 - 4.0 g/dL    A-G Ratio 0.9 (L) 1.1 - 2.2     MAGNESIUM    Collection Time: 10/22/21  5:15 AM   Result Value Ref Range    Magnesium 2.3 1.6 - 2.4 mg/dL   BLOOD GAS, ARTERIAL    Collection Time: 10/22/21  6:00 AM   Result Value Ref Range    pH 7.46 (H) 7.35 - 7.45      PCO2 37 35 - 45 mmHg    PO2 83 75 - 100 mmHg    O2 SAT 97 >95 %    BICARBONATE 26 22 - 26 mmol/L    BASE EXCESS 2.0 0 - 2 mmol/L    O2 METHOD VENT      FIO2 40.0 %    MODE Assist Control/Volume Control      Tidal volume 500      SET RATE 12      EPAP/CPAP/PEEP 5.0      Sample source Arterial      SITE Right Radial      MARIXA'S TEST PASS       [unfilled]      Review of Systems    Unable to obtain . Physical Exam:      Constitutional: On ventilator :   Head: Normocephalic and atraumatic. Eyes: Pupils are equal, round, and reactive to light. EOM are normal.   Cardiovascular: Normal rate, regular rhythm and normal heart sounds.    Pulmonary/Chest: Breath sounds normal. No wheezes. No rales. Exhibits no tenderness. Abdominal: Soft. Bowel sounds are normal. There is no abdominal tenderness. There is no rebound and no guarding. Musculoskeletal: Normal range of motion. Neurological: Ventilator on sedation  XR CHEST PORT   Final Result      XR CHEST PORT   Final Result      XR CHEST PORT   Final Result      XR CHEST PORT   Final Result   Findings/impression:   1. Stable ET tube. Interval removal of defibrillator pads. The previously noted   malpositioned OG, is not visualized. 2.  Patchy groundglass opacities in the hilar and perihilar region bilaterally   overall stable. No new consolidation. 3.  Remainder unchanged            XR CHEST PORT   Final Result   Findings/impression:   1. Interval placement of an OG tube which is coiled in the upper neck. Please   remove and replace. 2.  The ET tube is in good position stable. Right chest defibrillator pad. 3.  Slightly more conspicuous bibasilar atelectasis and interstitial hilar   opacities. No new consolidation. 4.  Redemonstration of enlarged cardiac silhouette, stable. US RETROPERITONEUM COMP   Final Result   Echogenic kidneys consistent with medical renal disease. No   hydronephrosis. Ascites. XR CHEST PORT   Final Result      XR CHEST PORT   Final Result   Cardiomegaly and/or pericardial effusion with mild sequela of   presumed CHF and/or pulmonary edema, infection, or combination of these and with   associated left pleural effusion. ET tube in place as described      CTA ABD PELV W WO CONT   Final Result   Overall there is intact arterial circulation of the abdomen and   pelvis and there is no saurabh evidence of an acute GI hemorrhage. There is   however diffuse sequela of third spacing with bilateral pleural effusions,   profound abdominopelvic ascites, and diffuse subcutaneous edema. Considerations   for sequela of cardiac, hepatic and/or renal dysfunction.  Bilateral areas of renal parenchymal hypoperfusion are noted and could indicate sequela of   infection/pyelonephritis although by appearance might also suggest sequela of   renal infarcts of uncertain age. Component of CHF suggested given the lung base   findings described. .      Results called to Kaleigh Standing on 10/19/2021 12:27 AM.      XR CHEST PORT   Final Result   1. Small left pleural effusion and multifocal airspace disease suspicious for   pneumonia. 2.Mild pulmonary vascular congestion. Recent Results (from the past 24 hour(s))   CBC WITH AUTOMATED DIFF    Collection Time: 10/22/21  5:15 AM   Result Value Ref Range    WBC 5.9 4.1 - 11.1 K/uL    RBC 4.21 4.10 - 5.70 M/uL    HGB 13.1 12.1 - 17.0 g/dL    HCT 38.3 36.6 - 50.3 %    MCV 91.0 80.0 - 99.0 FL    MCH 31.1 26.0 - 34.0 PG    MCHC 34.2 30.0 - 36.5 g/dL    RDW 18.1 (H) 11.5 - 14.5 %    PLATELET 871 (L) 812 - 400 K/uL    MPV 11.5 8.9 - 12.9 FL    NRBC 1.0 (H) 0.0  WBC    ABSOLUTE NRBC 0.06 (H) 0.00 - 0.01 K/uL    NEUTROPHILS 83 (H) 32 - 75 %    LYMPHOCYTES 10 (L) 12 - 49 %    MONOCYTES 6 5 - 13 %    EOSINOPHILS 0 0 - 7 %    BASOPHILS 0 0 - 1 %    IMMATURE GRANULOCYTES 1 (H) 0 - 0.5 %    ABS. NEUTROPHILS 4.9 1.8 - 8.0 K/UL    ABS. LYMPHOCYTES 0.6 (L) 0.8 - 3.5 K/UL    ABS. MONOCYTES 0.4 0.0 - 1.0 K/UL    ABS. EOSINOPHILS 0.0 0.0 - 0.4 K/UL    ABS. BASOPHILS 0.0 0.0 - 0.1 K/UL    ABS. IMM.  GRANS. 0.0 0.00 - 0.04 K/UL    DF AUTOMATED     METABOLIC PANEL, COMPREHENSIVE    Collection Time: 10/22/21  5:15 AM   Result Value Ref Range    Sodium 144 136 - 145 mmol/L    Potassium 4.4 3.5 - 5.1 mmol/L    Chloride 107 97 - 108 mmol/L    CO2 28 21 - 32 mmol/L    Anion gap 9 5 - 15 mmol/L    Glucose 130 (H) 65 - 100 mg/dL    BUN 68 (H) 6 - 20 mg/dL    Creatinine 2.22 (H) 0.70 - 1.30 mg/dL    BUN/Creatinine ratio 31 (H) 12 - 20      GFR est AA 36 (L) >60 ml/min/1.73m2    GFR est non-AA 30 (L) >60 ml/min/1.73m2    Calcium 7.3 (L) 8.5 - 10.1 mg/dL    Bilirubin, total 0.6 0.2 - 1.0 mg/dL    AST (SGOT) 44 (H) 15 - 37 U/L    ALT (SGPT) 37 12 - 78 U/L    Alk.  phosphatase 97 45 - 117 U/L    Protein, total 5.3 (L) 6.4 - 8.2 g/dL    Albumin 2.5 (L) 3.5 - 5.0 g/dL    Globulin 2.8 2.0 - 4.0 g/dL    A-G Ratio 0.9 (L) 1.1 - 2.2     MAGNESIUM    Collection Time: 10/22/21  5:15 AM   Result Value Ref Range    Magnesium 2.3 1.6 - 2.4 mg/dL   BLOOD GAS, ARTERIAL    Collection Time: 10/22/21  6:00 AM   Result Value Ref Range    pH 7.46 (H) 7.35 - 7.45      PCO2 37 35 - 45 mmHg    PO2 83 75 - 100 mmHg    O2 SAT 97 >95 %    BICARBONATE 26 22 - 26 mmol/L    BASE EXCESS 2.0 0 - 2 mmol/L    O2 METHOD VENT      FIO2 40.0 %    MODE Assist Control/Volume Control      Tidal volume 500      SET RATE 12      EPAP/CPAP/PEEP 5.0      Sample source Arterial      SITE Right Radial      MARIXA'S TEST PASS         Results     Procedure Component Value Units Date/Time    MRSA SCREEN - PCR (NASAL) [[id number]] Collected: 10/20/21 0800    Order Status: Completed Specimen: Swab Updated: 10/20/21 0959     MRSA by PCR, Nasal Not Detected       CULTURE, RESPIRATORY/SPUTUM/BRONCH [place] STAIN [[id number]] Collected: 10/20/21 0800    Order Status: Completed Specimen: Sputum Updated: 10/21/21 1333     Special Requests: No Special Requests        GRAM STAIN Occasional WBCs seen         1+ Epithelial cells seen         No organisms seen        Culture result:       Light Yeast, (apparent candida albicans)                  No normal respiratory [place] isolated          CULTURE, BLOOD, LINE [[id number]] Collected: 10/19/21 0045    Order Status: Canceled Specimen: Blood     CULTURE, BLOOD #1 [[id number]]     Order Status: Canceled Specimen: Blood     CULTURE, BLOOD #2 [[id number]]     Order Status: Canceled Specimen: Blood     CULTURE, URINE [[id number]]  (Abnormal)  (Susceptibility) Collected: 10/18/21 2136    Order Status: Completed Specimen: Urine Updated: 10/21/21 0946     Special Requests: --        No Special Requests  Reflexed from [id number]       [place] Count --        >100,000  colonies/ml       Culture result: Escherichia coli       Susceptibility      Escherichia coli      NEHA      Amikacin ($) Susceptible      Ampicillin ($) Susceptible      Ampicillin/sulbactam ($) Susceptible      Cefazolin ($) Susceptible      Cefepime ($$) Susceptible      Cefoxitin Susceptible      Ceftazidime ($) Susceptible      Ceftriaxone ($) Susceptible      Ciprofloxacin ($) Susceptible      Gentamicin ($) Susceptible      Levofloxacin ($) Susceptible      Meropenem ($$) Susceptible      Nitrofurantoin Susceptible      Piperacillin/Tazobac ($) Susceptible      Tobramycin ($) Susceptible      Trimeth/Sulfa Susceptible               Linear View                   COVID-19 RAPID TEST [[id number]] Collected: 10/18/21 2055    Order Status: Completed Specimen: Nasopharyngeal Updated: 10/18/21 2148     Specimen source Nasopharyngeal        COVID-19 rapid test Not Detected        Comment: Rapid [employer] ID Now   Rapid NAAT:  The specimen is NEGATIVE for SARS-CoV-2, the novel coronavirus associated with COVID-19. Negative results should be treated as presumptive and, if inconsistent with clinical signs and symptoms or necessary for patient management, should be tested with an alternative molecular assay. Negative results do not preclude SARS-CoV-2 infection and should not be used as the sole basis for patient management decisions. This test has been authorized by the FDA under   an Emergency Use Authorization (EUA) for use by authorized laboratories.  Fact sheet for Healthcare Providers: ConventionUpdate.co.nz Fact sheet for Patients: ConventionUpdate.co.nz   Methodology: Isothermal Nucleic Acid Amplification         CULTURE, BLOOD, PAIRED [[id number]]  (Abnormal) Collected: 10/18/21 1935    Order Status: Completed Specimen: Blood Updated: 10/22/21 0646     Special Requests: No Special Requests        Culture result:       Alpha streptococcus, not S. pneumoniae GROWING IN THE ANAEROBIC BOTTLE (SITE NOT INDICATED)                  Diphtheroids GROWING IN THE AEROBIC BOTTLE                 Labs:     Recent Labs     10/22/21  0515 10/21/21  0430   WBC 5.9 5.0   HGB 13.1 12.8   HCT 38.3 36.7   * 124*     Recent Labs     10/22/21  0515 10/21/21  0430 10/20/21  1245 10/20/21  0455 10/20/21  0455    143 149*   < > 146*   K 4.4 4.6 3.4*   < > 5.0    109* 106   < > 112*   CO2 28 24 21   < > 23   BUN 68* 60* 38*   < > 53*   CREA 2.22* 2.25* 1.56*   < > 2.13*   * 122* 69   < > 111*   CA 7.3* 7.5* <5.0*   < > 7.7*   MG 2.3 2.2  --   --  2.2   PHOS  --  7.2*  --   --  7.6*    < > = values in this interval not displayed. Recent Labs     10/22/21  0515 10/21/21  0430 10/20/21  0455   ALT 37 53  --    AP 97 124*  --    TBILI 0.6 0.7  --    TP 5.3* 5.7*  --    ALB 2.5* 3.0* 2.4*   GLOB 2.8 2.7  --      No results for input(s): INR, PTP, APTT, INREXT, INREXT in the last 72 hours. No results for input(s): FE, TIBC, PSAT, FERR in the last 72 hours. Lab Results   Component Value Date/Time    Folate 18.6 08/12/2021 11:00 AM      Recent Labs     10/22/21  0600 10/21/21  0330   PH 7.46* 7.43   PCO2 37 37   PO2 83 91     No results for input(s): CPK, CKNDX, TROIQ in the last 72 hours.     No lab exists for component: CPKMB  Lab Results   Component Value Date/Time    Cholesterol, total 178 08/10/2021 02:40 PM    HDL Cholesterol 41 08/10/2021 02:40 PM    LDL, calculated 107.8 (H) 08/10/2021 02:40 PM    Triglyceride 146 08/10/2021 02:40 PM    CHOL/HDL Ratio 4.3 08/10/2021 02:40 PM     Lab Results   Component Value Date/Time    Glucose (POC) 139 (H) 10/19/2021 09:42 PM     Lab Results   Component Value Date/Time    Color Yellow/Straw 10/20/2021 12:55 PM    Appearance Turbid (A) 10/20/2021 12:55 PM    Specific gravity 1.014 10/20/2021 12:55 PM    pH (UA) 5.0 10/20/2021 12:55 PM    Protein Negative 10/20/2021 12:55 PM Glucose Negative 10/20/2021 12:55 PM    Ketone Negative 10/20/2021 12:55 PM    Bilirubin Negative 10/20/2021 12:55 PM    Urobilinogen 0.1 10/20/2021 12:55 PM    Nitrites Negative 10/20/2021 12:55 PM    Leukocyte Esterase Trace (A) 10/20/2021 12:55 PM    Bacteria Negative 10/20/2021 12:55 PM    WBC 0-4 10/20/2021 12:55 PM    RBC 5-10 10/20/2021 12:55 PM         Assessment:     Acute hypoxemic respiratory failure  on ventilator 40% O2  gram-negative pneumonia  Acute exacerbation of COPD  Acute systolic heart failure   Abdominal distention  Possible urinary retention  Thrombocytopenia  Neutropenia  Acute kidney injury  Elevated BNP  Elevated troponin  UTI with E. coli        Plan:     Continue nebulizer treatment every 6 hours  On IV Zosyn and IV Zithromax follow blood culture urine cultures    Lasix on hold due to worsening renal function  continue IV Solu-Medrol    Repeat the labs  On Levophed for hypotension  On propofol for sedation    Monitor platelet count  Monitor renal function  Monitor sodium level            Current Facility-Administered Medications:     zinc oxide-white petrolatum 17-57 % topical paste, , Topical, TID, Maritza Jackson MD, Given at 10/21/21 2257    NOREPINephrine (LEVOPHED) 8 mg in 5% dextrose 250mL (32 mcg/mL) infusion, 0.5-120 mcg/min, IntraVENous, TITRATE, Maritza Jackson MD, Last Rate: 15 mL/hr at 10/22/21 0703, 8 mcg/min at 10/22/21 0703    [Held by provider] furosemide (LASIX) injection 60 mg, 60 mg, IntraVENous, BID, Lili DIAZ MD, 60 mg at 10/21/21 0823    methylPREDNISolone (PF) (SOLU-MEDROL) injection 40 mg, 40 mg, IntraVENous, Q12H, Bossman Martines MD, 40 mg at 10/21/21 2038    propofol (DIPRIVAN) 10 mg/mL infusion, 0-50 mcg/kg/min, IntraVENous, TITRATE, Bossman Martines MD, Last Rate: 7.6 mL/hr at 10/22/21 0703, 20 mcg/kg/min at 10/22/21 0703    pantoprazole (PROTONIX) 40 mg in 0.9% sodium chloride 10 mL injection, 40 mg, IntraVENous, Q12H, Roxana Ramirez R., NP, 40 mg at 10/21/21 2040    piperacillin-tazobactam (ZOSYN) 3.375 g in 0.9% sodium chloride (MBP/ADV) 100 mL MBP, 3.375 g, IntraVENous, Q8H, Maritza Jackson MD, Last Rate: 25 mL/hr at 10/22/21 0628, 3.375 g at 10/22/21 1860    aspirin chewable tablet 81 mg, 81 mg, Oral, DAILY, Khushi Brown MD    sodium chloride (NS) flush 5-40 mL, 5-40 mL, IntraVENous, Q8H, Maritza Jackson MD, 10 mL at 10/22/21 8308    sodium chloride (NS) flush 5-40 mL, 5-40 mL, IntraVENous, PRN, Christie Jackson MD    sodium chloride (NS) flush 5-10 mL, 5-10 mL, IntraVENous, PRN, Christie Jackson MD    azithromycin (ZITHROMAX) 500 mg in 0.9% sodium chloride 250 mL (VIAL-MATE), 500 mg, IntraVENous, Q24H, Maritza Jackson MD, Last Rate: 250 mL/hr at 10/21/21 2038, 500 mg at 10/21/21 2038    albuterol-ipratropium (DUO-NEB) 2.5 MG-0.5 MG/3 ML, 3 mL, Nebulization, Q6H PRN, Christie Jackson MD    carvediloL (COREG) tablet 3.125 mg, 3.125 mg, Oral, BID WITH MEALS, Christie Jackson MD, 3.125 mg at 10/21/21 1649    acetaminophen (TYLENOL) tablet 650 mg, 650 mg, Oral, Q6H PRN, 650 mg at 10/21/21 1648 **OR** acetaminophen (TYLENOL) suppository 650 mg, 650 mg, Rectal, Q6H PRN, hCristie Jackson MD    polyethylene glycol (MIRALAX) packet 17 g, 17 g, Oral, DAILY PRN, Christie Jackson MD    ondansetron (ZOFRAN ODT) tablet 4 mg, 4 mg, Oral, Q8H PRN **OR** ondansetron (ZOFRAN) injection 4 mg, 4 mg, IntraVENous, Q6H PRN, Maritza Jackson MD    heparin (porcine) injection 5,000 Units, 5,000 Units, SubCUTAneous, Q8H, Maritza Jackson MD, 5,000 Units at 10/22/21 4245

## 2021-10-23 NOTE — PROGRESS NOTES
General Daily Progress Note          Patient Name:   Tyree Mendoza       YOB: 1960       Age:  59 y.o. Admit Date: 10/18/2021      Subjective:     Patient is a 59y.o. year old male no past medical history of hypertension COPD cardiomyopathy with systolic heart failure with ejection fraction with 25% ongoing smoking came to emergency room because of shortness of breath patient was recently discharged from the hospital after multiple admission for same reason seen by the ER physician x-ray shows pneumonia patient was recommend to be admitted for further evaluation treatment at the same time patient has no abdominal pain  No work-up done in the ER initially , abdominal distended no CT scan was ordered, nurse try to put the [place] in no urine output at that time CT scan of the abdomen was ordered results pending      On ventilator on 40% O2    Patient have a cardiac arrest and intubated   Hypotensive on Levophed  On propofol for sedation  NG tube with feeding             Objective:     Visit Vitals  /88   Pulse 83   Temp 98.6 °F (37 °C)   Resp 12   Ht 6' 2\" (1.88 m)   Wt 66.2 kg (145 lb 15.1 oz)   SpO2 98%   BMI 18.74 kg/m²        Recent Results (from the past 24 hour(s))   BLOOD GAS, ARTERIAL    Collection Time: 10/23/21  4:00 AM   Result Value Ref Range    pH 7.44 7.35 - 7.45      PCO2 40 35 - 45 mmHg    PO2 88 75 - 100 mmHg    O2 SAT 98 >95 %    BICARBONATE 27 (H) 22 - 26 mmol/L    BASE EXCESS 2.9 (H) 0 - 2 mmol/L    O2 METHOD VENT      FIO2 40.0 %    MODE Assist Control/Volume Control      Tidal volume 500      SET RATE 12      EPAP/CPAP/PEEP 5.0      SITE Right Radial      MARIXA'S TEST PASS       [unfilled]      Review of Systems    Unable to obtain . Physical Exam:      Constitutional: On ventilator :   Head: Normocephalic and atraumatic. Eyes: Pupils are equal, round, and reactive to light.  EOM are normal.   Cardiovascular: Normal rate, regular rhythm and normal heart sounds. Pulmonary/Chest: Breath sounds normal. No wheezes. No rales. Exhibits no tenderness. Abdominal: Soft. Bowel sounds are normal. There is no abdominal tenderness. There is no rebound and no guarding. Musculoskeletal: Normal range of motion. Neurological: Ventilator on sedation  XR CHEST PORT   Final Result   FINDINGS/IMPRESSION:   Support lines and tubes are appropriate in radiographic position. Lungs similarly inflated with bibasilar atelectasis and bilateral pleural   effusions. Negative for pneumothorax. Persistent patchy airspace opacity throughout the lungs. This may represent   multilobar pneumonia or edema, or perhaps a combination of each. XR CHEST PORT   Final Result      XR CHEST PORT   Final Result      XR CHEST PORT   Final Result      XR CHEST PORT   Final Result   Findings/impression:   1. Stable ET tube. Interval removal of defibrillator pads. The previously noted   malpositioned OG, is not visualized. 2.  Patchy groundglass opacities in the hilar and perihilar region bilaterally   overall stable. No new consolidation. 3.  Remainder unchanged            XR CHEST PORT   Final Result   Findings/impression:   1. Interval placement of an OG tube which is coiled in the upper neck. Please   remove and replace. 2.  The ET tube is in good position stable. Right chest defibrillator pad. 3.  Slightly more conspicuous bibasilar atelectasis and interstitial hilar   opacities. No new consolidation. 4.  Redemonstration of enlarged cardiac silhouette, stable. US RETROPERITONEUM COMP   Final Result   Echogenic kidneys consistent with medical renal disease. No   hydronephrosis. Ascites. XR CHEST PORT   Final Result      XR CHEST PORT   Final Result   Cardiomegaly and/or pericardial effusion with mild sequela of   presumed CHF and/or pulmonary edema, infection, or combination of these and with   associated left pleural effusion.  ET tube in place as described      CTA ABD PELV W WO CONT   Final Result   Overall there is intact arterial circulation of the abdomen and   pelvis and there is no saurabh evidence of an acute GI hemorrhage. There is   however diffuse sequela of third spacing with bilateral pleural effusions,   profound abdominopelvic ascites, and diffuse subcutaneous edema. Considerations   for sequela of cardiac, hepatic and/or renal dysfunction. Bilateral areas of   renal parenchymal hypoperfusion are noted and could indicate sequela of   infection/pyelonephritis although by appearance might also suggest sequela of   renal infarcts of uncertain age. Component of CHF suggested given the lung base   findings described. .      Results called to Nallely Trujillo on 10/19/2021 12:27 AM.      XR CHEST PORT   Final Result   1. Small left pleural effusion and multifocal airspace disease suspicious for   pneumonia. 2.Mild pulmonary vascular congestion.             Recent Results (from the past 24 hour(s))   BLOOD GAS, ARTERIAL    Collection Time: 10/23/21  4:00 AM   Result Value Ref Range    pH 7.44 7.35 - 7.45      PCO2 40 35 - 45 mmHg    PO2 88 75 - 100 mmHg    O2 SAT 98 >95 %    BICARBONATE 27 (H) 22 - 26 mmol/L    BASE EXCESS 2.9 (H) 0 - 2 mmol/L    O2 METHOD VENT      FIO2 40.0 %    MODE Assist Control/Volume Control      Tidal volume 500      SET RATE 12      EPAP/CPAP/PEEP 5.0      SITE Right Radial      MARIXA'S TEST PASS         Results     Procedure Component Value Units Date/Time    MRSA SCREEN - PCR (NASAL) [[id number]] Collected: 10/20/21 0800    Order Status: Completed Specimen: Swab Updated: 10/20/21 0959     MRSA by PCR, Nasal Not Detected       CULTURE, RESPIRATORY/SPUTUM/BRONCH Zayas Must [[id number]] Collected: 10/20/21 0800    Order Status: Completed Specimen: Sputum Updated: 10/22/21 1031     Special Requests: No Special Requests        GRAM STAIN Occasional WBCs seen         1+ Epithelial cells seen         No organisms seen Culture result:       Light Yeast, (apparent candida albicans)                  No normal respiratory saskia isolated          CULTURE, BLOOD, LINE [[id number]] Collected: 10/19/21 0045    Order Status: Canceled Specimen: Blood     CULTURE, BLOOD #1 [[id number]]     Order Status: Canceled Specimen: Blood     CULTURE, BLOOD #2 [[id number]]     Order Status: Canceled Specimen: Blood     CULTURE, URINE [[id number]]  (Abnormal)  (Susceptibility) Collected: 10/18/21 2136    Order Status: Completed Specimen: Urine Updated: 10/21/21 0946     Special Requests: --        No Special Requests  Reflexed from [id number]       [place] Count --        >100,000  colonies/ml       Culture result: Escherichia coli       Susceptibility      Escherichia coli      NEHA      Amikacin ($) Susceptible      Ampicillin ($) Susceptible      Ampicillin/sulbactam ($) Susceptible      Cefazolin ($) Susceptible      Cefepime ($$) Susceptible      Cefoxitin Susceptible      Ceftazidime ($) Susceptible      Ceftriaxone ($) Susceptible      Ciprofloxacin ($) Susceptible      Gentamicin ($) Susceptible      Levofloxacin ($) Susceptible      Meropenem ($$) Susceptible      Nitrofurantoin Susceptible      Piperacillin/Tazobac ($) Susceptible      Tobramycin ($) Susceptible      Trimeth/Sulfa Susceptible               Linear View                   COVID-19 RAPID TEST [[id number]] Collected: 10/18/21 2055    Order Status: Completed Specimen: Nasopharyngeal Updated: 10/18/21 2148     Specimen source Nasopharyngeal        COVID-19 rapid test Not Detected        Comment: Rapid [employer] ID Now   Rapid NAAT:  The specimen is NEGATIVE for SARS-CoV-2, the novel coronavirus associated with COVID-19. Negative results should be treated as presumptive and, if inconsistent with clinical signs and symptoms or necessary for patient management, should be tested with an alternative molecular assay.  Negative results do not preclude SARS-CoV-2 infection and should not be used as the sole basis for patient management decisions. This test has been authorized by the FDA under   an Emergency Use Authorization (EUA) for use by authorized laboratories. Fact sheet for Healthcare Providers: ConventionUpdate.co.nz Fact sheet for Patients: ConventionUpdate.co.nz   Methodology: Isothermal Nucleic Acid Amplification         CULTURE, BLOOD, PAIRED [[id number]]  (Abnormal) Collected: 10/18/21 1935    Order Status: Completed Specimen: Blood Updated: 10/22/21 0646     Special Requests: No Special Requests        Culture result:       Alpha streptococcus, not S. pneumoniae GROWING IN THE ANAEROBIC BOTTLE (SITE NOT INDICATED)                  Diphtheroids GROWING IN THE AEROBIC BOTTLE                 Labs:     Recent Labs     10/22/21  0515 10/21/21  0430   WBC 5.9 5.0   HGB 13.1 12.8   HCT 38.3 36.7   * 124*     Recent Labs     10/22/21  0515 10/21/21  0430 10/20/21  1245    143 149*   K 4.4 4.6 3.4*    109* 106   CO2 28 24 21   BUN 68* 60* 38*   CREA 2.22* 2.25* 1.56*   * 122* 69   CA 7.3*  7.3* 7.5* <5.0*   MG 2.3 2.2  --    PHOS  --  7.2*  --      Recent Labs     10/22/21  0515 10/21/21  0430   ALT 37 53   AP 97 124*   TBILI 0.6 0.7   TP 5.3* 5.7*   ALB 2.5* 3.0*   GLOB 2.8 2.7     No results for input(s): INR, PTP, APTT, INREXT, INREXT in the last 72 hours. No results for input(s): FE, TIBC, PSAT, FERR in the last 72 hours. Lab Results   Component Value Date/Time    Folate 18.6 08/12/2021 11:00 AM      Recent Labs     10/23/21  0400 10/22/21  0600   PH 7.44 7.46*   PCO2 40 37   PO2 88 83     No results for input(s): CPK, CKNDX, TROIQ in the last 72 hours.     No lab exists for component: CPKMB  Lab Results   Component Value Date/Time    Cholesterol, total 178 08/10/2021 02:40 PM    HDL Cholesterol 41 08/10/2021 02:40 PM    LDL, calculated 107.8 (H) 08/10/2021 02:40 PM    Triglyceride 146 08/10/2021 02:40 PM    CHOL/HDL Ratio 4.3 08/10/2021 02:40 PM     Lab Results   Component Value Date/Time    Glucose (POC) 139 (H) 10/19/2021 09:42 PM     Lab Results   Component Value Date/Time    Color Yellow/Straw 10/20/2021 12:55 PM    Appearance Turbid (A) 10/20/2021 12:55 PM    Specific gravity 1.014 10/20/2021 12:55 PM    pH (UA) 5.0 10/20/2021 12:55 PM    Protein Negative 10/20/2021 12:55 PM    Glucose Negative 10/20/2021 12:55 PM    Ketone Negative 10/20/2021 12:55 PM    Bilirubin Negative 10/20/2021 12:55 PM    Urobilinogen 0.1 10/20/2021 12:55 PM    Nitrites Negative 10/20/2021 12:55 PM    Leukocyte Esterase Trace (A) 10/20/2021 12:55 PM    Bacteria Negative 10/20/2021 12:55 PM    WBC 0-4 10/20/2021 12:55 PM    RBC 5-10 10/20/2021 12:55 PM         Assessment:     Acute hypoxemic respiratory failure  on ventilator 40% O2  gram-negative pneumonia  Acute exacerbation of COPD  Acute systolic heart failure   Abdominal distention  Possible urinary retention  Thrombocytopenia  Neutropenia  Acute kidney injury  Elevated BNP  Elevated troponin  UTI with E. coli  Vitamin D deficiency      Plan:     Continue nebulizer treatment every 6 hours  On IV Zosyn and IV Zithromax follow blood culture urine cultures    Lasix on hold due to worsening renal function  continue IV Solu-Medrol    Repeat the labs  On Levophed for hypotension  On propofol for sedation    Monitor platelet count  Monitor renal function  Monitor sodium level            Current Facility-Administered Medications:     zinc oxide-white petrolatum 17-57 % topical paste, , Topical, TID, Maritza Jackson MD, Given at 10/23/21 0929    NOREPINephrine (LEVOPHED) 8 mg in 5% dextrose 250mL (32 mcg/mL) infusion, 0.5-120 mcg/min, IntraVENous, TITRATE, Maritza Jackson MD, Last Rate: 24.4 mL/hr at 10/23/21 0928, 13 mcg/min at 10/23/21 0928    [Held by provider] furosemide (LASIX) injection 60 mg, 60 mg, IntraVENous, BID, Kurt DIAZ MD, 60 mg at 10/21/21 0823    methylPREDNISolone (PF) (SOLU-MEDROL) injection 40 mg, 40 mg, IntraVENous, Q12H, Herrera Lehman MD, 40 mg at 10/23/21 5034    propofol (DIPRIVAN) 10 mg/mL infusion, 0-50 mcg/kg/min, IntraVENous, TITRATE, Herrera Lehman MD, Last Rate: 7.6 mL/hr at 10/23/21 0536, 20 mcg/kg/min at 10/23/21 0536    pantoprazole (PROTONIX) 40 mg in 0.9% sodium chloride 10 mL injection, 40 mg, IntraVENous, Q12H, Samara Ramirez NP, 40 mg at 10/23/21 0927    piperacillin-tazobactam (ZOSYN) 3.375 g in 0.9% sodium chloride (MBP/ADV) 100 mL MBP, 3.375 g, IntraVENous, Q8H, Maritza Jackson MD, Last Rate: 25 mL/hr at 10/23/21 0554, 3.375 g at 10/23/21 0554    aspirin chewable tablet 81 mg, 81 mg, Oral, DAILY, Khushi Brown MD, 81 mg at 10/23/21 0928    sodium chloride (NS) flush 5-40 mL, 5-40 mL, IntraVENous, Q8H, Maritza Jackson MD, 10 mL at 10/23/21 0523    sodium chloride (NS) flush 5-40 mL, 5-40 mL, IntraVENous, PRN, Verónica Jackson MD    sodium chloride (NS) flush 5-10 mL, 5-10 mL, IntraVENous, PRN, Verónica Jackson MD    azithromycin (ZITHROMAX) 500 mg in 0.9% sodium chloride 250 mL (VIAL-MATE), 500 mg, IntraVENous, Q24H, Maritza Jackson MD, Last Rate: 250 mL/hr at 10/22/21 2046, 500 mg at 10/22/21 2046    albuterol-ipratropium (DUO-NEB) 2.5 MG-0.5 MG/3 ML, 3 mL, Nebulization, Q6H PRN, Verónica Jackson MD    carvediloL (COREG) tablet 3.125 mg, 3.125 mg, Oral, BID WITH MEALS, Verónica Jackson MD, 3.125 mg at 10/23/21 0872    acetaminophen (TYLENOL) tablet 650 mg, 650 mg, Oral, Q6H PRN, 650 mg at 10/21/21 1648 **OR** acetaminophen (TYLENOL) suppository 650 mg, 650 mg, Rectal, Q6H PRN, Verónica Jackson MD    polyethylene glycol (MIRALAX) packet 17 g, 17 g, Oral, DAILY PRN, Verónica Jackson MD    ondansetron (ZOFRAN ODT) tablet 4 mg, 4 mg, Oral, Q8H PRN **OR** ondansetron (ZOFRAN) injection 4 mg, 4 mg, IntraVENous, Q6H PRN, Maritza Jackson MD    heparin (porcine) injection 5,000 Units, 5,000 Units, SubCUTAneous, Q8H, Verónica Jackson MD, 5,000 Units at 10/23/21 0194

## 2021-10-24 NOTE — PROGRESS NOTES
Patient has desaturated twice since 0400 requiring he be disconnected from the vent and bagged, her has become tachycardiac and hypotensive. Levophed is being titrated to maintain map. Currently @100 % O2 saturation. WIll continue to monitor.

## 2021-10-24 NOTE — PROGRESS NOTES
Pulmonary, Critical Care Note    Name: Pedro Pablo Tucker MRN: [id number]   : 1960 Hospital: [address] [place] [address]   Date: 10/24/2021  Admission date: 10/18/2021 Hospital Day: 7       Subjective/Interval History:   Seen in the ICU on nasal oxygen. He is awake alert extremely poor historian all I can tell from him is that he had shortness of breath and came to the hospital  10/20 worsening respiratory status last evening and required intubation now on the ventilator unresponsive on IV propofol    Patient Active Problem List   Diagnosis Code    CHF (congestive heart failure) ([facility]) I50.9    Respiratory failure with hypoxia ([facility] [address] J96.91    Fluid overload E87.70    Elevated brain natriuretic peptide (BNP) level R79.89    Noncompliance Z91.19    Tobacco abuse Z72.0    Pneumonia J18.9    Hypoxia R09.02    Severe protein-calorie malnutrition ([facility] [address] E43    Acute kidney injury superimposed on chronic kidney disease ([facility]) N17.9, N18.9    Hyperphosphatemia E83.39    Hypocalcemia E83.51    Polyuria R35.89       IMPRESSION:   1. Acute hypoxic respiratory failure controlled on the ventilator sedated with propofol  2. Pulmonary edema chest x-ray still shows accentuated interstitial markings  3. Bilateral pleural effusions left greater than right  4. GI bleed rule out varices  5. Ascites  6. Severe cardiomyopathy  7. Pyuria  Body mass index is 18.4 kg/m². RECOMMENDATIONS/PLAN:   1. Patient remained on ventilator 40% FiO2 tidal volume 500 PEEP of 5 rate of 12 we will start weaning, he desaturated last night vent was disconnected he was bagging tachypneic hypotensive Levophed has been increased  2. Lasix now twice daily received albumin  3. Renal function improving we will continue to monitor  4. NG tube still has bloody secretion  5. Follow urine culture  6. Continue DuoNeb and Symbicort will decrease Solu-Medrol  7. Ascites present may need to consider paracentesis  8.  Status post EGD with NG tube placement was done   9. Unable to get consent for paracentesis         Subjective/Initial History:   I have reviewed the flowsheet and previous days notes. I was asked by Edita Daniel MD to see Kiki Koehler a 59 y.o.  male  in consultation for a chief complaint of acute hypoxic respiratory failure pulmonary edema pleural effusion      The patient is unable to give any meaningful history or review of systems due to patient factors. Patient PCP: Other, MD Zak  PMH:  has a past medical history of Chronic obstructive pulmonary disease ([facility] [address] and Heart failure ([facility] [address] PSH:   has no past surgical history on file. FHX: family history includes Hypertension in his mother. SHX:  reports that he has been smoking. He has been smoking about 0.50 packs per day. He has never used smokeless tobacco. He reports current alcohol use. He reports previous drug use.   Systemic review unreliable he is very confused    No Known Allergies   MEDS:   Current Facility-Administered Medications   Medication    zinc oxide-white petrolatum 17-57 % topical paste    NOREPINephrine (LEVOPHED) 8 mg in 5% dextrose 250mL (32 mcg/mL) infusion    [Held by provider] furosemide (LASIX) injection 60 mg    methylPREDNISolone (PF) (SOLU-MEDROL) injection 40 mg    propofol (DIPRIVAN) 10 mg/mL infusion    pantoprazole (PROTONIX) 40 mg in 0.9% sodium chloride 10 mL injection    piperacillin-tazobactam (ZOSYN) 3.375 g in 0.9% sodium chloride (MBP/ADV) 100 mL MBP    aspirin chewable tablet 81 mg    sodium chloride (NS) flush 5-40 mL    sodium chloride (NS) flush 5-40 mL    sodium chloride (NS) flush 5-10 mL    azithromycin (ZITHROMAX) 500 mg in 0.9% sodium chloride 250 mL (VIAL-MATE)    albuterol-ipratropium (DUO-NEB) 2.5 MG-0.5 MG/3 ML    carvediloL (COREG) tablet 3.125 mg    acetaminophen (TYLENOL) tablet 650 mg    Or    acetaminophen (TYLENOL) suppository 650 mg    polyethylene glycol (MIRALAX) packet 17 g    ondansetron (ZOFRAN ODT) tablet 4 mg    Or    ondansetron (ZOFRAN) injection 4 mg    heparin (porcine) injection 5,000 Units        Current Facility-Administered Medications:     zinc oxide-white petrolatum 17-57 % topical paste, , Topical, TID, Maritza Jackson MD, Given at 10/24/21 0803    NOREPINephrine (LEVOPHED) 8 mg in 5% dextrose 250mL (32 mcg/mL) infusion, 0.5-120 mcg/min, IntraVENous, TITRATE, Maritza Jackson MD, Last Rate: 43.1 mL/hr at 10/24/21 0712, 23 mcg/min at 10/24/21 0712    [Held by provider] furosemide (LASIX) injection 60 mg, 60 mg, IntraVENous, BID, Eileen DIAZ MD, 60 mg at 10/21/21 0823    methylPREDNISolone (PF) (SOLU-MEDROL) injection 40 mg, 40 mg, IntraVENous, Q12H, Dee Dee Garcia MD, 40 mg at 10/24/21 0803    propofol (DIPRIVAN) 10 mg/mL infusion, 0-50 mcg/kg/min, IntraVENous, TITRATE, Dee Dee Garcia MD, Last Rate: 7.6 mL/hr at 10/24/21 0805, 20 mcg/kg/min at 10/24/21 0805    pantoprazole (PROTONIX) 40 mg in 0.9% sodium chloride 10 mL injection, 40 mg, IntraVENous, Q12H, Samara Ramirez NP, 40 mg at 10/24/21 0814    piperacillin-tazobactam (ZOSYN) 3.375 g in 0.9% sodium chloride (MBP/ADV) 100 mL MBP, 3.375 g, IntraVENous, Q8H, Maritza Jackson MD, Last Rate: 25 mL/hr at 10/24/21 0559, 3.375 g at 10/24/21 0559    aspirin chewable tablet 81 mg, 81 mg, Oral, DAILY, Khushi Brown MD, 81 mg at 10/24/21 0803    sodium chloride (NS) flush 5-40 mL, 5-40 mL, IntraVENous, Q8H, Maritza Jackson MD, 10 mL at 10/23/21 2133    sodium chloride (NS) flush 5-40 mL, 5-40 mL, IntraVENous, PRN, Madeline Jackson MD    sodium chloride (NS) flush 5-10 mL, 5-10 mL, IntraVENous, PRN, Madeline Jackson MD    azithromycin (ZITHROMAX) 500 mg in 0.9% sodium chloride 250 mL (VIAL-MATE), 500 mg, IntraVENous, Q24H, Maritza Jackson MD, Last Rate: 250 mL/hr at 10/23/21 1937, 500 mg at 10/23/21 1937    albuterol-ipratropium (DUO-NEB) 2.5 MG-0.5 MG/3 ML, 3 mL, Nebulization, Q6H PRN, Keli Yao MD    carvediloL (COREG) tablet 3.125 mg, 3.125 mg, Oral, BID WITH MEALS, Duong Jackson MD, 3.125 mg at 10/24/21 0803    acetaminophen (TYLENOL) tablet 650 mg, 650 mg, Oral, Q6H PRN, 650 mg at 10/21/21 1648 **OR** acetaminophen (TYLENOL) suppository 650 mg, 650 mg, Rectal, Q6H PRN, Duong Jackson MD    polyethylene glycol (MIRALAX) packet 17 g, 17 g, Oral, DAILY PRN, Duong Jackson MD    ondansetron (ZOFRAN ODT) tablet 4 mg, 4 mg, Oral, Q8H PRN **OR** ondansetron (ZOFRAN) injection 4 mg, 4 mg, IntraVENous, Q6H PRN, Maritza Jackson MD    heparin (porcine) injection 5,000 Units, 5,000 Units, SubCUTAneous, Q8H, Maritza Jackson MD, 5,000 Units at 10/23/21 2104      Objective:     Vital Signs: Telemetry:    normal sinus rhythm Intake/Output:   Visit Vitals  /69 (BP 1 Location: Left upper arm, BP Patient Position: At rest)   Pulse (!) 105   Temp 99 °F (37.2 °C)   Resp 12   Ht 6' 2\" (1.88 m)   Wt 65 kg (143 lb 4.8 oz)   SpO2 99%   BMI 18.40 kg/m²       Temp (24hrs), Av.6 °F (37 °C), Min:97.9 °F (36.6 °C), Max:99.5 °F (37.5 °C)        O2 Device: Ventilator O2 Flow Rate (L/min): 3 l/min         Body mass index is 18.4 kg/m². Wt Readings from Last 4 Encounters:   10/23/21 65 kg (143 lb 4.8 oz)   21 52.4 kg (115 lb 8.3 oz)   21 54.2 kg (119 lb 7.8 oz)          Intake/Output Summary (Last 24 hours) at 10/24/2021 0932  Last data filed at 10/24/2021 0700  Gross per 24 hour   Intake 1306.1 ml   Output 1400 ml   Net -93.9 ml       Last shift:      No intake/output data recorded. Last 3 shifts: 10/22 1901 - 10/24 0700  In: 1798.1 [I.V.:531.1]  Out: 2200 [Urine:2200]       Hemodynamics:    CO:    CI:    CVP:    SVR:   PAP Systolic:    PAP Diastolic:    PVR:    UE34:       Ventilator Settings:      Mode Rate TV Press PEEP FiO2 PIP Min.  Vent   Assist control, Volume control    500 ml    5 cm H20 100 %  29 cm H2O  5.61 l/min      Physical Exam:    General:  American male; unresponsive on propofol on the ventilator  HEENT: NCAT, poor dentition, oral mucosa moist  Eyes: anicteric; conjunctiva clear doll's eye reflex  Neck: no nodes, JVD present, no accessory MM use. Chest: no deformity,  Cardiac: Regular rate and rhythm systolic murmur present peripheral edema present  Lungs: Clear anteriorly with lateral rales extending posteriorly no wheezing  Abd: Relatively soft nontender bowel sounds present  Ext: Leg edema  no joint swelling; No clubbing  : clear urine  Neuro: Unresponsive on propofol doll's eye reflex present flaccid extremities  Psych-unable to assess  Skin: warm, dry, no cyanosis;  Pulses: Brachial and radial pulses intact  Capillary: Slow capillary refill      Labs:    Recent Labs     10/23/21  1415 10/22/21  0515   WBC 5.3 5.9   HGB 12.9 13.1   * 132*     Recent Labs     10/23/21  1415 10/22/21  0515    144   K 3.8 4.4   * 107   CO2 28 28   * 130*   BUN 59* 68*   CREA 1.37* 2.22*   CA 7.1* 7.3*  7.3*   MG  --  2.3   ALB 2.2* 2.5*   ALT 28 37   10/28/C 12  PEEP 5 FiO2 40% with PO2 90 PCO2 36 pH 7.40  Blood 1 of 4 with gram-positive cocci  Sputum culture pending  Urine culture pending  Nasal MRSA smear negative  BNP greater than 35,000  Troponin I 178, 135, 122  COVID-19 antigen negative  8/11/2021 echo ejection fraction 20% moderate aortic regurg mild to moderate mitral regurg IVC dilated PASP 57  Imaging:  I have personally reviewed the patients radiographs and have reviewed the reports:    CXR Results  (Last 48 hours)               10/18/21 1928  XR CHEST PORT Final result    Impression:  1. Small left pleural effusion and multifocal airspace disease suspicious for   pneumonia. 2.Mild pulmonary vascular congestion. Narrative:  AP portable chest, 1915 hours. Comparison: 9/3/2021. Findings: There is mild to moderate cardiomegaly. Mild pulmonary vascular   congestion is noted.  There is patchy bilateral airspace disease, left greater   than right. A small left pleural effusion is noted. There is more consolidative   airspace disease in the left lung base. No pneumothorax is identified. The   osseous structures are unremarkable. To me the x-ray looks more like accentuated interstitial markings consistent with pulmonary edema and the left effusion           Results from Hospital Encounter encounter on 10/18/21    XR CHEST PORT    Narrative  XR CHEST PORT    Comparison: Chest radiograph dated October 22, 2021    Impression  FINDINGS/IMPRESSION:  Support lines and tubes are appropriate in radiographic position. Lungs similarly inflated with bibasilar atelectasis and bilateral pleural  effusions. Negative for pneumothorax. Persistent patchy airspace opacity throughout the lungs. This may represent  multilobar pneumonia or edema, or perhaps a combination of each. XR CHEST PORT    Narrative  Indication: NG tube placement. AP portable chest radiograph 12:54 PM 21 October 2021. Comparison examination  earlier the same date. ET tube tip 6.5 cm cephalad to the brian. NG tube tip to the right of L1, possibly gastric antral region. Persistent dense left base opacification obscuring the hemidiaphragm likely  combination of pleural fluid and nonspecific airspace disease. Heterogeneous bilateral airspace opacities unchanged. Mild cardiomegaly unchanged. No pneumothorax. XR CHEST PORT    Narrative  Indication: NG tube. AP semiupright portable chest radiograph 0944 hours 10/21/2021. Comparison  examination earlier the same date. Heterogeneous bilateral airspace disease unchanged. Dense opacity left lung base  obscuring the diaphragm unchanged. Cardiomegaly unchanged. No pneumothorax. No apparent pleural effusion. ET tube tip 4.5 cm cephalad to the brian. Interval NG tube, tip to the right of L3, likely proximal duodenum.     Results from [place] encounter on 10/18/21    CTA ABD PELV W WO CONT    Narrative  HISTORY:  possible gi bleed  Dose reduction technique: All CT scans at this facility are performed using dose reduction optimization  technique as appropriate on the exam including the following: Automated exposure  control, adjustment of the MA and/or KV according to patient size of use of  iterative reconstructive technique. .    TECHNIQUE: CTA ABD PELV W WO CONT. CT angiogram of the abdomen and pelvis  performed and maximum intensity projection images (MIPS) generated. 100 cc [id number]-[id number] injected. COMPARISON: None  LIMITATIONS: None    CHEST: Multichamber cardiomegaly. Small right and moderate left pleural  effusion. Bibasilar airspace consolidation overlies the effusions. Moderate  emphysematous changes incidentally noted at the lung bases. AORTA: No aneurysm or dissection. Mild peripheral areas of aortic calcification  CELIAC AXIS: Patent. SUPERIOR MESENTERIC ARTERY: Patent. RENAL ARTERIES: Patent. INFERIOR MESENTERIC ARTERY: Patent. ILIAC ARTERIES: Patent. Mild peripheral areas of calcification. .  Visualized femoral vasculature:  Patent. LIVER: Normal.  GALLBLADDER: Distended. BILIARY TREE: Normal.  PANCREAS: Normal.  SPLEEN: Normal.  ADRENAL GLANDS: Normal.  KIDNEYS/URETERS/BLADDER: Multiple areas of peripheral hypoperfusion throughout  both kidneys which does persist on the venous and delayed images. No saurabh  obstructive changes. The bladder is catheterized/collapsed. RETROPERITONEUM: Normal.  BOWEL/MESENTERY: no obvious acute abnormality. No evidence of GI hemorrhage on  arterial, venous or delayed phase images. .  APPENDIX: Identified and normal.  PERITONEAL CAVITY: Profound abdominopelvic ascites distending the abdomen and  centralizing many of the bowel loops. REPRODUCTIVE ORGANS: Normal.  BONE/TISSUES: No acute osseous abnormality. Diffuse edema throughout the  subcutaneous tissue of the visualized chest abdomen, pelvis and bilateral lower  extremities. .    OTHER: None    Impression  Overall there is intact arterial circulation of the abdomen and  pelvis and there is no saurabh evidence of an acute GI hemorrhage. There is  however diffuse sequela of third spacing with bilateral pleural effusions,  profound abdominopelvic ascites, and diffuse subcutaneous edema. Considerations  for sequela of cardiac, hepatic and/or renal dysfunction. Bilateral areas of  renal parenchymal hypoperfusion are noted and could indicate sequela of  infection/pyelonephritis although by appearance might also suggest sequela of  renal infarcts of uncertain age. Component of CHF suggested given the lung base  findings described. .    Results called to Luanne Verduzco on 10/19/2021 12:27 AM.      · Discussion severe cardiomyopathy ejection fraction 20% with ascites peripheral edema bilateral pleural effusion left greater than right on CT scan December this changes on CT scan. He continues to smoke. Will increase Lasix to every 8 hour dosing for the next 24 hours  · Follow blood gases electrolytes and chest x-ray  · 10/20 intubated last evening blood gases well-maintained on the ventilator will maintain as is today. Lasix twice daily ordered. Will give 2 doses of albumin. Creatinine has risen from yesterday. · 10/21 still has significant ascites for EGD today with NG tube placement  · 10/22 start weaning change vent setting to spontaneous if weaning criteria acceptable will extubate. · Time of care 30 minutes           Thank you for allowing us to participate in the care of this patient.   We will follow along with you   Heriberto Estrada MD

## 2021-10-24 NOTE — PROGRESS NOTES
Sister Abel Ellis arrived to ICU after phone call from cousin. Had no knowledge of her brother being in the hospital until today. Updated Dagmar to the severity of the patients condition. Anna Glendy stated \"She doesn't want him to suffer and doesn't want him to go through CPR\". I called Dr. Sylvain Diaz to let him know next of kin has been located and was here. He gave me a message to have the sister call him to discuss pt code status and condition. Info given to the sister.

## 2021-10-24 NOTE — PROGRESS NOTES
Attempted to reach next of kin, the call went straight to voicemail;  voicemail is full, unable to leave a message.

## 2021-10-24 NOTE — PROGRESS NOTES
1605-Call from Mrs. Jaffe to check on pt condition. Informed her of his desat episode early this morning and asked her relationship to pt and stated \"room mate of 20 years and he's my cousin. \" I asked if he had any other family or next of kin. Mrs Carlene Dolan replied that he has a sister but they are estranged and the sister is not returning her calls. I asked for the sisters number and Mrs. Carlene Dolan stated she would \"work on getting that number for me\". Mrs. Doss number was verified and changed in the computer during this conversation.      Neelam July ([address] [place] [address], Room [address])-[phone]

## 2021-10-24 NOTE — PROGRESS NOTES
Witness to Nhung Brunner and siblings confirm on the phone with Dr. Carlos Ellsworth that they want the patient to be DNR. Primary Decision Maker is Dagmar(sister). Phone numbers for Keon Cutler (brother) and Polo Goodman [phone] (sister) Added to chart.

## 2021-10-24 NOTE — PROGRESS NOTES
General Daily Progress Note          Patient Name:   Rhianna Salazar       YOB: 1960       Age:  59 y.o.       Admit Date: 10/18/2021      Subjective:     Patient is a 59y.o. year old male no past medical history of hypertension COPD cardiomyopathy with systolic heart failure with ejection fraction with 25% ongoing smoking came to emergency room because of shortness of breath patient was recently discharged from the hospital after multiple admission for same reason seen by the ER physician x-ray shows pneumonia patient was recommend to be admitted for further evaluation treatment at the same time patient has no abdominal pain  No work-up done in the ER initially , abdominal distended no CT scan was ordered, nurse try to put the [place] in no urine output at that time CT scan of the abdomen was ordered results pending      On ventilator on 100% O2    Patient have a cardiac arrest and intubated   Hypotensive on Levophed  On propofol for sedation  NG tube with feeding    Yesterday patient oxygen saturation drops on ventilator  Ventilator word discontinue he was bagging this point patient on ventilator 100% O2        On Levophed and propofol drip             Objective:     Visit Vitals  /73 (BP 1 Location: Left upper arm, BP Patient Position: At rest)   Pulse (!) 102   Temp 99 °F (37.2 °C)   Resp 14   Ht 6' 2\" (1.88 m)   Wt 65 kg (143 lb 4.8 oz)   SpO2 98%   BMI 18.40 kg/m²        Recent Results (from the past 24 hour(s))   CBC WITH AUTOMATED DIFF    Collection Time: 10/23/21  2:15 PM   Result Value Ref Range    WBC 5.3 4.1 - 11.1 K/uL    RBC 4.18 4.10 - 5.70 M/uL    HGB 12.9 12.1 - 17.0 g/dL    HCT 38.6 36.6 - 50.3 %    MCV 92.3 80.0 - 99.0 FL    MCH 30.9 26.0 - 34.0 PG    MCHC 33.4 30.0 - 36.5 g/dL    RDW 18.3 (H) 11.5 - 14.5 %    PLATELET 732 (L) 212 - 400 K/uL    MPV 11.2 8.9 - 12.9 FL    NRBC 0.4 (H) 0.0  WBC    ABSOLUTE NRBC 0.02 (H) 0.00 - 0.01 K/uL    NEUTROPHILS 90 (H) 32 - 75 % LYMPHOCYTES 4 (L) 12 - 49 %    MONOCYTES 5 5 - 13 %    EOSINOPHILS 0 0 - 7 %    BASOPHILS 0 0 - 1 %    IMMATURE GRANULOCYTES 1 (H) 0 - 0.5 %    ABS. NEUTROPHILS 4.8 1.8 - 8.0 K/UL    ABS. LYMPHOCYTES 0.2 (L) 0.8 - 3.5 K/UL    ABS. MONOCYTES 0.3 0.0 - 1.0 K/UL    ABS. EOSINOPHILS 0.0 0.0 - 0.4 K/UL    ABS. BASOPHILS 0.0 0.0 - 0.1 K/UL    ABS. IMM. GRANS. 0.0 0.00 - 0.04 K/UL    DF AUTOMATED     METABOLIC PANEL, COMPREHENSIVE    Collection Time: 10/23/21  2:15 PM   Result Value Ref Range    Sodium 143 136 - 145 mmol/L    Potassium 3.8 3.5 - 5.1 mmol/L    Chloride 109 (H) 97 - 108 mmol/L    CO2 28 21 - 32 mmol/L    Anion gap 6 5 - 15 mmol/L    Glucose 162 (H) 65 - 100 mg/dL    BUN 59 (H) 6 - 20 mg/dL    Creatinine 1.37 (H) 0.70 - 1.30 mg/dL    BUN/Creatinine ratio 43 (H) 12 - 20      GFR est AA >60 >60 ml/min/1.73m2    GFR est non-AA 52 (L) >60 ml/min/1.73m2    Calcium 7.1 (L) 8.5 - 10.1 mg/dL    Bilirubin, total 0.4 0.2 - 1.0 mg/dL    AST (SGOT) 26 15 - 37 U/L    ALT (SGPT) 28 12 - 78 U/L    Alk. phosphatase 78 45 - 117 U/L    Protein, total 5.0 (L) 6.4 - 8.2 g/dL    Albumin 2.2 (L) 3.5 - 5.0 g/dL    Globulin 2.8 2.0 - 4.0 g/dL    A-G Ratio 0.8 (L) 1.1 - 2.2     BLOOD GAS, ARTERIAL    Collection Time: 10/24/21  3:15 AM   Result Value Ref Range    pH 7.45 7.35 - 7.45      PCO2 42 35 - 45 mmHg    PO2 88 75 - 100 mmHg    O2 SAT 98 >95 %    BICARBONATE 28 (H) 22 - 26 mmol/L    BASE EXCESS 4.3 (H) 0 - 2 mmol/L    O2 METHOD VENT      FIO2 40.0 %    MODE Assist Control/Volume Control      Tidal volume 500      SET RATE 12      IPAP/PIP 0      EPAP/CPAP/PEEP 5.0      SITE Right Brachial      MARIXA'S TEST PASS       [unfilled]      Review of Systems    Unable to obtain . Physical Exam:      Constitutional: On ventilator :   Head: Normocephalic and atraumatic. Eyes: Pupils are equal, round, and reactive to light. EOM are normal.   Cardiovascular: Normal rate, regular rhythm and normal heart sounds. Pulmonary/Chest: Breath sounds normal. No wheezes. No rales. Exhibits no tenderness. Abdominal: Soft. Bowel sounds are normal. There is no abdominal tenderness. There is no rebound and no guarding. Musculoskeletal: Normal range of motion. Neurological: Ventilator on sedation  XR CHEST PORT   Final Result   FINDINGS/IMPRESSION:   Support lines and tubes are appropriate in radiographic position. Lungs similarly inflated with bibasilar atelectasis and bilateral pleural   effusions. Negative for pneumothorax. Persistent patchy airspace opacity throughout the lungs. This may represent   multilobar pneumonia or edema, or perhaps a combination of each. XR CHEST PORT   Final Result      XR CHEST PORT   Final Result      XR CHEST PORT   Final Result      XR CHEST PORT   Final Result   Findings/impression:   1. Stable ET tube. Interval removal of defibrillator pads. The previously noted   malpositioned OG, is not visualized. 2.  Patchy groundglass opacities in the hilar and perihilar region bilaterally   overall stable. No new consolidation. 3.  Remainder unchanged            XR CHEST PORT   Final Result   Findings/impression:   1. Interval placement of an OG tube which is coiled in the upper neck. Please   remove and replace. 2.  The ET tube is in good position stable. Right chest defibrillator pad. 3.  Slightly more conspicuous bibasilar atelectasis and interstitial hilar   opacities. No new consolidation. 4.  Redemonstration of enlarged cardiac silhouette, stable. US RETROPERITONEUM COMP   Final Result   Echogenic kidneys consistent with medical renal disease. No   hydronephrosis. Ascites. XR CHEST PORT   Final Result      XR CHEST PORT   Final Result   Cardiomegaly and/or pericardial effusion with mild sequela of   presumed CHF and/or pulmonary edema, infection, or combination of these and with   associated left pleural effusion.  ET tube in place as described      CTA ABD PELV W WO CONT   Final Result   Overall there is intact arterial circulation of the abdomen and   pelvis and there is no saurabh evidence of an acute GI hemorrhage. There is   however diffuse sequela of third spacing with bilateral pleural effusions,   profound abdominopelvic ascites, and diffuse subcutaneous edema. Considerations   for sequela of cardiac, hepatic and/or renal dysfunction. Bilateral areas of   renal parenchymal hypoperfusion are noted and could indicate sequela of   infection/pyelonephritis although by appearance might also suggest sequela of   renal infarcts of uncertain age. Component of CHF suggested given the lung base   findings described. .      Results called to Wilber Nichols on 10/19/2021 12:27 AM.      XR CHEST PORT   Final Result   1. Small left pleural effusion and multifocal airspace disease suspicious for   pneumonia. 2.Mild pulmonary vascular congestion. XR CHEST PORT    (Results Pending)        Recent Results (from the past 24 hour(s))   CBC WITH AUTOMATED DIFF    Collection Time: 10/23/21  2:15 PM   Result Value Ref Range    WBC 5.3 4.1 - 11.1 K/uL    RBC 4.18 4.10 - 5.70 M/uL    HGB 12.9 12.1 - 17.0 g/dL    HCT 38.6 36.6 - 50.3 %    MCV 92.3 80.0 - 99.0 FL    MCH 30.9 26.0 - 34.0 PG    MCHC 33.4 30.0 - 36.5 g/dL    RDW 18.3 (H) 11.5 - 14.5 %    PLATELET 788 (L) 615 - 400 K/uL    MPV 11.2 8.9 - 12.9 FL    NRBC 0.4 (H) 0.0  WBC    ABSOLUTE NRBC 0.02 (H) 0.00 - 0.01 K/uL    NEUTROPHILS 90 (H) 32 - 75 %    LYMPHOCYTES 4 (L) 12 - 49 %    MONOCYTES 5 5 - 13 %    EOSINOPHILS 0 0 - 7 %    BASOPHILS 0 0 - 1 %    IMMATURE GRANULOCYTES 1 (H) 0 - 0.5 %    ABS. NEUTROPHILS 4.8 1.8 - 8.0 K/UL    ABS. LYMPHOCYTES 0.2 (L) 0.8 - 3.5 K/UL    ABS. MONOCYTES 0.3 0.0 - 1.0 K/UL    ABS. EOSINOPHILS 0.0 0.0 - 0.4 K/UL    ABS. BASOPHILS 0.0 0.0 - 0.1 K/UL    ABS. IMM.  GRANS. 0.0 0.00 - 0.04 K/UL    DF AUTOMATED     METABOLIC PANEL, COMPREHENSIVE    Collection Time: 10/23/21  2:15 PM   Result Value Ref Range    Sodium 143 136 - 145 mmol/L    Potassium 3.8 3.5 - 5.1 mmol/L    Chloride 109 (H) 97 - 108 mmol/L    CO2 28 21 - 32 mmol/L    Anion gap 6 5 - 15 mmol/L    Glucose 162 (H) 65 - 100 mg/dL    BUN 59 (H) 6 - 20 mg/dL    Creatinine 1.37 (H) 0.70 - 1.30 mg/dL    BUN/Creatinine ratio 43 (H) 12 - 20      GFR est AA >60 >60 ml/min/1.73m2    GFR est non-AA 52 (L) >60 ml/min/1.73m2    Calcium 7.1 (L) 8.5 - 10.1 mg/dL    Bilirubin, total 0.4 0.2 - 1.0 mg/dL    AST (SGOT) 26 15 - 37 U/L    ALT (SGPT) 28 12 - 78 U/L    Alk.  phosphatase 78 45 - 117 U/L    Protein, total 5.0 (L) 6.4 - 8.2 g/dL    Albumin 2.2 (L) 3.5 - 5.0 g/dL    Globulin 2.8 2.0 - 4.0 g/dL    A-G Ratio 0.8 (L) 1.1 - 2.2     BLOOD GAS, ARTERIAL    Collection Time: 10/24/21  3:15 AM   Result Value Ref Range    pH 7.45 7.35 - 7.45      PCO2 42 35 - 45 mmHg    PO2 88 75 - 100 mmHg    O2 SAT 98 >95 %    BICARBONATE 28 (H) 22 - 26 mmol/L    BASE EXCESS 4.3 (H) 0 - 2 mmol/L    O2 METHOD VENT      FIO2 40.0 %    MODE Assist Control/Volume Control      Tidal volume 500      SET RATE 12      IPAP/PIP 0      EPAP/CPAP/PEEP 5.0      SITE Right Brachial      MARIXA'S TEST PASS         Results     Procedure Component Value Units Date/Time    MRSA SCREEN - PCR (NASAL) [[id number]] Collected: 10/20/21 0800    Order Status: Completed Specimen: Swab Updated: 10/20/21 0959     MRSA by PCR, Nasal Not Detected       CULTURE, RESPIRATORY/SPUTUM/BRONCH Dannial Hane STAIN [[id number]] Collected: 10/20/21 0800    Order Status: Completed Specimen: Sputum Updated: 10/22/21 1031     Special Requests: No Special Requests        GRAM STAIN Occasional WBCs seen         1+ Epithelial cells seen         No organisms seen        Culture result:       Light Yeast, (apparent candida albicans)                  No normal respiratory s[place] isolated          CULTURE, BLOOD, LINE [[id number]] Collected: 10/19/21 0045    Order Status: Canceled Specimen: Blood CULTURE, BLOOD #1 [[id number]]     Order Status: Canceled Specimen: Blood     CULTURE, BLOOD #2 [[id number]]     Order Status: Canceled Specimen: Blood     CULTURE, URINE [[id number]]  (Abnormal)  (Susceptibility) Collected: 10/18/21 2136    Order Status: Completed Specimen: Urine Updated: 10/21/21 0946     Special Requests: --        No Special Requests  Reflexed from [id number]       [place] Count --        >100,000  colonies/ml       Culture result: Escherichia coli       Susceptibility      Escherichia coli      NEHA      Amikacin ($) Susceptible      Ampicillin ($) Susceptible      Ampicillin/sulbactam ($) Susceptible      Cefazolin ($) Susceptible      Cefepime ($$) Susceptible      Cefoxitin Susceptible      Ceftazidime ($) Susceptible      Ceftriaxone ($) Susceptible      Ciprofloxacin ($) Susceptible      Gentamicin ($) Susceptible      Levofloxacin ($) Susceptible      Meropenem ($$) Susceptible      Nitrofurantoin Susceptible      Piperacillin/Tazobac ($) Susceptible      Tobramycin ($) Susceptible      Trimeth/Sulfa Susceptible               Linear View                   COVID-19 RAPID TEST [[id number]] Collected: 10/18/21 2055    Order Status: Completed Specimen: Nasopharyngeal Updated: 10/18/21 2148     Specimen source Nasopharyngeal        COVID-19 rapid test Not Detected        Comment: Rapid Abbott ID Now   Rapid NAAT:  The specimen is NEGATIVE for SARS-CoV-2, the novel coronavirus associated with COVID-19. Negative results should be treated as presumptive and, if inconsistent with clinical signs and symptoms or necessary for patient management, should be tested with an alternative molecular assay. Negative results do not preclude SARS-CoV-2 infection and should not be used as the sole basis for patient management decisions. This test has been authorized by the FDA under   an Emergency Use Authorization (EUA) for use by authorized laboratories.  Fact sheet for Healthcare Providers: ConventionUpdate.co.nz Fact sheet for Patients: ConventionUpdate.co.nz   Methodology: Isothermal Nucleic Acid Amplification         CULTURE, BLOOD, PAIRED [[id number]]  (Abnormal) Collected: 10/18/21 1935    Order Status: Completed Specimen: Blood Updated: 10/22/21 0646     Special Requests: No Special Requests        Culture result:       Alpha streptococcus, not S. pneumoniae GROWING IN THE ANAEROBIC BOTTLE (SITE NOT INDICATED)                  Diphtheroids GROWING IN THE AEROBIC BOTTLE                 Labs:     Recent Labs     10/23/21  1415 10/22/21  0515   WBC 5.3 5.9   HGB 12.9 13.1   HCT 38.6 38.3   * 132*     Recent Labs     10/23/21  1415 10/22/21  0515    144   K 3.8 4.4   * 107   CO2 28 28   BUN 59* 68*   CREA 1.37* 2.22*   * 130*   CA 7.1* 7.3*  7.3*   MG  --  2.3     Recent Labs     10/23/21  1415 10/22/21  0515   ALT 28 37   AP 78 97   TBILI 0.4 0.6   TP 5.0* 5.3*   ALB 2.2* 2.5*   GLOB 2.8 2.8     No results for input(s): INR, PTP, APTT, INREXT, INREXT in the last 72 hours. No results for input(s): FE, TIBC, PSAT, FERR in the last 72 hours. Lab Results   Component Value Date/Time    Folate 18.6 08/12/2021 11:00 AM      Recent Labs     10/24/21  0315 10/23/21  0400   PH 7.45 7.44   PCO2 42 40   PO2 88 88     No results for input(s): CPK, CKNDX, TROIQ in the last 72 hours.     No lab exists for component: CPKMB  Lab Results   Component Value Date/Time    Cholesterol, total 178 08/10/2021 02:40 PM    HDL Cholesterol 41 08/10/2021 02:40 PM    LDL, calculated 107.8 (H) 08/10/2021 02:40 PM    Triglyceride 146 08/10/2021 02:40 PM    CHOL/HDL Ratio 4.3 08/10/2021 02:40 PM     Lab Results   Component Value Date/Time    Glucose (POC) 139 (H) 10/19/2021 09:42 PM     Lab Results   Component Value Date/Time    Color Yellow/Straw 10/20/2021 12:55 PM    Appearance Turbid (A) 10/20/2021 12:55 PM    Specific gravity 1.014 10/20/2021 12:55 PM    pH (UA) 5.0 10/20/2021 12:55 PM    Protein Negative 10/20/2021 12:55 PM    Glucose Negative 10/20/2021 12:55 PM    Ketone Negative 10/20/2021 12:55 PM    Bilirubin Negative 10/20/2021 12:55 PM    Urobilinogen 0.1 10/20/2021 12:55 PM    Nitrites Negative 10/20/2021 12:55 PM    Leukocyte Esterase Trace (A) 10/20/2021 12:55 PM    Bacteria Negative 10/20/2021 12:55 PM    WBC 0-4 10/20/2021 12:55 PM    RBC 5-10 10/20/2021 12:55 PM         Assessment:     Acute hypoxemic respiratory failure  on ventilator 100% O2  gram-negative pneumonia  Acute exacerbation of COPD  Acute systolic heart failure   Abdominal distention  Possible urinary retention  Thrombocytopenia  Neutropenia  Acute kidney injury  Elevated BNP  Elevated troponin  UTI with E. coli  Vitamin D deficiency      Plan:     Continue aspirin 81 mg daily  On Zithromax 500 every 24 hours and IV Zosyn    Heparin 5000 units subcu every 8 hours  On IV Solu-Medrol 40 IV every 12 hours      Continue Levophed for hypotension  Continue propofol for sedation            Current Facility-Administered Medications:     zinc oxide-white petrolatum 17-57 % topical paste, , Topical, TID, Maritza Jackson MD, Given at 10/24/21 0803    NOREPINephrine (LEVOPHED) 8 mg in 5% dextrose 250mL (32 mcg/mL) infusion, 0.5-120 mcg/min, IntraVENous, TITRATE, Maritza Jackson MD, Last Rate: 43.1 mL/hr at 10/24/21 0712, 23 mcg/min at 10/24/21 0712    [Held by provider] furosemide (LASIX) injection 60 mg, 60 mg, IntraVENous, BID, Marlena Severs A, MD, 60 mg at 10/21/21 0823    methylPREDNISolone (PF) (SOLU-MEDROL) injection 40 mg, 40 mg, IntraVENous, Q12H, Rm Olivas MD, 40 mg at 10/24/21 0803    propofol (DIPRIVAN) 10 mg/mL infusion, 0-50 mcg/kg/min, IntraVENous, TITRATE, Rm Olivas MD, Last Rate: 7.6 mL/hr at 10/24/21 0805, 20 mcg/kg/min at 10/24/21 0805    pantoprazole (PROTONIX) 40 mg in 0.9% sodium chloride 10 mL injection, 40 mg, IntraVENous, Q12H, Samara Ramirez NP, 40 mg at 10/24/21 0814    piperacillin-tazobactam (ZOSYN) 3.375 g in 0.9% sodium chloride (MBP/ADV) 100 mL MBP, 3.375 g, IntraVENous, Q8H, Maritza Jackson MD, Last Rate: 25 mL/hr at 10/24/21 0559, 3.375 g at 10/24/21 0559    aspirin chewable tablet 81 mg, 81 mg, Oral, DAILY, Khushi Brown MD, 81 mg at 10/24/21 0803    sodium chloride (NS) flush 5-40 mL, 5-40 mL, IntraVENous, Q8H, Maritza Jackson MD, 10 mL at 10/23/21 2133    sodium chloride (NS) flush 5-40 mL, 5-40 mL, IntraVENous, PRN, Shefali Jackson MD    sodium chloride (NS) flush 5-10 mL, 5-10 mL, IntraVENous, PRN, Shefali Jackson MD    azithromycin (ZITHROMAX) 500 mg in 0.9% sodium chloride 250 mL (VIAL-MATE), 500 mg, IntraVENous, Q24H, Maritza Jackson MD, Last Rate: 250 mL/hr at 10/23/21 1937, 500 mg at 10/23/21 1937    albuterol-ipratropium (DUO-NEB) 2.5 MG-0.5 MG/3 ML, 3 mL, Nebulization, Q6H PRN, Shefali Jackson MD    carvediloL (COREG) tablet 3.125 mg, 3.125 mg, Oral, BID WITH MEALS, Shefali Jackson MD, 3.125 mg at 10/24/21 0803    acetaminophen (TYLENOL) tablet 650 mg, 650 mg, Oral, Q6H PRN, 650 mg at 10/21/21 1648 **OR** acetaminophen (TYLENOL) suppository 650 mg, 650 mg, Rectal, Q6H PRN, Shefali Jackson MD    polyethylene glycol (MIRALAX) packet 17 g, 17 g, Oral, DAILY PRN, Shefali Jackson MD    ondansetron (ZOFRAN ODT) tablet 4 mg, 4 mg, Oral, Q8H PRN **OR** ondansetron (ZOFRAN) injection 4 mg, 4 mg, IntraVENous, Q6H PRN, Maritza Jackson MD    heparin (porcine) injection 5,000 Units, 5,000 Units, SubCUTAneous, Q8H, Maritza Jackson MD, 5,000 Units at 10/23/21 9392

## 2021-10-25 NOTE — PROGRESS NOTES
Dr. Power Longest notified of no chest tube chamber fluctuation on inspiration despite chest tube dressing being reinforced. No orders received at this time. Will continue to monitor.

## 2021-10-25 NOTE — BRIEF OP NOTE
Postoperative Note    Patient: Justin Harris  YOB: 1960  MRN: [id number]    Date of Procedure: 10/21/2021     Pre-Op Diagnosis: left spont pneumothorax    Post-Op Diagnosis: same    Procedure(s):  Left chest tube placement    Surgeon Gaudencio Anderson MD    Surgical Assistant: None    Anesthesia: 10 cc 1% lidocaine local     Estimated Blood Loss (mL): none    Complications: none    Specimens: none    Implants none    Drains: 28 [place] chest tube    Procedure  59year old man criticlly ill on vent with pneumonia after cardiopulmonary arrest who now has complete left pneumothorax, POA does not answer phone and IPX mail box is full so chest tube placed with out consent   Using 10 cc of 1% lidocaine after hte left ches twas placed a stab wound was made in the left chest and a 28 [place] chest tube was placed and sewn in place with a 2-2 silk suture and the tube hooked up to water seal. There was no rushof air out and no change in hemodyanics after tube placed. Patient remains 95% saturated.       Findings:Ppneumothorax    Electronically Signed by Gaudencio Anderson MD on 10/25/2021 at 4:42 AM

## 2021-10-25 NOTE — PROGRESS NOTES
General Daily Progress Note          Patient Name:   Florentino Crigler       YOB: 1960       Age:  59 y.o. Admit Date: 10/18/2021      Subjective:     Patient is a 59y.o. year old male no past medical history of hypertension COPD cardiomyopathy with systolic heart failure with ejection fraction with 25% ongoing smoking came to emergency room because of shortness of breath patient was recently discharged from the hospital after multiple admission for same reason seen by the ER physician x-ray shows pneumonia patient was recommend to be admitted for further evaluation treatment at the same time patient has no abdominal pain  No work-up done in the ER initially , abdominal distended no CT scan was ordered, nurse try to put the [place] in no urine output at that time CT scan of the abdomen was ordered results pending      On ventilator on 65 % O2    Patient have a cardiac arrest and intubated     On propofol for sedation  NG tube with feeding                       Objective:     Visit Vitals  BP 91/82 (BP 1 Location: Left upper arm, BP Patient Position: At rest)   Pulse 76   Temp 97.2 °F (36.2 °C)   Resp 12   Ht 6' 2\" (1.88 m)   Wt 66.2 kg (145 lb 15.1 oz)   SpO2 100%   BMI 18.74 kg/m²        Recent Results (from the past 24 hour(s))   GLUCOSE, POC    Collection Time: 10/25/21 12:11 AM   Result Value Ref Range    Glucose (POC) 98 65 - 117 mg/dL    Performed by Francisco Mcqueen(ICU)    BLOOD GAS, ARTERIAL    Collection Time: 10/25/21  3:15 AM   Result Value Ref Range    pH 7.46 (H) 7.35 - 7.45      PCO2 38 35 - 45 mmHg    PO2 96 75 - 100 mmHg    O2 SAT 99 >95 %    BICARBONATE 27 (H) 22 - 26 mmol/L    BASE EXCESS 2.8 (H) 0 - 2 mmol/L    O2 METHOD VENT      FIO2 65.0 %    MODE Assist Control/Volume Control      Tidal volume 500      SET RATE 12      IPAP/PIP 0      EPAP/CPAP/PEEP 5.0      SITE Right Radial      MARIXA'S TEST PASS       [unfilled]      Review of Systems    Unable to obtain . Physical Exam:      Constitutional: On ventilator :   Head: Normocephalic and atraumatic. Eyes: Pupils are equal, round, and reactive to light. EOM are normal.   Cardiovascular: Normal rate, regular rhythm and normal heart sounds. Pulmonary/Chest: Breath sounds normal. No wheezes. No rales. Exhibits no tenderness. Abdominal: Soft. Bowel sounds are normal. There is no abdominal tenderness. There is no rebound and no guarding. Musculoskeletal: Normal range of motion. Neurological: Ventilator on sedation  XR CHEST PORT   Final Result   Persistent although improved left pneumothorax with persistent left   lung collapse. Nonspecific airspace disease and effusion throughout the right   lung base. . Endotracheal tube tip could be advanced by a couple centimeters for   more optimal positioning. Nasogastric tube requires advancement by several   centimeters to locate sidehole below gastroesophageal junction. Results called to Physician: Grace Hamilton on 10/25/2021 5:13 AM.      XR CHEST PORT   Final Result   Left-sided tension pneumothorax. Report phoned to [facility] at 2:45 AM      XR CHEST PORT   Final Result   FINDINGS/IMPRESSION:   Support lines and tubes are appropriate in radiographic position. Lungs similarly inflated with bibasilar atelectasis and bilateral pleural   effusions. Negative for pneumothorax. Persistent patchy airspace opacity throughout the lungs. This may represent   multilobar pneumonia or edema, or perhaps a combination of each. XR CHEST PORT   Final Result      XR CHEST PORT   Final Result      XR CHEST PORT   Final Result      XR CHEST PORT   Final Result   Findings/impression:   1. Stable ET tube. Interval removal of defibrillator pads. The previously noted   malpositioned OG, is not visualized. 2.  Patchy groundglass opacities in the hilar and perihilar region bilaterally   overall stable. No new consolidation.       3. Remainder unchanged            XR CHEST PORT   Final Result   Findings/impression:   1. Interval placement of an OG tube which is coiled in the upper neck. Please   remove and replace. 2.  The ET tube is in good position stable. Right chest defibrillator pad. 3.  Slightly more conspicuous bibasilar atelectasis and interstitial hilar   opacities. No new consolidation. 4.  Redemonstration of enlarged cardiac silhouette, stable. US RETROPERITONEUM COMP   Final Result   Echogenic kidneys consistent with medical renal disease. No   hydronephrosis. Ascites. XR CHEST PORT   Final Result      XR CHEST PORT   Final Result   Cardiomegaly and/or pericardial effusion with mild sequela of   presumed CHF and/or pulmonary edema, infection, or combination of these and with   associated left pleural effusion. ET tube in place as described      CTA ABD PELV W WO CONT   Final Result   Overall there is intact arterial circulation of the abdomen and   pelvis and there is no saurabh evidence of an acute GI hemorrhage. There is   however diffuse sequela of third spacing with bilateral pleural effusions,   profound abdominopelvic ascites, and diffuse subcutaneous edema. Considerations   for sequela of cardiac, hepatic and/or renal dysfunction. Bilateral areas of   renal parenchymal hypoperfusion are noted and could indicate sequela of   infection/pyelonephritis although by appearance might also suggest sequela of   renal infarcts of uncertain age. Component of CHF suggested given the lung base   findings described. .      Results called to Amanda Moralez on 10/19/2021 12:27 AM.      XR CHEST PORT   Final Result   1. Small left pleural effusion and multifocal airspace disease suspicious for   pneumonia. 2.Mild pulmonary vascular congestion.        XR CHEST PORT    (Results Pending)   XR CHEST PORT    (Results Pending)   XR CHEST PORT    (Results Pending)        Recent Results (from the past 24 hour(s)) GLUCOSE, POC    Collection Time: 10/25/21 12:11 AM   Result Value Ref Range    Glucose (POC) 98 65 - 117 mg/dL    Performed by Tammy Mcqueen(ICU)    BLOOD GAS, ARTERIAL    Collection Time: 10/25/21  3:15 AM   Result Value Ref Range    pH 7.46 (H) 7.35 - 7.45      PCO2 38 35 - 45 mmHg    PO2 96 75 - 100 mmHg    O2 SAT 99 >95 %    BICARBONATE 27 (H) 22 - 26 mmol/L    BASE EXCESS 2.8 (H) 0 - 2 mmol/L    O2 METHOD VENT      FIO2 65.0 %    MODE Assist Control/Volume Control      Tidal volume 500      SET RATE 12      IPAP/PIP 0      EPAP/CPAP/PEEP 5.0      SITE Right Radial      MARIXA'S TEST PASS         Results     Procedure Component Value Units Date/Time    MRSA SCREEN - PCR (NASAL) [[id number]] Collected: 10/20/21 0800    Order Status: Completed Specimen: Swab Updated: 10/20/21 0959     MRSA by PCR, Nasal Not Detected       CULTURE, RESPIRATORY/SPUTUM/BRONCH Myrle Loach STAIN [[id number]] Collected: 10/20/21 0800    Order Status: Completed Specimen: Sputum Updated: 10/22/21 1031     Special Requests: No Special Requests        GRAM STAIN Occasional WBCs seen         1+ Epithelial cells seen         No organisms seen        Culture result:       Light Yeast, (apparent candida albicans)                  No normal respiratory saskia isolated          CULTURE, BLOOD, LINE [[id number]] Collected: 10/19/21 0045    Order Status: Canceled Specimen: Blood     CULTURE, BLOOD #1 [[id number]]     Order Status: Canceled Specimen: Blood     CULTURE, BLOOD #2 [[id number]]     Order Status: Canceled Specimen: Blood     CULTURE, URINE [[id number]]  (Abnormal)  (Susceptibility) Collected: 10/18/21 2136    Order Status: Completed Specimen: Urine Updated: 10/21/21 0946     Special Requests: --        No Special Requests  Reflexed from [id number]       Alleman Count --        >100,000  colonies/ml       Culture result: Escherichia coli       Susceptibility      Escherichia coli      NEHA      Amikacin ($) Susceptible      Ampicillin ($) Susceptible Ampicillin/sulbactam ($) Susceptible      Cefazolin ($) Susceptible      Cefepime ($$) Susceptible      Cefoxitin Susceptible      Ceftazidime ($) Susceptible      Ceftriaxone ($) Susceptible      Ciprofloxacin ($) Susceptible      Gentamicin ($) Susceptible      Levofloxacin ($) Susceptible      Meropenem ($$) Susceptible      Nitrofurantoin Susceptible      Piperacillin/Tazobac ($) Susceptible      Tobramycin ($) Susceptible      Trimeth/Sulfa Susceptible               Linear View                   COVID-19 RAPID TEST [[id number]] Collected: 10/18/21 2055    Order Status: Completed Specimen: Nasopharyngeal Updated: 10/18/21 2148     Specimen source Nasopharyngeal        COVID-19 rapid test Not Detected        Comment: Rapid Abbott ID Now   Rapid NAAT:  The specimen is NEGATIVE for SARS-CoV-2, the novel coronavirus associated with COVID-19. Negative results should be treated as presumptive and, if inconsistent with clinical signs and symptoms or necessary for patient management, should be tested with an alternative molecular assay. Negative results do not preclude SARS-CoV-2 infection and should not be used as the sole basis for patient management decisions. This test has been authorized by the FDA under   an Emergency Use Authorization (EUA) for use by authorized laboratories.  Fact sheet for Healthcare Providers: ConventionUpdate.co.nz Fact sheet for Patients: ConventionUpdate.co.nz   Methodology: Isothermal Nucleic Acid Amplification         CULTURE, BLOOD, PAIRED [[id number]]  (Abnormal) Collected: 10/18/21 1935    Order Status: Completed Specimen: Blood Updated: 10/22/21 0646     Special Requests: No Special Requests        Culture result:       Alpha streptococcus, not S. pneumoniae GROWING IN THE ANAEROBIC BOTTLE (SITE NOT INDICATED)                  Diphtheroids GROWING IN THE AEROBIC BOTTLE                 Labs:     Recent Labs     10/23/21  1415   WBC 5.3   HGB 12.9 HCT 38.6   *     Recent Labs     10/23/21  1415      K 3.8   *   CO2 28   BUN 59*   CREA 1.37*   *   CA 7.1*     Recent Labs     10/23/21  1415   ALT 28   AP 78   TBILI 0.4   TP 5.0*   ALB 2.2*   GLOB 2.8     No results for input(s): INR, PTP, APTT, INREXT, INREXT in the last 72 hours. No results for input(s): FE, TIBC, PSAT, FERR in the last 72 hours. Lab Results   Component Value Date/Time    Folate 18.6 08/12/2021 11:00 AM      Recent Labs     10/25/21  0315 10/24/21  0315   PH 7.46* 7.45   PCO2 38 42   PO2 96 88     No results for input(s): CPK, CKNDX, TROIQ in the last 72 hours.     No lab exists for component: CPKMB  Lab Results   Component Value Date/Time    Cholesterol, total 178 08/10/2021 02:40 PM    HDL Cholesterol 41 08/10/2021 02:40 PM    LDL, calculated 107.8 (H) 08/10/2021 02:40 PM    Triglyceride 146 08/10/2021 02:40 PM    CHOL/HDL Ratio 4.3 08/10/2021 02:40 PM     Lab Results   Component Value Date/Time    Glucose (POC) 98 10/25/2021 12:11 AM    Glucose (POC) 139 (H) 10/19/2021 09:42 PM     Lab Results   Component Value Date/Time    Color Yellow/Straw 10/20/2021 12:55 PM    Appearance Turbid (A) 10/20/2021 12:55 PM    Specific gravity 1.014 10/20/2021 12:55 PM    pH (UA) 5.0 10/20/2021 12:55 PM    Protein Negative 10/20/2021 12:55 PM    Glucose Negative 10/20/2021 12:55 PM    Ketone Negative 10/20/2021 12:55 PM    Bilirubin Negative 10/20/2021 12:55 PM    Urobilinogen 0.1 10/20/2021 12:55 PM    Nitrites Negative 10/20/2021 12:55 PM    Leukocyte Esterase Trace (A) 10/20/2021 12:55 PM    Bacteria Negative 10/20/2021 12:55 PM    WBC 0-4 10/20/2021 12:55 PM    RBC 5-10 10/20/2021 12:55 PM         Assessment:     Acute hypoxemic respiratory failure  on ventilator 100% O2  gram-negative pneumonia  Acute exacerbation of COPD  Acute systolic heart failure   Abdominal distention  Possible urinary retention  Thrombocytopenia  Neutropenia  Acute kidney injury  Elevated BNP  Elevated troponin  UTI with E. coli  Vitamin D deficiency      Plan:     Continue aspirin 81 mg daily  On Zithromax 500 every 24 hours and IV Zosyn  Coreg 3.125 twice daily  Protonic 40 mg every 12 hours    Heparin 5000 units subcu every 8 hours  On IV Solu-Medrol 40 IV every 12 hours        Continue propofol for sedation    Discussed with the patient family yesterday to make a DNR            Current Facility-Administered Medications:     zinc oxide-white petrolatum 17-57 % topical paste, , Topical, TID, Maritza Jackson MD, Given at 10/24/21 2152    NOREPINephrine (LEVOPHED) 8 mg in 5% dextrose 250mL (32 mcg/mL) infusion, 0.5-120 mcg/min, IntraVENous, TITRATE, Maritza Jackson MD, Last Rate: 35.6 mL/hr at 10/25/21 0353, 19 mcg/min at 10/25/21 0353    [Held by provider] furosemide (LASIX) injection 60 mg, 60 mg, IntraVENous, BID, Brad Carter MD, 60 mg at 10/21/21 0823    methylPREDNISolone (PF) (SOLU-MEDROL) injection 40 mg, 40 mg, IntraVENous, Q12H, Lola Birch MD, 40 mg at 10/24/21 2151    propofol (DIPRIVAN) 10 mg/mL infusion, 0-50 mcg/kg/min, IntraVENous, TITRATE, Lola Birch MD, Last Rate: 7.6 mL/hr at 10/24/21 1834, 20 mcg/kg/min at 10/24/21 1834    pantoprazole (PROTONIX) 40 mg in 0.9% sodium chloride 10 mL injection, 40 mg, IntraVENous, Q12H, Samara Ramirez NP, 40 mg at 10/24/21 2143    piperacillin-tazobactam (ZOSYN) 3.375 g in 0.9% sodium chloride (MBP/ADV) 100 mL MBP, 3.375 g, IntraVENous, Q8H, Maritza Jackson MD, Last Rate: 25 mL/hr at 10/25/21 0628, 3.375 g at 10/25/21 3254    aspirin chewable tablet 81 mg, 81 mg, Oral, DAILY, Khushi Brown MD, 81 mg at 10/24/21 0803    sodium chloride (NS) flush 5-40 mL, 5-40 mL, IntraVENous, Q8H, Maritza Jackson MD, 10 mL at 10/25/21 0510    sodium chloride (NS) flush 5-40 mL, 5-40 mL, IntraVENous, PRN, Maritza Jackson MD    sodium chloride (NS) flush 5-10 mL, 5-10 mL, IntraVENous, PRN, MD Iwona Hickey azithromycin (ZITHROMAX) 500 mg in 0.9% sodium chloride 250 mL (VIAL-MATE), 500 mg, IntraVENous, Q24H, Maritza Jackson MD, Last Rate: 250 mL/hr at 10/24/21 2110, 500 mg at 10/24/21 2110    albuterol-ipratropium (DUO-NEB) 2.5 MG-0.5 MG/3 ML, 3 mL, Nebulization, Q6H PRN, Madeline Jackson MD    carvediloL (COREG) tablet 3.125 mg, 3.125 mg, Oral, BID WITH MEALS, Madeline Jackson MD, 3.125 mg at 10/24/21 1834    acetaminophen (TYLENOL) tablet 650 mg, 650 mg, Oral, Q6H PRN, 650 mg at 10/21/21 1648 **OR** acetaminophen (TYLENOL) suppository 650 mg, 650 mg, Rectal, Q6H PRN, Maritza Jackson MD    polyethylene glycol (MIRALAX) packet 17 g, 17 g, Oral, DAILY PRN, Maritza Jackson MD    ondansetron (ZOFRAN ODT) tablet 4 mg, 4 mg, Oral, Q8H PRN **OR** ondansetron (ZOFRAN) injection 4 mg, 4 mg, IntraVENous, Q6H PRN, Lavonne Frankel, MD

## 2021-10-25 NOTE — PROGRESS NOTES
DTI noted to the sacral area, skin not broken but impaired. On previous assessments, skin darkened yet smooth over bony prominences consistent with all bony areas; tonight skin is tenting over the sacrum.

## 2021-10-25 NOTE — PROGRESS NOTES
CM reviewed clinical chart. Patient is still being actively treated by cardiovascular surgeon and pulmonology. He is currently on a ventilator. CM team will continue to follow for any needs at discharge.

## 2021-10-25 NOTE — PROGRESS NOTES
To bedside for change in vital signs on monitor. Patient noted to be in PEA with no pulse able to be palpated. 2nd nurse at bedside to confirm. Dr. Dylan Liang notified. [facility] and family to be called.

## 2021-10-25 NOTE — PROGRESS NOTES
Study Result    Narrative & Impression   A single upright portable view is compared with a prior exam from 10/23/2021. There is a tracheostomy tube 13.7 cm from the brian. A nasogastric tube is seen  with the side port in the GE junction. The heart, mediastinum and pulmonary  vasculature are normal. The lungs are significant for a large, likely tension  pneumothorax on the left side. The right lung is mildly compressed. IMPRESSION  Left-sided tension pneumothorax. Report phoned to [facility] at 2:45 AM     Dr Horne notified @ [phone] verbal order for surgical consult. Surgical consult entered;notified Dr. Yi Dye @[phone].

## 2021-10-25 NOTE — PROGRESS NOTES
cxr taken immed after chest tube placed while I was at bedside shows that gilberto is only 50% expanded (with no air leak) on water seal,  So his lungs are now fairly stiff and frances require application of suction to get the lung to expand. With -20 cm of suction a large amount of air came out along with 200cc fluid and has a tiny air leak.   Will leave him on -20 for now,

## 2021-10-25 NOTE — PROGRESS NOTES
Renal Daily Progress Note    Admit Date: 10/18/2021      Subjective:   He is intubated and on norepinephrine. He has a left-sided chest tube. He is on propofol. Urine output documented at 350 mL yesterday.       Current Facility-Administered Medications   Medication Dose Route Frequency    zinc oxide-white petrolatum 17-57 % topical paste   Topical TID    NOREPINephrine (LEVOPHED) 8 mg in 5% dextrose 250mL (32 mcg/mL) infusion  0.5-120 mcg/min IntraVENous TITRATE    [Held by provider] furosemide (LASIX) injection 60 mg  60 mg IntraVENous BID    methylPREDNISolone (PF) (SOLU-MEDROL) injection 40 mg  40 mg IntraVENous Q12H    propofol (DIPRIVAN) 10 mg/mL infusion  0-50 mcg/kg/min IntraVENous TITRATE    pantoprazole (PROTONIX) 40 mg in 0.9% sodium chloride 10 mL injection  40 mg IntraVENous Q12H    piperacillin-tazobactam (ZOSYN) 3.375 g in 0.9% sodium chloride (MBP/ADV) 100 mL MBP  3.375 g IntraVENous Q8H    aspirin chewable tablet 81 mg  81 mg Oral DAILY    sodium chloride (NS) flush 5-40 mL  5-40 mL IntraVENous Q8H    sodium chloride (NS) flush 5-40 mL  5-40 mL IntraVENous PRN    sodium chloride (NS) flush 5-10 mL  5-10 mL IntraVENous PRN    azithromycin (ZITHROMAX) 500 mg in 0.9% sodium chloride 250 mL (VIAL-MATE)  500 mg IntraVENous Q24H    albuterol-ipratropium (DUO-NEB) 2.5 MG-0.5 MG/3 ML  3 mL Nebulization Q6H PRN    carvediloL (COREG) tablet 3.125 mg  3.125 mg Oral BID WITH MEALS    acetaminophen (TYLENOL) tablet 650 mg  650 mg Oral Q6H PRN    Or    acetaminophen (TYLENOL) suppository 650 mg  650 mg Rectal Q6H PRN    polyethylene glycol (MIRALAX) packet 17 g  17 g Oral DAILY PRN    ondansetron (ZOFRAN ODT) tablet 4 mg  4 mg Oral Q8H PRN    Or    ondansetron (ZOFRAN) injection 4 mg  4 mg IntraVENous Q6H PRN        Review of Systems    ROS   Not obtainable    Objective:     Patient Vitals for the past 8 hrs:   BP Temp Pulse Resp SpO2   10/25/21 1144   74 12 100 %   10/25/21 1100  97.3 °F (36.3 °C)      10/25/21 0720   76 12 100 %   10/25/21 0700  97.2 °F (36.2 °C)      10/25/21 0600 91/82 97.3 °F (36.3 °C) 70 12 100 %   10/25/21 0500 119/79 97.5 °F (36.4 °C) 75 12 100 %     No intake/output data recorded. 10/23 1901 - 10/25 0700  In: 405   Out: 950 [Urine:950]    Physical Exam:   Physical Exam  Cardiovascular:      Rate and Rhythm: Regular rhythm. Pulmonary:      Breath sounds: Normal breath sounds. Abdominal:      General: Bowel sounds are normal.      Palpations: Abdomen is soft. Musculoskeletal:         General: No swelling. Intubated gentleman sedated on ventilator      XR CHEST PORT   Final Result   Findings/impression:      Stable support hardware. Interval reexpansion of the left lung with no evidence of residual pneumothorax. Bibasilar interstitial and groundglass airspace disease. Bilateral pleural   effusions. Cardiomediastinal contours are stable. Mild central vascular congestion. Visualized osseous structures are unchanged. XR CHEST PORT   Final Result   Persistent although improved left pneumothorax with persistent left   lung collapse. Nonspecific airspace disease and effusion throughout the right   lung base. . Endotracheal tube tip could be advanced by a couple centimeters for   more optimal positioning. Nasogastric tube requires advancement by several   centimeters to locate sidehole below gastroesophageal junction. Results called to Physician: Chayito Tillman on 10/25/2021 5:13 AM.      XR CHEST PORT   Final Result   Left-sided tension pneumothorax. Report phoned to [facility] at 2:45 AM      XR CHEST PORT   Final Result   FINDINGS/IMPRESSION:   Support lines and tubes are appropriate in radiographic position. Lungs similarly inflated with bibasilar atelectasis and bilateral pleural   effusions. Negative for pneumothorax. Persistent patchy airspace opacity throughout the lungs.  This may represent   multilobar pneumonia or edema, or perhaps a combination of each. XR CHEST PORT   Final Result      XR CHEST PORT   Final Result      XR CHEST PORT   Final Result      XR CHEST PORT   Final Result   Findings/impression:   1. Stable ET tube. Interval removal of defibrillator pads. The previously noted   malpositioned OG, is not visualized. 2.  Patchy groundglass opacities in the hilar and perihilar region bilaterally   overall stable. No new consolidation. 3.  Remainder unchanged            XR CHEST PORT   Final Result   Findings/impression:   1. Interval placement of an OG tube which is coiled in the upper neck. Please   remove and replace. 2.  The ET tube is in good position stable. Right chest defibrillator pad. 3.  Slightly more conspicuous bibasilar atelectasis and interstitial hilar   opacities. No new consolidation. 4.  Redemonstration of enlarged cardiac silhouette, stable. US RETROPERITONEUM COMP   Final Result   Echogenic kidneys consistent with medical renal disease. No   hydronephrosis. Ascites. XR CHEST PORT   Final Result      XR CHEST PORT   Final Result   Cardiomegaly and/or pericardial effusion with mild sequela of   presumed CHF and/or pulmonary edema, infection, or combination of these and with   associated left pleural effusion. ET tube in place as described      CTA ABD PELV W WO CONT   Final Result   Overall there is intact arterial circulation of the abdomen and   pelvis and there is no saurabh evidence of an acute GI hemorrhage. There is   however diffuse sequela of third spacing with bilateral pleural effusions,   profound abdominopelvic ascites, and diffuse subcutaneous edema. Considerations   for sequela of cardiac, hepatic and/or renal dysfunction.  Bilateral areas of   renal parenchymal hypoperfusion are noted and could indicate sequela of   infection/pyelonephritis although by appearance might also suggest sequela of   renal infarcts of uncertain age. Component of CHF suggested given the lung base   findings described. .      Results called to Huang Trimble on 10/19/2021 12:27 AM.      XR CHEST PORT   Final Result   1. Small left pleural effusion and multifocal airspace disease suspicious for   pneumonia. 2.Mild pulmonary vascular congestion. XR CHEST PORT    (Results Pending)        Data Review   Recent Labs     10/23/21  1415   WBC 5.3   HGB 12.9   HCT 38.6   *     Recent Labs     10/23/21  1415      K 3.8   *   CO2 28   *   BUN 59*   CREA 1.37*   CA 7.1*   ALB 2.2*   ALT 28     No components found for: [place]  Recent Labs     10/25/21  0315 10/24/21  0315 10/23/21  0400   PH 7.46* 7.45 7.44   PCO2 38 42 40   PO2 96 88 88   HCO3 27* 28* 27*   FIO2 65.0 40.0 40.0     No results for input(s): INR, INREXT, INREXT in the last 72 hours. Assessment:           Active Problems:    Pneumonia (10/18/2021)      Hypoxia (10/18/2021)      Severe protein-calorie malnutrition ([address] (10/20/2021)      Acute kidney injury superimposed on chronic kidney disease ([address] (10/21/2021)      Hyperphosphatemia (10/21/2021)      Hypocalcemia (10/21/2021)      Polyuria (10/22/2021)    Acute kidney injury. Creatinine has improved from 2.2 mg to 1.37 mg as of 23 October. No recent labs. Secondary hyperparathyroidism  Vitamin D deficiency  Corrected calcium 8.5 mg.  Plan:   Add cholecalciferol 100,000 units weekly  Follow calcium levels. Repeat labs. Expect phosphorus to be better as renal function improves.

## 2021-10-25 NOTE — PROGRESS NOTES
responded to RN referral due to patient's death.  consulted with RN upon arrival, family expected to arrive.  provided prayer of commendation at bedside for patient, prayer for God's presence to be with family on their journey of grief. Family called RN and indicated they will not be arriving.   advised RN of chaplains availability for follow up upon further referral.     Visited by Nancey Claude, [facility], [facility]    can be contacted by calling  and requesting  on call

## 2021-10-25 NOTE — PROGRESS NOTES
History and Physical    Wen Simmons is a 59 y.o. male who presents today pneumonia    HPI  Patient is critically ill in the hospital with cardiopulmonary failure and has recently just been made DNR 12 hours ago  who had a CXR done at 2:45 this am ro reasons that I can not ascertain that showed a complete pneumox on the left side. I was called by nursing that consult had been placed for me. .  I have come in to see him, he is sedated on the vent unable to give consent. I have called his sister JHONATAN the POA and here phone goes to voice mail with a full mail box that is not taking messages. At this time he appears comfortable and unaffected by the complete left sided pneumo. I have looked at the CXR and despite the radiology report I see no evidence of tension physiology on CXR or clinicall. I have personally looked at the cxr and I disagree with radiology interp tat there is evidence of tension physiology. Itis just a pneumothorax    Past Medical History:   Diagnosis Date    Chronic obstructive pulmonary disease ([address]     Heart failure ([address]        No past surgical history on file.     Family History   Problem Relation Age of Onset    Hypertension Mother        Social History     Socioeconomic History    Marital status: SINGLE     Spouse name: Not on file    Number of children: Not on file    Years of education: Not on file    Highest education level: Not on file   Occupational History    Not on file   Tobacco Use    Smoking status: Current Every Day Smoker     Packs/day: 0.50    Smokeless tobacco: Never Used   Substance and Sexual Activity    Alcohol use: Yes     Comment: ocassionally    Drug use: Not Currently    Sexual activity: Not on file   Other Topics Concern    Not on file   Social History Narrative    Not on file     Social Determinants of Health     Financial Resource Strain:     Difficulty of Paying Living Expenses:    Food Insecurity:     Worried About Running Out of Food in the Last Year:     [address] in the Last Year:    Transportation Needs:     Lack of Transportation (Medical):  Lack of Transportation (Non-Medical):    Physical Activity:     Days of Exercise per Week:     Minutes of Exercise per Session:    Stress:     Feeling of Stress :    Social Connections:     Frequency of Communication with Friends and Family:     Frequency of Social Gatherings with Friends and Family:     Attends Mandaen Services:     Active Member of Clubs or Organizations:     Attends Club or Organization Meetings:     Marital Status:    Intimate Partner Violence:     Fear of Current or Ex-Partner:     Emotionally Abused:     Physically Abused:     Sexually Abused:        Patient has no known allergies.       Current Facility-Administered Medications   Medication Dose Route Frequency Provider Last Rate Last Admin    zinc oxide-white petrolatum 17-57 % topical paste   Topical TID Maritza Jackson MD   Given at 10/24/21 2152    NOREPINephrine (LEVOPHED) 8 mg in 5% dextrose 250mL (32 mcg/mL) infusion  0.5-120 mcg/min IntraVENous TITRATE Maritza Jackson MD 35.6 mL/hr at 10/25/21 0353 19 mcg/min at 10/25/21 0353    [Held by provider] furosemide (LASIX) injection 60 mg  60 mg IntraVENous BID Samuel DIAZ MD   60 mg at 10/21/21 0823    methylPREDNISolone (PF) (SOLU-MEDROL) injection 40 mg  40 mg IntraVENous Q12H Alisson Cedeño MD   40 mg at 10/24/21 2151    propofol (DIPRIVAN) 10 mg/mL infusion  0-50 mcg/kg/min IntraVENous TITRATE Alisson Cedeño MD 7.6 mL/hr at 10/24/21 1834 20 mcg/kg/min at 10/24/21 1834    pantoprazole (PROTONIX) 40 mg in 0.9% sodium chloride 10 mL injection  40 mg IntraVENous Q12H Samara Ramirez NP   40 mg at 10/24/21 2143    piperacillin-tazobactam (ZOSYN) 3.375 g in 0.9% sodium chloride (MBP/ADV) 100 mL MBP  3.375 g IntraVENous Q8H Maritza Jackson MD 25 mL/hr at 10/24/21 2155 3.375 g at 10/24/21 2155    aspirin chewable tablet 81 mg  81 mg Oral DAILY Khushi Brown MD   81 mg at 10/24/21 0803    sodium chloride (NS) flush 5-40 mL  5-40 mL IntraVENous Q8H Maritza Jackson MD   10 mL at 10/24/21 2151    sodium chloride (NS) flush 5-40 mL  5-40 mL IntraVENous PRN Andra Jackson MD        sodium chloride (NS) flush 5-10 mL  5-10 mL IntraVENous PRN Andra Jackson MD        azithromycin (ZITHROMAX) 500 mg in 0.9% sodium chloride 250 mL (VIAL-MATE)  500 mg IntraVENous Q24H Maritza Jackson  mL/hr at 10/24/21 2110 500 mg at 10/24/21 2110    albuterol-ipratropium (DUO-NEB) 2.5 MG-0.5 MG/3 ML  3 mL Nebulization Q6H PRN Andra Jackson MD        carvediloL (COREG) tablet 3.125 mg  3.125 mg Oral BID WITH MEALS Andra Jackson MD   3.125 mg at 10/24/21 1834    acetaminophen (TYLENOL) tablet 650 mg  650 mg Oral Q6H PRN Andra Jackson MD   650 mg at 10/21/21 1648    Or    acetaminophen (TYLENOL) suppository 650 mg  650 mg Rectal Q6H PRN Andra Jackson MD        polyethylene glycol (MIRALAX) packet 17 g  17 g Oral DAILY PRN Andra Jackson MD        ondansetron (ZOFRAN ODT) tablet 4 mg  4 mg Oral Q8H PRN Andra Jackson MD        Or    ondansetron [facility]) injection 4 mg  4 mg IntraVENous Q6H PRN Nicolasa Herrera MD        heparin (porcine) injection 5,000 Units  5,000 Units SubCUTAneous Q8H Nicolasa Herrera MD   5,000 Units at 10/24/21 2153       Review of Systems   Unable to perform ROS: Acuity of condition   sedated on the vent     Patient Active Problem List   Diagnosis Code    CHF (congestive heart failure) ([facility] I50.9    Respiratory failure with hypoxia ([facility] J96.91    Fluid overload E87.70    Elevated brain natriuretic peptide (BNP) level R79.89    Noncompliance Z91.19    Tobacco abuse Z72.0    Pneumonia J18.9    Hypoxia R09.02    Severe protein-calorie malnutrition ([facility] E43    Acute kidney injury superimposed on chronic kidney disease (HCC) N17.9, N18.9    Hyperphosphatemia E83.39    Hypocalcemia E83.51    Polyuria R35.89       /80 (BP 1 Location: Left upper arm, BP Patient Position: At rest)   Pulse 63   Temp 97.9 °F (36.6 °C)   Resp 12   Ht 6' 2\" (1.88 m)   Wt 145 lb 15.1 oz (66.2 kg)   SpO2 92%   BMI 18.74 kg/m²     Physical Exam  Constitutional:       Appearance: He is ill-appearing. HENT:      Head: Normocephalic and atraumatic. Mouth/Throat:      Mouth: Mucous membranes are moist.   Cardiovascular:      Rate and Rhythm: Normal rate. Pulmonary:      Effort: Pulmonary effort is normal.      Comments: Absent breath sounds on the left  Abdominal:      General: Abdomen is flat. Palpations: Abdomen is soft. Musculoskeletal:      Comments: Sedated on the vent   Skin:     Capillary Refill: Capillary refill takes less than 2 seconds.    Psychiatric:      Comments: Sedated on the vent          Problem List Items Addressed This Visit     None      Visit Diagnoses     Community acquired pneumonia, unspecified laterality    -  Primary          Assessment and Plan:   Spontanous left sided pneumothorax on the vent, Unable to obtain consent, will place chest tube,   Ivanna Bennett MD

## 2021-10-25 NOTE — PROGRESS NOTES
Pulmonary, Critical Care Note    Name: Pedro Pablo Tucker MRN: [id number]   : 1960 Hospital: [address] [place] [address]   Date: 10/25/2021  Admission date: 10/18/2021 Hospital Day: 8       Subjective/Interval History:   Seen in the ICU on nasal oxygen. He is awake alert extremely poor historian all I can tell from him is that he had shortness of breath and came to the hospital  10/20 worsening respiratory status last evening and required intubation now on the ventilator unresponsive on IV propofol    Patient Active Problem List   Diagnosis Code    CHF (congestive heart failure) ([facility]) I50.9    Respiratory failure with hypoxia ([facility] J96.91    Fluid overload E87.70    Elevated brain natriuretic peptide (BNP) level R79.89    Noncompliance Z91.19    Tobacco abuse Z72.0    Pneumonia J18.9    Hypoxia R09.02    Severe protein-calorie malnutrition ([facility] E43    Acute kidney injury superimposed on chronic kidney disease ([facility]) N17.9, N18.9    Hyperphosphatemia E83.39    Hypocalcemia E83.51    Polyuria R35.89       IMPRESSION:   1. Acute hypoxic respiratory failure controlled on the ventilator sedated with propofol  2. Spontaneous left-sided pneumothorax  3. Pulmonary edema chest x-ray still shows accentuated interstitial markings  4. Bilateral pleural effusions left greater than right  5. GI bleed rule out varices  6. Ascites  7. Severe cardiomyopathy  8. Pyuria  Body mass index is 18.74 kg/m². RECOMMENDATIONS/PLAN:   1. Patient had left complete pneumothorax which is spontaneous he is on ventilator  2. Status post chest tube placement by CT surgeon partial reexpansion of the left lung with -20 suction  3. Patient remained on ventilator 40% FiO2 tidal volume 500 PEEP of 5 rate of 12 we will start weaning, he desaturated last night vent was disconnected he was bagging tachypneic hypotensive Levophed has been increased   4. Will advance ETT and repeat CXR  5.  Lasix now twice daily received albumin  6. Renal function improving we will continue to monitor  7. NG tube still has bloody secretion  8. Follow urine culture  9. Continue DuoNeb and Symbicort will decrease Solu-Medrol  10. Ascites present may need to consider paracentesis  11. Status post EGD with NG tube placement was done   12. Unable to get consent for paracentesis         Subjective/Initial History:   I have reviewed the flowsheet and previous days notes. I was asked by Carmen Kam MD to see Renea Nice a 59 y.o.  male  in consultation for a chief complaint of acute hypoxic respiratory failure pulmonary edema pleural effusion      The patient is unable to give any meaningful history or review of systems due to patient factors. Patient PCP: Other, MD Zak  PMH:  has a past medical history of Chronic obstructive pulmonary disease ([facility] and Heart failure ([facility] PSH:   has no past surgical history on file. FHX: family history includes Hypertension in his mother. SHX:  reports that he has been smoking. He has been smoking about 0.50 packs per day. He has never used smokeless tobacco. He reports current alcohol use. He reports previous drug use.   Systemic review unreliable he is very confused    No Known Allergies   MEDS:   Current Facility-Administered Medications   Medication    zinc oxide-white petrolatum 17-57 % topical paste    NOREPINephrine (LEVOPHED) 8 mg in 5% dextrose 250mL (32 mcg/mL) infusion    [Held by provider] furosemide (LASIX) injection 60 mg    methylPREDNISolone (PF) (SOLU-MEDROL) injection 40 mg    propofol (DIPRIVAN) 10 mg/mL infusion    pantoprazole (PROTONIX) 40 mg in 0.9% sodium chloride 10 mL injection    piperacillin-tazobactam (ZOSYN) 3.375 g in 0.9% sodium chloride (MBP/ADV) 100 mL MBP    aspirin chewable tablet 81 mg    sodium chloride (NS) flush 5-40 mL    sodium chloride (NS) flush 5-40 mL    sodium chloride (NS) flush 5-10 mL    azithromycin (ZITHROMAX) 500 mg in 0.9% sodium chloride 250 mL (VIAL-MATE)    albuterol-ipratropium (DUO-NEB) 2.5 MG-0.5 MG/3 ML    carvediloL (COREG) tablet 3.125 mg    acetaminophen (TYLENOL) tablet 650 mg    Or    acetaminophen (TYLENOL) suppository 650 mg    polyethylene glycol (MIRALAX) packet 17 g    ondansetron (ZOFRAN ODT) tablet 4 mg    Or    ondansetron (ZOFRAN) injection 4 mg        Current Facility-Administered Medications:     zinc oxide-white petrolatum 17-57 % topical paste, , Topical, TID, Maritza Jackson MD, Given at 10/24/21 2152    NOREPINephrine (LEVOPHED) 8 mg in 5% dextrose 250mL (32 mcg/mL) infusion, 0.5-120 mcg/min, IntraVENous, TITRATE, Maritza Jackson MD, Last Rate: 35.6 mL/hr at 10/25/21 0353, 19 mcg/min at 10/25/21 0353    [Held by provider] furosemide (LASIX) injection 60 mg, 60 mg, IntraVENous, BID, Freeman DIAZ MD, 60 mg at 10/21/21 0823    methylPREDNISolone (PF) (SOLU-MEDROL) injection 40 mg, 40 mg, IntraVENous, Q12H, Gaudencio Lorenz MD, 40 mg at 10/24/21 2151    propofol (DIPRIVAN) 10 mg/mL infusion, 0-50 mcg/kg/min, IntraVENous, TITRATE, Gaudencio Lorenz MD, Last Rate: 7.6 mL/hr at 10/24/21 1834, 20 mcg/kg/min at 10/24/21 1834    pantoprazole (PROTONIX) 40 mg in 0.9% sodium chloride 10 mL injection, 40 mg, IntraVENous, Q12H, Samara Ramirez NP, 40 mg at 10/24/21 2143    piperacillin-tazobactam (ZOSYN) 3.375 g in 0.9% sodium chloride (MBP/ADV) 100 mL MBP, 3.375 g, IntraVENous, Q8H, Maritza Jackson MD, Last Rate: 25 mL/hr at 10/25/21 0628, 3.375 g at 10/25/21 8742    aspirin chewable tablet 81 mg, 81 mg, Oral, DAILY, Khushi Brown MD, 81 mg at 10/24/21 0803    sodium chloride (NS) flush 5-40 mL, 5-40 mL, IntraVENous, Q8H, Maritza Jackson MD, 10 mL at 10/25/21 0510    sodium chloride (NS) flush 5-40 mL, 5-40 mL, IntraVENous, PRN, Maritza Jackson MD    sodium chloride (NS) flush 5-10 mL, 5-10 mL, IntraVENous, PRN, Joaquina Jackson MD    azithromycin (ZITHROMAX) 500 mg in 0.9% sodium chloride 250 mL (VIAL-MATE), 500 mg, IntraVENous, Q24H, Maritza Jackson MD, Last Rate: 250 mL/hr at 10/24/21 2110, 500 mg at 10/24/21 2110    albuterol-ipratropium (DUO-NEB) 2.5 MG-0.5 MG/3 ML, 3 mL, Nebulization, Q6H PRN, Shayy Jackson MD    carvediloL (COREG) tablet 3.125 mg, 3.125 mg, Oral, BID WITH MEALS, Shayy Jackson MD, 3.125 mg at 10/24/21 1834    acetaminophen (TYLENOL) tablet 650 mg, 650 mg, Oral, Q6H PRN, 650 mg at 10/21/21 1648 **OR** acetaminophen (TYLENOL) suppository 650 mg, 650 mg, Rectal, Q6H PRN, Shayy Jackson MD    polyethylene glycol (MIRALAX) packet 17 g, 17 g, Oral, DAILY PRN, Shayy Jackson MD    ondansetron (ZOFRAN ODT) tablet 4 mg, 4 mg, Oral, Q8H PRN **OR** ondansetron (ZOFRAN) injection 4 mg, 4 mg, IntraVENous, Q6H PRN, Maritza Jackson MD      Objective:     Vital Signs: Telemetry:    normal sinus rhythm Intake/Output:   Visit Vitals  BP 91/82 (BP 1 Location: Left upper arm, BP Patient Position: At rest)   Pulse 76   Temp 97.2 °F (36.2 °C)   Resp 12   Ht 6' 2\" (1.88 m)   Wt 66.2 kg (145 lb 15.1 oz)   SpO2 100%   BMI 18.74 kg/m²       Temp (24hrs), Av.6 °F (37 °C), Min:97.2 °F (36.2 °C), Max:99.3 °F (37.4 °C)        O2 Device: Ventilator O2 Flow Rate (L/min): 3 l/min         Body mass index is 18.74 kg/m². Wt Readings from Last 4 Encounters:   10/25/21 66.2 kg (145 lb 15.1 oz)   21 52.4 kg (115 lb 8.3 oz)   21 54.2 kg (119 lb 7.8 oz)          Intake/Output Summary (Last 24 hours) at 10/25/2021 0829  Last data filed at 10/24/2021 1819  Gross per 24 hour   Intake    Output 350 ml   Net -350 ml       Last shift:      No intake/output data recorded. Last 3 shifts: 10/23 1901 - 10/25 0700  In: 405   Out: 950 [Urine:950]       Hemodynamics:    CO:    CI:    CVP:    SVR:   PAP Systolic:    PAP Diastolic:    PVR:    PF52:       Ventilator Settings:      Mode Rate TV Press PEEP FiO2 PIP Min.  Vent   Assist control, Volume control    500 ml 5 cm H20 65 %  16 cm H2O  5.4 l/min      Physical Exam:    General:  male; unresponsive on propofol on the ventilator  HEENT: NCAT, poor dentition, oral mucosa moist  Eyes: anicteric; conjunctiva clear doll's eye reflex  Neck: no nodes, JVD present, no accessory MM use. Chest: no deformity,  Cardiac: Regular rate and rhythm systolic murmur present peripheral edema present  Lungs: Clear anteriorly with lateral rales extending posteriorly no wheezing  Abd: Relatively soft nontender bowel sounds present  Ext: Leg edema  no joint swelling; No clubbing  : clear urine  Neuro: Unresponsive on propofol doll's eye reflex present flaccid extremities  Psych-unable to assess  Skin: warm, dry, no cyanosis;  Pulses: Brachial and radial pulses intact  Capillary: Slow capillary refill      Labs:    Recent Labs     10/23/21  1415   WBC 5.3   HGB 12.9   *     Recent Labs     10/23/21  1415      K 3.8   *   CO2 28   *   BUN 59*   CREA 1.37*   CA 7.1*   ALB 2.2*   ALT 28   10/28/C 12  PEEP 5 FiO2 40% with PO2 90 PCO2 36 pH 7.40  Blood 1 of 4 with gram-positive cocci  Sputum culture pending  Urine culture pending  Nasal MRSA smear negative  BNP greater than 35,000  Troponin I 178, 135, 122  COVID-19 antigen negative  8/11/2021 echo ejection fraction 20% moderate aortic regurg mild to moderate mitral regurg IVC dilated PASP 57  Imaging:  I have personally reviewed the patients radiographs and have reviewed the reports:    CXR Results  (Last 48 hours)               10/18/21 1928  XR CHEST PORT Final result    Impression:  1. Small left pleural effusion and multifocal airspace disease suspicious for   pneumonia. 2.Mild pulmonary vascular congestion. Narrative:  AP portable chest, 1915 hours. Comparison: 9/3/2021. Findings: There is mild to moderate cardiomegaly. Mild pulmonary vascular   congestion is noted.  There is patchy bilateral airspace disease, left greater than right. A small left pleural effusion is noted. There is more consolidative   airspace disease in the left lung base. No pneumothorax is identified. The   osseous structures are unremarkable. To me the x-ray looks more like accentuated interstitial markings consistent with pulmonary edema and the left effusion           Results from Hospital Encounter encounter on 10/18/21    XR CHEST PORT    Narrative  HISTORY:  chest tube placement    TECHNIQUE:  XR CHEST PORT    COMPARISON: 2 hours prior  LIMITATIONS: None    TUBES/LINES: Left chest tube placed with tip directed toward superomedial left  lung apex. Endotracheal tube present with tip 7.7 cm above brian. NG tube  present with sidehole still above the diaphragm. LUNG PARENCHYMA: Left lung collapse persists. Airspace disease throughout the  right lower lobe. TRACHEA/BRONCHI: Normal  PULMONARY VESSELS: Normal  PLEURA: Large left pneumothorax persists but improved since prior exam after  chest tube placement. There are bilateral pleural effusions. HEART: Enlarged  AORTIC SHADOW:Normal.  MEDIASTINUM: Normal  BONE/SOFT TISSUES: No acute abnormality. OTHER: None    Impression  Persistent although improved left pneumothorax with persistent left  lung collapse. Nonspecific airspace disease and effusion throughout the right  lung base. . Endotracheal tube tip could be advanced by a couple centimeters for  more optimal positioning. Nasogastric tube requires advancement by several  centimeters to locate sidehole below gastroesophageal junction. Results called to Physician: Annemarie Chaudhary on 10/25/2021 5:13 AM.      XR CHEST PORT    Narrative  A single upright portable view is compared with a prior exam from 10/23/2021. There is a tracheostomy tube 13.7 cm from the brian. A nasogastric tube is seen  with the side port in the GE junction.  The heart, mediastinum and pulmonary  vasculature are normal. The lungs are significant for a large, likely tension  pneumothorax on the left side. The right lung is mildly compressed. Impression  Left-sided tension pneumothorax. Report phoned to [facility] at 2:45 AM      XR CHEST PORT    Narrative  XR CHEST PORT    Comparison: Chest radiograph dated October 22, 2021    Impression  FINDINGS/IMPRESSION:  Support lines and tubes are appropriate in radiographic position. Lungs similarly inflated with bibasilar atelectasis and bilateral pleural  effusions. Negative for pneumothorax. Persistent patchy airspace opacity throughout the lungs. This may represent  multilobar pneumonia or edema, or perhaps a combination of each. Results from [place] encounter on 10/18/21    CTA ABD PELV W WO CONT    Narrative  HISTORY:  possible gi bleed  Dose reduction technique: All CT scans at this facility are performed using dose reduction optimization  technique as appropriate on the exam including the following: Automated exposure  control, adjustment of the MA and/or KV according to patient size of use of  iterative reconstructive technique. .    TECHNIQUE: CTA ABD PELV W WO CONT. CT angiogram of the abdomen and pelvis  performed and maximum intensity projection images (MIPS) generated. 100 cc [id number]-[id number] injected. COMPARISON: None  LIMITATIONS: None    CHEST: Multichamber cardiomegaly. Small right and moderate left pleural  effusion. Bibasilar airspace consolidation overlies the effusions. Moderate  emphysematous changes incidentally noted at the lung bases. AORTA: No aneurysm or dissection. Mild peripheral areas of aortic calcification  CELIAC AXIS: Patent. SUPERIOR MESENTERIC ARTERY: Patent. RENAL ARTERIES: Patent. INFERIOR MESENTERIC ARTERY: Patent. ILIAC ARTERIES: Patent. Mild peripheral areas of calcification. .  Visualized femoral vasculature:  Patent. LIVER: Normal.  GALLBLADDER: Distended.   BILIARY TREE: Normal.  PANCREAS: Normal.  SPLEEN: Normal.  ADRENAL GLANDS: Normal.  KIDNEYS/URETERS/BLADDER: Multiple areas of peripheral hypoperfusion throughout  both kidneys which does persist on the venous and delayed images. No saurabh  obstructive changes. The bladder is catheterized/collapsed. RETROPERITONEUM: Normal.  BOWEL/MESENTERY: no obvious acute abnormality. No evidence of GI hemorrhage on  arterial, venous or delayed phase images. .  APPENDIX: Identified and normal.  PERITONEAL CAVITY: Profound abdominopelvic ascites distending the abdomen and  centralizing many of the bowel loops. REPRODUCTIVE ORGANS: Normal.  BONE/TISSUES: No acute osseous abnormality. Diffuse edema throughout the  subcutaneous tissue of the visualized chest abdomen, pelvis and bilateral lower  extremities. .    OTHER: None    Impression  Overall there is intact arterial circulation of the abdomen and  pelvis and there is no saurabh evidence of an acute GI hemorrhage. There is  however diffuse sequela of third spacing with bilateral pleural effusions,  profound abdominopelvic ascites, and diffuse subcutaneous edema. Considerations  for sequela of cardiac, hepatic and/or renal dysfunction. Bilateral areas of  renal parenchymal hypoperfusion are noted and could indicate sequela of  infection/pyelonephritis although by appearance might also suggest sequela of  renal infarcts of uncertain age. Component of CHF suggested given the lung base  findings described. .    Results called to Leann Vaca on 10/19/2021 12:27 AM.      · Discussion severe cardiomyopathy ejection fraction 20% with ascites peripheral edema bilateral pleural effusion left greater than right on CT scan December this changes on CT scan. He continues to smoke. Will increase Lasix to every 8 hour dosing for the next 24 hours  · Follow blood gases electrolytes and chest x-ray  · 10/20 intubated last evening blood gases well-maintained on the ventilator will maintain as is today. Lasix twice daily ordered. Will give 2 doses of albumin. Creatinine has risen from yesterday. · 10/21 still has significant ascites for EGD today with NG tube placement  · 10/22 start weaning change vent setting to spontaneous if weaning criteria acceptable will extubate. · 10/25 Last patient had spontaneous pneumothorax chest tube in place   · Time of care 30 minutes           Thank you for allowing us to participate in the care of this patient.   We will follow along with you   Kenyon Ma MD

## 2021-10-25 NOTE — PROGRESS NOTES
Pulmonary, Critical Care Note    Name: Justin Harris MRN: [id number]   : 1960 Hospital: [address] [place] [address]   Date: 10/25/2021  Admission date: 10/18/2021 Hospital Day: 8       Subjective/Interval History:   Seen in the ICU on nasal oxygen. He is awake alert extremely poor historian all I can tell from him is that he had shortness of breath and came to the hospital  10/20 worsening respiratory status last evening and required intubation now on the ventilator unresponsive on IV propofol    Patient Active Problem List   Diagnosis Code    CHF (congestive heart failure) ([facility]) I50.9    Respiratory failure with hypoxia ([facility] [address] J96.91    Fluid overload E87.70    Elevated brain natriuretic peptide (BNP) level R79.89    Noncompliance Z91.19    Tobacco abuse Z72.0    Pneumonia J18.9    Hypoxia R09.02    Severe protein-calorie malnutrition ([facility] [address] E43    Acute kidney injury superimposed on chronic kidney disease ([facility]) N17.9, N18.9    Hyperphosphatemia E83.39    Hypocalcemia E83.51    Polyuria R35.89       IMPRESSION:   1. Acute hypoxic respiratory failure controlled on the ventilator sedated with propofol  2. Spontaneous left-sided pneumothorax  3. Pulmonary edema chest x-ray still shows accentuated interstitial markings  4. Bilateral pleural effusions left greater than right  5. GI bleed rule out varices  6. Ascites  7. Severe cardiomyopathy  8. Pyuria  Body mass index is 18.74 kg/m². RECOMMENDATIONS/PLAN:   1. Patient had left complete pneumothorax which is spontaneous he is on ventilator  2. Status post chest tube placement by CT surgeon reexpansion of the left lung  3. Patient remained on ventilator 40% FiO2 tidal volume 500 PEEP of 5 rate of 12 we will start weaning, he desaturated last night vent was disconnected he was bagging tachypneic hypotensive Levophed has been increased   4. Lasix now twice daily received albumin  5.  Renal function improving we will continue to monitor  6. NG tube still has bloody secretion  7. Follow urine culture  8. Continue DuoNeb and Symbicort will decrease Solu-Medrol  9. Ascites present may need to consider paracentesis  10. Status post EGD with NG tube placement was done   11. Unable to get consent for paracentesis         Subjective/Initial History:   I have reviewed the flowsheet and previous days notes. I was asked by Kranthi Hector MD to see Patrick Phlegm a 59 y.o.  male  in consultation for a chief complaint of acute hypoxic respiratory failure pulmonary edema pleural effusion      The patient is unable to give any meaningful history or review of systems due to patient factors. Patient PCP: Other, MD Zak  PMH:  has a past medical history of Chronic obstructive pulmonary disease ([facility] [address] and Heart failure ([facility] [address] PSH:   has no past surgical history on file. FHX: family history includes Hypertension in his mother. SHX:  reports that he has been smoking. He has been smoking about 0.50 packs per day. He has never used smokeless tobacco. He reports current alcohol use. He reports previous drug use.   Systemic review unreliable he is very confused    No Known Allergies   MEDS:   Current Facility-Administered Medications   Medication    zinc oxide-white petrolatum 17-57 % topical paste    NOREPINephrine (LEVOPHED) 8 mg in 5% dextrose 250mL (32 mcg/mL) infusion    [Held by provider] furosemide (LASIX) injection 60 mg    methylPREDNISolone (PF) (SOLU-MEDROL) injection 40 mg    propofol (DIPRIVAN) 10 mg/mL infusion    pantoprazole (PROTONIX) 40 mg in 0.9% sodium chloride 10 mL injection    piperacillin-tazobactam (ZOSYN) 3.375 g in 0.9% sodium chloride (MBP/ADV) 100 mL MBP    aspirin chewable tablet 81 mg    sodium chloride (NS) flush 5-40 mL    sodium chloride (NS) flush 5-40 mL    sodium chloride (NS) flush 5-10 mL    azithromycin (ZITHROMAX) 500 mg in 0.9% sodium chloride 250 mL (VIAL-MATE)    albuterol-ipratropium (DUO-NEB) 2.5 MG-0.5 MG/3 ML    carvediloL (COREG) tablet 3.125 mg    acetaminophen (TYLENOL) tablet 650 mg    Or    acetaminophen (TYLENOL) suppository 650 mg    polyethylene glycol (MIRALAX) packet 17 g    ondansetron (ZOFRAN ODT) tablet 4 mg    Or    ondansetron (ZOFRAN) injection 4 mg        Current Facility-Administered Medications:     zinc oxide-white petrolatum 17-57 % topical paste, , Topical, TID, Maritza Jackson MD, Given at 10/24/21 2152    NOREPINephrine (LEVOPHED) 8 mg in 5% dextrose 250mL (32 mcg/mL) infusion, 0.5-120 mcg/min, IntraVENous, TITRATE, Maritza Jackson MD, Last Rate: 35.6 mL/hr at 10/25/21 0353, 19 mcg/min at 10/25/21 0353    [Held by provider] furosemide (LASIX) injection 60 mg, 60 mg, IntraVENous, BID, Brad Carter MD, 60 mg at 10/21/21 0823    methylPREDNISolone (PF) (SOLU-MEDROL) injection 40 mg, 40 mg, IntraVENous, Q12H, Lola Birch MD, 40 mg at 10/24/21 2151    propofol (DIPRIVAN) 10 mg/mL infusion, 0-50 mcg/kg/min, IntraVENous, TITRATE, Lola Birch MD, Last Rate: 7.6 mL/hr at 10/24/21 1834, 20 mcg/kg/min at 10/24/21 1834    pantoprazole (PROTONIX) 40 mg in 0.9% sodium chloride 10 mL injection, 40 mg, IntraVENous, Q12H, Samara Ramirez NP, 40 mg at 10/24/21 2143    piperacillin-tazobactam (ZOSYN) 3.375 g in 0.9% sodium chloride (MBP/ADV) 100 mL MBP, 3.375 g, IntraVENous, Q8H, Maritza Jackson MD, Last Rate: 25 mL/hr at 10/25/21 0628, 3.375 g at 10/25/21 9494    aspirin chewable tablet 81 mg, 81 mg, Oral, DAILY, Al-Khushi Castaneda MD, 81 mg at 10/24/21 0803    sodium chloride (NS) flush 5-40 mL, 5-40 mL, IntraVENous, Q8H, Maritza Jackson MD, 10 mL at 10/25/21 0510    sodium chloride (NS) flush 5-40 mL, 5-40 mL, IntraVENous, PRN, Christie Jackson MD    sodium chloride (NS) flush 5-10 mL, 5-10 mL, IntraVENous, PRN, Christie Jackson MD    azithromycin (ZITHROMAX) 500 mg in 0.9% sodium chloride 250 mL (VIAL-MATE), 500 mg, IntraVENous, Q24H, Maritza Jackson MD, Last Rate: 250 mL/hr at 10/24/21 2110, 500 mg at 10/24/21 2110    albuterol-ipratropium (DUO-NEB) 2.5 MG-0.5 MG/3 ML, 3 mL, Nebulization, Q6H PRN, Mohiuddin, Romana Dallas, MD    carvediloL (COREG) tablet 3.125 mg, 3.125 mg, Oral, BID WITH MEALS, Mohiuddin, Romana Dallas, MD, 3.125 mg at 10/24/21 1834    acetaminophen (TYLENOL) tablet 650 mg, 650 mg, Oral, Q6H PRN, 650 mg at 10/21/21 1648 **OR** acetaminophen (TYLENOL) suppository 650 mg, 650 mg, Rectal, Q6H PRN, Mohiuddin, Romana Dallas, MD    polyethylene glycol (MIRALAX) packet 17 g, 17 g, Oral, DAILY PRN, Mohiuddin, Romana Dallas, MD    ondansetron (ZOFRAN ODT) tablet 4 mg, 4 mg, Oral, Q8H PRN **OR** ondansetron (ZOFRAN) injection 4 mg, 4 mg, IntraVENous, Q6H PRN, Maritza Jackson MD      Objective:     Vital Signs: Telemetry:    normal sinus rhythm Intake/Output:   Visit Vitals  BP 91/82 (BP 1 Location: Left upper arm, BP Patient Position: At rest)   Pulse 76   Temp 97.2 °F (36.2 °C)   Resp 12   Ht 6' 2\" (1.88 m)   Wt 66.2 kg (145 lb 15.1 oz)   SpO2 100%   BMI 18.74 kg/m²       Temp (24hrs), Av.6 °F (37 °C), Min:97.2 °F (36.2 °C), Max:99.3 °F (37.4 °C)        O2 Device: Ventilator O2 Flow Rate (L/min): 3 l/min         Body mass index is 18.74 kg/m². Wt Readings from Last 4 Encounters:   10/25/21 66.2 kg (145 lb 15.1 oz)   21 52.4 kg (115 lb 8.3 oz)   21 54.2 kg (119 lb 7.8 oz)          Intake/Output Summary (Last 24 hours) at 10/25/2021 0829  Last data filed at 10/24/2021 1819  Gross per 24 hour   Intake    Output 350 ml   Net -350 ml       Last shift:      No intake/output data recorded. Last 3 shifts: 10/23 1901 - 10/25 0700  In: 405   Out: 950 [Urine:950]       Hemodynamics:    CO:    CI:    CVP:    SVR:   PAP Systolic:    PAP Diastolic:    PVR:    NL54:       Ventilator Settings:      Mode Rate TV Press PEEP FiO2 PIP Min.  Vent   Assist control, Volume control    500 ml    5 cm H20 65 %  16 cm H2O  5.4 l/min Physical Exam:    General:  male; unresponsive on propofol on the ventilator  HEENT: NCAT, poor dentition, oral mucosa moist  Eyes: anicteric; conjunctiva clear doll's eye reflex  Neck: no nodes, JVD present, no accessory MM use. Chest: no deformity,  Cardiac: Regular rate and rhythm systolic murmur present peripheral edema present  Lungs: Clear anteriorly with lateral rales extending posteriorly no wheezing  Abd: Relatively soft nontender bowel sounds present  Ext: Leg edema  no joint swelling; No clubbing  : clear urine  Neuro: Unresponsive on propofol doll's eye reflex present flaccid extremities  Psych-unable to assess  Skin: warm, dry, no cyanosis;  Pulses: Brachial and radial pulses intact  Capillary: Slow capillary refill      Labs:    Recent Labs     10/23/21  1415   WBC 5.3   HGB 12.9   *     Recent Labs     10/23/21  1415      K 3.8   *   CO2 28   *   BUN 59*   CREA 1.37*   CA 7.1*   ALB 2.2*   ALT 28   10/28/C 12  PEEP 5 FiO2 40% with PO2 90 PCO2 36 pH 7.40  Blood 1 of 4 with gram-positive cocci  Sputum culture pending  Urine culture pending  Nasal MRSA smear negative  BNP greater than 35,000  Troponin I 178, 135, 122  COVID-19 antigen negative  8/11/2021 echo ejection fraction 20% moderate aortic regurg mild to moderate mitral regurg IVC dilated PASP 57  Imaging:  I have personally reviewed the patients radiographs and have reviewed the reports:    CXR Results  (Last 48 hours)               10/18/21 1928  XR CHEST PORT Final result    Impression:  1. Small left pleural effusion and multifocal airspace disease suspicious for   pneumonia. 2.Mild pulmonary vascular congestion. Narrative:  AP portable chest, 1915 hours. Comparison: 9/3/2021. Findings: There is mild to moderate cardiomegaly. Mild pulmonary vascular   congestion is noted. There is patchy bilateral airspace disease, left greater   than right.  A small left pleural effusion is noted. There is more consolidative   airspace disease in the left lung base. No pneumothorax is identified. The   osseous structures are unremarkable. To me the x-ray looks more like accentuated interstitial markings consistent with pulmonary edema and the left effusion           Results from Hospital Encounter encounter on 10/18/21    XR CHEST PORT    Narrative  HISTORY:  chest tube placement    TECHNIQUE:  XR CHEST PORT    COMPARISON: 2 hours prior  LIMITATIONS: None    TUBES/LINES: Left chest tube placed with tip directed toward superomedial left  lung apex. Endotracheal tube present with tip 7.7 cm above brian. NG tube  present with sidehole still above the diaphragm. LUNG PARENCHYMA: Left lung collapse persists. Airspace disease throughout the  right lower lobe. TRACHEA/BRONCHI: Normal  PULMONARY VESSELS: Normal  PLEURA: Large left pneumothorax persists but improved since prior exam after  chest tube placement. There are bilateral pleural effusions. HEART: Enlarged  AORTIC SHADOW:Normal.  MEDIASTINUM: Normal  BONE/SOFT TISSUES: No acute abnormality. OTHER: None    Impression  Persistent although improved left pneumothorax with persistent left  lung collapse. Nonspecific airspace disease and effusion throughout the right  lung base. . Endotracheal tube tip could be advanced by a couple centimeters for  more optimal positioning. Nasogastric tube requires advancement by several  centimeters to locate sidehole below gastroesophageal junction. Results called to Physician: Souleymane Yusuf on 10/25/2021 5:13 AM.      XR CHEST PORT    Narrative  A single upright portable view is compared with a prior exam from 10/23/2021. There is a tracheostomy tube 13.7 cm from the brian. A nasogastric tube is seen  with the side port in the GE junction. The heart, mediastinum and pulmonary  vasculature are normal. The lungs are significant for a large, likely tension  pneumothorax on the left side.  The right lung is mildly compressed. Impression  Left-sided tension pneumothorax. Report phoned to [facility] at 2:45 AM      XR CHEST PORT    Narrative  XR CHEST PORT    Comparison: Chest radiograph dated October 22, 2021    Impression  FINDINGS/IMPRESSION:  Support lines and tubes are appropriate in radiographic position. Lungs similarly inflated with bibasilar atelectasis and bilateral pleural  effusions. Negative for pneumothorax. Persistent patchy airspace opacity throughout the lungs. This may represent  multilobar pneumonia or edema, or perhaps a combination of each. Results from [place] encounter on 10/18/21    CTA ABD PELV W WO CONT    Narrative  HISTORY:  possible gi bleed  Dose reduction technique: All CT scans at this facility are performed using dose reduction optimization  technique as appropriate on the exam including the following: Automated exposure  control, adjustment of the MA and/or KV according to patient size of use of  iterative reconstructive technique. .    TECHNIQUE: CTA ABD PELV W WO CONT. CT angiogram of the abdomen and pelvis  performed and maximum intensity projection images (MIPS) generated. 100 cc [id number]-[id number] injected. COMPARISON: None  LIMITATIONS: None    CHEST: Multichamber cardiomegaly. Small right and moderate left pleural  effusion. Bibasilar airspace consolidation overlies the effusions. Moderate  emphysematous changes incidentally noted at the lung bases. AORTA: No aneurysm or dissection. Mild peripheral areas of aortic calcification  CELIAC AXIS: Patent. SUPERIOR MESENTERIC ARTERY: Patent. RENAL ARTERIES: Patent. INFERIOR MESENTERIC ARTERY: Patent. ILIAC ARTERIES: Patent. Mild peripheral areas of calcification. .  Visualized femoral vasculature:  Patent. LIVER: Normal.  GALLBLADDER: Distended.   BILIARY TREE: Normal.  PANCREAS: Normal.  SPLEEN: Normal.  ADRENAL GLANDS: Normal.  KIDNEYS/URETERS/BLADDER: Multiple areas of peripheral hypoperfusion throughout  both kidneys which does persist on the venous and delayed images. No saurabh  obstructive changes. The bladder is catheterized/collapsed. RETROPERITONEUM: Normal.  BOWEL/MESENTERY: no obvious acute abnormality. No evidence of GI hemorrhage on  arterial, venous or delayed phase images. .  APPENDIX: Identified and normal.  PERITONEAL CAVITY: Profound abdominopelvic ascites distending the abdomen and  centralizing many of the bowel loops. REPRODUCTIVE ORGANS: Normal.  BONE/TISSUES: No acute osseous abnormality. Diffuse edema throughout the  subcutaneous tissue of the visualized chest abdomen, pelvis and bilateral lower  extremities. .    OTHER: None    Impression  Overall there is intact arterial circulation of the abdomen and  pelvis and there is no saurabh evidence of an acute GI hemorrhage. There is  however diffuse sequela of third spacing with bilateral pleural effusions,  profound abdominopelvic ascites, and diffuse subcutaneous edema. Considerations  for sequela of cardiac, hepatic and/or renal dysfunction. Bilateral areas of  renal parenchymal hypoperfusion are noted and could indicate sequela of  infection/pyelonephritis although by appearance might also suggest sequela of  renal infarcts of uncertain age. Component of CHF suggested given the lung base  findings described. .    Results called to Marquis Scott on 10/19/2021 12:27 AM.      · Discussion severe cardiomyopathy ejection fraction 20% with ascites peripheral edema bilateral pleural effusion left greater than right on CT scan December this changes on CT scan. He continues to smoke. Will increase Lasix to every 8 hour dosing for the next 24 hours  · Follow blood gases electrolytes and chest x-ray  · 10/20 intubated last evening blood gases well-maintained on the ventilator will maintain as is today. Lasix twice daily ordered. Will give 2 doses of albumin. Creatinine has risen from yesterday.   · 10/21 still has significant ascites for EGD today with NG tube placement  · 10/22 start weaning change vent setting to spontaneous if weaning criteria acceptable will extubate. · Time of care 30 minutes           Thank you for allowing us to participate in the care of this patient.   We will follow along with you   Jacquelin Barnes MD

## 2021-10-27 NOTE — ROUTINE PROCESS
CMS Notification of Death while in Restraints:  Restraint Use at Time of Death: ?Yes    ? No    Restraint Use Discontinued within 24 Hours of Death: ?Yes    ? No    Restraint use within one week prior to death where it is reasonable to assume that the use of restraint contributed directly to the patients death: ?Yes    ? No    CMS Restraint Criteria Met? ?Yes    ? No    Type of Restraints:   ? 2-point soft wrist (no seclusion)  ? 4-point, any kind  ? Vest  ? Seclusion  ? Posey bed      Documentation completed on facilitys Death/Restraint log per policy: ?Yes    ? No    CMS Notified: ?Yes    ?  No  Entered by:  Sharmin Cage, MSN, RN                        RN  10/27/21 5767

## 2021-11-09 NOTE — DISCHARGE SUMMARY
.     Discharge Summary       PATIENT ID: Wen Simmons  MRN: [id number]   YOB: 1960    DATE OF ADMISSION: 10/18/2021  6:46 PM    DATE OF DISCHARGE:   PRIMARY CARE PROVIDER: Zak Hart MD     ATTENDING PHYSICIAN: Yesica Jackson  DISCHARGING PROVIDER: Yesica Jackson      CONSULTATIONS: IP CONSULT TO PULMONOLOGY  IP CONSULT TO GASTROENTEROLOGY    PROCEDURES/SURGERIES: Procedure(s) with comments:  ESOPHAGOGASTRODUODENOSCOPY (EGD) with NG tube placement - pt intubated and sedated in ICU, anesthesia requested for additional support as needed for procedure    ADMITTING DIAGNOSES:    Patient Active Problem List    Diagnosis Date Noted    Polyuria 10/22/2021    Acute kidney injury superimposed on chronic kidney disease ([address] 10/21/2021    Hyperphosphatemia 10/21/2021    Hypocalcemia 10/21/2021    Severe protein-calorie malnutrition ([address] 10/20/2021    Pneumonia 10/18/2021    Hypoxia 10/18/2021    Respiratory failure with hypoxia ([address] 09/01/2021    Fluid overload 09/01/2021    Elevated brain natriuretic peptide (BNP) level 09/01/2021    Noncompliance 09/01/2021    Tobacco abuse 09/01/2021    CHF (congestive heart failure) ([facility] [address] 08/09/2021       DISCHARGE DIAGNOSES / PLAN:    Acute hypoxemic respiratory failure  on ventilator 100% O2  gram-negative pneumonia  Acute exacerbation of COPD  Acute systolic heart failure   Abdominal distention  Possible urinary retention  Thrombocytopenia  Neutropenia  Acute kidney injury  Elevated BNP  Elevated troponin  UTI with E. coli  Vitamin D deficiency        Patient is [age] y.o. year old male no past medical history of hypertension COPD cardiomyopathy with systolic heart failure with ejection fraction with 25% ongoing smoking came to emergency room because of shortness of breath patient was recently discharged from the hospital after multiple admission for same reason seen by the ER physician x-ray shows pneumonia patient was recommend to be admitted for further evaluation treatment at the same time patient has no abdominal pain  No work-up done in the ER initially , abdominal distended no CT scan was ordered, nurse try to put the [place] in no urine output at that time CT scan of the abdomen was ordered results pending        On ventilator on 65 % O2     Patient have a cardiac arrest and intubated      On propofol for sedation  NG tube with feeding patient started on aspirin  Zithromax and IV Zosyn) Coreg 2.125 twice daily Protonix 40 mg every 12 hours Heparin 5000 subcu every 8 hours and IV Solu-Medrol 40 IV every 12    Thoracic surgeon nephrologist was consulted on this patient     patient have endoscopy done showed esophageal varices without bleeding lot of edema in the oropharynx and nasopharynx duodenitis without bleeding    Later on patient     Patient was DNR        DISCHARGE MEDICATIONS:  Discharge Medication List as of 10/25/2021  7:45 PM            NOTIFY YOUR PHYSICIAN FOR ANY OF THE FOLLOWING:   Fever over 101 degrees for 24 hours. Chest pain, shortness of breath, fever, chills, nausea, vomiting, diarrhea, change in mentation, falling, weakness, bleeding. Severe pain or pain not relieved by medications. Or, any other signs or symptoms that you may have questions about. DISPOSITION:  x  Home With:   OT  PT  HH  RN       Long term SNF/Inpatient Rehab    Independent/assisted living    Hospice    Other:       PATIENT CONDITION AT DISCHARGE: Stable      PHYSICAL EXAMINATION AT DISCHARGE:  General:          Alert, cooperative, no distress, appears stated age. HEENT:           Atraumatic, anicteric sclerae, pink conjunctivae                          No oral ulcers, mucosa moist, throat clear, dentition fair  Neck:               Supple, symmetrical  Lungs:             Clear to auscultation bilaterally. No Wheezing or Rhonchi. No rales. Chest wall:      No tenderness  No Accessory muscle use.   Heart:              Regular  rhythm,  No  murmur No edema  Abdomen:        Soft, non-tender. Not distended. Bowel sounds normal  Extremities:     No cyanosis. No clubbing,                            Skin turgor normal, Capillary refill normal  Skin:                Not pale. Not Jaundiced  No rashes   Psych:             Not anxious or agitated. Neurologic:      Alert, moves all extremities, answers questions appropriately and responds to commands     XR CHEST PORT   Final Result   Findings/impression:      Stable support hardware. Interval reexpansion of the left lung with no evidence of residual pneumothorax. Bibasilar interstitial and groundglass airspace disease. Bilateral pleural   effusions. Cardiomediastinal contours are stable. Mild central vascular congestion. Visualized osseous structures are unchanged. XR CHEST PORT   Final Result   Persistent although improved left pneumothorax with persistent left   lung collapse. Nonspecific airspace disease and effusion throughout the right   lung base. . Endotracheal tube tip could be advanced by a couple centimeters for   more optimal positioning. Nasogastric tube requires advancement by several   centimeters to locate sidehole below gastroesophageal junction. Results called to Physician: Osmar Moya on 10/25/2021 5:13 AM.      XR CHEST PORT   Final Result   Left-sided tension pneumothorax. Report phoned to [facility] at 2:45 AM      XR CHEST PORT   Final Result   FINDINGS/IMPRESSION:   Support lines and tubes are appropriate in radiographic position. Lungs similarly inflated with bibasilar atelectasis and bilateral pleural   effusions. Negative for pneumothorax. Persistent patchy airspace opacity throughout the lungs. This may represent   multilobar pneumonia or edema, or perhaps a combination of each.           XR CHEST PORT   Final Result      XR CHEST PORT   Final Result      XR CHEST PORT   Final Result      XR CHEST PORT   Final Result   Findings/impression:   1. Stable ET tube. Interval removal of defibrillator pads. The previously noted   malpositioned OG, is not visualized. 2.  Patchy groundglass opacities in the hilar and perihilar region bilaterally   overall stable. No new consolidation. 3.  Remainder unchanged            XR CHEST PORT   Final Result   Findings/impression:   1. Interval placement of an OG tube which is coiled in the upper neck. Please   remove and replace. 2.  The ET tube is in good position stable. Right chest defibrillator pad. 3.  Slightly more conspicuous bibasilar atelectasis and interstitial hilar   opacities. No new consolidation. 4.  Redemonstration of enlarged cardiac silhouette, stable. US RETROPERITONEUM COMP   Final Result   Echogenic kidneys consistent with medical renal disease. No   hydronephrosis. Ascites. XR CHEST PORT   Final Result      XR CHEST PORT   Final Result   Cardiomegaly and/or pericardial effusion with mild sequela of   presumed CHF and/or pulmonary edema, infection, or combination of these and with   associated left pleural effusion. ET tube in place as described      CTA ABD PELV W WO CONT   Final Result   Overall there is intact arterial circulation of the abdomen and   pelvis and there is no saurabh evidence of an acute GI hemorrhage. There is   however diffuse sequela of third spacing with bilateral pleural effusions,   profound abdominopelvic ascites, and diffuse subcutaneous edema. Considerations   for sequela of cardiac, hepatic and/or renal dysfunction. Bilateral areas of   renal parenchymal hypoperfusion are noted and could indicate sequela of   infection/pyelonephritis although by appearance might also suggest sequela of   renal infarcts of uncertain age. Component of CHF suggested given the lung base   findings described. .      Results called to Chin Phipps on 10/19/2021 12:27 AM.      XR CHEST PORT   Final Result   1.  Small left pleural effusion and multifocal airspace disease suspicious for   pneumonia. 2.Mild pulmonary vascular congestion. No results found for this or any previous visit (from the past 24 hour(s)).        HOSPITAL COURSE:      Signed:   Carmen Kam MD  11/9/2021  1:56 PM
